# Patient Record
Sex: FEMALE | Race: WHITE | Employment: OTHER | URBAN - NONMETROPOLITAN AREA
[De-identification: names, ages, dates, MRNs, and addresses within clinical notes are randomized per-mention and may not be internally consistent; named-entity substitution may affect disease eponyms.]

---

## 2017-07-17 ENCOUNTER — APPOINTMENT (OUTPATIENT)
Dept: GENERAL RADIOLOGY | Age: 72
End: 2017-07-17
Payer: MEDICARE

## 2017-07-17 ENCOUNTER — APPOINTMENT (OUTPATIENT)
Dept: CT IMAGING | Age: 72
End: 2017-07-17
Payer: MEDICARE

## 2017-07-17 ENCOUNTER — HOSPITAL ENCOUNTER (EMERGENCY)
Age: 72
Discharge: HOME | End: 2017-07-17
Attending: FAMILY MEDICINE
Payer: MEDICARE

## 2017-07-17 VITALS
HEIGHT: 60 IN | RESPIRATION RATE: 18 BRPM | SYSTOLIC BLOOD PRESSURE: 147 MMHG | HEART RATE: 64 BPM | OXYGEN SATURATION: 98 % | DIASTOLIC BLOOD PRESSURE: 72 MMHG | BODY MASS INDEX: 24.54 KG/M2 | TEMPERATURE: 98.1 F | WEIGHT: 125 LBS

## 2017-07-17 DIAGNOSIS — S30.0XXA SACRAL CONTUSION, INITIAL ENCOUNTER: ICD-10-CM

## 2017-07-17 DIAGNOSIS — S00.03XA CONTUSION OF SCALP, INITIAL ENCOUNTER: ICD-10-CM
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
## 2017-07-17 DIAGNOSIS — S01.01XA SCALP LACERATION, INITIAL ENCOUNTER: Primary | ICD-10-CM

## 2017-07-17 PROCEDURE — 6370000000 HC RX 637 (ALT 250 FOR IP): Performed by: PHYSICIAN ASSISTANT

## 2017-07-17 PROCEDURE — 72125 CT NECK SPINE W/O DYE: CPT

## 2017-07-17 PROCEDURE — 72220 X-RAY EXAM SACRUM TAILBONE: CPT

## 2017-07-17 PROCEDURE — 12001 RPR S/N/AX/GEN/TRNK 2.5CM/<: CPT

## 2017-07-17 PROCEDURE — 73523 X-RAY EXAM HIPS BI 5/> VIEWS: CPT

## 2017-07-17 PROCEDURE — 70450 CT HEAD/BRAIN W/O DYE: CPT

## 2017-07-17 PROCEDURE — 99284 EMERGENCY DEPT VISIT MOD MDM: CPT

## 2017-07-17 RX ORDER — CALCIUM CARBONATE 500(1250)
500 TABLET ORAL DAILY
COMMUNITY
End: 2022-04-18

## 2017-07-17 RX ADMIN — Medication 3 ML: at 14:09

## 2017-07-17 ASSESSMENT — ENCOUNTER SYMPTOMS
CHEST TIGHTNESS: 0
NAUSEA: 0
SHORTNESS OF BREATH: 0
BACK PAIN: 0
DIARRHEA: 0
EYE DISCHARGE: 0
COUGH: 0
VOMITING: 0
ABDOMINAL PAIN: 0
RHINORRHEA: 0
WHEEZING: 0
FACIAL SWELLING: 0
PHOTOPHOBIA: 0
ABDOMINAL DISTENTION: 0
SORE THROAT: 0
STRIDOR: 0
VOICE CHANGE: 0
COLOR CHANGE: 0
EYE REDNESS: 0
EYE PAIN: 0
CONSTIPATION: 0

## 2017-07-17 ASSESSMENT — PAIN DESCRIPTION - DESCRIPTORS: DESCRIPTORS: THROBBING

## 2017-07-17 ASSESSMENT — PAIN DESCRIPTION - PAIN TYPE: TYPE: ACUTE PAIN

## 2017-07-17 ASSESSMENT — PAIN SCALES - GENERAL: PAINLEVEL_OUTOF10: 7

## 2019-09-26 ENCOUNTER — HOSPITAL ENCOUNTER (OUTPATIENT)
Dept: GENERAL RADIOLOGY | Age: 74
Discharge: HOME | End: 2019-09-26
Payer: MEDICARE

## 2019-09-26 DIAGNOSIS — R13.14 DYSPHAGIA, PHARYNGOESOPHAGEAL: ICD-10-CM

## 2019-09-26 PROCEDURE — A4641 RADIOPHARM DX AGENT NOC: HCPCS | Performed by: OTOLARYNGOLOGY

## 2019-09-26 PROCEDURE — 6370000000 HC RX 637 (ALT 250 FOR IP): Performed by: OTOLARYNGOLOGY

## 2019-09-26 PROCEDURE — 74220 X-RAY XM ESOPHAGUS 1CNTRST: CPT

## 2019-09-26 PROCEDURE — 2500000003 HC RX 250 WO HCPCS: Performed by: OTOLARYNGOLOGY

## 2019-09-26 PROCEDURE — 6360000004 HC RX CONTRAST MEDICATION: Performed by: OTOLARYNGOLOGY

## 2019-09-26 RX ADMIN — BARIUM SULFATE 40 ML: 980 POWDER, FOR SUSPENSION ORAL at 08:36

## 2019-09-26 RX ADMIN — BARIUM SULFATE 40 ML: 0.6 SUSPENSION ORAL at 08:36

## 2019-09-26 RX ADMIN — ANTACID/ANTIFLATULENT 1 EACH: 380; 550; 10; 10 GRANULE, EFFERVESCENT ORAL at 08:36

## 2021-02-03 ENCOUNTER — HOSPITAL ENCOUNTER (EMERGENCY)
Age: 76
Discharge: HOME | End: 2021-02-03
Payer: MEDICARE

## 2021-02-03 ENCOUNTER — APPOINTMENT (OUTPATIENT)
Dept: CT IMAGING | Age: 76
End: 2021-02-03
Payer: MEDICARE

## 2021-02-03 VITALS
HEIGHT: 60 IN | SYSTOLIC BLOOD PRESSURE: 127 MMHG | DIASTOLIC BLOOD PRESSURE: 62 MMHG | OXYGEN SATURATION: 99 % | BODY MASS INDEX: 23.16 KG/M2 | HEART RATE: 78 BPM | RESPIRATION RATE: 15 BRPM | TEMPERATURE: 97.8 F | WEIGHT: 118 LBS

## 2021-02-03 DIAGNOSIS — N20.0 KIDNEY STONE: Primary | ICD-10-CM

## 2021-02-03 LAB
ALBUMIN SERPL-MCNC: 4 G/DL (ref 3.5–5.1)
ALP BLD-CCNC: 63 U/L (ref 38–126)
ALT SERPL-CCNC: 15 U/L (ref 11–66)
ANION GAP SERPL CALCULATED.3IONS-SCNC: 7 MEQ/L (ref 8–16)
AST SERPL-CCNC: 29 U/L (ref 5–40)
BASOPHILS # BLD: 0.4 %
BASOPHILS ABSOLUTE: 0 THOU/MM3 (ref 0–0.1)
BILIRUB SERPL-MCNC: 0.5 MG/DL (ref 0.3–1.2)
BILIRUBIN DIRECT: < 0.2 MG/DL (ref 0–0.3)
BUN BLDV-MCNC: 23 MG/DL (ref 7–22)
CALCIUM SERPL-MCNC: 9.4 MG/DL (ref 8.5–10.5)
CHLORIDE BLD-SCNC: 99 MEQ/L (ref 98–111)
CO2: 32 MEQ/L (ref 23–33)
CREAT SERPL-MCNC: 0.6 MG/DL (ref 0.4–1.2)
EKG ATRIAL RATE: 62 BPM
EKG P AXIS: 34 DEGREES
EKG P-R INTERVAL: 144 MS
EKG Q-T INTERVAL: 406 MS
EKG QRS DURATION: 74 MS
EKG QTC CALCULATION (BAZETT): 412 MS
EKG R AXIS: 89 DEGREES
EKG T AXIS: 77 DEGREES
EKG VENTRICULAR RATE: 62 BPM
EOSINOPHIL # BLD: 0.4 %
EOSINOPHILS ABSOLUTE: 0 THOU/MM3 (ref 0–0.4)
ERYTHROCYTE [DISTWIDTH] IN BLOOD BY AUTOMATED COUNT: 12.9 % (ref 11.5–14.5)
ERYTHROCYTE [DISTWIDTH] IN BLOOD BY AUTOMATED COUNT: 49.1 FL (ref 35–45)
GFR SERPL CREATININE-BSD FRML MDRD: > 90 ML/MIN/1.73M2
GLUCOSE BLD-MCNC: 121 MG/DL (ref 70–108)
HCT VFR BLD CALC: 40.5 % (ref 37–47)
HEMOGLOBIN: 12.7 GM/DL (ref 12–16)
IMMATURE GRANS (ABS): 0.02 THOU/MM3 (ref 0–0.07)
IMMATURE GRANULOCYTES: 0.2 %
LIPASE: 40.9 U/L (ref 5.6–51.3)
LYMPHOCYTES # BLD: 16.9 %
LYMPHOCYTES ABSOLUTE: 1.4 THOU/MM3 (ref 1–4.8)
MCH RBC QN AUTO: 32.4 PG (ref 26–33)
MCHC RBC AUTO-ENTMCNC: 31.4 GM/DL (ref 32.2–35.5)
MCV RBC AUTO: 103.3 FL (ref 81–99)
MONOCYTES # BLD: 5.2 %
MONOCYTES ABSOLUTE: 0.4 THOU/MM3 (ref 0.4–1.3)
NUCLEATED RED BLOOD CELLS: 0 /100 WBC
OSMOLALITY CALCULATION: 280.6 MOSMOL/KG (ref 275–300)
PLATELET # BLD: 190 THOU/MM3 (ref 130–400)
PMV BLD AUTO: 10.2 FL (ref 9.4–12.4)
POTASSIUM REFLEX MAGNESIUM: 3.7 MEQ/L (ref 3.5–5.2)
RBC # BLD: 3.92 MILL/MM3 (ref 4.2–5.4)
SEG NEUTROPHILS: 76.9 %
SEGMENTED NEUTROPHILS ABSOLUTE COUNT: 6.2 THOU/MM3 (ref 1.8–7.7)
SODIUM BLD-SCNC: 138 MEQ/L (ref 135–145)
TOTAL PROTEIN: 6.6 G/DL (ref 6.1–8)
WBC # BLD: 8.1 THOU/MM3 (ref 4.8–10.8)

## 2021-02-03 PROCEDURE — 80048 BASIC METABOLIC PNL TOTAL CA: CPT

## 2021-02-03 PROCEDURE — 96372 THER/PROPH/DIAG INJ SC/IM: CPT

## 2021-02-03 PROCEDURE — 6370000000 HC RX 637 (ALT 250 FOR IP): Performed by: NURSE PRACTITIONER

## 2021-02-03 PROCEDURE — 99285 EMERGENCY DEPT VISIT HI MDM: CPT

## 2021-02-03 PROCEDURE — 6360000002 HC RX W HCPCS: Performed by: NURSE PRACTITIONER

## 2021-02-03 PROCEDURE — 93010 ELECTROCARDIOGRAM REPORT: CPT | Performed by: NUCLEAR MEDICINE

## 2021-02-03 PROCEDURE — 80076 HEPATIC FUNCTION PANEL: CPT

## 2021-02-03 PROCEDURE — 83690 ASSAY OF LIPASE: CPT

## 2021-02-03 PROCEDURE — 36415 COLL VENOUS BLD VENIPUNCTURE: CPT

## 2021-02-03 PROCEDURE — 74177 CT ABD & PELVIS W/CONTRAST: CPT

## 2021-02-03 PROCEDURE — 96374 THER/PROPH/DIAG INJ IV PUSH: CPT

## 2021-02-03 PROCEDURE — 93005 ELECTROCARDIOGRAM TRACING: CPT | Performed by: NURSE PRACTITIONER

## 2021-02-03 PROCEDURE — 96375 TX/PRO/DX INJ NEW DRUG ADDON: CPT

## 2021-02-03 PROCEDURE — 85025 COMPLETE CBC W/AUTO DIFF WBC: CPT

## 2021-02-03 PROCEDURE — 6360000004 HC RX CONTRAST MEDICATION: Performed by: NURSE PRACTITIONER

## 2021-02-03 RX ORDER — MELOXICAM 15 MG/1
15 TABLET ORAL DAILY
Status: ON HOLD | COMMUNITY
End: 2022-05-29 | Stop reason: HOSPADM

## 2021-02-03 RX ORDER — KETOROLAC TROMETHAMINE 10 MG/1
10 TABLET, FILM COATED ORAL EVERY 6 HOURS PRN
Qty: 20 TABLET | Refills: 0 | Status: SHIPPED | OUTPATIENT
Start: 2021-02-03 | End: 2021-02-12

## 2021-02-03 RX ORDER — MORPHINE SULFATE 4 MG/ML
4 INJECTION, SOLUTION INTRAMUSCULAR; INTRAVENOUS ONCE
Status: COMPLETED | OUTPATIENT
Start: 2021-02-03 | End: 2021-02-03

## 2021-02-03 RX ORDER — PROMETHAZINE HYDROCHLORIDE 25 MG/1
25 TABLET ORAL 3 TIMES DAILY PRN
Qty: 12 TABLET | Refills: 0 | Status: SHIPPED | OUTPATIENT
Start: 2021-02-03 | End: 2021-02-10

## 2021-02-03 RX ORDER — HYDROCODONE BITARTRATE AND ACETAMINOPHEN 5; 325 MG/1; MG/1
1 TABLET ORAL ONCE
Status: COMPLETED | OUTPATIENT
Start: 2021-02-03 | End: 2021-02-03

## 2021-02-03 RX ORDER — KETOROLAC TROMETHAMINE 30 MG/ML
15 INJECTION, SOLUTION INTRAMUSCULAR; INTRAVENOUS ONCE
Status: COMPLETED | OUTPATIENT
Start: 2021-02-03 | End: 2021-02-03

## 2021-02-03 RX ORDER — TAMSULOSIN HYDROCHLORIDE 0.4 MG/1
0.4 CAPSULE ORAL DAILY
Qty: 5 CAPSULE | Refills: 0 | Status: SHIPPED | OUTPATIENT
Start: 2021-02-03 | End: 2021-02-05 | Stop reason: SDUPTHER

## 2021-02-03 RX ORDER — HYDROCODONE BITARTRATE AND ACETAMINOPHEN 5; 325 MG/1; MG/1
1 TABLET ORAL EVERY 4 HOURS PRN
Qty: 18 TABLET | Refills: 0 | Status: SHIPPED | OUTPATIENT
Start: 2021-02-03 | End: 2021-02-06

## 2021-02-03 RX ORDER — METOCLOPRAMIDE HYDROCHLORIDE 5 MG/ML
10 INJECTION INTRAMUSCULAR; INTRAVENOUS ONCE
Status: COMPLETED | OUTPATIENT
Start: 2021-02-03 | End: 2021-02-03

## 2021-02-03 RX ORDER — PROMETHAZINE HYDROCHLORIDE 25 MG/ML
25 INJECTION, SOLUTION INTRAMUSCULAR; INTRAVENOUS ONCE
Status: DISCONTINUED | OUTPATIENT
Start: 2021-02-03 | End: 2021-02-03 | Stop reason: HOSPADM

## 2021-02-03 RX ORDER — ONDANSETRON 2 MG/ML
4 INJECTION INTRAMUSCULAR; INTRAVENOUS ONCE
Status: COMPLETED | OUTPATIENT
Start: 2021-02-03 | End: 2021-02-03

## 2021-02-03 RX ORDER — TAMSULOSIN HYDROCHLORIDE 0.4 MG/1
0.4 CAPSULE ORAL DAILY
Status: DISCONTINUED | OUTPATIENT
Start: 2021-02-03 | End: 2021-02-03 | Stop reason: HOSPADM

## 2021-02-03 RX ADMIN — IOPAMIDOL 80 ML: 755 INJECTION, SOLUTION INTRAVENOUS at 09:58

## 2021-02-03 RX ADMIN — METOCLOPRAMIDE 10 MG: 5 INJECTION, SOLUTION INTRAMUSCULAR; INTRAVENOUS at 11:27

## 2021-02-03 RX ADMIN — KETOROLAC TROMETHAMINE 15 MG: 30 INJECTION, SOLUTION INTRAMUSCULAR at 10:15

## 2021-02-03 RX ADMIN — MORPHINE SULFATE 4 MG: 4 INJECTION, SOLUTION INTRAMUSCULAR; INTRAVENOUS at 09:08

## 2021-02-03 RX ADMIN — ONDANSETRON 4 MG: 2 INJECTION INTRAMUSCULAR; INTRAVENOUS at 09:07

## 2021-02-03 RX ADMIN — HYDROCODONE BITARTRATE AND ACETAMINOPHEN 1 TABLET: 5; 325 TABLET ORAL at 11:21

## 2021-02-03 RX ADMIN — TAMSULOSIN HYDROCHLORIDE 0.4 MG: 0.4 CAPSULE ORAL at 11:22

## 2021-02-03 ASSESSMENT — PAIN SCALES - GENERAL
PAINLEVEL_OUTOF10: 10
PAINLEVEL_OUTOF10: 10
PAINLEVEL_OUTOF10: 8
PAINLEVEL_OUTOF10: 7

## 2021-02-03 ASSESSMENT — PAIN DESCRIPTION - ORIENTATION: ORIENTATION: RIGHT

## 2021-02-03 NOTE — ED NOTES
Pain 8/10 at this time. Pulse ox 84%. Placed patient on 2L NC. Pulse ox up to 95%. Patient resting in bed. Respirations easy and unlabored. No distress noted. Call light within reach.        Erica Madison RN  02/03/21 8549

## 2021-02-03 NOTE — ED TRIAGE NOTES
Presents to ED with c/o right flank pain that radiates into lower abdomen. Pain started at 0600 this morning. Reports nausea.

## 2021-02-03 NOTE — ED PROVIDER NOTES
[facility] COMPLAINT       Chief Complaint   Patient presents with    Abdominal Pain       Nurses Notes reviewed and I agree except as noted in the HPI. HISTORY OF PRESENT ILLNESS    Gail Daniels is a 66 y.o. female who presents to the Emergency Department for the evaluation of right flank pain and right lower quadrant abdominal pain that started this morning at approximately 8 PM.  Patient states the pain was not present when she woke, had no pain yesterday evening when she went to bed. Developed sudden onset right flank pain radiating to her right groin. Reports decreased urine output since, associated nausea and vomiting. Denies history of this type of pain, denies exacerbating or alleviating factors. The HPI was provided by the patient. REVIEW OF SYSTEMS     Review of Systems   Constitutional: Negative for chills, diaphoresis, fatigue and fever. HENT: Negative for congestion, ear pain, nosebleeds, rhinorrhea, sinus pressure, sinus pain, sore throat and trouble swallowing. Eyes: Negative for photophobia. Respiratory: Negative for cough, chest tightness, shortness of breath and wheezing. Cardiovascular: Negative for chest pain and palpitations. Gastrointestinal: Positive for abdominal pain (RLQ/groin), nausea and vomiting. Negative for constipation and diarrhea. Endocrine: Negative for cold intolerance and heat intolerance. Genitourinary: Positive for decreased urine volume and flank pain. Negative for difficulty urinating, dysuria, hematuria, pelvic pain, vaginal bleeding, vaginal discharge and vaginal pain. Musculoskeletal: Negative for arthralgias, back pain, joint swelling, myalgias and neck stiffness. Skin: Negative for color change and wound. Neurological: Negative for dizziness, weakness, light-headedness, numbness and headaches.    Psychiatric/Behavioral: Negative for agitation, behavioral problems, confusion, hallucinations, self-injury and suicidal ideas. The patient is not nervous/anxious. PAST MEDICAL HISTORY    has a past medical history of GERD (gastroesophageal reflux disease), Osteoarthritis, Paralysis of vocal cords, and Polio. SURGICAL HISTORY      has a past surgical history that includes Cholecystectomy and knee surgery. CURRENT MEDICATIONS       Discharge Medication List as of 2/3/2021 11:12 AM      CONTINUE these medications which have NOT CHANGED    Details   meloxicam (MOBIC) 7.5 MG tablet Take 7.5 mg by mouth dailyHistorical Med      calcium carbonate (OSCAL) 500 MG TABS tablet Take 500 mg by mouth dailyHistorical Med      Multiple Vitamins-Minerals (PRESERVISION AREDS 2) CAPS Take  by mouth 2 times daily. 2 tabs BID      omeprazole (PRILOSEC) 20 MG capsule Take 20 mg by mouth daily. raloxifene (EVISTA) 60 MG tablet Take 60 mg by mouth daily. fluticasone (FLONASE) 50 MCG/ACT nasal spray 1 spray by Nasal route daily. multivitamin (THERAGRAN) per tablet Take 1 tablet by mouth daily. ALLERGIES     is allergic to latex. FAMILY HISTORY     has no family status information on file. family history is not on file. SOCIAL HISTORY      reports that she has never smoked. She has never used smokeless tobacco. She reports current alcohol use. She reports that she does not use drugs. PHYSICAL EXAM     INITIAL VITALS:  height is 5' (1.524 m) and weight is 118 lb (53.5 kg). Her oral temperature is 97.8 °F (36.6 °C). Her blood pressure is 127/62 and her pulse is 78. Her respiration is 15 and oxygen saturation is 99%. Physical Exam  Vitals signs and nursing note reviewed. Constitutional:       General: She is awake. She is not in acute distress. Appearance: Normal appearance. She is well-developed and normal weight. She is ill-appearing. She is not toxic-appearing or diaphoretic. HENT:      Head: Normocephalic and atraumatic.       Right Ear: Tympanic membrane normal.      Left Ear: Tympanic membrane normal.      Nose: Nose normal.      Mouth/Throat:      Mouth: Mucous membranes are moist.      Pharynx: Oropharynx is clear. Eyes:      Extraocular Movements: Extraocular movements intact. Pupils: Pupils are equal, round, and reactive to light. Neck:      Musculoskeletal: Normal range of motion and neck supple. No neck rigidity or muscular tenderness. Vascular: No carotid bruit. Cardiovascular:      Rate and Rhythm: Normal rate and regular rhythm. Pulses: Normal pulses. Heart sounds: Normal heart sounds, S1 normal and S2 normal. Heart sounds not distant. No murmur. No friction rub. No gallop. Pulmonary:      Effort: Pulmonary effort is normal. No tachypnea, bradypnea, accessory muscle usage, prolonged expiration, respiratory distress or retractions. Breath sounds: Normal breath sounds. Abdominal:      General: Abdomen is flat. Bowel sounds are normal. There is no distension or abdominal bruit. There are no signs of injury. Palpations: Abdomen is soft. There is no shifting dullness, fluid wave, hepatomegaly, splenomegaly, mass or pulsatile mass. Tenderness: There is abdominal tenderness in the right lower quadrant and suprapubic area. There is right CVA tenderness. There is no guarding or rebound. Hernia: No hernia is present. Musculoskeletal: Normal range of motion. General: No swelling, tenderness, deformity or signs of injury. Right lower leg: No edema. Left lower leg: No edema. Lymphadenopathy:      Cervical: No cervical adenopathy. Skin:     General: Skin is warm and dry. Capillary Refill: Capillary refill takes less than 2 seconds. Neurological:      General: No focal deficit present. Mental Status: She is alert and oriented to person, place, and time. Mental status is at baseline. GCS: GCS eye subscore is 4. GCS verbal subscore is 5. GCS motor subscore is 6. within normal limits   HEPATIC FUNCTION PANEL   LIPASE   GLOMERULAR FILTRATION RATE, ESTIMATED   OSMOLALITY   URINE RT REFLEX TO CULTURE       EMERGENCY DEPARTMENT COURSE:   Vitals:    Vitals:    02/03/21 0854 02/03/21 1016   BP: 136/63 127/62   Pulse: 66 78   Resp: 14 15   Temp: 97.8 °F (36.6 °C)    TempSrc: Oral    SpO2: 98% 99%   Weight: 118 lb (53.5 kg)    Height: 5' (1.524 m)        8:49 AM EST: The patient was seen and evaluated. Patient seen and evaluated    MDM:  Patient was having significant discomfort, pain was treated appropriately, she had right CVA tenderness on exam.  Denied hematuria. Reported some decreased urine output. Labs and imaging were ordered. EKG completed by nursing staff upon arrival based on chief complaint. Pain not improved with morphine, she was given 15 mg Toradol with some improvement. CT scan shows small right UVJ stone with mild hydronephrosis. She was experiencing some significant nausea initially treated with Zofran, subsequently treated with Phenergan. I discussed admission due to patient having significant pain and nausea versus outpatient follow-up with urology. After much discussion with her , patient elected to go home with appropriate analgesia and go to urology appointment tomorrow. Encouraged to return if pain becomes more severe. CRITICAL CARE:   None    CONSULTS:  None    PROCEDURES:  None    FINAL IMPRESSION      1. Kidney stone          DISPOSITION/PLAN   Discharge    PATIENT REFERRED TO:  [address] [facility]  [address]  Go in 1 day      [facility] EMERGENCY DEPT  [address] [address]  [phone]  Go to   If symptoms worsen      DISCHARGE MEDICATIONS:  Discharge Medication List as of 2/3/2021 11:12 AM      START taking these medications    Details   tamsulosin (FLOMAX) 0.4 MG capsule Take 1 capsule by mouth daily for 5 doses, Disp-5 capsule, R-0Print      HYDROcodone-acetaminophen (NORCO) 5-325 MG per tablet Take 1 tablet by mouth every 4 hours as needed for Pain for up to 3 days. Intended supply: 3 days. Take lowest dose possible to manage pain, Disp-18 tablet, R-0Print      ketorolac (TORADOL) 10 MG tablet Take 1 tablet by mouth every 6 hours as needed for Pain, Disp-20 tablet, R-0Print             (Please note that portions of this note were completed with a voice recognition program.  Efforts were made to edit the dictations but occasionally words are mis-transcribed.)    The patient was given an opportunity to see the Emergency Attending. The patient voiced understanding that I was a Mid-LevelProvider and was in agreement with being seen independently by myself. Provider:  I personally performed the services described in the documentation, reviewed and edited the documentation which was dictated to the scribe in my presence, and it accurately records my words and actions.     IMELDA Madrigal CNP, 2/3/21, 3:39 AM        IMELDA Madrigal CNP  02/05/21 0270

## 2021-02-03 NOTE — ED NOTES
Patient resting in bed. Respirations easy and unlabored. No distress noted. Call light within reach.        Carlos Manuel Cuello RN  02/03/21 1016

## 2021-02-05 ENCOUNTER — OFFICE VISIT (OUTPATIENT)
Dept: UROLOGY | Age: 76
End: 2021-02-05
Payer: MEDICARE

## 2021-02-05 ENCOUNTER — TELEPHONE (OUTPATIENT)
Dept: UROLOGY | Age: 76
End: 2021-02-05

## 2021-02-05 VITALS — BODY MASS INDEX: 22.78 KG/M2 | WEIGHT: 116 LBS | HEIGHT: 60 IN | TEMPERATURE: 97.1 F

## 2021-02-05 DIAGNOSIS — N20.0 KIDNEY STONE: Primary | ICD-10-CM

## 2021-02-05 LAB
BILIRUBIN URINE: NEGATIVE
BLOOD URINE, POC: NEGATIVE
CHARACTER, URINE: CLEAR
COLOR, URINE: YELLOW
GLUCOSE URINE: NEGATIVE MG/DL
KETONES, URINE: 40
LEUKOCYTE CLUMPS, URINE: ABNORMAL
NITRITE, URINE: NEGATIVE
PH, URINE: 7.5 (ref 5–9)
PROTEIN, URINE: NEGATIVE MG/DL
SPECIFIC GRAVITY, URINE: 1.02 (ref 1–1.03)
UROBILINOGEN, URINE: 1 EU/DL (ref 0–1)

## 2021-02-05 PROCEDURE — 99204 OFFICE O/P NEW MOD 45 MIN: CPT | Performed by: NURSE PRACTITIONER

## 2021-02-05 PROCEDURE — 81003 URINALYSIS AUTO W/O SCOPE: CPT | Performed by: NURSE PRACTITIONER

## 2021-02-05 RX ORDER — ESTRADIOL 0.1 MG/G
2 CREAM VAGINAL DAILY
Status: ON HOLD | COMMUNITY
End: 2022-02-15

## 2021-02-05 RX ORDER — METHYLPREDNISOLONE ACETATE 80 MG/ML
80 INJECTION, SUSPENSION INTRA-ARTICULAR; INTRALESIONAL; INTRAMUSCULAR; SOFT TISSUE
COMMUNITY
End: 2021-10-05

## 2021-02-05 RX ORDER — TAMSULOSIN HYDROCHLORIDE 0.4 MG/1
0.4 CAPSULE ORAL DAILY
Qty: 14 CAPSULE | Refills: 0 | Status: SHIPPED | OUTPATIENT
Start: 2021-02-05 | End: 2021-02-12

## 2021-02-05 RX ORDER — IPRATROPIUM BROMIDE 42 UG/1
2 SPRAY, METERED NASAL 3 TIMES DAILY PRN
Status: ON HOLD | COMMUNITY
Start: 2020-09-14 | End: 2022-04-15

## 2021-02-05 RX ORDER — TRIAMCINOLONE ACETONIDE 0.25 MG/G
CREAM TOPICAL EVERY 4 HOURS PRN
COMMUNITY
End: 2021-10-05

## 2021-02-05 ASSESSMENT — ENCOUNTER SYMPTOMS
SORE THROAT: 0
COUGH: 0
PHOTOPHOBIA: 0
WHEEZING: 0
SHORTNESS OF BREATH: 0
BACK PAIN: 0
BACK PAIN: 0
CONSTIPATION: 0
SINUS PAIN: 0
SINUS PRESSURE: 0
COLOR CHANGE: 0
VOMITING: 1
DIARRHEA: 0
TROUBLE SWALLOWING: 0
CHEST TIGHTNESS: 0
RHINORRHEA: 0
ABDOMINAL PAIN: 1
NAUSEA: 1
ABDOMINAL PAIN: 1

## 2021-02-05 NOTE — TELEPHONE ENCOUNTER
Patient scheduled for US at [facility] MR on 2/9/21 arrival of 2:30 pm for a 3:00 pm scan time . No prep. Patient will do the KUB at this time also. Patient left with the instructions.

## 2021-02-05 NOTE — PROGRESS NOTES
[address] [facility].  [address] [facility]  Dept: [phone]  Loc: [phone]    Visit Date: 2/5/2021        HPI:     Piyush Wellington is a 66 y.o. female who presents today for:  Chief Complaint   Patient presents with    Advice Only     New patient seen at [address] [place] [address] for kidney stones.  Nephrolithiasis       HPI   Pt seen in referral by Sharon Chan for kidney stone. Pt presented to ER on 2/3/2021 for R flank and RLQ abdominal pain that started at 1800 on 2/3. CT imaging revealed a thickened endometrial stripe for pt's age and a 2 mm R UVJ calculus with mild right hydronephrosis. WBC 8.1, creatinine 0.6. Urinalysis was not performed. Pt reports she had pain of 10/10 located in right back and right lower quadrant of abdomen at its most severe. Pain came in [place]" and was sharp. Associated with nausea and vomiting. No measurable fever. Pt reports she hasn't taken any toradol since leaving the ER. Reports she just took some tylenol yesterday. Notes pain today is 0/10. Pt is accompanied by her .     Current Outpatient Medications   Medication Sig Dispense Refill    NONFORMULARY Occuvite      estradiol (ESTRACE VAGINAL) 0.1 MG/GM vaginal cream Place 2 g vaginally daily      methylPREDNISolone acetate (DEPO-MEDROL) 80 MG/ML injection Inject 80 mg into the muscle      ipratropium (ATROVENT) 0.06 % nasal spray 2 sprays by Nasal route 3 times daily as needed      triamcinolone (KENALOG) 0.025 % cream Apply topically every 4 hours as needed Apply Topically twice a week      meloxicam (MOBIC) 7.5 MG tablet Take 7.5 mg by mouth daily      tamsulosin (FLOMAX) 0.4 MG capsule Take 1 capsule by mouth daily for 5 doses 5 capsule 0    promethazine (PHENERGAN) 25 MG tablet Take 1 tablet by mouth 3 times daily as needed for Nausea 12 tablet 0    calcium carbonate (OSCAL) 500 MG TABS tablet Take 500 mg by mouth daily      Multiple Vitamins-Minerals (PRESERVISION AREDS 2) CAPS Take  by mouth 2 times daily. 2 tabs BID      omeprazole (PRILOSEC) 20 MG capsule Take 20 mg by mouth daily.  raloxifene (EVISTA) 60 MG tablet Take 60 mg by mouth daily.  multivitamin (THERAGRAN) per tablet Take 1 tablet by mouth daily Alive 50+      HYDROcodone-acetaminophen (NORCO) 5-325 MG per tablet Take 1 tablet by mouth every 4 hours as needed for Pain for up to 3 days. Intended supply: 3 days. Take lowest dose possible to manage pain (Patient not taking: Reported on 2/5/2021) 18 tablet 0    ketorolac (TORADOL) 10 MG tablet Take 1 tablet by mouth every 6 hours as needed for Pain (Patient not taking: Reported on 2/5/2021) 20 tablet 0     No current facility-administered medications for this visit. Past Medical History  Nish Rodriguez  has a past medical history of GERD (gastroesophageal reflux disease), Osteoarthritis, Paralysis of vocal cords, and Polio. Past Surgical History  The patient  has a past surgical history that includes Cholecystectomy and knee surgery. Family History  This patient's family history is not on file. Social History  Nish Rodriguez  reports that she has never smoked. She has never used smokeless tobacco. She reports current alcohol use. She reports that she does not use drugs. Subjective:      Review of Systems   Constitutional: Negative for activity change, appetite change, chills, diaphoresis, fatigue, fever and unexpected weight change. Gastrointestinal: Positive for abdominal pain (currently resolved). Genitourinary: Positive for flank pain (currently resolved). Negative for decreased urine volume, difficulty urinating, dysuria, frequency, hematuria and urgency. Musculoskeletal: Negative for back pain. Objective:   Temp 97.1 °F (36.2 °C)   Ht 5' (1.524 m)   Wt 116 lb (52.6 kg)   BMI 22.65 kg/m²     Physical Exam  Constitutional:       General: She is not in acute distress. Appearance: Normal appearance. She is not ill-appearing or diaphoretic. HENT:      Head: Normocephalic and atraumatic. Right Ear: External ear normal.      Left Ear: External ear normal.      Nose: Nose normal.      Mouth/Throat:      Mouth: Mucous membranes are moist.   Eyes:      General: No scleral icterus. Right eye: No discharge. Left eye: No discharge. Cardiovascular:      Rate and Rhythm: Normal rate and regular rhythm. Pulses: Normal pulses. Pulmonary:      Effort: Pulmonary effort is normal. No respiratory distress. Breath sounds: Normal breath sounds. Abdominal:      General: There is no distension. Tenderness: There is no abdominal tenderness. There is no right CVA tenderness or left CVA tenderness. Musculoskeletal: Normal range of motion. Skin:     General: Skin is warm and dry. Neurological:      General: No focal deficit present. Mental Status: She is alert and oriented to person, place, and time. Mental status is at baseline. Psychiatric:         Mood and Affect: Mood normal.         Behavior: Behavior normal.         Thought Content: Thought content normal.         POC  No results found for this visit on 02/05/21. Patients recent PSA values are as follows  No results found for: PSA, PSADIA     Recent BUN/Creatinine:  Lab Results   Component Value Date    BUN 23 02/03/2021    CREATININE 0.6 02/03/2021       Radiology  The patient has had a CT scan of the abd/pelvis which I have reviewed along with its accompanying report. The study demonstrates a possible R UVJ 2 mm calculus with mild right hydronephrosis. Also noted to have thickened endometrial stripe for age per radiology report. Assessment:   R UVJ calculus with mild R hydronephrosis  R flank and abdominal pain  N/V  Thickened endometrial stripe    Plan:     Pt's symptoms markedly improved and no pain at this time.   Stone 2 mm in size and pt would like to trial spontaneous passage. Continue flomax, prn toradol, prn zofran, and increased fluids. F/u in 1-2 weeks with renal US and KUB prior to appt. Call for any worsening in symptoms prior. Pt and  Phong Ashrafs aware to present to ER for any intractable pain, intractable n/v, or any fever. F/u in 1-2 weeks with KUB and PVR prior.

## 2021-02-07 LAB
ORGANISM: ABNORMAL
URINE CULTURE, ROUTINE: ABNORMAL

## 2021-02-09 ENCOUNTER — HOSPITAL ENCOUNTER (OUTPATIENT)
Dept: ULTRASOUND IMAGING | Age: 76
Discharge: HOME | End: 2021-02-09
Payer: MEDICARE

## 2021-02-09 ENCOUNTER — HOSPITAL ENCOUNTER (OUTPATIENT)
Dept: GENERAL RADIOLOGY | Age: 76
Discharge: HOME | End: 2021-02-09
Payer: MEDICARE

## 2021-02-09 DIAGNOSIS — N20.0 KIDNEY STONE: ICD-10-CM

## 2021-02-09 PROCEDURE — 74018 RADEX ABDOMEN 1 VIEW: CPT

## 2021-02-09 PROCEDURE — 76775 US EXAM ABDO BACK WALL LIM: CPT

## 2021-02-12 ENCOUNTER — OFFICE VISIT (OUTPATIENT)
Dept: UROLOGY | Age: 76
End: 2021-02-12
Payer: MEDICARE

## 2021-02-12 VITALS — BODY MASS INDEX: 21.99 KG/M2 | WEIGHT: 116.5 LBS | TEMPERATURE: 97 F | HEIGHT: 61 IN

## 2021-02-12 DIAGNOSIS — N20.0 KIDNEY STONE: Primary | ICD-10-CM

## 2021-02-12 LAB
BILIRUBIN URINE: NEGATIVE
BLOOD URINE, POC: NEGATIVE
CHARACTER, URINE: CLEAR
COLOR, URINE: YELLOW
GLUCOSE URINE: NEGATIVE MG/DL
KETONES, URINE: NEGATIVE
LEUKOCYTE CLUMPS, URINE: ABNORMAL
NITRITE, URINE: NEGATIVE
PH, URINE: 7 (ref 5–9)
PROTEIN, URINE: NEGATIVE MG/DL
SPECIFIC GRAVITY, URINE: 1.02 (ref 1–1.03)
UROBILINOGEN, URINE: 0.2 EU/DL (ref 0–1)

## 2021-02-12 PROCEDURE — 99214 OFFICE O/P EST MOD 30 MIN: CPT | Performed by: NURSE PRACTITIONER

## 2021-02-12 PROCEDURE — 81003 URINALYSIS AUTO W/O SCOPE: CPT | Performed by: NURSE PRACTITIONER

## 2021-02-12 ASSESSMENT — ENCOUNTER SYMPTOMS
BACK PAIN: 0
ABDOMINAL PAIN: 0

## 2021-02-12 NOTE — PROGRESS NOTES
[address] [facility].  [address]  [facility] [address]  Dept: [phone]  Loc: [phone]    Visit Date: 2/12/2021        HPI:     Fran Mccrary is a 66 y.o. female who presents today for:  Chief Complaint   Patient presents with    Follow-up     kidney stones review scan        HPI   Pt seen in follow up for kidney stone. Gordon Calhoun was seen in office 2/5/21 for a 2 mm R UVJ calculus with mild right hydronephrosis. Her CT also incidentally noted a thickened endometrial stripe for her age. When seen in office her pain was well controlled and she opted for a trial of spontaneous stone passage. She is here today to review renal US and KUB results. She reports no further pain and is doing well. Current Outpatient Medications   Medication Sig Dispense Refill    NONFORMULARY Occuvite      estradiol (ESTRACE VAGINAL) 0.1 MG/GM vaginal cream Place 2 g vaginally daily      methylPREDNISolone acetate (DEPO-MEDROL) 80 MG/ML injection Inject 80 mg into the muscle      ipratropium (ATROVENT) 0.06 % nasal spray 2 sprays by Nasal route 3 times daily as needed      triamcinolone (KENALOG) 0.025 % cream Apply topically every 4 hours as needed Apply Topically twice a week      meloxicam (MOBIC) 7.5 MG tablet Take 7.5 mg by mouth daily      calcium carbonate (OSCAL) 500 MG TABS tablet Take 500 mg by mouth daily      Multiple Vitamins-Minerals (PRESERVISION AREDS 2) CAPS Take  by mouth 2 times daily. 2 tabs BID      omeprazole (PRILOSEC) 20 MG capsule Take 20 mg by mouth daily.  raloxifene (EVISTA) 60 MG tablet Take 60 mg by mouth daily.  multivitamin (THERAGRAN) per tablet Take 1 tablet by mouth daily Alive 50+       No current facility-administered medications for this visit.         Past Medical History  Gordon Calhoun  has a past medical history of GERD (gastroesophageal reflux disease), Osteoarthritis, Paralysis of vocal cords, and Chaya. Past Surgical History  The patient  has a past surgical history that includes Cholecystectomy and knee surgery. Family History  This patient's family history is not on file. Social History  Nirmala Garnett  reports that she has never smoked. She has never used smokeless tobacco. She reports current alcohol use. She reports that she does not use drugs. Subjective:      Review of Systems   Constitutional: Negative for activity change, appetite change, chills, diaphoresis, fatigue, fever and unexpected weight change. Gastrointestinal: Negative for abdominal pain. Genitourinary: Negative for difficulty urinating, dysuria, frequency, hematuria and urgency. Musculoskeletal: Negative for back pain. Objective:   Temp 97 °F (36.1 °C)   Ht 5' 1\" (1.549 m)   Wt 116 lb 8 oz (52.8 kg)   BMI 22.01 kg/m²     Physical Exam  Constitutional:       General: She is not in acute distress. Appearance: Normal appearance. She is not ill-appearing or diaphoretic. HENT:      Head: Normocephalic and atraumatic. Right Ear: External ear normal.      Left Ear: External ear normal.      Nose: Nose normal.      Mouth/Throat:      Mouth: Mucous membranes are moist.   Eyes:      General: No scleral icterus. Right eye: No discharge. Left eye: No discharge. Cardiovascular:      Rate and Rhythm: Normal rate and regular rhythm. Pulmonary:      Effort: Pulmonary effort is normal.      Breath sounds: Normal breath sounds. Abdominal:      Tenderness: There is no abdominal tenderness. There is no right CVA tenderness or left CVA tenderness. Skin:     General: Skin is warm and dry. Neurological:      Mental Status: She is alert and oriented to person, place, and time. Mental status is at baseline. Psychiatric:         Mood and Affect: Mood normal.         Behavior: Behavior normal.         Thought Content:  Thought content normal.         POC  Results for POC orders placed in visit on 02/12/21 POCT Urinalysis No Micro (Auto)   Result Value Ref Range    Glucose, Ur Negative NEGATIVE mg/dl    Bilirubin Urine Negative     Ketones, Urine Negative NEGATIVE    Specific Gravity, Urine 1.025 1.002 - 1.030    Blood, UA POC Negative NEGATIVE    pH, Urine 7.00 5.0 - 9.0    Protein, Urine Negative NEGATIVE mg/dl    Urobilinogen, Urine 0.20 0.0 - 1.0 eu/dl    Nitrite, Urine Negative NEGATIVE    Leukocyte Clumps, Urine Small (A) NEGATIVE    Color, Urine Yellow [id number]-STRAW    Character, Urine Clear [id number]         Patients recent PSA values are as follows  No results found for: PSA, PSADIA     Recent BUN/Creatinine:  Lab Results   Component Value Date    BUN 23 02/03/2021    CREATININE 0.6 02/03/2021       Radiology  The patient has had a Renal Ultrasound which I have reviewed along with its accompanying report. The study demonstrates no hydronephrosis. PVR 10 mls. BIlateral ureteral jets visualized. this patient   Narrative   PROCEDURE: US RENAL LIMITED       CLINICAL INFORMATION: Kidney stone.       COMPARISON: No prior study.       TECHNIQUE: Grayscale and color images were obtained of both kidneys.         FINDINGS:           RIGHT KIDNEY - 9.4 x 5 x 5 cm   Resistive Index - 0.73   Cortical Thickness - 1.2 cm       LEFT KIDNEY - 10.6 x 4.9 x 4.1 cm   Resistive Index - 0.74   Cortical Thickness - 0.9 cm       URINARY BLADDER   Pre-Void - 32 mL   Post-Void - 10 mL               There is normal echogenicity of the renal parenchyma bilaterally. There is no thinning of the renal cortex. There is no hydronephrosis.  No stones are noted.       No mass lesions are noted.       There is a small amount of postvoid residual in the bladder. The previously noted tiny stone at the right ureterovesical junction seen on CT scan is not clearly seen on ultrasound. There is no bladder wall thickening.  Bilateral ureteral jets are seen.                   Impression       1.  No evidence of stones or hydronephrosis on movement

## 2021-04-06 ENCOUNTER — OFFICE VISIT (OUTPATIENT)
Dept: RHEUMATOLOGY | Age: 76
End: 2021-04-06
Payer: MEDICARE

## 2021-04-06 ENCOUNTER — NURSE ONLY (OUTPATIENT)
Dept: LAB | Age: 76
End: 2021-04-06

## 2021-04-06 VITALS
BODY MASS INDEX: 22.51 KG/M2 | HEIGHT: 61 IN | DIASTOLIC BLOOD PRESSURE: 68 MMHG | OXYGEN SATURATION: 92 % | HEART RATE: 76 BPM | SYSTOLIC BLOOD PRESSURE: 130 MMHG | WEIGHT: 119.2 LBS

## 2021-04-06 DIAGNOSIS — M81.0 AGE-RELATED OSTEOPOROSIS WITHOUT CURRENT PATHOLOGICAL FRACTURE: ICD-10-CM

## 2021-04-06 DIAGNOSIS — R53.83 OTHER FATIGUE: ICD-10-CM

## 2021-04-06 DIAGNOSIS — M81.0 AGE-RELATED OSTEOPOROSIS WITHOUT CURRENT PATHOLOGICAL FRACTURE: Primary | ICD-10-CM

## 2021-04-06 LAB
ALBUMIN SERPL-MCNC: 4.1 G/DL (ref 3.5–5.1)
ALP BLD-CCNC: 62 U/L (ref 38–126)
ALT SERPL-CCNC: 18 U/L (ref 11–66)
ANION GAP SERPL CALCULATED.3IONS-SCNC: 7 MEQ/L (ref 8–16)
AST SERPL-CCNC: 25 U/L (ref 5–40)
BASOPHILS # BLD: 0.3 %
BASOPHILS ABSOLUTE: 0 THOU/MM3 (ref 0–0.1)
BILIRUB SERPL-MCNC: 0.2 MG/DL (ref 0.3–1.2)
BUN BLDV-MCNC: 24 MG/DL (ref 7–22)
C-REACTIVE PROTEIN: < 0.3 MG/DL (ref 0–1)
CALCIUM SERPL-MCNC: 9.4 MG/DL (ref 8.5–10.5)
CHLORIDE BLD-SCNC: 103 MEQ/L (ref 98–111)
CO2: 33 MEQ/L (ref 23–33)
CREAT SERPL-MCNC: 0.5 MG/DL (ref 0.4–1.2)
EOSINOPHIL # BLD: 0.7 %
EOSINOPHILS ABSOLUTE: 0.1 THOU/MM3 (ref 0–0.4)
ERYTHROCYTE [DISTWIDTH] IN BLOOD BY AUTOMATED COUNT: 13.2 % (ref 11.5–14.5)
ERYTHROCYTE [DISTWIDTH] IN BLOOD BY AUTOMATED COUNT: 50.4 FL (ref 35–45)
GFR SERPL CREATININE-BSD FRML MDRD: > 90 ML/MIN/1.73M2
GLUCOSE BLD-MCNC: 98 MG/DL (ref 70–108)
HCT VFR BLD CALC: 42.8 % (ref 37–47)
HEMOGLOBIN: 12.8 GM/DL (ref 12–16)
IMMATURE GRANS (ABS): 0.02 THOU/MM3 (ref 0–0.07)
IMMATURE GRANULOCYTES: 0.3 %
LYMPHOCYTES # BLD: 24.6 %
LYMPHOCYTES ABSOLUTE: 1.8 THOU/MM3 (ref 1–4.8)
MAGNESIUM: 2 MG/DL (ref 1.6–2.4)
MCH RBC QN AUTO: 30.8 PG (ref 26–33)
MCHC RBC AUTO-ENTMCNC: 29.9 GM/DL (ref 32.2–35.5)
MCV RBC AUTO: 103.1 FL (ref 81–99)
MONOCYTES # BLD: 7.7 %
MONOCYTES ABSOLUTE: 0.6 THOU/MM3 (ref 0.4–1.3)
NUCLEATED RED BLOOD CELLS: 0 /100 WBC
PHOSPHORUS: 3.3 MG/DL (ref 2.4–4.7)
PLATELET # BLD: 170 THOU/MM3 (ref 130–400)
PMV BLD AUTO: 10.7 FL (ref 9.4–12.4)
POTASSIUM SERPL-SCNC: 4.1 MEQ/L (ref 3.5–5.2)
RBC # BLD: 4.15 MILL/MM3 (ref 4.2–5.4)
SEDIMENTATION RATE, ERYTHROCYTE: 2 MM/HR (ref 0–20)
SEG NEUTROPHILS: 66.4 %
SEGMENTED NEUTROPHILS ABSOLUTE COUNT: 5 THOU/MM3 (ref 1.8–7.7)
SODIUM BLD-SCNC: 143 MEQ/L (ref 135–145)
TOTAL PROTEIN: 6.4 G/DL (ref 6.1–8)
TSH SERPL DL<=0.05 MIU/L-ACNC: 1.39 UIU/ML (ref 0.4–4.2)
WBC # BLD: 7.5 THOU/MM3 (ref 4.8–10.8)

## 2021-04-06 PROCEDURE — 99203 OFFICE O/P NEW LOW 30 MIN: CPT | Performed by: NURSE PRACTITIONER

## 2021-04-06 RX ORDER — 0.9 % SODIUM CHLORIDE 0.9 %
500 INTRAVENOUS SOLUTION INTRAVENOUS ONCE
Status: CANCELLED | OUTPATIENT
Start: 2021-04-06 | End: 2021-04-06

## 2021-04-06 RX ORDER — SODIUM CHLORIDE 0.9 % (FLUSH) 0.9 %
10 SYRINGE (ML) INJECTION PRN
Status: CANCELLED | OUTPATIENT
Start: 2021-04-06

## 2021-04-06 RX ORDER — METHYLPREDNISOLONE SODIUM SUCCINATE 125 MG/2ML
125 INJECTION, POWDER, LYOPHILIZED, FOR SOLUTION INTRAMUSCULAR; INTRAVENOUS ONCE
Status: CANCELLED | OUTPATIENT
Start: 2021-04-06 | End: 2021-04-06

## 2021-04-06 RX ORDER — CHOLECALCIFEROL (VITAMIN D3) 1250 MCG
CAPSULE ORAL
Status: ON HOLD | COMMUNITY
End: 2022-02-15

## 2021-04-06 RX ORDER — EPINEPHRINE 1 MG/ML
0.3 INJECTION, SOLUTION, CONCENTRATE INTRAVENOUS PRN
Status: CANCELLED | OUTPATIENT
Start: 2021-04-06

## 2021-04-06 RX ORDER — HEPARIN SODIUM (PORCINE) LOCK FLUSH IV SOLN 100 UNIT/ML 100 UNIT/ML
500 SOLUTION INTRAVENOUS PRN
Status: CANCELLED | OUTPATIENT
Start: 2021-04-06

## 2021-04-06 RX ORDER — SODIUM CHLORIDE 9 MG/ML
INJECTION, SOLUTION INTRAVENOUS ONCE
Status: CANCELLED | OUTPATIENT
Start: 2021-04-06 | End: 2021-04-06

## 2021-04-06 RX ORDER — ZOLEDRONIC ACID 5 MG/100ML
5 INJECTION, SOLUTION INTRAVENOUS ONCE
Status: CANCELLED | OUTPATIENT
Start: 2021-04-06 | End: 2021-04-06

## 2021-04-06 RX ORDER — SODIUM CHLORIDE 9 MG/ML
INJECTION, SOLUTION INTRAVENOUS CONTINUOUS
Status: CANCELLED | OUTPATIENT
Start: 2021-04-06

## 2021-04-06 RX ORDER — DIPHENHYDRAMINE HYDROCHLORIDE 50 MG/ML
50 INJECTION INTRAMUSCULAR; INTRAVENOUS ONCE
Status: CANCELLED | OUTPATIENT
Start: 2021-04-06 | End: 2021-04-06

## 2021-04-06 RX ORDER — SODIUM CHLORIDE 0.9 % (FLUSH) 0.9 %
5 SYRINGE (ML) INJECTION PRN
Status: CANCELLED | OUTPATIENT
Start: 2021-04-06

## 2021-04-06 ASSESSMENT — ENCOUNTER SYMPTOMS
EYE ITCHING: 0
ABDOMINAL PAIN: 0
EYE PAIN: 0
CONSTIPATION: 0
COUGH: 0
NAUSEA: 0
SHORTNESS OF BREATH: 0
DIARRHEA: 0
BACK PAIN: 0
TROUBLE SWALLOWING: 1

## 2021-04-06 NOTE — PROGRESS NOTES
[facility]  Bone Fragility Adult Consult/Referral     Visit Date: 4/6/2021  MRN: [id number]  Cc:   Chief Complaint   Patient presents with    New Patient     referral for bone fragility from Christa Sanchez         HPI:   Jonathan Kee  is a(n)75 y.o. female with a hx of osteoarthritis, GERD, polio, paralysis of vocal cords, diverticulosis, hypertension, osteoporosis here today as a consultation from Dr. Neymar Dumont for the evaluation of Osteoporosis. She was diagnosed with osteoporosis about 4 years ago. Most recent bone density scan on 11/3/2020 revealed T score of -3.0 at left hip. Patient denies history of fracture. No known family history of osteoporosis or fracture. She is . Has been postmenopausal since her late 40's. No history of smoking. Rare alcohol use. Has about 2 cups of coffee per day. Eats cheese and spinach frequently. Currently taking calcium and vitamin D supplements. Has had a couple falls, but nothing frequent. Walks a couple times per week for exercise. - Pet, + throw rugs, + nightlights, +  railing in bathroom, - falls or near fall, - muscle weakness    Patient has been on evista for at least 10 years. ROS:  Review of Systems   Constitutional: Negative for fatigue, fever and unexpected weight change. HENT: Positive for trouble swallowing. Negative for congestion. Eyes: Negative for pain and itching. Respiratory: Negative for cough and shortness of breath. Cardiovascular: Negative for chest pain and leg swelling. Gastrointestinal: Negative for abdominal pain, constipation, diarrhea and nausea. Endocrine: Negative for cold intolerance and heat intolerance. Genitourinary: Negative for difficulty urinating, frequency and urgency. Musculoskeletal: Positive for arthralgias. Negative for back pain and joint swelling. Skin: Negative for rash. Neurological: Negative for dizziness, weakness, numbness and headaches. 4.0 02/03/2021    PROT 6.6 02/03/2021     02/03/2021    K 3.7 02/03/2021    CO2 32 02/03/2021    CL 99 02/03/2021    BUN 23 02/03/2021    CREATININE 0.6 02/03/2021    ALKPHOS 63 02/03/2021    ALT 15 02/03/2021    AST 29 02/03/2021       HgBA1c: No components found for: HGBA1C    No results found for: TSHFT4, TSH  No results found for: VITD25    No results found for: SEDRATE  No results found for: CRP    Lab Results   Component Value Date    CALCIUM 9.4 02/03/2021     Lab Results   Component Value Date     02/03/2021    K 3.7 02/03/2021    CL 99 02/03/2021    CO2 32 02/03/2021    BUN 23 (H) 02/03/2021    CREATININE 0.6 02/03/2021    GLUCOSE 121 (H) 02/03/2021    CALCIUM 9.4 02/03/2021    PROT 6.6 02/03/2021    LABALBU 4.0 02/03/2021    BILITOT 0.5 02/03/2021    ALKPHOS 63 02/03/2021    AST 29 02/03/2021    ALT 15 02/03/2021         RADIOLOGY:     DEXA 11/3/2020  Left hip: -3.0, 0.625  Right hip: -2.7, 0.670  Lumbar spine: -2.8, 0.849      Assessment and plan:  Osteoporosis, postmenopausal  - A 66year old  female with osteoporosis diagnosed about 4 years ago, and osteopenia for several years prior to that. Has been treated with evista for about 10 years. Denies any fractures. No known fmhx of OP. Reports DEXA showed decline from previous. 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 - Recent DEXA reviewed with patient. - request prior DEXA records from [facility]  - Check CMP, CBC, phos, mag, Vit D, sed rate, CRP, TSH for baseline and to screen for secondary osteoporosis. - oral bisphosphonate contraindicated due to dysphagia   - start reclast 5 mg IV yearly. Side effects discussed. - Xray thoracic and lumbar spine to screen for compression fractures  - Encouraged calcium (1200 mg daily) and vitamin D3 (2000 IU daily) supplementation  - Encouraged regular weight bearing exercises.    - Referral to PT bone fragility program  - Education provided about fall risk prevention.    - Repeat DEXA 11/2022. No follow-ups on file. Electronically signed by IMELDA Hoffman CNP on 4/6/2021 at 1:00 PM      Thank you for allowing me to participate in the care of this patient. Please call if there are any questions.

## 2021-04-07 LAB — VITAMIN D 25-HYDROXY: 97 NG/ML (ref 30–100)

## 2021-04-12 ENCOUNTER — HOSPITAL ENCOUNTER (OUTPATIENT)
Dept: PHYSICAL THERAPY | Age: 76
Setting detail: THERAPIES SERIES
Discharge: HOME | End: 2021-04-12
Payer: MEDICARE

## 2021-04-12 PROCEDURE — 97161 PT EVAL LOW COMPLEX 20 MIN: CPT

## 2021-04-12 PROCEDURE — 97110 THERAPEUTIC EXERCISES: CPT

## 2021-04-12 NOTE — PROGRESS NOTES
been unsuccessful. CNP considering yearly injection and wanted to try PT. Pt denies suffering any previous fractures. Social/Functional History and Current Status:  Medications and Allergies have been reviewed and are listed on Medical History Questionnaire. Hank Pérez lives with spouse in a multiple floor home with ability to complete ADL's on main floor with 1-2 stairs and no handrail to enter.     Task Previous Current   ADLs  Independent Independent   IADL's Independent Independent   Ambulation Independent Independent   Transfers Independent Independent   Recreation Independent-gardening, sewing, reading, piano, travel, volunteer Independent   Community Integration Independent Independent   Driving Active  Active    Work Retired  Retired     OBJECTIVE:    Pain: Chronic left shoulder osteoarthritis pain   Palpation    Observation Right shoulder lower than left in standing, left scapular winging   Posture Occiput to wall 6.5 cm, thoracic kyphosis       Range of Motion BLE AROM WFL  BUE AROM WFL   Strength Seated B hip 4/5, hamstring 5/5, quad 4+/5, ankle df 5/5  B shoulder 4/5, elbow 4+/5   Coordination Intact    Sensation Intact    Bed Mobility Mod I- demos log roll technique with head of bed elevated   Transfers Independent, straight back, hands on legs but doesn't rely on UEs   Ambulation Good pace; normal step length; straight path   Stairs Reciprocating pattern, light handrail use   Balance See Tinetti, narrow step stance B x20 sec without sway   Special Tests          TREATMENT   Precautions:    Pain:     X in shaded column indicates activity completed today   Modalities Parameters/  Location  Notes                     Manual Therapy Time/Technique  Notes                     Exercise/Intervention   Notes   Osteoporosis  education with daily tasks 15 min  x    Corner pec stetch 3x20 sec  x    Horiztonal abduction 5  x Aggravates right shoulder   tband rows, extension 10x3 sec peach x    Wall push-up 10  x    Semi-recumbent scapular retraction in ER with hands behind head 10 3 sec                                          Specific Interventions Next Treatment: progress postural exercises, address body mechanics as needed, osteoporosis education    Activity/Treatment Tolerance:  [x]  Patient tolerated treatment well  []  Patient limited by fatigue  []  Patient limited by pain   []  Patient limited by medical complications  []  Other:     Assessment: 66year old presents with age-related osteoporosis for bone fragility clinic. Pt demos mild to moderate postural impairments with increased distance of occiput to wall test. Pt demos good body mechanics with bed mobility and transfers sit to/from stand, but requires education on bending to retrieve objects and garden. Postural exercises limited by shoulder arthritis but able to modify. Pt demos good balance but reviewed fall precautions. Pt will benefit from skilled PT to address listed impairments. Body Structures/Functions/Activity Limitations: impaired ROM, impaired strength and decreased posture  Prognosis: good    GOALS:  Patient Goal: Be more aware of body mechanics to decrease risk of fractures    Short Term Goals:  Time Frame: see LTGs      Long Term Goals:  Time Frame: 8 weeks  Patient will improve occiput to wall distance from 6.5 cm to 5.5 cm to decrease thoracic kyphosis and risk of fractures. Patient will improve postural awareness with retrieving objects and gardening with minimal spinal flexion to decrease stress on spine. Patient will be independent and compliant with HEP daily to achieve above goals. Patient Education:   [x]  HEP/Education Completed: Plan of Care, Goals, attendance policy, posture, body mechanics, exercises described above   [employer] Access Code:  []  No new Education completed  []  Reviewed Prior HEP      [x]  Patient verbalized and/or demonstrated understanding of education provided.   [] Patient unable to verbalize and/or demonstrate understanding of education provided. Will continue education. []  Barriers to learning:     PLAN:  Treatment Recommendations: Strengthening, Range of Motion, Manual Therapy - Soft Tissue Mobilization, Home Exercise Program, Patient Education and Safety Education and Training    [x]  Plan of care initiated. Plan to see patient 1 times per week for 8 weeks to address the treatment planned outlined above.   []  Continue with current plan of care  []  Modify plan of care as follows:    []  Hold pending physician visit  []  Discharge    Time In 0900   Time Out 0955   Timed Code Minutes: 25 min   Total Treatment Time: 55 min       Electronically Signed by: Chase Corrales

## 2021-04-21 ENCOUNTER — HOSPITAL ENCOUNTER (OUTPATIENT)
Dept: PHYSICAL THERAPY | Age: 76
Setting detail: THERAPIES SERIES
Discharge: HOME | End: 2021-04-21
Payer: MEDICARE

## 2021-04-21 PROCEDURE — 97110 THERAPEUTIC EXERCISES: CPT

## 2021-04-21 NOTE — PROGRESS NOTES
[address] [facility]  PHYSICAL THERAPY  [] EVALUATION  [x] DAILY NOTE (LAND) [] DAILY NOTE (AQUATIC ) [] PROGRESS NOTE [] DISCHARGE NOTE    [x] [facility] [place]   [] [facility]    [] [facility]   [] [facility]    Date: 2021  Patient Name:  Julio Cesar Ott  : 1945  MRN: [id number]  CSN: [id number]    Referring Practitioner Dread , *   Diagnosis Age-related osteoporosis without current pathological fracture [M81.0]    Treatment Diagnosis Decreased posture, shoulder and quad weakness   Date of Evaluation 21    Additional Pertinent History Osteoporosis, arthritis, polio with vocal cord paralysis. Functional Outcome Measure Used Occiput to wall   Functional Outcome Score 6.5 cm (21)       Insurance: Primary: Payor: Stephon Barger /  /  / ,   Secondary:    Authorization Information: Aquatics, modalities, and massage covered, telehealth covered   Visit # 2, 2/10 for progress note   Visits Allowed: Unlimited based on medical necessity   Recertification Date:    Physician Follow-Up: 10/5/21   Physician Orders:    History of Present Illness: Osteoporosis for ~4-5 years. Dexa scan within this year. Left shoulder injections every 3 months d/t osteoarthritis. SUBJECTIVE: Pt notes exercises are going well at home but had some questions on technique. Pt received steroid injection in left shoulder yesterday so modified or held exercises. Pt reports wall push up and corner stretch bother her back.      TREATMENT   Precautions:    Pain:     X in shaded column indicates activity completed today   Modalities Parameters/  Location  Notes                     Manual Therapy Time/Technique  Notes                     Exercise/Intervention   Notes   Osteoporosis  education with daily tasks 15 min  x    Corner pec stetch 3x20 sec  x Staggered stance   Horiztonal abduction, ER with tband 5 peach x Aggravates right shoulder   tband rows, extension 10x3 sec peach x    Wall push-up 10  x    Semi-recumbent scapular retraction in ER with hands behind head 10 5 sec     Field goal ER, \"W\" 10  x    Cervical retraction 10x5 sec  x    Seated thoracic extension over ball 10x5 sec   x    Standing 3 way hip with abdominal bracing 10  x    DLS: bridges, SLR, sidelying hip abduction and clams 10  x      Specific Interventions Next Treatment: reassess goals with plan for discharge    Activity/Treatment Tolerance:  [x]  Patient tolerated treatment well  []  Patient limited by fatigue  []  Patient limited by pain   []  Patient limited by medical complications  []  Other:     Assessment: Reviewed HEP and introduced mat DLSP, standing 3 way hip, cervical retraction and thoracic extension with good tolerance. Pt notes neck soreness at end of session. Pt had no complaints of shoulder pain. Instructed pt on staggered stance for corner stretch and wall push-ups to decrease strain on low back. Pt requires occasional cues for posture and core engagement throughout session. Plan to reassess next session. GOALS:  Patient Goal: Be more aware of body mechanics to decrease risk of fractures    Short Term Goals:  Time Frame: see LTGs      Long Term Goals:  Time Frame: 8 weeks  Patient will improve occiput to wall distance from 6.5 cm to 5.5 cm to decrease thoracic kyphosis and risk of fractures. Patient will improve postural awareness with retrieving objects and gardening with minimal spinal flexion to decrease stress on spine. Patient will be independent and compliant with HEP daily to achieve above goals. Patient Education:   [x]  HEP/Education Completed: 3 way hip, cerv retraction, thoracic extension, DLSP with handout   [employer] Access Code:  []  No new Education completed  [x]  Reviewed Prior HEP      [x]  Patient verbalized and/or demonstrated understanding of education provided.   []  Patient unable to verbalize and/or demonstrate understanding of education provided. Will continue education. []  Barriers to learning:     PLAN:  Treatment Recommendations: Strengthening, Range of Motion, Manual Therapy - Soft Tissue Mobilization, Home Exercise Program, Patient Education and Safety Education and Training    []  Plan of care initiated. Plan to see patient 1 times per week for 8 weeks to address the treatment planned outlined above.   [x]  Continue with current plan of care  []  Modify plan of care as follows:    []  Hold pending physician visit  []  Discharge    Time In 0845   Time Out 0915   Timed Code Minutes: 30 min   Total Treatment Time: 30 min       Electronically Signed by: Jaxon Mckinnon

## 2021-04-28 ENCOUNTER — HOSPITAL ENCOUNTER (OUTPATIENT)
Dept: NURSING | Age: 76
Discharge: HOME | End: 2021-04-28
Payer: MEDICARE

## 2021-04-28 VITALS
HEART RATE: 76 BPM | RESPIRATION RATE: 18 BRPM | DIASTOLIC BLOOD PRESSURE: 70 MMHG | SYSTOLIC BLOOD PRESSURE: 157 MMHG | OXYGEN SATURATION: 97 % | TEMPERATURE: 97.9 F

## 2021-04-28 DIAGNOSIS — M81.0 AGE-RELATED OSTEOPOROSIS WITHOUT CURRENT PATHOLOGICAL FRACTURE: Primary | ICD-10-CM

## 2021-04-28 PROCEDURE — 6360000002 HC RX W HCPCS: Performed by: NURSE PRACTITIONER

## 2021-04-28 PROCEDURE — 2580000003 HC RX 258: Performed by: NURSE PRACTITIONER

## 2021-04-28 PROCEDURE — 96365 THER/PROPH/DIAG IV INF INIT: CPT

## 2021-04-28 RX ORDER — SODIUM CHLORIDE 0.9 % (FLUSH) 0.9 %
10 SYRINGE (ML) INJECTION PRN
Status: CANCELLED | OUTPATIENT
Start: 2021-04-28

## 2021-04-28 RX ORDER — SODIUM CHLORIDE 9 MG/ML
INJECTION, SOLUTION INTRAVENOUS ONCE
Status: COMPLETED | OUTPATIENT
Start: 2021-04-28 | End: 2021-04-28

## 2021-04-28 RX ORDER — ZOLEDRONIC ACID 5 MG/100ML
5 INJECTION, SOLUTION INTRAVENOUS ONCE
Status: CANCELLED | OUTPATIENT
Start: 2021-04-28 | End: 2021-04-28

## 2021-04-28 RX ORDER — METHYLPREDNISOLONE SODIUM SUCCINATE 125 MG/2ML
125 INJECTION, POWDER, LYOPHILIZED, FOR SOLUTION INTRAMUSCULAR; INTRAVENOUS ONCE
Status: CANCELLED | OUTPATIENT
Start: 2021-04-28 | End: 2021-04-28

## 2021-04-28 RX ORDER — SODIUM CHLORIDE 9 MG/ML
INJECTION, SOLUTION INTRAVENOUS CONTINUOUS
Status: CANCELLED | OUTPATIENT
Start: 2021-04-28

## 2021-04-28 RX ORDER — SODIUM CHLORIDE 9 MG/ML
INJECTION, SOLUTION INTRAVENOUS ONCE
Status: CANCELLED | OUTPATIENT
Start: 2021-04-28 | End: 2021-04-28

## 2021-04-28 RX ORDER — DIPHENHYDRAMINE HYDROCHLORIDE 50 MG/ML
50 INJECTION INTRAMUSCULAR; INTRAVENOUS ONCE
Status: CANCELLED | OUTPATIENT
Start: 2021-04-28 | End: 2021-04-28

## 2021-04-28 RX ORDER — 0.9 % SODIUM CHLORIDE 0.9 %
500 INTRAVENOUS SOLUTION INTRAVENOUS ONCE
Status: COMPLETED | OUTPATIENT
Start: 2021-04-28 | End: 2021-04-28

## 2021-04-28 RX ORDER — 0.9 % SODIUM CHLORIDE 0.9 %
500 INTRAVENOUS SOLUTION INTRAVENOUS ONCE
Status: CANCELLED | OUTPATIENT
Start: 2021-04-28 | End: 2021-04-28

## 2021-04-28 RX ORDER — ZOLEDRONIC ACID 5 MG/100ML
5 INJECTION, SOLUTION INTRAVENOUS ONCE
Status: COMPLETED | OUTPATIENT
Start: 2021-04-28 | End: 2021-04-28

## 2021-04-28 RX ORDER — SODIUM CHLORIDE 0.9 % (FLUSH) 0.9 %
5 SYRINGE (ML) INJECTION PRN
Status: CANCELLED | OUTPATIENT
Start: 2021-04-28

## 2021-04-28 RX ORDER — HEPARIN SODIUM (PORCINE) LOCK FLUSH IV SOLN 100 UNIT/ML 100 UNIT/ML
500 SOLUTION INTRAVENOUS PRN
Status: CANCELLED | OUTPATIENT
Start: 2021-04-28

## 2021-04-28 RX ADMIN — SODIUM CHLORIDE 500 ML: 9 INJECTION, SOLUTION INTRAVENOUS at 11:40

## 2021-04-28 RX ADMIN — SODIUM CHLORIDE: 9 INJECTION, SOLUTION INTRAVENOUS at 11:24

## 2021-04-28 RX ADMIN — ZOLEDRONIC ACID 5 MG: 5 INJECTION, SOLUTION INTRAVENOUS at 11:21

## 2021-04-28 NOTE — PROGRESS NOTES
1100  Pt ambulated into room for reclast infusion. Pt rights and responsibilities offered to pt. Pt reclast infusion sliding scale used and pt okay to get reclast. Pt reclast explained and questions answered. 1121  reclast infusion started. Pt has no other needs at this time. 1140  reclast infusion complete and pt tolerated well. Hydration started. Pt has no other needs at this time. 1215  500 ml bolus complete and pt tolerated well. D/c instructions explained and pt verbalized understanding.  Pt ambulated out for d/c.       _m___ Safety:       (Environmental)   Parrott to environment   Ensure ID band is correct and in place/ allergy band as needed   Assess for fall risk   Initiate fall precautions as applicable (fall band, side rails, etc.)   Call light within reach   Bed in low position/ wheels locked    __m__ Pain:        Assess pain level and characteristics   Administer analgesics as ordered   Assess effectiveness of pain management and report to MD as needed    _m___ Knowledge Deficit:   Assess baseline knowledge   Provide teaching at level of understanding   Provide teaching via preferred learning method   Evaluate teaching effectiveness    __m__ Hemodynamic/Respiratory Status:       (Pre and Post Procedure Monitoring)   Assess/Monitor vital signs and LOC   Assess Baseline SpO2 prior to any sedation   Obtain weight/height   Assess vital signs/ LOC until patient meets discharge criteria   Monitor procedure site and notify MD of any issues
S/P  with first degree lac EBL 300cc on magnesium sulfate at 2 gm/hr with + orthostatic b/ps  fundal height 2F above umb with shift to right

## 2021-04-29 ENCOUNTER — TELEPHONE (OUTPATIENT)
Dept: RHEUMATOLOGY | Age: 76
End: 2021-04-29

## 2021-05-11 ENCOUNTER — HOSPITAL ENCOUNTER (OUTPATIENT)
Dept: PHYSICAL THERAPY | Age: 76
Setting detail: THERAPIES SERIES
Discharge: HOME | End: 2021-05-11
Payer: MEDICARE

## 2021-05-11 PROCEDURE — 97110 THERAPEUTIC EXERCISES: CPT

## 2021-05-11 NOTE — DISCHARGE SUMMARY
[facility]  PHYSICAL THERAPY  [] EVALUATION  [] DAILY NOTE (LAND) [] DAILY NOTE (AQUATIC ) [] PROGRESS NOTE [x] DISCHARGE NOTE    [x] [facility] [place]   [] [place] [address]    [] [facility]   [] [facility]    Date: 2021  Patient Name:  Victoriano Braswell  : 1945  MRN: [id number]  CSN: [id number]    Referring Practitioner Cheryl Stevens, *   Diagnosis Age-related osteoporosis without current pathological fracture [M81.0]    Treatment Diagnosis Decreased posture, shoulder and quad weakness   Date of Evaluation 21    Additional Pertinent History Osteoporosis, arthritis, polio with vocal cord paralysis. Functional Outcome Measure Used Occiput to wall   Functional Outcome Score 6.5 cm (21) 5 cm (21)      Insurance: Primary: Payor: Renee Tompkins /  /  / ,   Secondary:    Authorization Information: Aquatics, modalities, and massage covered, telehealth covered   Visit # 3, 3/10 for progress note   Visits Allowed: Unlimited based on medical necessity   Recertification Date: 34   Physician Follow-Up: 10/5/21   Physician Orders:    History of Present Illness: Osteoporosis for ~4-5 years. Dexa scan within this year. Left shoulder injections every 3 months d/t osteoarthritis. SUBJECTIVE: Pt notes exercises are going well at home. Pt has questions about body mechanics with unloading . Pt had Reclast injection a couple weeks ago and it went well, no adverse reactions.      TREATMENT   Precautions:    Pain:     X in shaded column indicates activity completed today   Modalities Parameters/  Location  Notes                     Manual Therapy Time/Technique  Notes                     Exercise/Intervention   Notes   Osteoporosis  education with daily tasks 15 min      Corner pec stetch 3x20 sec   Staggered stance   Horiztonal abduction, ER with tband 5 peach  Aggravates right shoulder tband rows, extension 10x3 sec peach     Wall push-up 10  x    Semi-recumbent scapular retraction in ER with hands behind head 10 5 sec     Field goal ER, \"W\" 10  x    Cervical retraction with flexion 10x5 sec  x    Seated thoracic extension over ball 10x5 sec   x    Standing 3 way hip with abdominal bracing 10  x    DLS: bridges, SLR, sidelying hip abduction and clams 10  x    Squats 15  x Cues for technique       Activity/Treatment Tolerance:  [x]  Patient tolerated treatment well  []  Patient limited by fatigue  []  Patient limited by pain   []  Patient limited by medical complications  []  Other:     Assessment: Patient demos good progress toward goals. Occiput to wall decreased 1.5 cm, decreasing thoracic kyphosis. Pt demos good postural awareness and  awareness with daily tasks, decreasing stress on spine. Introduced squats and discussed controlled descent to improve LE strength. No further PT warranted at this time. GOALS:  Patient Goal: Be more aware of body mechanics to decrease risk of fractures    Short Term Goals:  Time Frame: see LTGs      Long Term Goals:  Time Frame: 8 weeks  Patient will improve occiput to wall distance from 6.5 cm to 5.5 cm to decrease thoracic kyphosis and risk of fractures. GOAL MET:  occiput to wall 5 cm. Discontinue Goal  Patient will improve postural awareness with retrieving objects and gardening with minimal spinal flexion to decrease stress on spine. GOAL MET:  Pt demos good body mechanics with minimal spinal flexion when doing daily tasks. Discontinue Goal  Patient will be independent and compliant with HEP daily to achieve above goals. GOAL MET:  Pt verbalizes compliance with HEP daily and demos good understanding.   Discontinue Goal      Patient Education:   [x]  HEP/Education Completed: squats, controlled descent to chair, unloading diswasher   [employer] Access Code:  []  No new Education completed  [x]  Reviewed Prior HEP      [x]  Patient

## 2021-10-05 ENCOUNTER — OFFICE VISIT (OUTPATIENT)
Dept: RHEUMATOLOGY | Age: 76
End: 2021-10-05
Payer: MEDICARE

## 2021-10-05 VITALS
HEIGHT: 61 IN | SYSTOLIC BLOOD PRESSURE: 146 MMHG | HEART RATE: 78 BPM | BODY MASS INDEX: 21.18 KG/M2 | WEIGHT: 112.2 LBS | OXYGEN SATURATION: 95 % | DIASTOLIC BLOOD PRESSURE: 66 MMHG

## 2021-10-05 DIAGNOSIS — M81.0 AGE-RELATED OSTEOPOROSIS WITHOUT CURRENT PATHOLOGICAL FRACTURE: Primary | ICD-10-CM

## 2021-10-05 PROCEDURE — 99213 OFFICE O/P EST LOW 20 MIN: CPT | Performed by: NURSE PRACTITIONER

## 2021-10-05 ASSESSMENT — ENCOUNTER SYMPTOMS
EYE ITCHING: 0
SHORTNESS OF BREATH: 0
TROUBLE SWALLOWING: 1
BACK PAIN: 0
NAUSEA: 0
EYE PAIN: 0
COUGH: 0
DIARRHEA: 0
ABDOMINAL PAIN: 0
CONSTIPATION: 0

## 2021-10-05 NOTE — PROGRESS NOTES
[facility]  Bone Fragility Follow up     Visit Date: 10/5/2021  MRN: [id number]  Cc:   Chief Complaint   Patient presents with    6 Month Follow-Up         HPI:   Abbe Diop  is a(n)75 y.o. female here today for follow up of Osteoporosis. Started reclast x1 dose, tolerated well. Taking calcium and vitamin D supplements. Reports 1 fall on some bleachers when she tripped. No major injuries. ROS:  Review of Systems   Constitutional: Negative for fatigue, fever and unexpected weight change. HENT: Positive for trouble swallowing. Negative for congestion. Eyes: Negative for pain and itching. Respiratory: Negative for cough and shortness of breath. Cardiovascular: Negative for chest pain and leg swelling. Gastrointestinal: Negative for abdominal pain, constipation, diarrhea and nausea. Endocrine: Negative for cold intolerance and heat intolerance. Genitourinary: Negative for difficulty urinating, frequency and urgency. Musculoskeletal: Positive for arthralgias. Negative for back pain and joint swelling. Skin: Negative for rash. Neurological: Negative for dizziness, weakness, numbness and headaches. Psychiatric/Behavioral: The patient is not nervous/anxious.           PAST MEDICAL HISTORY  Past Medical History:   Diagnosis Date    GERD (gastroesophageal reflux disease)     Osteoarthritis     Paralysis of vocal cords     Polio        SOCIAL HISTORY  Social History     Socioeconomic History    Marital status:      Spouse name: None    Number of children: None    Years of education: None    Highest education level: None   Occupational History    None   Tobacco Use    Smoking status: Never Smoker    Smokeless tobacco: Never Used   Substance and Sexual Activity    Alcohol use: Yes     Comment: Seldom    Drug use: No    Sexual activity: None   Other Topics Concern    None   Social History Narrative    None     Social Determinants of Health Financial Resource Strain:     Difficulty of Paying Living Expenses:    Food Insecurity:     Worried About Running Out of Food in the Last Year:     [address] in the Last Year:    Transportation Needs:     Lack of Transportation (Medical):  Lack of Transportation (Non-Medical):    Physical Activity:     Days of Exercise per Week:     Minutes of Exercise per Session:    Stress:     Feeling of Stress :    Social Connections:     Frequency of Communication with Friends and Family:     Frequency of Social Gatherings with Friends and Family:     Attends Christian Services:     Active Member of Clubs or Organizations:     Attends Club or Organization Meetings:     Marital Status:    Intimate Partner Violence:     Fear of Current or Ex-Partner:     Emotionally Abused:     Physically Abused:     Sexually Abused:        FAMILY HISTORY  History reviewed. No pertinent family history. SURGICAL HISTORY  Past Surgical History:   Procedure Laterality Date    CHOLECYSTECTOMY      KNEE SURGERY      Right       ALLERGIES  Allergies   Allergen Reactions    Latex        CURRENTMEDICATIONS  Current Outpatient Medications   Medication Sig Dispense Refill    Cholecalciferol (VITAMIN D3) 1.25 MG (13809 UT) CAPS Take by mouth      estradiol (ESTRACE VAGINAL) 0.1 MG/GM vaginal cream Place 2 g vaginally daily      methylPREDNISolone acetate (DEPO-MEDROL) 80 MG/ML injection Inject 80 mg into the muscle      ipratropium (ATROVENT) 0.06 % nasal spray 2 sprays by Nasal route 3 times daily as needed      meloxicam (MOBIC) 7.5 MG tablet Take 7.5 mg by mouth daily      calcium carbonate (OSCAL) 500 MG TABS tablet Take 500 mg by mouth daily      Multiple Vitamins-Minerals (PRESERVISION AREDS 2) CAPS Take  by mouth 2 times daily.  2 tabs BID      multivitamin (THERAGRAN) per tablet Take 1 tablet by mouth daily Alive 50+      NONFORMULARY Occuvite (Patient not taking: Reported on 10/5/2021)      triamcinolone (KENALOG) 0.025 % cream Apply topically every 4 hours as needed Apply Topically twice a week (Patient not taking: Reported on 10/5/2021)      raloxifene (EVISTA) 60 MG tablet Take 60 mg by mouth daily. (Patient not taking: Reported on 10/5/2021)       No current facility-administered medications for this visit. Objective:  BP (!) 146/66 (Site: Left Upper Arm, Position: Sitting, Cuff Size: Medium Adult)   Pulse 78   Ht 5' 0.98\" (1.549 m)   Wt 112 lb 3.2 oz (50.9 kg)   SpO2 95%   BMI 21.21 kg/m²     General: No distress. Alert. Eyes: PERRL. No conjunctival injection. ENT: No discharge. Pharynx clear. Neck: Trachea midline. Normal thyroid. Resp: No accessory muscle use. Lung sounds clear. CV: Regular rate. Regularrhythm. No mumur or rub. No edema. GI: Non-tender. Non-distended. M/S:   Upper extremities:  Muscle strength 5/5, FROM, No Synovitis     Lower Extremities:   Muscle strength 5/5, FROM, No Synovitis     Neuro: Oriented to person, place, time. DTR's 2/4 bilat and equal  Psych:  No anxiety or agitation. Skin: Warm and dry. No rash on exposed extremities. Lymph: No cervical LAD. No supraclavicular LAD.        Labs:  CBC  Lab Results   Component Value Date    WBC 7.5 04/06/2021    RBC 4.15 04/06/2021    HGB 12.8 04/06/2021    HCT 42.8 04/06/2021    .1 04/06/2021    MCH 30.8 04/06/2021    MCHC 29.9 04/06/2021     04/06/2021       CMP  Lab Results   Component Value Date    CALCIUM 9.4 04/06/2021    LABALBU 4.1 04/06/2021    PROT 6.4 04/06/2021     04/06/2021    K 4.1 04/06/2021    K 3.7 02/03/2021    CO2 33 04/06/2021     04/06/2021    BUN 24 04/06/2021    CREATININE 0.5 04/06/2021    ALKPHOS 62 04/06/2021    ALT 18 04/06/2021    AST 25 04/06/2021       HgBA1c: No components found for: HGBA1C    Lab Results   Component Value Date    TSH 1.390 04/06/2021     Lab Results   Component Value Date    VITD25 97 04/06/2021       Lab Results   Component

## 2021-11-23 ENCOUNTER — NURSE ONLY (OUTPATIENT)
Dept: LAB | Age: 76
End: 2021-11-23

## 2022-02-15 ENCOUNTER — HOSPITAL ENCOUNTER (INPATIENT)
Age: 77
LOS: 14 days | Discharge: HOME | DRG: 189 | End: 2022-03-01
Attending: PHYSICAL MEDICINE & REHABILITATION | Admitting: PHYSICAL MEDICINE & REHABILITATION
Payer: MEDICARE

## 2022-02-15 DIAGNOSIS — Z90.710 HX OF TOTAL HYSTERECTOMY: ICD-10-CM

## 2022-02-15 DIAGNOSIS — J96.11 CHRONIC RESPIRATORY FAILURE WITH HYPOXIA (HCC): ICD-10-CM

## 2022-02-15 DIAGNOSIS — J96.02 ACUTE RESPIRATORY FAILURE WITH HYPERCAPNIA (HCC): ICD-10-CM

## 2022-02-15 DIAGNOSIS — G47.34 NOCTURNAL HYPOXIA: ICD-10-CM

## 2022-02-15 DIAGNOSIS — R13.10 DYSPHAGIA, UNSPECIFIED TYPE: ICD-10-CM

## 2022-02-15 DIAGNOSIS — Z86.12 PERSONAL HISTORY OF POLIOMYELITIS: ICD-10-CM

## 2022-02-15 DIAGNOSIS — Z93.0 TRACHEOSTOMY STATUS (HCC): Primary | ICD-10-CM

## 2022-02-15 PROBLEM — J96.00 ACUTE RESPIRATORY FAILURE (HCC): Status: ACTIVE | Noted: 2022-02-15

## 2022-02-15 LAB
MRSA SCREEN RT-PCR: NEGATIVE
VANCOMYCIN RESISTANT ENTEROCOCCUS: NEGATIVE

## 2022-02-15 PROCEDURE — 1180000000 HC REHAB R&B

## 2022-02-15 PROCEDURE — 94640 AIRWAY INHALATION TREATMENT: CPT

## 2022-02-15 PROCEDURE — 87500 VANOMYCIN DNA AMP PROBE: CPT

## 2022-02-15 PROCEDURE — 99223 1ST HOSP IP/OBS HIGH 75: CPT | Performed by: PHYSICAL MEDICINE & REHABILITATION

## 2022-02-15 PROCEDURE — 94760 N-INVAS EAR/PLS OXIMETRY 1: CPT

## 2022-02-15 PROCEDURE — 6370000000 HC RX 637 (ALT 250 FOR IP): Performed by: PHYSICAL MEDICINE & REHABILITATION

## 2022-02-15 PROCEDURE — 2700000000 HC OXYGEN THERAPY PER DAY

## 2022-02-15 PROCEDURE — 87641 MR-STAPH DNA AMP PROBE: CPT

## 2022-02-15 RX ORDER — MELOXICAM 7.5 MG/1
7.5 TABLET ORAL DAILY
Status: DISCONTINUED | OUTPATIENT
Start: 2022-02-15 | End: 2022-02-15

## 2022-02-15 RX ORDER — SENNA PLUS 8.6 MG/1
1 TABLET ORAL DAILY PRN
Status: DISCONTINUED | OUTPATIENT
Start: 2022-02-15 | End: 2022-03-01 | Stop reason: HOSPADM

## 2022-02-15 RX ORDER — ACETAMINOPHEN 325 MG/1
650 TABLET ORAL EVERY 6 HOURS PRN
Status: DISCONTINUED | OUTPATIENT
Start: 2022-02-15 | End: 2022-03-01 | Stop reason: HOSPADM

## 2022-02-15 RX ORDER — POLYETHYLENE GLYCOL 3350 17 G/17G
17 POWDER, FOR SOLUTION ORAL DAILY PRN
Status: DISCONTINUED | OUTPATIENT
Start: 2022-02-15 | End: 2022-03-01 | Stop reason: HOSPADM

## 2022-02-15 RX ORDER — VITAMIN B COMPLEX
1250 TABLET ORAL DAILY
Status: DISCONTINUED | OUTPATIENT
Start: 2022-02-15 | End: 2022-02-15

## 2022-02-15 RX ORDER — PANTOPRAZOLE SODIUM 40 MG/1
40 TABLET, DELAYED RELEASE ORAL
Status: DISCONTINUED | OUTPATIENT
Start: 2022-02-16 | End: 2022-02-16

## 2022-02-15 RX ORDER — MELOXICAM 7.5 MG/1
15 TABLET ORAL DAILY
Status: DISCONTINUED | OUTPATIENT
Start: 2022-02-15 | End: 2022-03-01 | Stop reason: HOSPADM

## 2022-02-15 RX ORDER — TRAZODONE HYDROCHLORIDE 50 MG/1
50 TABLET ORAL NIGHTLY
Status: DISCONTINUED | OUTPATIENT
Start: 2022-02-15 | End: 2022-03-01 | Stop reason: HOSPADM

## 2022-02-15 RX ORDER — POLYVINYL ALCOHOL 14 MG/ML
1 SOLUTION/ DROPS OPHTHALMIC 2 TIMES DAILY
Status: DISCONTINUED | OUTPATIENT
Start: 2022-02-15 | End: 2022-03-01 | Stop reason: HOSPADM

## 2022-02-15 RX ORDER — OXYCODONE HYDROCHLORIDE 5 MG/1
5 TABLET ORAL EVERY 4 HOURS PRN
Status: DISCONTINUED | OUTPATIENT
Start: 2022-02-15 | End: 2022-03-01 | Stop reason: HOSPADM

## 2022-02-15 RX ORDER — MULTIVITAMIN WITH IRON
1 TABLET ORAL DAILY
Status: DISCONTINUED | OUTPATIENT
Start: 2022-02-15 | End: 2022-03-01 | Stop reason: HOSPADM

## 2022-02-15 RX ORDER — METHYLPREDNISOLONE ACETATE 40 MG/ML
80 INJECTION, SUSPENSION INTRA-ARTICULAR; INTRALESIONAL; INTRAMUSCULAR; SOFT TISSUE
Status: DISCONTINUED | OUTPATIENT
Start: 2022-02-15 | End: 2022-02-15

## 2022-02-15 RX ORDER — IPRATROPIUM BROMIDE AND ALBUTEROL SULFATE 2.5; .5 MG/3ML; MG/3ML
1 SOLUTION RESPIRATORY (INHALATION) 3 TIMES DAILY
Status: DISCONTINUED | OUTPATIENT
Start: 2022-02-16 | End: 2022-02-17

## 2022-02-15 RX ORDER — CALCIUM CARBONATE 200(500)MG
500 TABLET,CHEWABLE ORAL 3 TIMES DAILY PRN
Status: DISCONTINUED | OUTPATIENT
Start: 2022-02-15 | End: 2022-03-01 | Stop reason: HOSPADM

## 2022-02-15 RX ORDER — IPRATROPIUM BROMIDE AND ALBUTEROL SULFATE 2.5; .5 MG/3ML; MG/3ML
1 SOLUTION RESPIRATORY (INHALATION) EVERY 6 HOURS
Status: DISCONTINUED | OUTPATIENT
Start: 2022-02-15 | End: 2022-02-15

## 2022-02-15 RX ORDER — IPRATROPIUM BROMIDE 42 UG/1
2 SPRAY, METERED NASAL 3 TIMES DAILY PRN
Status: DISCONTINUED | OUTPATIENT
Start: 2022-02-15 | End: 2022-03-01 | Stop reason: HOSPADM

## 2022-02-15 RX ORDER — CYCLOSPORINE 0.5 MG/ML
1 EMULSION OPHTHALMIC 2 TIMES DAILY
Status: DISCONTINUED | OUTPATIENT
Start: 2022-02-15 | End: 2022-02-15 | Stop reason: CLARIF

## 2022-02-15 RX ORDER — VITAMIN B COMPLEX
2000 TABLET ORAL DAILY
Status: DISCONTINUED | OUTPATIENT
Start: 2022-02-22 | End: 2022-02-15

## 2022-02-15 RX ORDER — IPRATROPIUM BROMIDE AND ALBUTEROL SULFATE 2.5; .5 MG/3ML; MG/3ML
1 SOLUTION RESPIRATORY (INHALATION) EVERY 4 HOURS PRN
Status: DISCONTINUED | OUTPATIENT
Start: 2022-02-15 | End: 2022-03-01 | Stop reason: HOSPADM

## 2022-02-15 RX ORDER — LANOLIN ALCOHOL/MO/W.PET/CERES
3 CREAM (GRAM) TOPICAL NIGHTLY
Status: DISCONTINUED | OUTPATIENT
Start: 2022-02-15 | End: 2022-02-16

## 2022-02-15 RX ORDER — NYSTATIN AND TRIAMCINOLONE ACETONIDE 100000; 1 [USP'U]/G; MG/G
OINTMENT TOPICAL 2 TIMES DAILY
Status: DISCONTINUED | OUTPATIENT
Start: 2022-02-15 | End: 2022-02-15

## 2022-02-15 RX ADMIN — TRAZODONE HYDROCHLORIDE 50 MG: 50 TABLET ORAL at 21:29

## 2022-02-15 RX ADMIN — ACETAMINOPHEN 650 MG: 325 TABLET ORAL at 21:29

## 2022-02-15 RX ADMIN — Medication 3 MG: at 21:29

## 2022-02-15 RX ADMIN — IPRATROPIUM BROMIDE AND ALBUTEROL SULFATE 1 AMPULE: .5; 3 SOLUTION RESPIRATORY (INHALATION) at 17:29

## 2022-02-15 RX ADMIN — POLYVINYL ALCOHOL 1 DROP: 14 SOLUTION/ DROPS OPHTHALMIC at 21:29

## 2022-02-15 ASSESSMENT — PAIN SCALES - GENERAL
PAINLEVEL_OUTOF10: 2
PAINLEVEL_OUTOF10: 2
PAINLEVEL_OUTOF10: 0
PAINLEVEL_OUTOF10: 0

## 2022-02-15 NOTE — PROGRESS NOTES
Admitted to the Inpatient Rehabilitation Unit via stretcher. Patient was then oriented to room and unit. Education provided on the rehabilitation routine: three hours of therapy five days per week and dining room for lunch. Explained patients right to have family, representative or physician notified of their admission. Patient has Declined for physician to be notified. Patient has Declined for family/representative to be notified. Admitting medication orders compared with acute stay medications; home medication list reviewed with patient/family. Medication issues identified No  Medication issue: none    Bladder and Bowel Function Assessment:  1. Prior history of bladder problems: frequency  2. Number of pads used per day: 2  3. Frequency of night time voiding: twice  4. Fluid intake volume and pattern: adequate  5. Last BM: 2/13/22  6. Bowel problems (prior or current) No      Incontinence      Frequent diarrhea      No BM in 3 days this stay or history of constipation      Hemorrhoids      Diverticulitis      Bowel Surgery     Two nurse skin assessment performed by Cheko Gonzales LPN  and [facility] . Weight: 129 lbs        Care plan was created with patient's input and goals were agreed upon. Admission folder provided with education regarding patients diagnoses, fall prevention, skin care, and M in the box. \"Data Collection Information Summary for Patients in Inpatient Rehabilitation Facilities\" and \"Privacy Act Statement - Health Care Records\" provided. Please refer to the admission navigator for further information.

## 2022-02-15 NOTE — H&P
Physical Medicine & Rehabilitation   History and Physical    Chief Complaint and Reason for Rehabilitation Admission:   Respiratory Failure with subsequent Debility; s/p JONI with BSO    History of Present Illness:  Kira Hernandez  is a 64 y.o. female admitted to the inpatient rehabilitation unit on 2/15/2022. The patient was originally admitted to the [facility] January 24, 2022 to the gynecology oncology service secondary to postmenopausal bleeding. She started with the bleeding in early November 2021 and was followed by Dr. Anthony Mejia here at [facility]. Ms. Shy Harris presented to [facility] (/[facility])  for initial consultation for Post-menopausal Bleeding and failed Endometrial Biopsy. PMB started in November 2021. Had pelvic US that showed EMS 9mm. EMB was attempted twice in the office, no endometrial cells were identified. She was therefore referred to the [facility] 1/24/22 for additional management. She had no pelvic pain. Only intermittent spotting, no heavy bleeding. No issues with urination/BM. Had history of Lichen sclerosis, was prescribed estrogen cream and steroid cream. Was recommended to stop the estrogen cream since starting with PMB. D&C was considered with Ms. Shy Harris, but not completed given history of bulbar polio and concern for difficulty with intubation. She instead underwent JONI-BSO 1/24/22. She became hypercapnic at the end of the surgery requiring intubation in the PACU and transferred to the SICU. She had minimal respiratory drive and subsequent worsening of respiratory acidosis requiring reintubation. She was intubated x3, the last time 12/28/2021. Her tracheostomy was placed to 122 per SICU. She passed her speaking valve trial on 2/5/2022 and speech pathology continues to follow.   On 2/11/2022 she was noted to have increased secretions with an increase in the white blood cell count to 11.9, chest x-ray without acute findings consistent with infection (shallow lung volumes, small to moderate sized layering bilateral pleural effusion). By 2/14/22 her white blood cell count was 8.3. Nutritional needs were initially met per NG tube feeds. On 2/7/2022 she had a fees with concern for aspiration with p.o. intake; she was able to take comfort sips of thin liquids with safe swallow strategies in place. She is status post PEG placement 2/8/2022. She transitioned to bolus tube feeds, and is has been tolerating without difficulty. On 2/13/2022 she noted increased gas pain with feeding, improved after tube feeds. Nutrition modified her feedings secondary to lactose intolerance. She also had a modified barium swallow study on 2/13/2022, and was okayed for thin liquid diet as tolerated. Possible Zenker's diverticulum also noted with inpatient GI consult deferred at that time. Patient also had acute blood loss anemia with preop hemoglobin of 12.2, and a hemoglobin of 6.8 on 2/10/2022. She was transfused 1 unit of packed red blood cells. Concern for pneumonia is improved with T-max of 1-1.7 on 1/26/2022. X-ray at that time was with mild pulmonary edema, small layering pleural effusions. Repeat chest x-ray 1/30/2022 showed no acute processes. She did have 1 lower respiratory culture 1 out of 28 which was positive for Serratia and is status post vancomycin 1/30/2022 through 1/31/2022 and cefepime 1/30/2022 through 2/6/2022. Lower extremity Dopplers were negative 2/2/22. In regards to her postmenopausal bleeding and thickened endometrial stripe, 2/3/2021 CT scan showed endometrial thickening 10 mm.  3/16/2021 pelvic ultrasound showed a 1-1/2 cm fibroid, endometrial fluid-filled, 6.8 mm, with 2 small cystic areas on the anterior/posterior wall;. Ovaries were normal Janeth biopsy showed endocervical glandular epithelium negative for dysplasia without endometrium present.   11/16 and 11/23/2021 again endometrial biopsy attempted with no endometrial cells. 11/23/2021 pelvic ultrasound showed the endometrial stripe at 9 mm and the ovaries normal 1/24/2022 status post total abdominal hysterectomy, BSO via P. Pfannenstiel incision. Final pathology benign    Her hospitalization was complicated by respiratory failure (now on 3L supplemental O2 per trach mask,) and has PEG for pharyngeal dysphagia. Also has a PMH positive for GERD, OA,  Paralysis of vocal cords, Polio (bulbar), Dysphagia, Inhalation and ingestion of other object causing obstruction of respiratory tract or suffocation, Osteoporosis, Hoarse voice, Hypernasal speech, Diaphragm paralysis, and Left adhesive capsulitis          Current Rehabilitation Assessments:  PT:      Balance:  Static sitting balance independent  Dynamic sitting balance  Standing balance standby assist  Dynamic standing contact-guard  Required verbal cues and cues for increased safety    Transfers:  Sit to stand standby assist of 2 from armed chair, with assistance of 1 from a bedside commode  Stand to sit transfer with supervision  Bed to chair transfer standby assist  Gait contact-guard assist with a 4 wheeled walker 50 feet      OT:      ADL's:  Grooming: contact-guard assist standing at the sink  Toileting: contact guard assist from the bedside commode  Patient continues to be limited by fatigue, generalized weakness, and balance deficits. ST:      Patient is a speaking valve when awake/alert as tolerated. She is to remove the speaking valve when sleeping and the cough must be deflated prior to placement of the speaking valve. Nutrition is per combination of p.o. and n.p.o. and diet rate is full liquids with strategies with thin liquids and medications are per feeding tube. Swallow strategies include speaking valve in place,, single drink, right head turn, and multiple swallows.     Speech therapy recommends a GI consult due to modified barium swallow findings of outpouching consistent with Zenker's diverticulum given the overall impact out pouching has on patient swallowing efficiency    She presents with presumed ongoing pharyngeal dysphagia consistent with most recent modified barium swallow and fees. Patient demonstrates excellent recall of safe swallow strategies and understanding/awareness into her swallow function.   She has been tolerating the speaking valve for the duration of the session    Past Medical History:  · Gastroesophageal reflux disease  · Osteoarthritis  · Paralysis of vocal cords  · Polio, bulbar  · Dysphagia  · Inhalation and ingestion of other object causing obstruction of respiratory tract or suffocation  · Osteoporosis  · Hoarse voice  · Hypernasal speech  · Diaphragm paralysis  · Left adhesive capsulitis    Past Surgical History  · Knee surgery, right  · Tonsillectomy  · Varicose vein surgery  · Cholecystectomy, laparoscopic, 2012  · Total hysterectomy (abdominal open JONI with removal of tubes and ovaries      Primary care provider: Lewis Somers       Allergies:    Latex    Current Medications:    Current Facility-Administered Medications: pantoprazole (PROTONIX) tablet 40 mg, 40 mg, Oral, BID AC  sertraline (ZOLOFT) tablet 50 mg, 50 mg, Oral, Daily  calcium carbonate (TUMS) chewable tablet 500 mg, 500 mg, Oral, TID PRN  magnesium oxide (MAG-OX) tablet 400 mg, 400 mg, Oral, Daily  melatonin tablet 3 mg, 3 mg, Oral, Nightly  oxyCODONE (ROXICODONE) immediate release tablet 5 mg, 5 mg, Oral, Q4H PRN  polyethylene glycol (GLYCOLAX) packet 17 g, 17 g, Oral, Daily PRN  traZODone (DESYREL) tablet 50 mg, 50 mg, Oral, Nightly  zinc oxide (PINXAV) 30 % ointment, , Topical, 4x Daily PRN  acetaminophen (TYLENOL) tablet 650 mg, 650 mg, PEG Tube, Q6H PRN  ipratropium (ATROVENT) 0.06 % nasal spray 2 spray, 2 spray, Each Nostril, TID PRN  multivitamin 1 tablet, 1 tablet, Oral, Daily  senna (SENOKOT) tablet 8.6 mg, 1 tablet, Oral, Daily PRN  meloxicam (MOBIC) tablet 15 mg, 15 mg, Oral, Daily  polyvinyl alcohol (LIQUIFILM TEARS) 1.4 % ophthalmic solution 1 drop, 1 drop, Both Eyes, BID  ipratropium-albuterol (DUONEB) nebulizer solution 1 ampule, 1 ampule, Inhalation, TID  ipratropium-albuterol (DUONEB) nebulizer solution 1 ampule, 1 ampule, Inhalation, Q4H PRN     Social History:  Social History     Socioeconomic History    Marital status:      Spouse name: Not on file    Number of children: Not on file    Years of education: Not on file    Highest education level: Not on file   Occupational History    Not on file   Tobacco Use    Smoking status: Never Smoker    Smokeless tobacco: Never Used   Substance and Sexual Activity    Alcohol use: Yes     Comment: Seldom    Drug use: No    Sexual activity: Not on file   Other Topics Concern    Not on file   Social History Narrative    Not on file     Social Determinants of Health     Financial Resource Strain:     Difficulty of Paying Living Expenses: Not on file   Food Insecurity:     Worried About Running Out of Food in the Last Year: Not on file    Charli of Food in the Last Year: Not on file   Transportation Needs:     Lack of Transportation (Medical): Not on file    Lack of Transportation (Non-Medical):  Not on file   Physical Activity:     Days of Exercise per Week: Not on file    Minutes of Exercise per Session: Not on file   Stress:     Feeling of Stress : Not on file   Social Connections:     Frequency of Communication with Friends and Family: Not on file    Frequency of Social Gatherings with Friends and Family: Not on file    Attends Restoration Services: Not on file    Active Member of Clubs or Organizations: Not on file    Attends Club or Organization Meetings: Not on file    Marital Status: Not on file   Intimate Partner Violence:     Fear of Current or Ex-Partner: Not on file    Emotionally Abused: Not on file    Physically Abused: Not on file    Sexually Abused: Not on file   Housing Stability:     Unable to Pay for Housing in the Last Year: Not on file    Number of Places Lived in the Last Year: Not on file    Unstable Housing in the Last Year: Not on file       Occupation: Retired teacher at [employer]; lives in [address] with:  [address] setup: 2 story with 2 steps to enter from the front or the garage (both without railings); basement but she doesn't need to go down there; bedroom and bathroom (with shower seat) on first floor;   Prior functional status: Independent    Family History:   History reviewed. No pertinent family history.     Review of Systems:  CONSTITUTIONAL:  positive for  fatigue  EYES:  Wears glasses  HEENT:  + Pharyngeal Dysphagia  RESPIRATORY:  positive for  dry cough, dyspnea, wheezing and on trach currently with 8L of supplemental O2 per trach mask  CARDIOVASCULAR:  negative  GASTROINTESTINAL:  Dysphagia with PEG; nutrition per tube feedings and comfort sips of thin liquids  GENITOURINARY:  negative  SKIN:  Healing incision  HEMATOLOGIC/LYMPHATIC:  Recent anemia; s/p 1 unit PRBC's  MUSCULOSKELETAL:  positive for  myalgias, arthralgias, pain, stiff joints, decreased range of motion and muscle weakness  NEUROLOGICAL:  positive for speech problems, gait problems, dysphagia and weakness  BEHAVIOR/PSYCH:  positive for decreased energy level, fatigue and anxiety  10 point system review otherwise negative    Physical Exam:  /62   Pulse 96   Temp 97.8 °F (36.6 °C) (Oral)   Resp 18   Ht 5' (1.524 m)   Wt 129 lb (58.5 kg)   SpO2 91%   BMI 25.19 kg/m²   awake  Orientation:   person, place, time  Mood: within normal limits  Affect: anxious and calm  General appearance: well groomed and in no acute distress    Memory:   normal,   Attention/Concentration: normal  Language:  abnormal - hoarse voice; hypernasal    Cranial Nerves:  cranial nerves II-XII are grossly intact  ROM:  abnormal - decreased left shoulder 2/2 adhesive capsulitis  Motor Exam:  Motor exam is Potassium reflex Magnesium 4.5 3.5 - 5.2 meq/L    Chloride 100 98 - 111 meq/L    CO2 29 23 - 33 meq/L    Glucose 91 70 - 108 mg/dL    BUN 18 7 - 22 mg/dL    CREATININE 0.4 0.4 - 1.2 mg/dL    Calcium 9.1 8.5 - 10.5 mg/dL   Prealbumin    Collection Time: 02/16/22  5:41 AM   Result Value Ref Range    Prealbumin 21.7 20.0 - 40.0 mg/dl   Anion Gap    Collection Time: 02/16/22  5:41 AM   Result Value Ref Range    Anion Gap 10.0 8.0 - 16.0 meq/L   Glomerular Filtration Rate, Estimated    Collection Time: 02/16/22  5:41 AM   Result Value Ref Range    Est, Glom Filt Rate >90 ml/min/1.73m2       Impression:  · Postop day #22 (1/24/22) status post total abdominal hysterectomy, bilateral salpingo-oophorectomy secondary to postmenopausal bleeding via pfannenstiel incision. Final pathology benign. · Hypercarbic respiratory failure, now on 3L supplemental O2 per trach mask    · Tracheostomy status  · History of diaphragmatic and vocal cord paralysis (baseline right vocal cord and diaphragm paralysis with inability to cough, rare nasal regurgitation of liquids believed to be secondary to bulbar polio  · Status post PEG placement 2/8/2022 secondary to pharyngeal dysphagia; on tube feedings as well as comfort sips of thin liquids  · Zenker's diverticulum noted on a modified barium swallow study of 2/13/2022; GI consult deferred while at [facility]. · Lactose intolerant  · Acute blood loss anemia; transfused 1 unit of packed red blood cells on 2/10/2022. · 1 lower respiratory culture positive for Serratia and the patient is status post vancomycin 1/30 through 1/31, and cefepime 1/30 through 2/ 6. · Negative lower extremity Dopplers on 2/2/22.   · Gastroesophageal reflux disease  · Osteoarthritis  · Paralysis of vocal cords  · Bulbar polio  · Dysphagia  · Inhalation it and ingestion of other object causing obstruction of respiratory tract or suffocation  · Osteoporosis  · Hoarse voice  · Hypernasal speech  · Diaphragmatic paralysis  · Left adhesive capsulitis      Plan:   · Admit to the inpatient rehabilitation unit. The patient demonstrates good potential to participate in an inpatient rehabilitation program involving at least 3 hours per day, 5 days per week of intensive rehabilitation. Rehabilitation services will include PT, OT and SLP/RT in order to improve functional status prior to discharge. Family education and training will be completed. Equipment evaluations and recommendations will be completed as appropriate. · Rehabilitation nursing will be involved for bowel, bladder, skin, and pain management. Nursing will also provide education and training to patient and family. · Prophylaxis:  DVT: EPC cuffs; no oral anticoagulation secondary to bleeding rish. · GI:  Protonix 40 mg po bid   · Pain: OxyIR 5 mg po q 4 hours prn; Tylenol 650 mg per PEG q 6 hours prn;   · Nutrition:  Consultation to dietician for nutritional counseling and recommendations. Prealbumin will be checked on admission. · Bladder: Per Rehab nursing  · Bowel: Per Rehab nursing; Continue Glycolax 17 g po/PEG daily prn  ·  and case management consultations for coordination of care and discharge planning    The main medical problem(s) and comorbidities being actively managed by the physicians and requiring 24 hour rehabilitation nursing care during this stay include:  · Postop day #22 (1/24/22) status post total abdominal hysterectomy, bilateral salpingo-oophorectomy secondary to postmenopausal bleeding via pfannenstiel incision. Final pathology benign.   · Hypercarbic respiratory failure, now on 3L supplemental O2 per trach mask    · Tracheostomy status  · History of diaphragmatic and vocal cord paralysis (baseline right vocal cord and diaphragm paralysis with inability to cough, rare nasal regurgitation of liquids believed to be secondary to bulbar polio  · Status post PEG placement 2/8/2022 secondary to pharyngeal dysphagia; on tube feedings as well as comfort sips of thin liquids  · Zenker's diverticulum noted on a modified barium swallow study of 2/13/2022; GI consult deferred while at [facility]. · Lactose intolerant  · Acute blood loss anemia; transfused 1 unit of packed red blood cells on 2/10/2022. · 1 lower respiratory culture positive for Serratia and the patient is status post vancomycin 1/30 through 1/31, and cefepime 1/30 through 2/ 6. · Negative lower extremity Dopplers on 2/2/22. · Gastroesophageal reflux disease  · Osteoarthritis  · Paralysis of vocal cords  · Bulbar polio  · Dysphagia  · Inhalation it and ingestion of other object causing obstruction of respiratory tract or suffocation  · Osteoporosis  · Hoarse voice  · Hypernasal speech  · Diaphragmatic paralysis  · Left adhesive capsulitis        The domains of functional impairment present in this patient which will require an intensive and interdisciplinary rehabilitation environment include self care, mobility, motor dysfunction, bowel/bladder management, pain management, safety, cognitive function and communication. Estimated length of stay for this admission 14 days    Anticipated disposition: Home. The potential to achieve that is very good. The post admission physician evaluation (DANIKA) is consistent with the pre-admission assessment. See above findings to reflect the elements required in the DANIKA. Patient's admitting condition is consistent with the findings of the preadmission assessment by the rehabilitation admissions coordinator.     Titus Agustin MD

## 2022-02-15 NOTE — PROGRESS NOTES
Admitted to the Inpatient Rehabilitation Unit via bed. Patient was then oriented to room and unit. Education provided on the rehabilitation routine: three hours of therapy five days per week and dining room for lunch. Explained patients right to have family, representative or physician notified of their admission. Patient has Declined for physician to be notified. Patient has Declined for family/representative to be notified. Admitting medication orders compared with acute stay medications; home medication list reviewed with patient/family. Medication issues identified No  Medication issue: none  If yes, physician notified Dr Toro Kay. Bladder and Bowel Function Assessment:  1. Prior history of bladder problems: stress incontinence  2. Number of pads used per day: 2  3. Frequency of night time voiding: {frequency number of times:[id number]}  4. Fluid intake volume and pattern: 220 ml Vital 1.2 and 30 before and after 30   5. Last BM: 2/15/2022  6. Bowel problems (prior or current) No      Incontinence      Frequent diarrhea      No BM in 3 days this stay or history of constipation      Hemorrhoids      Diverticulitis      Bowel Surgery     Two nurse skin assessment performed by Birgit Feliz RN  and Isis Cardozo LPN . Weight: 129 lb      Care plan was created with patient's input and goals were agreed upon. Admission folder provided with education regarding patients diagnoses, fall prevention, skin care, and M in the box. \"Data Collection Information Summary for Patients in Inpatient Rehabilitation Facilities\" and \"Privacy Act Statement - Health Care Records\" provided. Please refer to the admission navigator for further information.

## 2022-02-15 NOTE — PROGRESS NOTES
[facility]  Individualized Disclosure Statement      Patient: Annetta Guy      Scope of Service  [facility] provides 24 hour individualized service to patients with functional limitations due to, but not limited to: stroke, brain injury, spinal cord injury, major multiple trauma, fractures, amputation, and neurological disorders. The [address] [place] [address] provides rehabilitative nursing and medical services as well as physical, occupational, speech, and recreation therapies. [facility] is fully accredited by the Commission on Accreditation of Rehabilitation Facilities (CARF) as a comprehensive provider of rehabilitation services. Patients admitted to the [address] [place] [address] receive a minimum of three hours of therapy per day, at least six days per week, with a revised therapy schedule on weekends and holidays. Physical therapy, occupational therapy, and speech therapy are provided seven days per week including holidays. Other therapeutic services are available on weekends and evenings as needed or scheduled. Intensity of Treatment  Your treatment program will consist of Nursing Care and:  1.5 hours of Physical Therapy, per day  1.5 hours of Occupational Therapy, per day   30-60 minutes of Speech Therapy, per day  1 hour of Recreational Therapy, per week    [address] [address] maintains contracts with most insurance plans. Depending on the type of coverage, the insurance may impose limits on the coverage for rehabilitation care. Coverage is based on the premise that you are able to fully participate in the rehabilitation program and show continued progress. Please verify your own insurance information A copy of this was given to the patient/ family on this date.   Insurance Coverage  Your insurance company has made the following determination relative to the length of your stay:   Your estimated length of stay is 14 days   Your insurance Coverage has been verified as follows:    Primary Insurance: A[employer]na Medicare   Deductible:   $[phone] Coverage: Active  Secondary Insurance: [employer] often cover co-pay amounts, but to ensure payment please contact your insurance company.     Alternative Resources: Please ask the  for more information [phone]

## 2022-02-15 NOTE — RT PROTOCOL NOTE
RT Inhaler-Nebulizer Bronchodilator Protocol Note    There is a bronchodilator order in the chart from a provider indicating to follow the RT Bronchodilator Protocol and there is an Initiate RT Inhaler-Nebulizer Bronchodilator Protocol order as well (see protocol at bottom of note). CXR Findings:  No results found. The findings from the last RT Protocol Assessment were as follows:   History Pulmonary Disease: None or smoker <15 pack years  Respiratory Pattern: Dyspnea on exertion or RR 21-25 bpm  Breath Sounds: Slightly diminished and/or crackles  Cough: Unable to generate effective cough  Indication for Bronchodilator Therapy: Decreased or absent breath sounds  Bronchodilator Assessment Score: 8    Aerosolized bronchodilator medication orders have been revised according to the RT Inhaler-Nebulizer Bronchodilator Protocol below. Respiratory Therapist to perform RT Therapy Protocol Assessment initially then follow the protocol. Repeat RT Therapy Protocol Assessment PRN for score 0-3 or on second treatment, BID, and PRN for scores above 3. No Indications - adjust the frequency to every 6 hours PRN wheezing or bronchospasm, if no treatments needed after 48 hours then discontinue using Per Protocol order mode. If indication present, adjust the RT bronchodilator orders based on the Bronchodilator Assessment Score as indicated below. Use Inhaler orders unless patient has one or more of the following: on home nebulizer, not able to hold breath for 10 seconds, is not alert and oriented, cannot activate and use MDI correctly, or respiratory rate 25 breaths per minute or more, then use the equivalent nebulizer order(s) with same Frequency and PRN reasons based on the score. If a patient is on this medication at home then do not decrease Frequency below that used at home.     0-3 - enter or revise RT bronchodilator order(s) to equivalent RT Bronchodilator order with Frequency of every 4 hours PRN for wheezing or increased work of breathing using Per Protocol order mode. 4-6 - enter or revise RT Bronchodilator order(s) to two equivalent RT bronchodilator orders with one order with BID Frequency and one order with Frequency of every 4 hours PRN wheezing or increased work of breathing using Per Protocol order mode. 7-10 - enter or revise RT Bronchodilator order(s) to two equivalent RT bronchodilator orders with one order with TID Frequency and one order with Frequency of every 4 hours PRN wheezing or increased work of breathing using Per Protocol order mode. 11-13 - enter or revise RT Bronchodilator order(s) to one equivalent RT bronchodilator order with QID Frequency and an Albuterol order with Frequency of every 4 hours PRN wheezing or increased work of breathing using Per Protocol order mode. Greater than 13 - enter or revise RT Bronchodilator order(s) to one equivalent RT bronchodilator order with every 4 hours Frequency and an Albuterol order with Frequency of every 2 hours PRN wheezing or increased work of breathing using Per Protocol order mode. RT to enter RT Home Evaluation for COPD & MDI Assessment order using Per Protocol order mode.     Electronically signed by Hussein Cervantes RCP on 2/15/2022 at 5:38 PM

## 2022-02-15 NOTE — PROGRESS NOTES
assistance    Current functional status for expression: Moderate assistance    Current functional status for social interaction: Moderate assistance    Current functional status for problem solving: Moderate assistance    Current functional status for memory: Moderate assistance    Expected level of Improvement in Self-Care:  Modified independence    Expected level of Improvement in Sphincter Control:  Modified independence    Expected level of Improvement in Transfers: Modified independence    Expected level of Improvement in Locomotion:  Modified independence    Expected level of Improvement in Communication and Social Cognition: Modified independence    Expected length of time to achieve that level of improvement: 14 days    Current rehab issues: ADL dysfunction,including nutrition management after PEG tube placement, assessment of safety, Pain management, patient and family education, prevention of secondary complications, skin integrity, cognitive impairment, communication impairment    Required therapy: Physical Therapy, Occupational Therapy and Speech Therapy 3 hours per day, 5-6 days per week. Recreational Therapy 1 hour per week.     Expected Discharge Destination: Home    Expected Post Discharge Treatments: Home Care    Other information relevant to the care needs:     Maintain O2 sats at and acceptable level during stay, effective pain management while on the rehabilitation unit, establish adequate pain control plan for discharge, absence of skin breakdown while on the rehabilitation unit, improved skin integrity via assessments including wound measurements, avoidance of any hospital acquired infections, no sign-symptoms of infection at the wound site, freedom from injury during hospitalization and complete education with patient/family with understanding demonstrated regarding disease process and resultant impariment    In order to achieve these goals, nursing interventions may include bowel/bladder training, education for medical assistive devices, medication education, O2 saturation management, energy conservation, stress management techniques, fall prevention including alarms protocol, seating and positioning, skin/wound care, pressure relief instruction, dressing changes, infection protection, DVT prophylaxis, assistance with safe transfers, and/or assistance with bathroom activities and hygiene. Lives with: Spouse  Type of Home: House  Home layout: one level  Home access: stairs to enter without rails  Entrance stairs- number of steps: 1  Home Equipment: None used PTA  Ambulation Assistance: Independent  Transfer Assistance: Independent  Occupation: Retired  Type of occupation:        Acute Inpatient Rehabilitation Disclosure Statement provided to patient. Patient verbalized understanding. I have reviewed and concur with the findings and results of the pre-admission screening assessment completed by the Inpatient Rehabilitation Admissions Coordinator.     Seamus Swain MD

## 2022-02-16 PROBLEM — Z86.12 PERSONAL HISTORY OF POLIOMYELITIS: Status: ACTIVE | Noted: 2022-02-16

## 2022-02-16 LAB
ANION GAP SERPL CALCULATED.3IONS-SCNC: 10 MEQ/L (ref 8–16)
BASOPHILS # BLD: 0.3 %
BASOPHILS ABSOLUTE: 0 THOU/MM3 (ref 0–0.1)
BUN BLDV-MCNC: 18 MG/DL (ref 7–22)
CALCIUM SERPL-MCNC: 9.1 MG/DL (ref 8.5–10.5)
CHLORIDE BLD-SCNC: 100 MEQ/L (ref 98–111)
CO2: 29 MEQ/L (ref 23–33)
CREAT SERPL-MCNC: 0.4 MG/DL (ref 0.4–1.2)
EOSINOPHIL # BLD: 2.2 %
EOSINOPHILS ABSOLUTE: 0.1 THOU/MM3 (ref 0–0.4)
ERYTHROCYTE [DISTWIDTH] IN BLOOD BY AUTOMATED COUNT: 13.9 % (ref 11.5–14.5)
ERYTHROCYTE [DISTWIDTH] IN BLOOD BY AUTOMATED COUNT: 51.3 FL (ref 35–45)
GFR SERPL CREATININE-BSD FRML MDRD: > 90 ML/MIN/1.73M2
GLUCOSE BLD-MCNC: 91 MG/DL (ref 70–108)
HCT VFR BLD CALC: 29.2 % (ref 37–47)
HEMOGLOBIN: 8.8 GM/DL (ref 12–16)
IMMATURE GRANS (ABS): 0.04 THOU/MM3 (ref 0–0.07)
IMMATURE GRANULOCYTES: 0.6 %
LYMPHOCYTES # BLD: 15.2 %
LYMPHOCYTES ABSOLUTE: 1 THOU/MM3 (ref 1–4.8)
MCH RBC QN AUTO: 30.6 PG (ref 26–33)
MCHC RBC AUTO-ENTMCNC: 30.1 GM/DL (ref 32.2–35.5)
MCV RBC AUTO: 101.4 FL (ref 81–99)
MONOCYTES # BLD: 10.7 %
MONOCYTES ABSOLUTE: 0.7 THOU/MM3 (ref 0.4–1.3)
NUCLEATED RED BLOOD CELLS: 0 /100 WBC
PLATELET # BLD: 346 THOU/MM3 (ref 130–400)
PMV BLD AUTO: 9.2 FL (ref 9.4–12.4)
POTASSIUM REFLEX MAGNESIUM: 4.5 MEQ/L (ref 3.5–5.2)
PREALBUMIN: 21.7 MG/DL (ref 20–40)
RBC # BLD: 2.88 MILL/MM3 (ref 4.2–5.4)
SEG NEUTROPHILS: 71 %
SEGMENTED NEUTROPHILS ABSOLUTE COUNT: 4.6 THOU/MM3 (ref 1.8–7.7)
SODIUM BLD-SCNC: 139 MEQ/L (ref 135–145)
WBC # BLD: 6.5 THOU/MM3 (ref 4.8–10.8)

## 2022-02-16 PROCEDURE — 6370000000 HC RX 637 (ALT 250 FOR IP): Performed by: PHYSICAL MEDICINE & REHABILITATION

## 2022-02-16 PROCEDURE — 92523 SPEECH SOUND LANG COMPREHEN: CPT

## 2022-02-16 PROCEDURE — 97530 THERAPEUTIC ACTIVITIES: CPT

## 2022-02-16 PROCEDURE — 94640 AIRWAY INHALATION TREATMENT: CPT

## 2022-02-16 PROCEDURE — 97163 PT EVAL HIGH COMPLEX 45 MIN: CPT

## 2022-02-16 PROCEDURE — 92610 EVALUATE SWALLOWING FUNCTION: CPT

## 2022-02-16 PROCEDURE — 97166 OT EVAL MOD COMPLEX 45 MIN: CPT

## 2022-02-16 PROCEDURE — 97535 SELF CARE MNGMENT TRAINING: CPT

## 2022-02-16 PROCEDURE — 97110 THERAPEUTIC EXERCISES: CPT

## 2022-02-16 PROCEDURE — 84134 ASSAY OF PREALBUMIN: CPT

## 2022-02-16 PROCEDURE — 1180000000 HC REHAB R&B

## 2022-02-16 PROCEDURE — 2700000000 HC OXYGEN THERAPY PER DAY

## 2022-02-16 PROCEDURE — 36415 COLL VENOUS BLD VENIPUNCTURE: CPT

## 2022-02-16 PROCEDURE — 99232 SBSQ HOSP IP/OBS MODERATE 35: CPT | Performed by: PHYSICAL MEDICINE & REHABILITATION

## 2022-02-16 PROCEDURE — 85025 COMPLETE CBC W/AUTO DIFF WBC: CPT

## 2022-02-16 PROCEDURE — 97116 GAIT TRAINING THERAPY: CPT

## 2022-02-16 PROCEDURE — 80048 BASIC METABOLIC PNL TOTAL CA: CPT

## 2022-02-16 RX ORDER — PANTOPRAZOLE SODIUM 40 MG/1
40 TABLET, DELAYED RELEASE ORAL
Status: DISCONTINUED | OUTPATIENT
Start: 2022-02-16 | End: 2022-03-01 | Stop reason: HOSPADM

## 2022-02-16 RX ORDER — MAGNESIUM HYDROXIDE/ALUMINUM HYDROXICE/SIMETHICONE 120; 1200; 1200 MG/30ML; MG/30ML; MG/30ML
30 SUSPENSION ORAL EVERY 6 HOURS PRN
Status: DISCONTINUED | OUTPATIENT
Start: 2022-02-16 | End: 2022-03-01 | Stop reason: HOSPADM

## 2022-02-16 RX ORDER — LANOLIN ALCOHOL/MO/W.PET/CERES
6 CREAM (GRAM) TOPICAL NIGHTLY
Status: DISCONTINUED | OUTPATIENT
Start: 2022-02-16 | End: 2022-03-01 | Stop reason: HOSPADM

## 2022-02-16 RX ADMIN — PANTOPRAZOLE SODIUM 40 MG: 40 TABLET, DELAYED RELEASE ORAL at 18:01

## 2022-02-16 RX ADMIN — Medication 1 TABLET: at 10:20

## 2022-02-16 RX ADMIN — Medication 400 MG: at 10:20

## 2022-02-16 RX ADMIN — IPRATROPIUM BROMIDE AND ALBUTEROL SULFATE 1 AMPULE: .5; 3 SOLUTION RESPIRATORY (INHALATION) at 14:23

## 2022-02-16 RX ADMIN — IPRATROPIUM BROMIDE AND ALBUTEROL SULFATE 1 AMPULE: .5; 3 SOLUTION RESPIRATORY (INHALATION) at 07:40

## 2022-02-16 RX ADMIN — TRAZODONE HYDROCHLORIDE 50 MG: 50 TABLET ORAL at 21:25

## 2022-02-16 RX ADMIN — MAGNESIUM HYDROXIDE 30 ML: 400 SUSPENSION ORAL at 21:25

## 2022-02-16 RX ADMIN — MELOXICAM 15 MG: 7.5 TABLET ORAL at 10:20

## 2022-02-16 RX ADMIN — POLYVINYL ALCOHOL 1 DROP: 14 SOLUTION/ DROPS OPHTHALMIC at 21:27

## 2022-02-16 RX ADMIN — SERTRALINE 50 MG: 50 TABLET, FILM COATED ORAL at 10:46

## 2022-02-16 RX ADMIN — PANTOPRAZOLE SODIUM 40 MG: 40 TABLET, DELAYED RELEASE ORAL at 06:05

## 2022-02-16 RX ADMIN — POLYVINYL ALCOHOL 1 DROP: 14 SOLUTION/ DROPS OPHTHALMIC at 10:46

## 2022-02-16 RX ADMIN — ACETAMINOPHEN 650 MG: 325 TABLET ORAL at 10:20

## 2022-02-16 RX ADMIN — Medication 6 MG: at 21:25

## 2022-02-16 RX ADMIN — ALUMINUM HYDROXIDE, MAGNESIUM HYDROXIDE, AND SIMETHICONE 30 ML: 200; 200; 20 SUSPENSION ORAL at 21:25

## 2022-02-16 ASSESSMENT — PAIN SCALES - GENERAL
PAINLEVEL_OUTOF10: 1
PAINLEVEL_OUTOF10: 1
PAINLEVEL_OUTOF10: 3

## 2022-02-16 NOTE — CONSULTS
Department of Family Practice  Consult Note        Reason for Consult:  Medical management while on the Inpatient Rehab unit. Requesting Physician:  Dr Nile Alford:   The need to continue the intensive time with therapies following the acute hospital stay. History Obtained From:  patient, EMR    HISTORY OF PRESENT ILLNESS:              The patient is a 64 y.o. female with significant past medical history of       Diagnosis Date    GERD (gastroesophageal reflux disease)     Osteoarthritis     Paralysis of vocal cords     Polio       who presents with a recent history of vaginal bleeding with a referral to [place] for more evaluation. She had a pelvic surgery and then several complications including respiratory failure. She now is more medically stable and has come to the Inpatient Rehab Unit to continue the time with therapies prior to a discharge disposition being made.       Past Medical History:        Diagnosis Date    GERD (gastroesophageal reflux disease)     Osteoarthritis     Paralysis of vocal cords     Polio      Past Surgical History:        Procedure Laterality Date    CHOLECYSTECTOMY      KNEE SURGERY      Right     Current Medications:   Current Facility-Administered Medications: pantoprazole (PROTONIX) tablet 40 mg, 40 mg, Oral, BID AC  sertraline (ZOLOFT) tablet 50 mg, 50 mg, Oral, Daily  calcium carbonate (TUMS) chewable tablet 500 mg, 500 mg, Oral, TID PRN  magnesium oxide (MAG-OX) tablet 400 mg, 400 mg, Oral, Daily  melatonin tablet 3 mg, 3 mg, Oral, Nightly  oxyCODONE (ROXICODONE) immediate release tablet 5 mg, 5 mg, Oral, Q4H PRN  polyethylene glycol (GLYCOLAX) packet 17 g, 17 g, Oral, Daily PRN  traZODone (DESYREL) tablet 50 mg, 50 mg, Oral, Nightly  zinc oxide (PINXAV) 30 % ointment, , Topical, 4x Daily PRN  acetaminophen (TYLENOL) tablet 650 mg, 650 mg, PEG Tube, Q6H PRN  ipratropium (ATROVENT) 0.06 % nasal spray 2 spray, 2 spray, Each Nostril, TID PRN  multivitamin 1 tablet, 1 tablet, Oral, Daily  senna (SENOKOT) tablet 8.6 mg, 1 tablet, Oral, Daily PRN  meloxicam (MOBIC) tablet 15 mg, 15 mg, Oral, Daily  polyvinyl alcohol (LIQUIFILM TEARS) 1.4 % ophthalmic solution 1 drop, 1 drop, Both Eyes, BID  ipratropium-albuterol (DUONEB) nebulizer solution 1 ampule, 1 ampule, Inhalation, TID  ipratropium-albuterol (DUONEB) nebulizer solution 1 ampule, 1 ampule, Inhalation, Q4H PRN  Allergies:  Latex    Social History:   MARITAL STATUS:      Family History:   History reviewed. No pertinent family history.   REVIEW OF SYSTEMS:    CONSTITUTIONAL:  positive for  fatigue  EYES:  positive for  glasses  HEENT:  negative for  nasal congestion  RESPIRATORY:  positive for  dyspnea  CARDIOVASCULAR:  negative for  chest pain  GASTROINTESTINAL:  negative for diarrhea  GENITOURINARY:  negative for dysuria  INTEGUMENT/BREAST:  negative for rash  HEMATOLOGIC/LYMPHATIC:  negative for petechiae  ALLERGIC/IMMUNOLOGIC:  negative for anaphylaxis  ENDOCRINE:  negative for tremor  MUSCULOSKELETAL:  positive for  myalgias, arthralgias and muscle weakness  NEUROLOGICAL:  positive for coordination problems and weakness  BEHAVIOR/PSYCH:  negative for increased agitation  PHYSICAL EXAM:      Vitals:    /62   Pulse 96   Temp 97.8 °F (36.6 °C) (Oral)   Resp 18   Ht 5' (1.524 m)   Wt 129 lb (58.5 kg)   SpO2 91%   BMI 25.19 kg/m²     Well developed well nourished white female who is awake alert and cooperative  Skin atrophic  Membranes moist  Head normocephalic  Neck without mass but has trach  Chest symmetrical expansion  Heart S1S2 without murmur  Lungs CTA  Abd soft, non tender, normoactive BS and no mass, PEG present  Ext with 1-2+ edema throughout  Neuro weak  Psy pleasant    IMPRESSION/RECOMMENDATIONS:      Active Hospital Problems    Diagnosis Date Noted    Personal history of poliomyelitis [Z86.12] 02/16/2022    Acute respiratory failure ([facility] [address] [J96.00] 02/15/2022

## 2022-02-16 NOTE — PROGRESS NOTES
[facility]      Date:  2/16/2022            Patient Name: Georgiana Persaud           MRN: [id number]  Acct: [de-identified]          YOB: 1945 (72 y.o.)       Gender: female   Diagnosis: Acute Respiratory Failure  Physician: Referring Practitioner: Dr Alejandra Gil:  pt asleep this afternoon-will evaluate for RT tomorrow     Axel Wang, CTRS    2/16/2022

## 2022-02-16 NOTE — PLAN OF CARE
Problem: Falls - Risk of:  Goal: Will remain free from falls  Description: Will remain free from falls  2/16/2022 0000 by Solomon Crenshaw LPN  Outcome: Ongoing  Call light within reach and bed alarm on. Gait belt and walker used to transfer to commode. Problem: Pain:  Goal: Control of acute pain  Description: Control of acute pain  Outcome: Ongoing   Pain in throat was a 2 out of 10. Pain was controlled with tylenol 650 mg. Problem: Skin Integrity:  Goal: Risk for impaired skin integrity will decrease  Description: Risk for impaired skin integrity will decrease  2/16/2022 0000 by Solomon Crenshaw LPN  Outcome: Ongoing  Patient turned self and pillow support under legs and behind back. No new skin breakdown noted during this shift.

## 2022-02-16 NOTE — PROGRESS NOTES
[facility]  INPATIENT PHYSICAL THERAPY  DAILY NOTE  [facility]    Time In: 1330  Time Out: 1400  Timed Code Treatment Minutes: 30 Minutes  Minutes: 30          Date: 2022  Patient Name: Lorena Mittal,  Gender:  female        MRN: [id number]  : 1945  (64 y.o.)     Referring Practitioner: Johann Galeazzi, MD  Diagnosis: Acute Respiratory Failure  Additional Pertinent Hx: Lorena Mittal  is a 64 y.o. female admitted to the inpatient rehabilitation unit on 2/15/2022. The patient was originally admitted to the [facility] 2022 to the gynecology oncology service secondary to postmenopausal bleeding. She started with the bleeding in early 2021 and was followed by Dr. Maryellen Springer here at [facility]. Ms. Rozina Oliver presented to [facility] (/[facility])  for initial consultation for Post-menopausal Bleeding and failed Endometrial Biopsy. PMB started in 2021. Had pelvic US that showed EMS 9mm. EMB was attempted twice in the office, no endometrial cells were identified. She was therefore referred to the [facility] for additional management. She had no pelvic pain. Only intermittent spotting, no heavy bleeding. Had history of Lichen sclerosis, was prescribed estrogen cream and steroid cream. Patient underwent abdominal hysterectomy on 2022, BSO via P. Pfannenstiel incision. Final pathology benign. Her hospitalization was complicated by respiratory failure (now on 3L supplemental O2 per trach mask,) and has PEG for pharyngeal dysphagia.   Also has a PMH positive for GERD, OA,  Paralysis of vocal cords, Polio (bulbar), Dysphagia, Inhalation and ingestion of other object causing obstruction of respiratory tract or suffocation, Osteoporosis, Hoarse voice, Hypernasal speech, Diaphragm paralysis, and Left adhesive capsulitis     Prior Level of Function:  Lives With: Spouse  Type of Home: House  Home Layout: Two level  Home Access: Stairs to enter without rails  Entrance Stairs - Number of Steps: ~4 steps at the front of the house without railings and 2 steps in the garage. Patient reports she has a few steps in the back of the house with railings. Patient reports typically she enters house from garage  Home Equipment:  (Patient reports she does not have an equipment at home)   Bathroom Shower/Tub: Walk-in shower (Patient reports she has a built in shower chair)  Bathroom Toilet: Standard  Bathroom Equipment: Grab bars in shower,Built-in shower seat    ADL Assistance: Independent  Homemaking Assistance: Independent  Homemaking Responsibilities: Yes  Ambulation Assistance: Independent  Transfer Assistance: Independent  Active : Yes  Additional Comments: Patient reports she was independent PTA and did not utilize an AD for mobility. Patient reports her daugther and son in law live next door and also would be able to help out as needed. Restrictions/Precautions:  Restrictions/Precautions: General Precautions,Fall Risk  Position Activity Restriction  Other position/activity restrictions: PEG, trach collar/mask, Place oxygen tank on 6L, No lifting >10lbs     SUBJECTIVE: Patient seated in recliner upon arrival. Patient spouse present during session. Patient is pleasant and agreeable to therapy. PAIN: 0/10    Vitals: Vitals not assessed per clinical judgement, see nursing flowsheet    OBJECTIVE:  Bed Mobility:  Not Tested    Transfers:  Sit to Stand: [employer], cues for hand placement  Stand to [place] [id number], cues for hand placement   -Patient instructed in sit to stand transfers from various surfaces including recliner and wheelchair. Patient required verbal cueing for hand placement and eccentric lowering control.      Ambulation:  Contact Guard Assistance  Distance: 75 feet  Surface: Level Tile  Device:Rolling Walker  Gait Deviations: Forward Flexed Posture, Decreased Step Length Bilaterally, Decreased Gait Speed, Decreased Heel Strike Bilaterally, Mild Path Deviations and Unsteady Gait    Stairs:  Minimal Assistance  Number of Steps: 1 step x 4 reps  Height: 4\" step with Rolling Walker  -Patient had one episode of LOB with descending step due to lack of complete foot clearance from step requiring moderate assistance in order to correct. Patient required verbal cueing for maintaining close proximity to AD with step negotiation. Exercise:  Patient was guided in 1 set(s) 15 reps of exercise to both lower extremities. Seated marches, Seated hamstring curls, Seated heel/toe raises, Long arc quads and Seated abduction/adduction. Exercises were completed for increased independence with functional mobility. Functional Outcome Measures: Not completed      ASSESSMENT:  Assessment: Patient progressing toward established goals. Activity Tolerance:  Patient tolerance of  treatment: good. Equipment Recommendations:Equipment Needed: Yes (Continue to assess pending progress (may require RW))  Discharge Recommendations: Continue to assess pending progress and Patient would benefit from continued PT at discharge  Plan: Times per week: 5x/wk 90min; 1x/wk 30 min  Times per day: Daily  Current Treatment Recommendations: Strengthening,Neuromuscular Re-education,Home Exercise Program,ROM,Safety Education & Clarisse Snuffer Training,Patient/Caregiver Education & Training,Functional Mobility Training,Wheelchair Mobility Training,Transfer Training,Gait Training,Stair training    Patient Education  Patient Education: Transfers, Gait, Verbal Exercise Instruction,  - Patient Verbalized Understanding, - Patient Requires Continued Education    Goals:  Patient goals :  To return to home  Short term goals  Time Frame for Short term goals: 1 week  Short term goal 1: Patient to perform supine<>sit with SBA without railings in order to assist with getting into and out of bed. Short term goal 2: Patient to perform sit to stand transfers wtih SBA with <=1 cue for hand placement in order to assist with safety with transfers from various surfaces. Short term goal 3: Patient to ambulate at least 75 feet with RW with SBA in order to assist with home mobility. Short term goal 4: Patient to ascend/descend 1 step with B handrails with CGA in order to assist with getting into and out of home. Short term goal 5: Patient to score at least 19/28 on the Tinetti in order to reduce risk for falls. Long term goals  Time Frame for Long term goals : 3 weeks  Long term goal 1: Patient to perform supine<>sit with Mod I without railings in order to assist with getting into and out of bed. Long term goal 2: Patient to perform sit to stand transfers with Mod I in order to assist with safety with transfers from various surfaces. Long term goal 3: Patient to ambulate at least 150 feet with RW with Mod I in order to assist with home mobility. Long term goal 4: Patient to ascend/descend 4 steps with 1 handrail with Supervision in order to assist with getting into and out of home. Long term goal 5: Patient to perform car transfer with Supervision in order to assist with getting to and from appointments. Following session, patient left in safe position with all fall risk precautions in place.

## 2022-02-16 NOTE — PLAN OF CARE
Individualized Plan of [address] [facility] Rehabilitation Unit    Rehabilitation physician: Dr. Marie Smith Date: 2/15/2022     Rehabilitation Diagnosis: Acute respiratory failure ([address] [J96.00]      Rehabilitation impairments: self care, mobility, motor dysfunction, bowel/bladder management, pain management, safety, cognitive function and communication    Factors facilitating achievement of predicted outcomes: Family support, Motivated, Cooperative and Pleasant  Barriers to the achievement of predicted outcomes: Pain, Limited safety awareness, Depression, Anxiety, Limited insight into deficits, Communication deficit, Decreased endurance, Upper extremity weakness, Lower extremity weakness, Long standing deficits, Medical complications and Stairs at home    Patient Goals: Improve independence with mobility, Improvement of mobility at a wheelchair level, Increase overall strength and endurance, Increase balance, Increase endurance, Increase independence with activities of daily living, Improve cognition, Increase self-awareness, Increase safety awareness, Increase community integration, Increase socialization, Functional communication with caregivers, Integrate appropriate pain management plan, Assure adequate nutritional option for discharge, Continence of bowel and bladder and Provide appropriate patient and family education      NURSING:  Nursing goals for Katy Do while on the rehabilitation unit will include:  Continence of bowel and bladder, Adequate number of bowel movements, Urinate with no urinary retention >300ml in bladder, Complete bladder protocol with torres removal, Maintain O2 SATs at an acceptable level during stay, Effective pain management while on the rehabilitation unit, Establish adequate pain control plan for discharge, Absence of skin breakdown while on the rehabilitation unit, Improved skin integrity via assessments including wound measurements, Avoidance of any hospital acquired infections, No signs/symptoms of infection at the wound site, Freedom from injury during hospitalization and Complete education with patient/family with understanding demonstrated regarding disease process and resultant impairment     In order to achieve these goals, nursing interventions may include bowel/bladder training, education for medical assistive devices, medication education, O2 saturation management, energy conservation, stress management techniques, fall prevention, alarms protocol, seating and positioning, skin/wound care, pressure relief instruction, dressing changes, infection protection, DVT prophylaxis, assistance with safe transfers , and/or assistance with bathroom activities and hygiene. PHYSICAL THERAPY:  Goals:        Short term goals  Time Frame for Short term goals: 1 week  Short term goal 1: Patient to perform supine<>sit with SBA without railings in order to assist with getting into and out of bed. Short term goal 2: Patient to perform sit to stand transfers wtih SBA with <=1 cue for hand placement in order to assist with safety with transfers from various surfaces. Short term goal 3: Patient to ambulate at least 75 feet with RW with SBA in order to assist with home mobility. Short term goal 4: Patient to ascend/descend 1 step with B handrails with CGA in order to assist with getting into and out of home. Short term goal 5: Patient to score at least 19/28 on the [place] in order to reduce risk for falls. Long term goals  Time Frame for Long term goals : 3 weeks  Long term goal 1: Patient to perform supine<>sit with Mod I without railings in order to assist with getting into and out of bed. Long term goal 2: Patient to perform sit to stand transfers with Mod I in order to assist with safety with transfers from various surfaces. Long term goal 3: Patient to ambulate at least 150 feet with RW with Mod I in order to assist with home mobility.   Long term goal 4: Patient to ascend/descend 4 steps with 1 handrail with Supervision in order to assist with getting into and out of home. Long term goal 5: Patient to perform car transfer with Supervision in order to assist with getting to and from appointments. Plan of Care: Patient to be seen by physical therapy services 90 minutes per day Monday through Friday and 30 minutes on Saturday or Sunday    Anticipated interventions may include therapeutic exercises, gait training, neuromuscular re-ed, transfer training, community reintegration, bed mobility, w/c mobility and training. OCCUPATIONAL THERAPY:  Goals:             Short term goals  Time Frame for Short term goals: 1 week  Short term goal 1: Pt will complete UB dressing tasks with min A to improve indep with donning her bra at home. Short term goal 2: Pt will complete LB dressing tasks with mod A to improve indep with donning socks and shoes at home. Short term goal 3: Pt will tolerate 4 minute standing tasks with SBA during shayy release tasks to improve indep with sinkside grooming. Short term goal 4: Pt will ambulate to and from bathroom with SBA and RW prn to improve activity tolerance needed for bathing. Short term goal 5: Pt will tolerate dynamic standing IADL tasks with SBA to meet patient goal of baking cookies with grandchildren. Long term goals  Time Frame for Long term goals : 2-3 weeks  Long term goal 1: Pt will complete bathing and shower transfer with mod I to improve indep with showering at home. Long term goal 2: Pt will complete dressing and grooming tasks with mod I to improve indep with self care at home. Long term goal 3: Pt will complete a simple meal prep tasks with mod I and 0 vcs for ECT to improve indep with preparing lunch at home.     Plan of Care: Patient to be seen by occupational therapy services 90 minutes per day Monday through Friday and 30 minutes on Saturday or Sunday    Anticipated interventions may include ADL and IADL retraining, strengthening, safety education and training, patient/caregiver education and training, equipment evaluation/ training/procurement, neuromuscular reeducation, wheelchair mobility training. SPEECH THERAPY:   Short-term Goals  Timeframe for Short-term Goals: 1 week  Goal 1: Patient will  Goal 2: Patient will complete pharyngeal strengthening exercises x10 each with good success in order to improve pharyngeal weakness and overall airway protection. Plan of Care: Pt to be seen by speech therapy services 30-60 minutes per day Monday through Friday . Anticipated interventions may include speech/language/communication therapy, cognitive training, group therapy, education, and/or dysphagia therapy based on the above goals. CASE MANAGEMENT:  Goals:   Assist patient/family with discharge planning, patient/family counseling,  and coordination with insurance during the inpatient rehabilitation stay. Other members of the multidisciplinary rehabilitation team that will be involved in the patient's plan of care include recreational therapy, dietary, respiratory therapy, and neuropsychology. Medical issues being managed closely and that require 24 hour availability of a physician:  Swallowing precautions, Bowel/Bladder function, Weight bearing precautions, Wound care, Pain management, Infection protection, DVT prophylaxis, Fall precautions, Fluid/Electrolyte balance, Nutritional status, Respiratory needs, Anemia and History of heart disease                                           Physician anticipated functional outcomes: Improved independence with functional measures   Estimated length of stay for this admission 14 days  Medical Prognosis: Good  Anticipated disposition: Home. The potential to achieve the above medical and rehabilitative goals is very good.     This plan of care has been developed with the assistance and input of the multidisciplinary rehabilitation team.  The plan was reviewed with the patient. The patient has had the opportunity to provide input to the therapy team.    I have reviewed this Individualized Plan of Care and agree with its contents. Above documentation has been expanded, modified, adjusted to reflect the findings of my evaluations and goals for the patient. Physician:   Kaci Gates M.D.

## 2022-02-16 NOTE — PROGRESS NOTES
[facility]  Speech - Language - Cognitive Evaluation + Clinical Swallow Evaluation    SLP Individual Minutes  Time In: 4748  Time Out: 4506  Minutes: 40  Timed Code Treatment Minutes: 0 Minutes       Date: 2022  Patient Name: Kenisha Flower      CSN: [id number]   : 1945  (64 y.o.)  Gender: female   Referring Physician: Shabbir Villarreal MD  Diagnosis: Debility  Secondary Diagnosis: Dysphagia. Dysphonia   Precautions: fall risk, trach and PEG  History of Present Illness/Injury: Patient admitted with above diagnosis. Per chart review, \"Lenka Chandra  is a 64 y.o. female admitted to the inpatient rehabilitation unit on 2/15/2022. The patient was originally admitted to the [facility] 2022 to the gynecology oncology service secondary to postmenopausal bleeding. She started with the bleeding in early 2021 and was followed by Dr. Severino Izaguirre here at [place] [address].     Ms. Jose Daniel Chandra [facility]  for initial consultation for Post-menopausal Bleeding and failed Endometrial Biopsy. PMB started in 2021. Had pelvic US that showed EMS 9mm. EMB was attempted twice in the office, no endometrial cells were identified. She was therefore referred to the [facility] [place] at [facility] for additional management. She had no pelvic pain. Only intermittent spotting, no heavy bleeding. No issues with urination/BM. Had history of Lichen sclerosis, was prescribed estrogen cream and steroid cream. Was recommended to stop the estrogen cream since starting with PMB.        D&C was considered with Ms. Jose Daniel Chandra, but not completed given history of bulbar polio and concern for difficulty with intubation. She instead underwent JONI-BSO 22. She became hypercapnic at the end of the surgery requiring intubation in the PACU and transferred to the SICU.   She had minimal respiratory drive and subsequent worsening of respiratory acidosis requiring reintubation. She was intubated x3, the last time 12/28/2021. Her tracheostomy was placed to 122 per SICU. She passed her speaking valve trial on 2/5/2022 and speech pathology continues to follow. On 2/11/2022 she was noted to have increased secretions with an increase in the white blood cell count to 11.9, chest x-ray without acute findings consistent with infection (shallow lung volumes, small to moderate sized layering bilateral pleural effusion). By 2/14/22 her white blood cell count was 8. 3.      Nutritional needs were initially met per NG tube feeds. On 2/7/2022 she had a fees with concern for aspiration with p.o. intake; she was able to take comfort sips of thin liquids with safe swallow strategies in place. She is status post PEG placement 2/8/2022. She transitioned to bolus tube feeds, and is has been tolerating without difficulty. On 2/13/2022 she noted increased gas pain with feeding, improved after tube feeds. Nutrition modified her feedings secondary to lactose intolerance. She also had a modified barium swallow study on 2/13/2022, and was okayed for thin liquid diet as tolerated. Possible Zenker's diverticulum also noted with inpatient GI consult deferred at that time. \" ST consulted to assess cognitive and swallowing function to assist with establishing POC for rehab stay. Past Medical History:   Diagnosis Date    GERD (gastroesophageal reflux disease)     Osteoarthritis     Paralysis of vocal cords     Polio        Pain: No pain reported. Subjective:  Patient seen sitting upright in recliner; alert and cooperative throughout. , Gianna Krause, present for duration of session.      SOCIAL HISTORY:   Living Arrangements: lives with   Work History: Retired, /K-8 guidance counselor  Education Level: Masters  Driving Status: Active   Finance Management: Independent  Medication Management: Independent  ADL's: Independent. Hobbies: Family time, reading, gardening, baking, traveling  Vision Status: WFL with glasses  Hearing: WFL    Type of Home: House  Home Layout: Two level  Home Access: Stairs to enter without rails  Entrance Stairs - Number of Steps: ~4 steps at the front of the house without railings and 2 steps in the garage. Patient reports she has a few steps in the back of the house with railings. Patient reports typically she enters house from garage  Home Equipment:  (Patient reports she does not have an equipment at home)    SPEECH / VOICE:  Speech appears to be grossly intact for basic and complex daily communication    Voice:   -Phonation - hoarse vocal quality evident with sustained phonation, consistent with documented right vocal fold paralysis. Attempted pitch glides with limited pitch variation and pitch breaks. Fair vocal intensity was achieved. LANGUAGE:  Receptive:  Receptive language skills appear to be grossly intact for basic and complex daily communication. Expressive:  Expressive language skills appear to be grossly intact for basic and complex daily communication. COGNITION:  [place] Cognitive Assessment [facility]) version 7.3 completed. Pt scored 26/30. Normal is greater than or equal to 26/30. Immediate Recall: 4/5  Short-Term Recall: 2/5  Divergent Namin units, 1 min  Reasonin/2  Attention: 3/3  Math Computation: 3/3  Executive Functionin/5    SWALLOWING:    Respiratory Status: Tracheostomy. Patient on 8 L trach mask (Covidine 6.5 cuffed). Passy-Wright valve placed throughout evaluation.      Behavioral Observation: Alert and Oriented    ORAL MECHANISM EVALUATION:      Facial / Labial WFL    Lingual Impaired Reduced range of motion   Dentition WFL    Velum WFL    Vocal Quality Impaired Hoarseness, decreased loudness   Sensation WFL    Cough Impaired Inability to achieve strong cough response given right vocal fold paralysis     PATIENT WAS EVALUATED USING:  Thin liquids via spoon and ice chips (PMV placed for all PO trials)     ORAL PHASE:  WFL    PHARYNGEAL PHASE:  Impaired:  Decreased Hyolaryngeal Elevation, Suspected Pharyngeal Residue - please refer to impression statement from recent instrumental evaluation as listed below. SIGNS AND SYMPTOMS OF LARYNGEAL PENETRATION / ASPIRATION:  Immediate Cough x1    INSTRUMENTAL EVALUATION: Instrumental evaluation not indicated at this time. FEES completed on 02/07/22 (please see notes from [facility] for further information). Repeat MBS completed on 02/11/22 at [facility] with impressions as indicated below. \"Impressions:  Pt presents with moderate-severe oropharyngeal   dysphagia, 2/2 impaired bolus propulsion and poor pharyngeal   clearance. Oral phase characterized by impaired oral bolus drive   resulting in piecemeal deglutition for oral clearance. Pharyngeal   phase characterized by absent pharyngeal stripping, impaired   hyolaryngeal elevation/excursion, incomplete laryngeal vestibule   closure, unilateral bulging/outpouching during pharyngeal   contraction, and minimal UES opening. Unilateral   bulging/outpouching concerning for Zenker's Diverticulum (per   Radiology report) causing significant residue (right sided)   spilling out and resulting in deep penetration during/after the   swallow with residual material remaining in laryngeal vestibule. Pyriform sinus residue increased as viscosity of bolus increased   (Mildly Thick (IDDSI 2)/Nectar thick liquid and Puree). No   aspiration observed. Dysphagia is likely acute related to chronic dysphagia risk   factors (GERD, Vocal cord paralysis and Diaphragmatic paralysis)   and likely further exacerbated by acute dysphagia risk factors   (multiple intubations and now s/p tracheostomy). Swallow safety is   impaired; swallow efficiency is impaired, however, impairments   are mitigated with use of safe swallow strategies (Speaking valve   in place, Single drink, Right head turn, Multiple swallows). Pt   appears to be at low-moderate risk for aspiration PNA (given pt   with increased O2 requirements s/p trach and consistent   penetration on FEES and MBS), and high risk for   malnutrition/dehydration (given recommendation for full liquid   diet and pt anxiety related to swallowing). Diet modification is   indicated. Swallow prognosis is fair, given multitude chronic   dysphagia risk factors and history of dysphagia. Pt appears to be   a fair-good candidate for behavioral swallow rehabilitation   (given impaired muscle strength at baseline d/t history of Bulbar   Polio with unclear/unknown prognosis for muscle function   improvement per medical team). \"   -SUNITA Fiore at 2022        DIET RECOMMENDATIONS:  Full liquid diet (thin liquids)     STRATEGIES: Full Upright Position, Small Bite/Sip, Multiple Swallow and Head Turn to the right    Comprehension: 6 - Complex ideas 90% or device (hearing aid or glasses- if patient is primarily a visual learner)  Expression: 6 - Device used to express complex ideas/needs  Social Interaction: 5 - Patient is appropriate with supervision/cues  Problem Solvin - Patient able to solve simple/routine tasks  Memory: 3 - Patient remembers 50%-74% of the time    RECOMMENDATIONS/ASSESSMENT:  DIAGNOSTIC IMPRESSIONS:  Patient presents with cognitive-linguistic skills WFL, characterized by a score of 26/30 on the [address] [place] [address]. Patient also presents with hoarse vocal quality, consistent with previously documented right vocal fold paralysis, though patient reported this as baseline functioning. Swallow function assessed using thin liquids via spoon and ice chips x2. Oral phase WFL with appropriate bolus control, though limited assessment of oral phase as only thin liquids provided. Cannot formally assess pharyngeal swallow function at the bedside.  However, results of the MBS completed on 22 indicated moderate-severe oropharyngeal dysphagia as outlined above, with evidence of reduced oral bolus drive, absent pharyngeal stripping, impaired hyolaryngeal elevation/excursion resulting in incomplete vestibule closure and minimal UES opening. [facility] report also indicates concern for possible Zenker's diverticulum. Patient demonstrating appropriate success with use of head turn to right and multiple swallow to clear single, small bolus attempts. Immediate cough evident x1. Patient remains at moderate risk for aspiration pneumonia given acute trach placement, right vocal fold paralysis, weak cough effort and degree of pharyngeal dysphagia. Recommend full thin liquid diet, with STRICT use of swallow strategies including of small bite/sip, 3-4 swallows, and head turn to the right, and CLOSE pulmonary monitoring. Recommend skilled ST services to improve patient swallow function to progress safety with consumption of least restrictive diet. Rehabilitation Potential: fair    EDUCATION:  Learner: Patient and Significant Other  Education:  Reviewed results and recommendations of this evaluation, Reviewed diet and strategies and Reviewed recommendations for follow-up  Evaluation of Education: Verbalizes understanding    PLAN:  Skilled SLP intervention on IP Rehab 60 minutes per day ,BID, 5 days per week. Specific interventions for next session may include: pharyngeal exercises    PATIENT GOAL:    Did not state. Will further assess during treatment. SHORT TERM GOALS:  Short-term Goals  Timeframe for Short-term Goals: 1 week  Goal 1: Patient will safely consume thin liquid diet with good success in order to increase PO intake and improve pharyngeal function. Goal 2: Patient will complete pharyngeal strengthening exercises x10 each with good success in order to improve pharyngeal weakness and overall airway protection. Goal 3: Patient will complete further assessment of voice evaluation.     LONG TERM GOALS:  Long-term Goals  Timeframe for Long-term Goals: 3 weeks  Goal 1: Patient will complete repeat instrumental to reevaluate readiness for initiation of solids as clinically indicated. Kavon Holguin, MARCELLA.  Speech Therapy Student Intern

## 2022-02-16 NOTE — PROGRESS NOTES
[address] [address]  INPATIENT PHYSICAL THERAPY  EVALUATION  [facility] - [id number]    Time In: 0730  Time Out: 7063  Timed Code Treatment Minutes: 40 Minutes  Minutes: 65          Date: 2022  Patient Name: Afia Nelson,  Gender:  female        MRN: [id number]  : 1945  (64 y.o.)      Referring Practitioner: Adriana Hebert MD  Diagnosis: Acute Respiratory Failure  Additional Pertinent Hx: Afia Nelson  is a 64 y.o. female admitted to the inpatient rehabilitation unit on 2/15/2022. The patient was originally admitted to the [facility] 2022 to the gynecology oncology service secondary to postmenopausal bleeding. She started with the bleeding in early 2021 and was followed by Dr. Hina Lyman here at [facility]. Ms. Juan Antonio Aguirre [facility]  for initial consultation for Post-menopausal Bleeding and failed Endometrial Biopsy. PMB started in 2021. Had pelvic US that showed EMS 9mm. EMB was attempted twice in the office, no endometrial cells were identified. She was therefore referred to the [facility] for additional management. She had no pelvic pain. Only intermittent spotting, no heavy bleeding. Had history of Lichen sclerosis, was prescribed estrogen cream and steroid cream. Patient underwent abdominal hysterectomy on 2022, BSO via P. Pfannenstiel incision. Final pathology benign. Her hospitalization was complicated by respiratory failure (now on 3L supplemental O2 per trach mask,) and has PEG for pharyngeal dysphagia.   Also has a PMH positive for GERD, OA,  Paralysis of vocal cords, Polio (bulbar), Dysphagia, Inhalation and ingestion of other object causing obstruction of respiratory tract or suffocation, Osteoporosis, Hoarse voice, Hypernasal speech, Diaphragm paralysis, and Left adhesive capsulitis Restrictions/Precautions:  Restrictions/Precautions: General Precautions,Fall Risk  Position Activity Restriction  Other position/activity restrictions: PEG, trach collar/mask, Place oxygen tank on 6L, No lifting >10lbs    Subjective:  Chart Reviewed: Yes  Patient assessed for rehabilitation services?: Yes  Family / Caregiver Present: No  Subjective: Patient resting in bed upon arrival. Patient is pleasant and agreeable to therapy. RN assisted with placement of speaking valve at beginning of session. Respiratory Therapy arrived to patients room and assisted PT with trach tubing for oxygen tank. Patient's  arrived during evaluation and very supportive and attended rest of therapy evaluation. General:  Follows Commands: Within Functional Limits    Vision: Impaired  Vision Exceptions: Wears glasses at all times    Hearing: Within functional limits         Pain: Patient reported discomfort at trach however did not rate pain    Vitals: Blood Pressure: 122/58mmHg  Oxygen: 93%  Heart Rate: 98bpm    Social/Functional History:    Lives With: Spouse  Type of Home: House  Home Layout: Two level  Home Access: Stairs to enter without rails  Entrance Stairs - Number of Steps: ~4 steps at the front of the house without railings and 2 steps in the garage. Patient reports she has a few steps in the back of the house with railings.  Patient reports typically she enters house from garage  Home Equipment:  (Patient reports she does not have an equipment at home)     Bathroom Shower/Tub: Walk-in shower (Patient reports she has a built in shower chair)  Bathroom Toilet: Standard  Bathroom Equipment: Grab bars in shower       ADL Assistance: [address] [place] [address] Avenue: Independent  Homemaking Responsibilities: Yes  Ambulation Assistance: Independent  Transfer Assistance: Independent    Active : Yes  Occupation: Retired  Type of occupation:   Additional Comments: Patient reports she was independent PTA and did not utilize an AD for mobility. Patient reports her daugther and son in law live next door and would be able to help out as needed. OBJECTIVE:  Range of Motion:  Right Lower Extremity: WFL  Left Lower Extremity: WFL    Strength:  Bilateral Lower Extremity: Impaired - 3+/5    Balance:  Static Sitting Balance:  Supervision  Dynamic Sitting Balance: Stand By Assistance  Static Standing Balance: Contact Guard Assistance  Dynamic Standing Balance: Minimal Assistance    Bed Mobility:  Rolling to Left: Stand By Assistance, with head of bed raised, with rail, with increased time for completion   Rolling to Right: Stand By Assistance, with head of bed raised, with rail, with increased time for completion   Supine to Sit: Minimal Assistance, with head of bed raised, with rail, with increased time for completion  Sit to Supine: Not tested     Transfers:  Sit to Stand: [employer], with increased time for completion, cues for hand placement  Stand to Sit:Contact Guard Assistance  -Patient initially performed from edge of bed and required increased time to complete with verbal cueing for hand placement. Patient then instructed in sit to stand transfers from wheelchair and recliner. To/From Bed and Chair: Contact Guard Assistance  -Patient performed from bed to recliner and wheelchair to recliner    Ambulation:  [employer]  Distance: 5 feet, 10 feet and 30 feet   Surface: Level Tile  Device:Rolling Walker  Gait Deviations: Forward Flexed Posture, Decreased Step Length Bilaterally, Decreased Gait Speed, Decreased Heel Strike Bilaterally and Unsteady Gait        Functional Outcome Measures: Completed  Balance Score: 9  Gait Score: 6  Tinetti Total Score: 15   Risk Indicators:  Less than/equal to 18 = high risk  19-23 Moderate risk  Greater than/equal to 24 = low risk    ASSESSMENT:  Activity Tolerance:  Patient tolerance of  treatment: fair.  Limited due to fatigue       Treatment Initiated: Treatment and education initiated within context of evaluation. Evaluation time included review of current medical information, gathering information related to past medical, social and functional history, completion of standardized testing, formal and informal observation of tasks, assessment of data and development of plan of care and goals. Treatment time included skilled education and facilitation of tasks to increase safety and independence with functional mobility for improved independence and quality of life. Assessment: Body structures, Functions, Activity limitations: Decreased functional mobility ,Decreased endurance,Decreased balance,Increased pain,Decreased strength,Decreased posture,Decreased safe awareness  Assessment: Patient is a 64 y.o female who presents s/p total hysterectomy performed at [facility] with respiratory complication resulting in decreased functional mobility. Patient required min assist for bed mobility supine to sit, CGA for sit to stand transfers with verbal cueing for hand placement, CGA for ambulation with RW ~30 feet limited due to decreased endurance and demonstrates unsteady gait pattern. Patient demonstrates decreased strength in BLE's and decreased endurance which limits her safety with functional mobility. Patient scored a 15/28 on the  Tinetti indicating she is a high  risk for falls. Patient can benefit from skilled PT treatment in order to assist with dynamic balance, gait training, transfer training, bed mobility, stair training, BLE strengthening and endurance training for increased functional mobility.   Prognosis: Good         Discharge Recommendations:  Discharge Recommendations: Continue to assess pending progress,Patient would benefit from continued therapy after discharge    Patient Education:  PT Education: [place] Drape of Care,Precautions,Transfer Training,Gait Training,General Safety,Functional Mobility Training    Equipment Recommendations:  Equipment Needed: Yes (Continue to assess pending progress (may require RW))    Plan:  Times per week: 5x/wk 90min; 1x/wk 30 min  Times per day: Daily  Current Treatment Recommendations: Strengthening,Neuromuscular Re-education,Home Exercise Program,ROM,Safety Education & Audrene Spell Training,Patient/Caregiver Education & Training,Functional Mobility Training,Wheelchair Mobility Training,Transfer Training,Gait Training,Stair training    Goals:  Patient goals : To return to home  Short term goals  Time Frame for Short term goals: 1 week  Short term goal 1: Patient to perform supine<>sit with SBA without railings in order to assist with getting into and out of bed. Short term goal 2: Patient to perform sit to stand transfers wtih SBA with <=1 cue for hand placement in order to assist with safety with transfers from various surfaces. Short term goal 3: Patient to ambulate at least 75 feet with RW with SBA in order to assist with home mobility. Short term goal 4: Patient to ascend/descend 1 step with B handrails with CGA in order to assist with getting into and out of home. Short term goal 5: Patient to score at least 19/28 on the Tinetti in order to reduce risk for falls. Long term goals  Time Frame for Long term goals : 3 weeks  Long term goal 1: Patient to perform supine<>sit with Mod I without railings in order to assist with getting into and out of bed. Long term goal 2: Patient to perform sit to stand transfers with Mod I in order to assist with safety with transfers from various surfaces. Long term goal 3: Patient to ambulate at least 150 feet with RW with Mod I in order to assist with home mobility. Long term goal 4: Patient to ascend/descend 4 steps with 1 handrail with Supervision in order to assist with getting into and out of home.   Long term goal 5: Patient to perform car transfer with Supervision in order to assist with getting to and from appointments. Following session, patient left in safe position with all fall risk precautions in place.

## 2022-02-16 NOTE — PLAN OF CARE
Problem: DISCHARGE BARRIERS  Goal: Patient's continuum of care needs are met  Note:   [facility]  Physical Medicine Case Management Assessment    [x] Inpatient Rehabilitation Unit      Patient Name: Kenisha Flower        MRN: [id number]    : 1945  (64 y.o.)  Gender: female     Date of Admission: 02/15/2022      Family/Social/Home Environment: Prior to surgery and hospitalization, patient was independent with ADL's and personal care. Patient lives with spouse, Marcelina Fernando, of 61 years (61 in September). Patient and spouse, Marcelina Fernando, have five children; son in [place], daughter in [place], daughter in [facility], son in [facility], and daughter next door. Patient was not using any medical equipment for ambulation. Patient involved with family and Holiness. Patient was driving, managing medications, managing finances, errands, housekeeping, laundry, and meal preparation. Patient's spouse, Marcelina Fernando, will be patient's primary support at discharge. Social/Functional History  Lives With: Spouse  Type of Home: House  Home Layout: Two level  Home Access: Stairs to enter without rails  Entrance Stairs - Number of Steps: ~4 steps at the front of the house without railings and 2 steps in the garage. Patient reports she has a few steps in the back of the house with railings.  Patient reports typically she enters house from garage  Bathroom Shower/Tub: Walk-in shower (Patient reports she has a built in shower chair)  Bathroom Toilet: Standard  Bathroom Equipment: Grab bars in shower,Built-in shower seat  Home Equipment:  (Patient reports she does not have an equipment at home)  ADL Assistance: Independent  Homemaking Assistance: Independent  Homemaking Responsibilities: Yes  Ambulation Assistance: Independent  Transfer Assistance: Independent  Active : Yes  Occupation: Retired  Type of occupation:   Additional Comments: Patient reports she was independent PTA and did not utilize an AD for mobility. Patient reports her daugther and son in law live next door and also would be able to help out as needed. Contact/Guardian Information: Ronak Meza [phone]    [employer] Utilized: No community resources utilized prior to admission. Sexuality/Intimacy: No issues or concerns identified at time of SW assessment. Complementary Health Approaches: Patient with no current interest in complementary health approaches at time of SW assessment. Anticipated Needs/Discharge Plans: Anticipate patient would benefit from continued therapy upon discharge. SW met with patient and spouse, Eloisa Gooden, to introduce self and explain role, complete SW assessment, and initiate discharge planning. Prior to surgery and hospitalization, patient was independent with ADL's and personal care. Patient lives with spouse, Eloisa Gooden, of 61 years (61 in September). Patient and spouse, Eloisa Gooden, have five children; son in [place], daughter in [place], daughter in [facility], son in [facility], and daughter next door. Patient was not using any medical equipment for ambulation. Patient involved with family and Gnosticism. Patient was driving, managing medications, managing finances, errands, housekeeping, laundry, and meal preparation. Patient's spouse, Eloisa Gooden, will be patient's primary support at discharge. Anticipate patient would benefit from continued therapy upon discharge. SW reviewed with patient and spouse, Eloisa Gooden, team conference on Thursday, 02/17/2022, to further discuss progress and discharge recommendations. SW to follow and maintain involvement in discharge planning. Discharge Planning  Living Arrangements: Spouse/Significant Other (Simultaneous filing. User may not have seen previous data.)  Support Systems: None,Spouse/Significant Other,Children  Potential Assistance Needed: Durable Medical Equipment,Home Care  Potential Assistance Purchasing Medications: No (Simultaneous filing.  User may not have seen previous data.)  DME: Bedside Commode,Enteral Feedings,Home Aerosol,Infusion Pump,Oxygen Therapy (Comment),Trach Suction Supplies,Walker,Shower Chair  Type of Home Care Services: PT,OT,Nursing Services  Patient expects to be discharged to[de-identified] [place] (Simultaneous filing. User may not have seen previous data.)  Expected Discharge Date:  (Undetermined)  Follow Up Appointment: Best Day/Time : Thursday AM (Simultaneous filing.  User may not have seen previous data.)      Electronically signed by NORMA Donato on 2/16/2022 at 3:20 PM

## 2022-02-16 NOTE — PROGRESS NOTES
[address] [address] THERAPY  [facility]  EVALUATION    Time:    Time In: 1100  Time Out: 1230  Timed Code Treatment Minutes: 74 Minutes  Minutes: 90          Date: 2022  Patient Name: Ollie Klinefelter,   Gender: female      MRN: [id number]  : 1945  (64 y.o.)  Referring Practitioner: Dr Sudheer Centeno  Diagnosis: Acute Respiratory Failure  Additional Pertinent Hx: Ollie Klinefelter  is a 64 y.o. female admitted to the inpatient rehabilitation unit on 2/15/2022. The patient was originally admitted to the [facility] 2022 to the gynecology oncology service secondary to postmenopausal bleeding. She started with the bleeding in early 2021 and was followed by Dr. Delmer Bardales here at [facility]. Ms. Janet Bhakta presented to [facility] (/[facility])  for initial consultation for Post-menopausal Bleeding and failed Endometrial Biopsy. PMB started in 2021. Had pelvic US that showed EMS 9mm. EMB was attempted twice in the office, no endometrial cells were identified. She was therefore referred to the [facility] for additional management. She had no pelvic pain. Only intermittent spotting, no heavy bleeding. Had history of Lichen sclerosis, was prescribed estrogen cream and steroid cream. Patient underwent abdominal hysterectomy on 2022, BSO via P. Pfannenstiel incision. Final pathology benign. Her hospitalization was complicated by respiratory failure (now on 3L supplemental O2 per trach mask,) and has PEG for pharyngeal dysphagia. Also has a PMH positive for GERD, OA,  Paralysis of vocal cords, Polio (bulbar), Dysphagia, Inhalation and ingestion of other object causing obstruction of respiratory tract or suffocation, Osteoporosis, Hoarse voice, Hypernasal speech, Diaphragm paralysis, and Left adhesive capsulitis.     Restrictions/Precautions:  Restrictions/Precautions: General Precautions,Fall Risk  Position Activity Restriction  Other position/activity restrictions: PEG, trach collar/mask, Place oxygen tank on 6L, No lifting >10lbs    Subjective  Chart Reviewed: Yes,Orders,Progress Notes,History and Physical  Patient assessed for rehabilitation services?: Yes    Subjective: Pt pleasant and cooperative. After much discussion and explaination of process, patient agreeable to a shower. Pain:  Pain Assessment  Patient Currently in Pain: Denies    Vitals: Nurse checked vitals prior to session  Oxygen: 93% at start of session,  during seated ADL tasks and while standing at sink   Heart Rate: 74     Social/Functional History:  Lives With: Spouse  Type of Home: House  Home Layout: Two level  Home Access: Stairs to enter without rails  Entrance Stairs - Number of Steps: ~4 steps at the front of the house without railings and 2 steps in the garage. Patient reports she has a few steps in the back of the house with railings. Patient reports typically she enters house from garage  Home Equipment:  (Patient reports she does not have an equipment at home)   Bathroom Shower/Tub: Walk-in shower (Patient reports she has a built in shower chair)  Bathroom Toilet: Standard  Bathroom Equipment: Grab bars in shower,Built-in shower seat       ADL Assistance: Independent  Homemaking Assistance: Independent  Homemaking Responsibilities: Yes  Ambulation Assistance: Independent  Transfer Assistance: Independent    Active : Yes  Occupation: Retired  Type of occupation:   Additional Comments: Patient reports she was independent PTA and did not utilize an AD for mobility. Patient reports her daugther and son in law live next door and also would be able to help out as needed. VISION:Corrected    HEARING:  WFL    COGNITION: Slow Processing and Decreased Recall    RANGE OF MOTION:  Bilateral Upper Extremity:  [facility] for ADL.   Hx of adhesive capsulitis on L.      STRENGTH:  Deconditioned throughout     SENSATION:   WFL    ADL:   EATING:Supervision or touching assistance. Rakesh Wilson CARE Score: 4. ORAL HYGIENE:Supervision or touching assistance. CGA standing at the sink to brush teeth and then brush her hair. CARE Score: 4. TOILETING HYGIENE:Partial/moderate assistance. A to manage clothing. Pt able to perform own hygiene after BM while seated on toilet. CARE Score: 3. SHOWERING/BATHING:Partial/moderate assistance. A to wash feet, CGA while standing to wash buttocks in shower. Completed most of shower while seated. CARE Score: 3.     UPPER BODY DRESSING:Partial/moderate assistance. Mod A to don shirt,  A with mgt of trach mask. CARE Score: 3. LOWER BODY DRESSING:Substantial/maximal assistance. A to thread clothing, pt pulled up to hips, A to manage over hips. CARE Score: 2. FOOTWEAR:Dependent   . CARE Score: 1.     TOILET TRANSFER: Supervision or touching assistance. CGA. CARE Score: 4. SHOWER TRANSFER: Contact guard assistance,  Walk in shower     BALANCE:  Sitting Balance:  Supervision. Standing Balance: Contact Guard Assistance. Patient completed dynamic standing task that facilitated BUE release. Patient required CGA, and demo'ed an endurance of 2 then 4 minutes. Demo good tolerance with 1 rest breaks. Standing task completed to challenge endurance and balance required for ADL and IADL skills. BED MOBILITY:  Not Tested    TRANSFERS:  Sit to Stand:  [employer], with increased time for completion, cues for hand placement. Stand to Sit: [employer], cues for hand placement. FUNCTIONAL MOBILITY:  Assistive Device: Rolling Walker  Assist Level:  Contact Guard Assistance; then stand by assistance   Distance: To and from bathroom   Assistive Device: No device   Assist Level:  Contact Guard Assistance. Distance: To and from bathroom    Activity Tolerance:  Patient tolerance of  treatment: fair.  Pt motivated for shower and was able to tolerate activity well provided bathing at slower pace while seated with a 5 min recovery rest break in between showering and dressing tasks. Assessment:  Assessment: Pt presents to occupational therapy following acute respiratory failure with patient complaints of weakness, decreased activity tolerance, decreased balance impacting patient's ability to complete self care at prior level of functioning. Due to patient's performance deficits, patient would greatly benefit from continued occupational therapy for ADL remediation, endurance building, strengthening and education on energy conservation techniques to return to prior level of functioning and safe return to home environment. Performance deficits / Impairments: Decreased functional mobility ,Decreased ADL status,Decreased safe awareness,Decreased balance,Decreased endurance  Prognosis: Good  Decision Making: Medium Complexity  Safety Devices in place: Yes  Type of devices: All fall risk precautions in place,Call light within reach,Chair alarm in place,Gait belt,Patient at risk for falls    Treatment Initiated: Treatment and education initiated within context of evaluation. Evaluation time included review of current medical information, gathering information related to past medical, social and functional history, completion of standardized testing, formal and informal observation of tasks, assessment of data and development of plan of care and goals. Treatment time included skilled education and facilitation of tasks to increase safety and independence with ADL's for improved functional independence and quality of life. Discharge Recommendations:  Home with Home health OT    Patient Education:  OT Education: OT Role,Home Exercise Program,ADL Adaptive Strategies,Equipment,Transfer Training    Equipment Recommendations:   Other: Pt has a built in shower seat    Plan:  Times per week: 5x/wk for 90 min and 1x/wk for 30 min  Times per day: Daily  Current Treatment Recommendations: Strengthening,Balance Training,Endurance Training,Functional Mobility Training,Equipment Evaluation, Education, & procurement,Patient/Caregiver Education & Training,Self-Care / ADL,Safety Education & Training. See long-term goal time frame for expected duration of plan of care. If no long-term goals established, a short length of stay is anticipated. Goals:  Patient goals : To be able to bake cookies with grandchildren, sit on the porch with her   Short term goals  Time Frame for Short term goals: 1 week  Short term goal 1: Pt will complete UB dressing tasks with min A to improve indep with donning her bra at home. Short term goal 2: Pt will complete LB dressing tasks with mod A to improve indep with donning socks and shoes at home. Short term goal 3: Pt will tolerate 4 minute standing tasks with SBA during shayy release tasks to improve indep with sinkside grooming. Short term goal 4: Pt will ambulate to and from bathroom with SBA and RW prn to improve activity tolerance needed for bathing. Short term goal 5: Pt will tolerate dynamic standing IADL tasks with SBA to meet patient goal of baking cookies with grandchildren. Long term goals  Time Frame for Long term goals : 2-3 weeks  Long term goal 1: Pt will complete bathing and shower transfer with mod I to improve indep with showering at home. Long term goal 2: Pt will complete dressing and grooming tasks with mod I to improve indep with self care at home. Long term goal 3: Pt will complete a simple meal prep tasks with mod I and 0 vcs for ECT to improve indep with preparing lunch at home. Following session, patient left in safe position with all fall risk precautions in place.

## 2022-02-16 NOTE — PROGRESS NOTES
Comprehensive Nutrition Assessment    Type and Reason for Visit:  Initial,Consult (Tube feeding Orders and Management)    Nutrition Recommendations/Plan:   · TF bolus regimen- Vital AF 1.2 at 220 ml six times daily (suggested times 7a, 10a, 1p, 4p, 7p, 10p). · Fluid flush of 30 ml before and after each bolus feed. · Continue current oral diet as ordered or per SLP recommendations- full liquid diet. · Encourage PO oral intakes with meals and snacks daily. Nutrition Assessment:   Pt. nutritionally compromised AEB hx of dysphagia and need for EN, trach mask, and need for full liquid diet. At risk for further nutrition compromise r/t admitted for debility r/t respiratory failure, recent hysterectomy and underlying medical condition (GERD, osteoarthritis, paralysis of vocal cord, dysphagia, diaphragm paralysis). Malnutrition Assessment:  Malnutrition Status:  No malnutrition    Context:  Acute Illness     Findings of the 6 clinical characteristics of malnutrition:  Energy Intake:  No significant decrease in energy intake (On EN)  Weight Loss:  No significant weight loss (Weight gain noted- likelt r/t edema)     Body Fat Loss:  No significant body fat loss     Muscle Mass Loss:  No significant muscle mass loss    Fluid Accumulation:   (RLE/LLE (1+ pitting edema) 2/15)     Strength:  Not Performed    Estimated Daily Nutrient Needs:  Energy (kcal):  6261-5829 kcal/day; Weight Used for Energy Requirements:  Current     Protein (g):  64-76 gm/day (1.1-1.3 gm/kg); Weight Used for Protein Requirements:  Current           Nutrition Related Findings:       Writer met with patient this date. Patient reports her appetite has been \"okay\". States cream of wheat seems to \"get stuck\". Patient reports tolerating current TF regimen other than some \"regurgitation\". Patient reports hx of reflux. Per discussion with RN, plans to get protonix ordered BID. Treatments/Miscellaneous: PT/OT/ST consulted.    GI Status: Last BM 2/16. Patient denies and N/V/D. Pertinent Labs: Hgb 8.8 (2/16). Pertinent Meds: mag-ox, mobic, MVI, protonix, desyrel; PRN tums, oxycodone, glycolax, senokot    Wounds:  Surgical Incision (incision to pelvis anterior)       Current Nutrition Therapies:    ADULT TUBE FEEDING; PEG; Other Tube Feeding (specify); VITAL AF 1.2 Dominic Liqd; Bolus; 6 TIMES DAILY; 220; 30; Before and after each bolus  ADULT DIET; Full Liquid  Current Tube Feeding (TF) Orders:  · Feeding Route: PEG  · Formula: Peptide Based  · Schedule: Bolus   · Water Flushes: 30 ml fluid flush before and after each bolus. · Current TF & Flush Orders Provides: Receiving Vital AF 1.2 at 220 ml bolus six times daily. Regimen providing 1584 kcal, 99 gm protein, 146 CHO, 6.7 gm fiber, 1070 ml free fluid. Total fluid provided: 1430 ml (1070 TF, 360 flushes). Total goal volume: 1680 ml (1320 TF, 360 flushes). · Goal TF & Flush Orders Provides: Recommend Vital AF 1.2 bolus feeds of 220 ml six times daily (suggested times 7a, 10a, 1p, 4p, 7p, 10p). Fluid flush 30 ml before and after each bolus feed or per physician recommendations. Regimen at goal to provide 1584 kcal, 99 gm protein, 146 gm CHO, 6.7 gm fiber, 1070 ml free fluid. Total fluid provided: 1430 ml (1070 TF, 360 flushes). Total goal volume: 1680 ml (1320 TF, 360 flushes). Anthropometric Measures:  · Height: 5' (152.4 cm)  · Current Body Weight: 128 lb 15.5 oz (58.5 kg) (2/15- RLE/LLE (1+ pitting) edema present 2/15)   · Admission Body Weight: 128 lb 15.5 oz (58.5 kg) (2/15- RLE/LLE (1+ pitting) edema present 2/15)    · Usual Body Weight:  (Patient reports UBW around 110-112 lbs)     · Ideal Body Weight: 100 lbs; % Ideal Body Weight 129 %   · BMI: 25.2  · BMI Categories: Overweight (BMI 25.0-29. 9)       Nutrition Diagnosis:   · Inadequate oral intake related to swallowing difficulty as evidenced by nutrition support - enteral nutrition      Nutrition Interventions:   Food and/or Nutrient Delivery:  Continue Current Diet,Continue Current Tube Feeding, Vitamin/Mineral  Nutrition Education/Counseling:  No recommendation at this time   Coordination of Nutrition Care:  Continue to monitor while inpatient    Goals:  Patient to meet % of estimated nutritional needs via PO diet and EN daily throughout LOS       Nutrition Monitoring and Evaluation:   Behavioral-Environmental Outcomes:  None Identified   Food/Nutrient Intake Outcomes:  Food and Nutrient Intake,Enteral Nutrition Intake/Tolerance, Vitamin/Mineral Intake  Physical Signs/Symptoms Outcomes:  Chewing or Swallowing,Biochemical Data,GI Status,Fluid Status or Edema,Skin,Weight,Nutrition Focused Physical Findings     Discharge Planning:     Too soon to determine     Electronically signed by Norris Maciel RD on 2/16/22 at 10:35 AM EST    Contact: [phone]

## 2022-02-16 NOTE — PROGRESS NOTES
Physical Medicine & Rehabilitation  Progress Note                Talia Gomez MD      Chief Complaint:    Respiratory Failure with subsequent Debility; s/p JONI with BSO    Subjective: Patient was seen on rounds today in her room. Her  Catrachita Sosa was also present and remains extremely supportive. Patient states she slept well last night. She continues on  supplemental oxygen per her trach mask. She did complain of burning around her G-tube site following her tube feedings; her Protonix was increased to 40 mg twice daily. She also complained of being anxious last night and was unable to sleep and she was started on Zoloft 50 mg at bedtime. Rehabilitation:  PT: Reviewed:    OBJECTIVE:  Bed Mobility:  Not Tested     Transfers:  Sit to Stand: [employer], cues for hand placement  Stand to [place] [id number], cues for hand placement   -Patient instructed in sit to stand transfers from various surfaces including recliner and wheelchair. Patient required verbal cueing for hand placement and eccentric lowering control.      Ambulation:  Contact Guard Assistance  Distance: 75 feet  Surface: Level Tile  Device:Rolling Walker  Gait Deviations: Forward Flexed Posture, Decreased Step Length Bilaterally, Decreased Gait Speed, Decreased Heel Strike Bilaterally, Mild Path Deviations and Unsteady Gait     Stairs:  Minimal Assistance  Number of Steps: 1 step x 4 reps  Height: 4\" step with Rolling Walker  -Patient had one episode of LOB with descending step due to lack of complete foot clearance from step requiring moderate assistance in order to correct. Patient required verbal cueing for maintaining close proximity to AD with step negotiation.      Exercise:  Patient was guided in 1 set(s) 15 reps of exercise to both lower extremities. Seated marches, Seated hamstring curls, Seated heel/toe raises, Long arc quads and Seated abduction/adduction.   Exercises were completed for increased independence with functional mobility.     Functional Outcome Measures: Not completed     OT: Reviewed:    COGNITION: Slow Processing and Decreased Recall     RANGE OF MOTION:  Bilateral Upper Extremity:  [facility] for ADL. Hx of adhesive capsulitis on L.       STRENGTH:  Deconditioned throughout      SENSATION:   WFL     ADL:   EATING:Supervision or touching assistance. Jess Horne CARE Score: 4. ORAL HYGIENE:Supervision or touching assistance. CGA standing at the sink to brush teeth and then brush her hair. CARE Score: 4. TOILETING HYGIENE:Partial/moderate assistance. A to manage clothing. Pt able to perform own hygiene after BM while seated on toilet. CARE Score: 3. SHOWERING/BATHING:Partial/moderate assistance. A to wash feet, CGA while standing to wash buttocks in shower. Completed most of shower while seated. CARE Score: 3.     UPPER BODY DRESSING:Partial/moderate assistance. Mod A to don shirt,  A with mgt of trach mask. CARE Score: 3. LOWER BODY DRESSING:Substantial/maximal assistance. A to thread clothing, pt pulled up to hips, A to manage over hips. CARE Score: 2. FOOTWEAR:Dependent   . CARE Score: 1.     TOILET TRANSFER: Supervision or touching assistance. CGA. CARE Score: 4. SHOWER TRANSFER: Contact guard assistance,  Walk in shower      BALANCE:  Sitting Balance:  Supervision. Standing Balance: Contact Guard Assistance. Patient completed dynamic standing task that facilitated BUE release. Patient required CGA, and demo'ed an endurance of 2 then 4 minutes. Demo good tolerance with 1 rest breaks. Standing task completed to challenge endurance and balance required for ADL and IADL skills.      BED MOBILITY:  Not Tested     TRANSFERS:  Sit to Stand:  [address], with increased time for completion, cues for hand placement.     Stand to Sit: [address], cues for hand placement.       FUNCTIONAL MOBILITY:  Assistive Device: Rolling Walker  Assist Level:  Contact Guard Assistance; then stand by assistance   Distance: To and from bathroom   Assistive Device: No device   Assist Level:  Contact Guard Assistance. Distance: To and from bathroom     Activity Tolerance:  Patient tolerance of  treatment: fair. Pt motivated for shower and was able to tolerate activity well provided bathing at slower pace while seated with a 5 min recovery rest break in between showering and dressing tasks.        ST: Reviewed:    Diagnosis: Debility  Secondary Diagnosis: dysphagia  Precautions: fall risk, trach and PEG        Pain: No pain reported.     Subjective:  Patient seen sitting upright in recliner; alert and cooperative throughout. , Gianna Krause, present for duration of session.         SPEECH / VOICE:  Speech appears to be grossly intact for basic and complex daily communication     Voice: Phonation - hoarse vocal quality evident, consistent with documented right vocal fold paralysis. Attempted pitch glides with limited pitch variation and pitch breaks. Fair vocal intensity was achieved.      LANGUAGE:  Receptive:  Receptive language skills appear to be grossly intact for basic and complex daily communication.     Expressive:  Expressive language skills appear to be grossly intact for basic and complex daily communication.     COGNITION:  [place] Cognitive Assessment (MOCA) version 7.3 completed. Pt scored 26/30. Normal is greater than or equal to 26/30. Immediate Recall: 4/5  Short-Term Recall: 2/5  Divergent Namin units, 1 min  Reasonin/2  Attention: 3/3  Math Computation: 3/3  Executive Functionin/5     SWALLOWING:     Respiratory Status:   Tracheostomy. Patient on 8 L trach mask (Covidine 6.5 cuffed).  Passy-Ashlie valve placed throughout evaluation.      Behavioral Observation: Alert and Oriented     ORAL MECHANISM EVALUATION:       Facial / Labial WFL     Lingual Impaired Reduced range of motion   Dentition WFL     Velum WFL     Vocal Quality Impaired Hoarseness, decreased loudness   Sensation WFL     Cough Impaired Inability to achieve strong cough response given right vocal fold paralysis      PATIENT WAS EVALUATED USING:  Thin liquids via spoon and ice chips     ORAL PHASE:  WFL     PHARYNGEAL PHASE:  Impaired:  Decreased Hyolaryngeal Elevation, Suspected Pharyngeal Residue - please refer to impression statement from recent instrumental evaluation as listed below.      SIGNS AND SYMPTOMS OF LARYNGEAL PENETRATION / ASPIRATION:  Immediate Cough x1     INSTRUMENTAL EVALUATION: Instrumental evaluation not indicated at this time.     FEES completed on 02/07/22 (please see notes from [place] for further information).      Repeat MBS completed on 02/11/22 at [place] with impressions as indicated below.     \"Impressions:  Pt presents with moderate-severe oropharyngeal   dysphagia, 2/2 impaired bolus propulsion and poor pharyngeal   clearance. Oral phase characterized by impaired oral bolus drive   resulting in piecemeal deglutition for oral clearance. Pharyngeal   phase characterized by absent pharyngeal stripping, impaired   hyolaryngeal elevation/excursion, incomplete laryngeal vestibule   closure, unilateral bulging/outpouching during pharyngeal   contraction, and minimal UES opening. Unilateral   bulging/outpouching concerning for Zenker's Diverticulum (per   Radiology report) causing significant residue (right sided)   spilling out and resulting in deep penetration during/after the   swallow with residual material remaining in laryngeal vestibule. Pyriform sinus residue increased as viscosity of bolus increased   (Mildly Thick (IDDSI 2)/Nectar thick liquid and Puree). No   aspiration observed. Dysphagia is likely acute related to chronic dysphagia risk   factors (GERD, Vocal cord paralysis and Diaphragmatic paralysis)   and likely further exacerbated by acute dysphagia risk factors   (multiple intubations and now s/p tracheostomy). Swallow safety is   impaired; swallow efficiency is impaired, however, impairments   are mitigated with use of safe swallow strategies (Speaking valve   in place, Single drink, Right head turn, Multiple swallows). Pt   appears to be at low-moderate risk for aspiration PNA (given pt   with increased O2 requirements s/p trach and consistent   penetration on FEES and MBS), and high risk for   malnutrition/dehydration (given recommendation for full liquid   diet and pt anxiety related to swallowing). Diet modification is   indicated. Swallow prognosis is fair, given multitude chronic   dysphagia risk factors and history of dysphagia. Pt appears to be   a fair-good candidate for behavioral swallow rehabilitation   (given impaired muscle strength at baseline d/t history of Bulbar   Polio with unclear/unknown prognosis for muscle function   improvement per medical team). \"   -Leonor Galicia, SLP at 2022          DIET RECOMMENDATIONS:  Full liquid diet (thin liquids)      STRATEGIES: Full Upright Position, Small Bite/Sip, Multiple Swallow and Head Turn to the right     Comprehension: 6 - Complex ideas 90% or device (hearing aid or glasses- if patient is primarily a visual learner)  Expression: 6 - Device used to express complex ideas/needs  Social Interaction: 5 - Patient is appropriate with supervision/cues  Problem Solvin - Patient able to solve simple/routine tasks  Memory: 3 - Patient remembers 50%-74% of the time     RECOMMENDATIONS/ASSESSMENT:  DIAGNOSTIC IMPRESSIONS:  Patient presents with cognitive-linguistic skills WFL, characterized by a score of 26/30 on the [address] [place] [address]. Patient also presents with hoarse vocal quality, consistent with previously documented right vocal fold paralysis, though patient reported this as baseline functioning. Swallow function assessed using thin liquids via spoon and ice chips x2. Oral phase WFL; patient demonstrated good success with head turn to the right. Patient with x1 immediate cough.  MBS on 22 revealed moderate-severe oropharyngeal dysphagia. See quotation above for details. Recommend full thin liquid diet, and use of swallow strategies including of small bite/sip, 3-4 swallows, and head turn to the right. Recommend skilled ST services to improve patient swallow function and tolerate least restrictive diet.    Rehabilitation Potential: fair         Review of Systems -   CONSTITUTIONAL:  positive for  fatigue  EYES:  Wears glasses  HEENT:  + Pharyngeal Dysphagia  RESPIRATORY:  positive for  dry cough, dyspnea, wheezing and on trach currently with 8L of supplemental O2 per trach mask  CARDIOVASCULAR:  negative  GASTROINTESTINAL:  Dysphagia with PEG; nutrition per tube feedings and comfort sips of thin liquids  GENITOURINARY:  negative  SKIN:  Healing incision  HEMATOLOGIC/LYMPHATIC:  Recent anemia; s/p 1 unit PRBC's  MUSCULOSKELETAL:  positive for  myalgias, arthralgias, pain, stiff joints, decreased range of motion and muscle weakness  NEUROLOGICAL:  positive for speech problems, gait problems, dysphagia and weakness  BEHAVIOR/PSYCH:  positive for decreased energy level, fatigue and anxiety  10 point system review otherwise negative      Objective:  Vitals:    02/16/22 0843   BP: 122/62   Pulse: 96   Resp: 18   Temp:    SpO2: 91%   awake  Orientation:   person, place, time  Mood: within normal limits  Affect: anxious and calm  General appearance: well groomed and in no acute distress     Memory:   normal,   Attention/Concentration: normal  Language:  abnormal - hoarse voice; hypernasal; using her talking valve     Cranial Nerves:  cranial nerves II-XII are grossly intact  ROM:  abnormal - decreased left shoulder 2/2 adhesive capsulitis  Motor Exam:  Motor exam is symmetrical 3+ out of 5 all extremities bilaterally except 4/5 bilateral anterior tibialis, EHL, gastrocnemius/soleus  Tone:  normal  Muscle bulk: normal  Sensory:  Sensory intact  Coordination:   normal  Deep Tendon Reflexes:  Reflexes are intact and Collection Time: 02/16/22  5:41 AM   Result Value Ref Range    Prealbumin 21.7 20.0 - 40.0 mg/dl   Anion Gap    Collection Time: 02/16/22  5:41 AM   Result Value Ref Range    Anion Gap 10.0 8.0 - 16.0 meq/L   Glomerular Filtration Rate, Estimated    Collection Time: 02/16/22  5:41 AM   Result Value Ref Range    Est, Glom Filt Rate >90 ml/min/1.73m2       Impression:    · Status-post total abdominal hysterectomy, bilateral salpingo-oophorectomy on 1/24/22 at [facility] secondary to postmenopausal bleeding via pfannenstiel incision. Final pathology benign. · Hypercarbic respiratory failure, now on supplemental O2 per trach mask    · Tracheostomy status  · History of diaphragmatic and vocal cord paralysis (baseline right vocal cord and diaphragm paralysis with inability to cough, rare nasal regurgitation of liquids believed to be secondary to bulbar polio  · Status post PEG placement 2/8/2022 secondary to pharyngeal dysphagia; on tube feedings as well as comfort sips of thin liquids  · Zenker's diverticulum noted on a modified barium swallow study of 2/13/2022; GI consult deferred while at [facility]. · Lactose intolerant  · Acute blood loss anemia; transfused 1 unit of packed red blood cells on 2/10/2022. · 1 lower respiratory culture positive for Serratia and the patient is status post vancomycin 1/30 through 1/31, and cefepime 1/30 through 2/ 6. · Negative lower extremity Dopplers on 2/2/22. · Gastroesophageal reflux disease  · Osteoarthritis  · Paralysis of vocal cords  · Bulbar polio  · Dysphagia, moderate to severe oropharyngeal  · Inhalation it and ingestion of other object causing obstruction of respiratory tract or suffocation  · Osteoporosis  · Hoarse voice  · Hypernasal speech  · Diaphragmatic paralysis  · Left adhesive capsulitis      Plan:  · Continue therapies  · Rehabilitation nursing for bowel, bladder, skin, and pain management. Nursing will also provide education and training to patient and family. · Prophylaxis:  DVT: EPC cuffs; no oral anticoagulation secondary to bleeding risk. · GI:  Protonix 40 mg po bid   · Pain: OxyIR 5 mg po q 4 hours prn; Tylenol 650 mg per PEG q 6 hours prn;   · Nutrition:  Consultation to dietician for nutritional counseling and recommendations. Prealbumin was checked on admission and was WNL at 21.7. · Bladder: Per Rehab nursing  · Bowel: Per Rehab nursing; Continue Glycolax 17 g po/PEG daily prn  ·  and case management consultations for coordination of care and discharge planning  · Have started Zoloft 50 mg po/PEG  q day for anxiety  · IPOC completed today  · Rehab Team Conferences Thursdays       I have reviewed the individualized overall plan of care developed by each specialty and agree with the treatment approach at this time. Please reference the history and physical along with interdisciplinary team conference notes from this admission for full details concerning the estimated LOS, intensity of services, and discussion of medical comorbid conditions.        Bill Stacy MD

## 2022-02-17 PROCEDURE — 97110 THERAPEUTIC EXERCISES: CPT

## 2022-02-17 PROCEDURE — 97530 THERAPEUTIC ACTIVITIES: CPT

## 2022-02-17 PROCEDURE — 2700000000 HC OXYGEN THERAPY PER DAY

## 2022-02-17 PROCEDURE — 6370000000 HC RX 637 (ALT 250 FOR IP): Performed by: PHYSICAL MEDICINE & REHABILITATION

## 2022-02-17 PROCEDURE — 97116 GAIT TRAINING THERAPY: CPT

## 2022-02-17 PROCEDURE — 1180000000 HC REHAB R&B

## 2022-02-17 PROCEDURE — 92526 ORAL FUNCTION THERAPY: CPT

## 2022-02-17 PROCEDURE — 99233 SBSQ HOSP IP/OBS HIGH 50: CPT | Performed by: PHYSICAL MEDICINE & REHABILITATION

## 2022-02-17 PROCEDURE — 94640 AIRWAY INHALATION TREATMENT: CPT

## 2022-02-17 PROCEDURE — 97535 SELF CARE MNGMENT TRAINING: CPT

## 2022-02-17 PROCEDURE — 94760 N-INVAS EAR/PLS OXIMETRY 1: CPT

## 2022-02-17 PROCEDURE — 97112 NEUROMUSCULAR REEDUCATION: CPT

## 2022-02-17 RX ORDER — IPRATROPIUM BROMIDE AND ALBUTEROL SULFATE 2.5; .5 MG/3ML; MG/3ML
1 SOLUTION RESPIRATORY (INHALATION) 2 TIMES DAILY
Status: DISCONTINUED | OUTPATIENT
Start: 2022-02-17 | End: 2022-02-20

## 2022-02-17 RX ADMIN — Medication 400 MG: at 09:56

## 2022-02-17 RX ADMIN — MELOXICAM 15 MG: 7.5 TABLET ORAL at 09:56

## 2022-02-17 RX ADMIN — Medication 6 MG: at 21:31

## 2022-02-17 RX ADMIN — TRAZODONE HYDROCHLORIDE 50 MG: 50 TABLET ORAL at 21:31

## 2022-02-17 RX ADMIN — POLYVINYL ALCOHOL 1 DROP: 14 SOLUTION/ DROPS OPHTHALMIC at 21:31

## 2022-02-17 RX ADMIN — SERTRALINE 50 MG: 50 TABLET, FILM COATED ORAL at 09:56

## 2022-02-17 RX ADMIN — Medication 1 TABLET: at 10:13

## 2022-02-17 RX ADMIN — IPRATROPIUM BROMIDE AND ALBUTEROL SULFATE 1 AMPULE: .5; 3 SOLUTION RESPIRATORY (INHALATION) at 16:57

## 2022-02-17 RX ADMIN — IPRATROPIUM BROMIDE AND ALBUTEROL SULFATE 1 AMPULE: .5; 3 SOLUTION RESPIRATORY (INHALATION) at 07:25

## 2022-02-17 RX ADMIN — PANTOPRAZOLE SODIUM 40 MG: 40 TABLET, DELAYED RELEASE ORAL at 06:17

## 2022-02-17 ASSESSMENT — PAIN SCALES - GENERAL: PAINLEVEL_OUTOF10: 2

## 2022-02-17 NOTE — PROGRESS NOTES
Patient refused bolus feeding due to reflux and feeling full. Dr. Jack Alvarado was notified and she allowed the bolus to be skipped until morning.

## 2022-02-17 NOTE — PLAN OF CARE
Problem: Impaired respiratory status  Goal: Clear lung sounds  Description: Clear lung sounds  Outcome: Ongoing  Note: Will continue with bronchodilator protocol to determine frequency of treatments.

## 2022-02-17 NOTE — PROGRESS NOTES
Patient tolerating her lunch at this time. PEG Feed held at this time due to eating lunch, and therapy schedule. Will reassess after therapy. Patient in agreement.

## 2022-02-17 NOTE — PROGRESS NOTES
Patient: Georgiana Persaud  Unit/Bed: 7E-66/066-A  YOB: 1945  MRN: [id number] Acct: [de-identified]   Admitting Diagnosis: Acute respiratory failure ([facility] [address] [J96.00]  Admit Date:  2/15/2022  Hospital Day: 2    Assessment:     Active Problems:    Acute respiratory failure ([facility] [address]    Personal history of poliomyelitis  Resolved Problems:    * No resolved hospital problems. *      Plan:     Check an H/H in the AM tomorrow        Subjective:     Patient has no complaint of CP or SOB. .   Medication side effects: none    Scheduled Meds:   ipratropium-albuterol  1 ampule Inhalation BID    pantoprazole  40 mg Oral BID AC    sertraline  50 mg Oral Daily    melatonin  6 mg Oral Nightly    magnesium oxide  400 mg Oral Daily    traZODone  50 mg Oral Nightly    multivitamin  1 tablet Oral Daily    meloxicam  15 mg Oral Daily    polyvinyl alcohol  1 drop Both Eyes BID     Continuous Infusions:  PRN Meds:magnesium hydroxide, aluminum & magnesium hydroxide-simethicone, calcium carbonate, oxyCODONE, polyethylene glycol, zinc oxide, acetaminophen, ipratropium, senna, ipratropium-albuterol    Review of Systems  Pertinent items are noted in HPI. Objective:     Patient Vitals for the past 8 hrs:   BP Temp Temp src Pulse Resp SpO2   02/17/22 0954 (!) 119/57 98.7 °F (37.1 °C) Oral 77 16 94 %   02/17/22 0728 -- -- -- -- 16 95 %     I/O last 3 completed shifts: In: 1680 [NG/GT:1680]  Out: -   I/O this shift:   In: 500 [NG/GT:500]  Out: -     BP (!) 119/57   Pulse 77   Temp 98.7 °F (37.1 °C) (Oral)   Resp 16   Ht 5' (1.524 m)   Wt 129 lb (58.5 kg)   SpO2 94%   BMI 25.19 kg/m²     General appearance: alert, appears stated age and cooperative  Head: Normocephalic, without obvious abnormality, atraumatic  Lungs: clear to auscultation bilaterally  Heart: regular rate and rhythm, S1, S2 normal, no murmur, click, rub or gallop  Abdomen: soft, non-tender; bowel sounds normal; no masses,  no organomegaly  Extremities: edema 2+ bilaterally  Skin: Skin color, texture, turgor normal. No rashes or lesions  Neurologic: weak    Electronically signed by Yadira Shcultz MD on 2/17/2022 at 12:11 PM

## 2022-02-17 NOTE — PROGRESS NOTES
Physical Medicine & Rehabilitation  Progress Note                Kaci Gates MD      Chief Complaint:    Respiratory Failure with subsequent Debility; s/p JONI with BSO    Subjective: Patient was seen on rounds with Jeff Fitzgerald, MSW, AIDENW, following patient's inpatient Rehab Team Conference. Updated patient ahnd her  that the expected date for discharge from the IPR Unit to home is 2/28/22 with Home Health or Outpatient services including PT, OT, and ST (and HHA if Home Health). Explained to patient and  that we will begin weaning supplemental O2 per trach mask next week. She is currently on 8L of O2 (28%) per trach mask. She slept well last night and GERD is \"so much better. \"  She follows with ENT Dr. Katerina Kinney at [facility] and she is already scheduled for f/u with him in one month. Will try increasing the number of tube feeds per day while decreasing the size of the boluses as she has been feeling very full after the tube feeding. Rehabilitation:  PT: Reviewed during team conference    OBJECTIVE:  Bed Mobility:  Not Tested     Transfers:  Sit to Stand: [address], cues for hand placement  Stand to [facility] [address], cues for hand placement   -Patient instructed in sit to stand transfers from various surfaces including recliner and wheelchair. Patient required verbal cueing for hand placement and eccentric lowering control. Ambulation:  Contact Guard Assistance  Distance: 75 feet  Surface: Level Tile  Device:Rolling Walker  Gait Deviations: Forward Flexed Posture, Decreased Step Length Bilaterally, Decreased Gait Speed, Decreased Heel Strike Bilaterally, Mild Path Deviations and Unsteady Gait     Stairs:  Minimal Assistance  Number of Steps: 1 step x 4 reps  Height: 4\" step with Rolling Walker  -Patient had one episode of LOB with descending step due to lack of complete foot clearance from step requiring moderate assistance in order to correct.  Patient required verbal cueing for maintaining close proximity to AD with step negotiation. Exercise:  Patient was guided in 1 set(s) 15 reps of exercise to both lower extremities. Seated marches, Seated hamstring curls, Seated heel/toe raises, Long arc quads and Seated abduction/adduction. Exercises were completed for increased independence with functional mobility. Functional Outcome Measures: Not completed     OT: Reviewed:    COGNITION: Slow Processing and Decreased Recall     RANGE OF MOTION:  Bilateral Upper Extremity:  [facility] for ADL. Hx of adhesive capsulitis on L.       STRENGTH:  Deconditioned throughout      SENSATION:   WFL     ADL:   EATING:Supervision or touching assistance. Donzell Pyo CARE Score: 4. ORAL HYGIENE:Supervision or touching assistance. CGA standing at the sink to brush teeth and then brush her hair. CARE Score: 4. TOILETING HYGIENE:Partial/moderate assistance. A to manage clothing. Pt able to perform own hygiene after BM while seated on toilet. CARE Score: 3. SHOWERING/BATHING:Partial/moderate assistance. A to wash feet, CGA while standing to wash buttocks in shower. Completed most of shower while seated. CARE Score: 3.     UPPER BODY DRESSING:Partial/moderate assistance. Mod A to don shirt,  A with mgt of trach mask. CARE Score: 3. LOWER BODY DRESSING:Substantial/maximal assistance. A to thread clothing, pt pulled up to hips, A to manage over hips. CARE Score: 2. FOOTWEAR:Dependent   . CARE Score: 1.     TOILET TRANSFER: Supervision or touching assistance. CGA. CARE Score: 4. SHOWER TRANSFER: Contact guard assistance,  Walk in shower      BALANCE:  Sitting Balance:  Supervision. Standing Balance: Contact Guard Assistance. Patient completed dynamic standing task that facilitated BUE release. Patient required CGA, and demo'ed an endurance of 2 then 4 minutes. Demo good tolerance with 1 rest breaks.  Standing task completed to challenge endurance and Status:   Tracheostomy. Patient on 8 L trach mask (Covidine 6.5 cuffed). [place] valve placed throughout evaluation. Behavioral Observation: Alert and Oriented     ORAL MECHANISM EVALUATION:       Facial / Labial WFL     Lingual Impaired Reduced range of motion   Dentition WFL     Velum WFL     Vocal Quality Impaired Hoarseness, decreased loudness   Sensation WFL     Cough Impaired Inability to achieve strong cough response given right vocal fold paralysis      PATIENT WAS EVALUATED USING:  Thin liquids via spoon and ice chips     ORAL PHASE:  WFL     PHARYNGEAL PHASE:  Impaired:  Decreased Hyolaryngeal Elevation, Suspected Pharyngeal Residue - please refer to impression statement from recent instrumental evaluation as listed below. SIGNS AND SYMPTOMS OF LARYNGEAL PENETRATION / ASPIRATION:  Immediate Cough x1     INSTRUMENTAL EVALUATION: Instrumental evaluation not indicated at this time. FEES completed on 02/07/22 (please see notes from [address] [place] [address] for further information). Repeat MBS completed on 02/11/22 at [address] [place] [address] with impressions as indicated below. \"Impressions:  Pt presents with moderate-severe oropharyngeal   dysphagia, 2/2 impaired bolus propulsion and poor pharyngeal   clearance. Oral phase characterized by impaired oral bolus drive   resulting in piecemeal deglutition for oral clearance. Pharyngeal   phase characterized by absent pharyngeal stripping, impaired   hyolaryngeal elevation/excursion, incomplete laryngeal vestibule   closure, unilateral bulging/outpouching during pharyngeal   contraction, and minimal UES opening. Unilateral   bulging/outpouching concerning for Zenker's Diverticulum (per   Radiology report) causing significant residue (right sided)   spilling out and resulting in deep penetration during/after the   swallow with residual material remaining in laryngeal vestibule.    Pyriform sinus residue increased as viscosity of bolus increased   (Mildly Thick (IDDSI 2)/Nectar thick liquid and Puree). No   aspiration observed. Dysphagia is likely acute related to chronic dysphagia risk   factors (GERD, Vocal cord paralysis and Diaphragmatic paralysis)   and likely further exacerbated by acute dysphagia risk factors   (multiple intubations and now s/p tracheostomy). Swallow safety is   impaired; swallow efficiency is impaired, however, impairments   are mitigated with use of safe swallow strategies (Speaking valve   in place, Single drink, Right head turn, Multiple swallows). Pt   appears to be at low-moderate risk for aspiration PNA (given pt   with increased O2 requirements s/p trach and consistent   penetration on FEES and MBS), and high risk for   malnutrition/dehydration (given recommendation for full liquid   diet and pt anxiety related to swallowing). Diet modification is   indicated. Swallow prognosis is fair, given multitude chronic   dysphagia risk factors and history of dysphagia. Pt appears to be   a fair-good candidate for behavioral swallow rehabilitation   (given impaired muscle strength at baseline d/t history of Bulbar   Polio with unclear/unknown prognosis for muscle function   improvement per medical team). \"   -SUNITA Maya at 2022          DIET RECOMMENDATIONS:  Full liquid diet (thin liquids)      STRATEGIES: Full Upright Position, Small Bite/Sip, Multiple Swallow and Head Turn to the right     Comprehension: 6 - Complex ideas 90% or device (hearing aid or glasses- if patient is primarily a visual learner)  Expression: 6 - Device used to express complex ideas/needs  Social Interaction: 5 - Patient is appropriate with supervision/cues  Problem Solvin - Patient able to solve simple/routine tasks  Memory: 3 - Patient remembers 50%-74% of the time     RECOMMENDATIONS/ASSESSMENT:  DIAGNOSTIC IMPRESSIONS:  Patient presents with cognitive-linguistic skills WFL, characterized by a score of 26/30 on the [address] [place] [address].  Patient also presents with hoarse vocal quality, left shoulder 2/2 adhesive capsulitis  Motor Exam:  Motor exam is symmetrical 3+ out of 5 all extremities bilaterally except 4/5 bilateral anterior tibialis, EHL, gastrocnemius/soleus  Tone:  normal  Muscle bulk: normal  Sensory:  Sensory intact  Coordination:   normal  Deep Tendon Reflexes:  Reflexes are intact and symmetrical bilaterally  Plantar Response:  Right:  upgoing  Left:  upgoing  Gait: not tested     Heart: normal rate and regular rhythm  Lungs: expiratory wheezes  Abdomen: soft, non-tender, non-distended, normal bowel sounds, no masses or organomegaly     Skin: warm and dry  Peripheral vascular: Pulses: Normal upper and lower extremity pulses; Edema: no       Diagnostics:   No results found for this or any previous visit (from the past 24 hour(s)). Impression:    Status-post total abdominal hysterectomy, bilateral salpingo-oophorectomy on 1/24/22 at [facility] secondary to postmenopausal bleeding via pfannenstiel incision. Final pathology benign. Hypercarbic respiratory failure, now on supplemental O2 per trach mask    Tracheostomy status  History of diaphragmatic and vocal cord paralysis (baseline right vocal cord and diaphragm paralysis with inability to cough, rare nasal regurgitation of liquids believed to be secondary to bulbar polio  Status post PEG placement 2/8/2022 secondary to pharyngeal dysphagia; on tube feedings as well as comfort sips of thin liquids  Zenker's diverticulum noted on a modified barium swallow study of 2/13/2022; GI consult deferred while at [place]. Lactose intolerant  Acute blood loss anemia; transfused 1 unit of packed red blood cells on 2/10/2022.  1 lower respiratory culture positive for Serratia and the patient is status post vancomycin 1/30 through 1/31, and cefepime 1/30 through 2/ 6. Negative lower extremity Dopplers on 2/2/22.   Gastroesophageal reflux disease  Osteoarthritis  Paralysis of vocal cords  Bulbar polio  Dysphagia, moderate to severe oropharyngeal  Inhalation it and ingestion of other object causing obstruction of respiratory tract or suffocation  Osteoporosis  Hoarse voice  Hypernasal speech  Diaphragmatic paralysis  Left adhesive capsulitis      Plan:  Continue therapies  Rehabilitation nursing for bowel, bladder, skin, and pain management. Nursing will also provide education and training to patient and family. Prophylaxis:  DVT: EPC cuffs; no oral anticoagulation secondary to bleeding risk. GI:  Protonix 40 mg po bid   Pain: OxyIR 5 mg po q 4 hours prn; Tylenol 650 mg per PEG q 6 hours prn;   Nutrition:  Consultation to dietician for nutritional counseling and recommendations. Prealbumin was checked on admission and was WNL at 21.7. Bladder: Per Rehab nursing  Bowel: Per Rehab nursing; Continue Glycolax 17 g po/PEG daily prn   and case management consultations for coordination of care and discharge planning  Have started Zoloft 50 mg po/PEG  q day for anxiety  IPOC completed   Rehab Team Conferences Thursdays  Planning discharge to home on Monday, 2/28/22   Dietary to evaluate tube feedings to possibly decrease bolus volumes and increase bolus frequency to maintain adequate caloric intake       I have reviewed the individualized overall plan of care developed by each specialty and agree with the treatment approach at this time. Please reference the history and physical along with interdisciplinary team conference notes from this admission for full details concerning the estimated LOS, intensity of services, and discussion of medical comorbid conditions.        Damion Pace MD

## 2022-02-17 NOTE — PROGRESS NOTES
[address] [address]  Inpatient Rehabilitation  Occupational Therapy  Daily Note  Time:  Time In: 1000  Time Out: 1130  Timed Code Treatment Minutes: 90 Minutes  Minutes: 90          Date: 2022  Patient Name: Donnell Hernandes,   Gender: female      Room: [facility]66/066-A  MRN: [id number]  : 1945  (64 y.o.)  Referring Practitioner: Dr Sathya Coronel  Diagnosis: Acute Respiratory Failure  Additional Pertinent Hx: Donnell Hernandes  is a 64 y.o. female admitted to the inpatient rehabilitation unit on 2/15/2022. The patient was originally admitted to the [facility] 2022 to the gynecology oncology service secondary to postmenopausal bleeding. She started with the bleeding in early 2021 and was followed by Dr. Ayla Ferrara here at [facility]. Ms. Yg Forrest presented to [facility])  for initial consultation for Post-menopausal Bleeding and failed Endometrial Biopsy. PMB started in 2021. Had pelvic US that showed EMS 9mm. EMB was attempted twice in the office, no endometrial cells were identified. She was therefore referred to the [email or web address] at [address] [place] [address] 22 for additional management. She had no pelvic pain. Only intermittent spotting, no heavy bleeding. Had history of Lichen sclerosis, was prescribed estrogen cream and steroid cream. Patient underwent abdominal hysterectomy on 2022, BSO via P. Pfannenstiel incision. Final pathology benign. Her hospitalization was complicated by respiratory failure (now on 3L supplemental O2 per trach mask,) and has PEG for pharyngeal dysphagia. Also has a PMH positive for GERD, OA,  Paralysis of vocal cords, Polio (bulbar), Dysphagia, Inhalation and ingestion of other object causing obstruction of respiratory tract or suffocation, Osteoporosis, Hoarse voice, Hypernasal speech, Diaphragm paralysis, and Left adhesive capsulitis.     Restrictions/Precautions:  Restrictions/Precautions: General Precautions,Fall Risk  Position Activity Restriction  Other position/activity restrictions: PEG, trach collar/mask, Place oxygen tank on 4L (continue to check notes for decreased FiO2 levels), No lifting >10lbs    SUBJECTIVE: Pt pleasant and cooperative. Agreeable to a shower. PAIN: Mild gastric pain around PEG tube site,  Pt reports reflux from tube feedings. Vitals: Oxygen: 4L via trach mask:  95% at rest,  93% while standing to brush teeth,   85% after bending over to doff socks. Quickly returned to 94% given 10 min upright seated rest break. COGNITION: WFL    ADL:   Grooming: Contact Guard Assistance. standing at sink, standing for 4 minutes   Bathing: Minimal Assistance. A to wash feet   Upper Extremity Dressing: Minimal Assistance. to doff and on shirt,  A to fasten bra   Lower Extremity Dressing: Moderate Assistance. To doff and don clothing,  SBA to doff socks. Total A to don socks after 02 sats dropped after bending over   Toileting: [employer]. Toilet Transfer: Contact Guard Assistance from standard height toilet   Shower Transfer: [employer]. Intermittent rest breaks needed during ADL due to shortness of breath, fatigue and 02 sats. BALANCE:  Sitting Balance:  Stand By Assistance. Standing Balance: Contact Guard Assistance. TRANSFERS:  Sit to Stand:  [employer], with increased time for completion, cues for hand placement. Stand to Sit: [employer], with increased time for completion, cues for hand placement. FUNCTIONAL MOBILITY:  Assistive Device: Rolling Walker  Assist Level:  Contact Guard Assistance. Distance: To and from bathroom and To and from shower room     ASSESSMENT:  Activity Tolerance:  Patient tolerance of  treatment: good. Improved from yesterday.        Assessment: Pt presents to occupational therapy following acute respiratory failure with patient complaints of weakness, decreased activity tolerance, decreased balance impacting patient's ability to complete self care at prior level of functioning. Due to patient's performance deficits, patient would greatly benefit from continued occupational therapy for ADL remediation, endurance building, strengthening and education on energy conservation techniques to return to prior level of functioning and safe return to home environment. Discharge Recommendations: Home with Home health OT  Equipment Recommendations: Other: Pt has a built in shower seat  Plan: Times per week: 5x/wk for 90 min and 1x/wk for 30 min  Times per day: Daily  Current Treatment Recommendations: Strengthening,Balance Training,Endurance Training,Functional Mobility Training,Equipment Evaluation, Education, & procurement,Patient/Caregiver Education & Training,Self-Care / ADL,Safety Education & Training    Patient Education  Patient Education: Role of OT, Plan of Care, ADL's, Energy Conservation and Equipment Education     Goals  Short term goals  Time Frame for Short term goals: 1 week  Short term goal 1: Pt will complete UB dressing tasks with min A to improve indep with donning her bra at home. Short term goal 2: Pt will complete LB dressing tasks with mod A to improve indep with donning socks and shoes at home. Short term goal 3: Pt will tolerate 4 minute standing tasks with SBA during shayy release tasks to improve indep with sinkside grooming. Short term goal 4: Pt will ambulate to and from bathroom with SBA and RW prn to improve activity tolerance needed for bathing. Short term goal 5: Pt will tolerate dynamic standing IADL tasks with SBA to meet patient goal of baking cookies with grandchildren. Long term goals  Time Frame for Long term goals : 2-3 weeks  Long term goal 1: Pt will complete bathing and shower transfer with mod I to improve indep with showering at home.   Long term goal 2: Pt will complete dressing and grooming tasks with mod I to improve indep with self care at home.  Long term goal 3: Pt will complete a simple meal prep tasks with mod I and 0 vcs for ECT to improve indep with preparing lunch at home. Following session, patient left in safe position with all fall risk precautions in place.

## 2022-02-17 NOTE — RT PROTOCOL NOTE
RT Inhaler-Nebulizer Bronchodilator Protocol Note    There is a bronchodilator order in the chart from a provider indicating to follow the RT Bronchodilator Protocol and there is an Initiate RT Inhaler-Nebulizer Bronchodilator Protocol order as well (see protocol at bottom of note). CXR Findings:  No results found. The findings from the last RT Protocol Assessment were as follows:   History Pulmonary Disease: None or smoker <15 pack years  Respiratory Pattern: Regular pattern and RR 12-20 bpm  Breath Sounds: Slightly diminished and/or crackles  Cough: Weak, productive  Indication for Bronchodilator Therapy: Decreased or absent breath sounds  Bronchodilator Assessment Score: 4    Aerosolized bronchodilator medication orders have been revised according to the RT Inhaler-Nebulizer Bronchodilator Protocol below. Respiratory Therapist to perform RT Therapy Protocol Assessment initially then follow the protocol. Repeat RT Therapy Protocol Assessment PRN for score 0-3 or on second treatment, BID, and PRN for scores above 3. No Indications - adjust the frequency to every 6 hours PRN wheezing or bronchospasm, if no treatments needed after 48 hours then discontinue using Per Protocol order mode. If indication present, adjust the RT bronchodilator orders based on the Bronchodilator Assessment Score as indicated below. Use Inhaler orders unless patient has one or more of the following: on home nebulizer, not able to hold breath for 10 seconds, is not alert and oriented, cannot activate and use MDI correctly, or respiratory rate 25 breaths per minute or more, then use the equivalent nebulizer order(s) with same Frequency and PRN reasons based on the score. If a patient is on this medication at home then do not decrease Frequency below that used at home.     0-3 - enter or revise RT bronchodilator order(s) to equivalent RT Bronchodilator order with Frequency of every 4 hours PRN for wheezing or increased work of breathing using Per Protocol order mode. 4-6 - enter or revise RT Bronchodilator order(s) to two equivalent RT bronchodilator orders with one order with BID Frequency and one order with Frequency of every 4 hours PRN wheezing or increased work of breathing using Per Protocol order mode. 7-10 - enter or revise RT Bronchodilator order(s) to two equivalent RT bronchodilator orders with one order with TID Frequency and one order with Frequency of every 4 hours PRN wheezing or increased work of breathing using Per Protocol order mode. 11-13 - enter or revise RT Bronchodilator order(s) to one equivalent RT bronchodilator order with QID Frequency and an Albuterol order with Frequency of every 4 hours PRN wheezing or increased work of breathing using Per Protocol order mode. Greater than 13 - enter or revise RT Bronchodilator order(s) to one equivalent RT bronchodilator order with every 4 hours Frequency and an Albuterol order with Frequency of every 2 hours PRN wheezing or increased work of breathing using Per Protocol order mode. RT to enter RT Home Evaluation for COPD & MDI Assessment order using Per Protocol order mode.     Electronically signed by Jayy Riley RCP on 2/17/2022 at 7:34 AM

## 2022-02-17 NOTE — PLAN OF CARE
Problem: Falls - Risk of:  Goal: Will remain free from falls  Description: Will remain free from falls  Outcome: Ongoing   Patient is alert and oriented and has demonstrated the use of using the call light for assistance before getting up. Education has been provided to defer the use of the bed alarm and/or restraint free alarm as the patient has shown competency in calling for assistance and verbalizing the risk. Problem: Pain:  Goal: Control of acute pain  Description: Control of acute pain  Outcome: Ongoing   Continuing to assess and monitor. Pain medication ordered PRN. Patient able to rate pain level effectively. Problem: Skin Integrity:  Goal: Risk for impaired skin integrity will decrease  Description: Risk for impaired skin integrity will decrease  Outcome: Ongoing   Patient able to reposition self. No areas of concern at this time.

## 2022-02-17 NOTE — PROGRESS NOTES
[address] [facility] ([place], [place]) THERAPY  [facility]  DAILY NOTE    TIME   SLP Individual Minutes  Time In: 0830  Time Out: 0900  Minutes: 30  Timed Code Treatment Minutes: 0 Minutes       Date: 2022  Patient Name: Bradley Lopez      CSN: [id number]   : 1945  (64 y.o.)  Gender: female   Referring Physician: Deisi Moran MD  Diagnosis: Debility  Secondary Diagnosis: Dysphagia. Dysphonia   Precautions: fall risk, trach and PEG  Current Diet: Full thin liquid diet  Swallowing Strategies: Right head turn, small bites/sips, 3-4 swallows, CLOSE pulmonary monitoring  Date of Last MBS/FEES: 22    Pain:  No pain reported. Subjective:  Patient seen sitting upright in recliner; cooperative and pleasant.  present for duration of session. Discussed diet level and food options with patient and ; placed handout in room outlining foods allowed and foods not allowed, both expressed understanding. Also provided education regarding NMES and discussed possibility of using this in subsequent sessions with patient; patient agreeable to this. Orders placed with MD Wong Grain approval, plan to initiate tomorrow. Short-Term Goals:  SHORT TERM GOAL #1:  Goal 1: Patient will safely consume thin liquid diet with good success in order to increase PO intake and improve pharyngeal function. INTERVENTIONS: Patient consumed ice chips in conjunction with pharyngeal exercises (see below). SHORT TERM GOAL #2:  Goal 2: Patient will complete pharyngeal strengthening exercises x10 each with good success in order to improve pharyngeal weakness and overall airway protection. INTERVENTIONS:  ST provided education and demonstration on proper technique regarding the following pharyngeal exercises: effortful swallow, Caren, TBR (\"kick\"), VF closure, supraglottic swallow, super supraglottic swallow.    Patient completed x10 of all aforementioned exercises excluding supraglottic swallows due to patient with difficulty completing this exercise. Recommended patient complete x10-15 of each exercise, 2-3x daily. A handout provided. SHORT TERM GOAL #3:  Goal 3: Patient will complete further assessment of voice evaluation. INTERVENTIONS: Not addressed due to focus on other goals. Long-Term Goals:  Timeframe for Long-term Goals: 3 weeks    LONG TERM GOAL #1:  Goal 1: Patient will complete repeat instrumental to reevaluate readiness for initiation of solids as clinically indicated. Comprehension: 6 - Complex ideas 90% or device (hearing aid or glasses- if patient is primarily a visual learner)  Expression: 6 - Device used to express complex ideas/needs  Social Interaction: 5 - Patient is appropriate with supervision/cues  Problem Solvin - Patient able to solve simple/routine tasks  Memory: 3 - Patient remembers 50%-74% of the time    EDUCATION:  Learner: Patient and Significant Other  Education:  Reviewed diet and strategies, Reviewed ST goals and Plan of Care and Reviewed recommendations for follow-up  Evaluation of Education: Verbalizes understanding    ASSESSMENT/PLAN:    Patient continues to present with hoarse vocal quality, consistent with previously documented right vocal fold paralysis, though patient reported this as baseline functioning. Patient also continues to present with moderate-severe oropharyngeal dysphagia, as indicated by results of MBS completed on 22 with evidence of reduced oral bolus drive, absent pharyngeal stripping, impaired hyolaryngeal elevation/excursion resulting in incomplete vestibule closure and minimal UES opening. Patient demonstrating good success with use of head turn to right and multiple swallow to clear single, small bolus attempts, while safely consuming thin liquid diet.  Patient remains at moderate risk for aspiration pneumonia given acute trach placement, right vocal fold paralysis, weak cough effort and degree of pharyngeal dysphagia. Have made improvements in overall understanding of compensatory strategy usage and completion of pharyngeal exercises. Will require ongoing skilled ST services during IPR stay. Without continued intensive (60 min per day) skilled ST services, patient likely not able to return to previous diet level. Activity Tolerance:  Patient tolerance of  Treatment: good  Assessment/Plan: Patient progressing toward established goals. Continues to require skilled care of licensed speech pathologist to progress toward achievement of established goals and plan of care. Plan for Next Session: NMES with pharyngeal exercises     Jenean Halsted, B.S.  Speech Therapy Student Intern

## 2022-02-17 NOTE — PROGRESS NOTES
[address] [place] [address]  INPATIENT PHYSICAL THERAPY  DAILY NOTE  [address] [facility],4Th Floor - [id number]    Time In: 0730  Time Out: 0830  Timed Code Treatment Minutes: 60 Minutes  Minutes: 60          Date: 2022  Patient Name: Donnell Hernandes,  Gender:  female        MRN: [id number]  : 1945  (64 y.o.)     Referring Practitioner: Elvi Evans MD  Diagnosis: Acute Respiratory Failure  Additional Pertinent Hx: Donnell Hernandes  is a 64 y.o. female admitted to the inpatient rehabilitation unit on 2/15/2022. The patient was originally admitted to the [facility] 2022 to the gynecology oncology service secondary to postmenopausal bleeding. She started with the bleeding in early 2021 and was followed by Dr. Anthony Stephenson here at [facility]. Ms. Yg Forrest presented to [facility] (/[facility])  for initial consultation for Post-menopausal Bleeding and failed Endometrial Biopsy. PMB started in 2021. Had pelvic US that showed EMS 9mm. EMB was attempted twice in the office, no endometrial cells were identified. She was therefore referred to the [facility] 22 for additional management. She had no pelvic pain. Only intermittent spotting, no heavy bleeding. Had history of Lichen sclerosis, was prescribed estrogen cream and steroid cream. Patient underwent abdominal hysterectomy on 2022, BSO via P. Pfannenstiel incision. Final pathology benign. Her hospitalization was complicated by respiratory failure (now on 3L supplemental O2 per trach mask,) and has PEG for pharyngeal dysphagia.   Also has a PMH positive for GERD, OA,  Paralysis of vocal cords, Polio (bulbar), Dysphagia, Inhalation and ingestion of other object causing obstruction of respiratory tract or suffocation, Osteoporosis, Hoarse voice, Hypernasal speech, Diaphragm paralysis, and Left adhesive capsulitis     Prior Level of Function:  Lives With: Spouse  Type of Home: House  Home Layout: Two level  Home Access: Stairs to enter without rails  Entrance Stairs - Number of Steps: ~4 steps at the front of the house without railings and 2 steps in the garage. Patient reports she has a few steps in the back of the house with railings. Patient reports typically she enters house from garage  Home Equipment:  (Patient reports she does not have an equipment at home)   Bathroom Shower/Tub: Walk-in shower (Patient reports she has a built in shower chair)  Bathroom Toilet: Standard  Bathroom Equipment: Grab bars in shower,Built-in shower seat    ADL Assistance: Independent  Homemaking Assistance: Independent  Homemaking Responsibilities: Yes  Ambulation Assistance: Independent  Transfer Assistance: Independent  Active : Yes  Additional Comments: Patient reports she was independent PTA and did not utilize an AD for mobility. Patient reports her daugther and son in law live next door and also would be able to help out as needed. Restrictions/Precautions:  Restrictions/Precautions: General Precautions,Fall Risk  Position Activity Restriction  Other position/activity restrictions: PEG, trach collar/mask, Place oxygen tank on 4L (continue to check notes for decreased FiO2 levels), No lifting >10lbs     SUBJECTIVE: Patient laying in bed finishing with respiratory therapy. Patient is pleasant and agreeable to therapy. Respiratory reported reducing FiO2 to 28% and therefore while on oxygen tank patient only requires 4L O2. Patient requesting to use bathroom and get dressed at beginning of session with increased time required to complete.      PAIN: 0/10    Vitals: Monitored oxygen throughout treatment with O2 able to maintain >90%    OBJECTIVE:  Bed Mobility:  Supine to Sit: Contact Guard Assistance, with head of bed raised, with rail, with increased time for completion    Transfers:  Sit to Stand: [address], cues for hand placement  Stand to Sit:Contact Guard Assistance, cues for hand placement  -Patient performed from various surfaces including: edge of bed, toilet, wheelchair and recliner. Patient required verbal cueing for hand placement with use of AD. Patient required Min assist from lower surface height including toilet with increased time required to complete. To/From Bed and Chair: Contact Guard Assistance  -Patient performed bed to chair transfer with RW initially and progressed to no RW however required verbal cueing for slowed speed and hand placement due to attempting to sit early at times. Car:Minimal Assistance, with increased time for completion, cues for hand placement, with verbal cues for proper sequencing due to attempting to step into car feet first with education provided on safety with getting into and out of car. Ambulation:  Contact Guard Assistance  Distance: 10 feet x 2   Surface: Level Tile  Device:Rolling Walker  Gait Deviations: Forward Flexed Posture, Decreased Step Length Bilaterally, Decreased Gait Speed and Decreased Heel Strike Bilaterally    Contact Guard Assistance, with cues for safety and increased time for completion  Distance: 15 feet x 2  Surface: Level Tile  Device: No Device  Gait Deviations: Forward Flexed Posture, decreased step length bilaterally, decreased gait speed, decreased heel strike bilaterally, unsteady gait   -Patient required  Verbal cueing for slowed gait speed with turning due to slight unsteadiness    Balance:  Dynamic Standing Balance: Contact Guard Assistance, with cues for safety   -Patient stood unsupported with CGA while donning clothing with increased anterior weight shifting noted with verbal cueing require for maintaining upright posture in order to reduce risk for falls. Stairs:  Minimal Assistance  Number of Steps: 1  Height: 6\" step with Rolling Walker   -Patient required verbal cueing for maintaining close proximity with AD with ascending and descending step.  Patient required verbal cueing for increased foot clearance from step. Patient required increased time to complete. Functional Outcome Measures: Not completed       ASSESSMENT:  Assessment: Patient progressing toward established goals. Patient requires CGA for supine to sit with use of railings,  CGA for sit to stand transfers with cueing for hand placement, and CGA for ambulation without RW ~15 feet with verbal cueing for slowed speed with turning due to unsteadiness with patient limited with distance due to decreased endurance levels. Patient requires min assist for stair negotiation with RW with cueing for body alignment with RW and for proper foot placement in order to assist with safety. Patient demonstrates generalized weakness and decreased endurance levels with functional mobility which limits her safety with functional mobility. Patient requires close CGA for standing dynamic balance and verbal cueing for safety due to increased anterior weight shifting resulting in unsteadiness with functional tasks. Patient overall can continue to benefit from skilled PT treatment in order to assist with dynamic balance, gait training, transfer training, stair negotiation, endurance training and BLE strengthening for increased functional mobility. Activity Tolerance:  Patient tolerance of  treatment: fair.       Equipment Recommendations:Equipment Needed: Yes (Continue to assess pending progress (may require RW))  Discharge Recommendations: Continue to assess pending progress and Patient would benefit from continued PT at discharge  Plan: Times per week: 5x/wk 90min; 1x/wk 30 min  Times per day: Daily  Current Treatment Recommendations: Strengthening,Neuromuscular Re-education,Home Exercise Program,ROM,Safety Education & Brigida Armin Training,Patient/Caregiver Education & Training,Functional Mobility Training,Wheelchair Mobility Training,Transfer Training,Gait Training,Stair training    Patient Education  Patient Education: [employer] Mobility, Transfers, Gait, Stairs, Verbal Exercise Instruction,  - Patient Verbalized Understanding, - Patient Requires Continued Education    Goals:  Patient goals : To return to home  Short term goals  Time Frame for Short term goals: 1 week  Short term goal 1: Patient to perform supine<>sit with SBA without railings in order to assist with getting into and out of bed. Short term goal 2: Patient to perform sit to stand transfers wtih SBA with <=1 cue for hand placement in order to assist with safety with transfers from various surfaces. Short term goal 3: Patient to ambulate at least 75 feet with RW with SBA in order to assist with home mobility. Short term goal 4: Patient to ascend/descend 1 step with B handrails with CGA in order to assist with getting into and out of home. Short term goal 5: Patient to score at least 19/28 on the Tinetti in order to reduce risk for falls. Long term goals  Time Frame for Long term goals : 3 weeks  Long term goal 1: Patient to perform supine<>sit with Mod I without railings in order to assist with getting into and out of bed. Long term goal 2: Patient to perform sit to stand transfers with Mod I in order to assist with safety with transfers from various surfaces. Long term goal 3: Patient to ambulate at least 150 feet with RW with Mod I in order to assist with home mobility. Long term goal 4: Patient to ascend/descend 4 steps with 1 handrail with Supervision in order to assist with getting into and out of home. Long term goal 5: Patient to perform car transfer with Supervision in order to assist with getting to and from appointments. Following session, patient left in safe position with all fall risk precautions in place.

## 2022-02-17 NOTE — PROGRESS NOTES
[address] [address]  Recreational Therapy  Inpatient Rehabilitation Evaluation        Time Spent with Patient: 25 minutes    Date:  2/17/2022       Patient Name: Parisa Mccloud      MRN: [id number]       YOB: 1945 (72 y.o.)       Gender: female  Diagnosis: Acute Respiratory Failure  Referring Practitioner: Dr Emma Rosales    RESTRICTIONS/PRECAUTIONS:  Restrictions/Precautions: General Precautions,Fall Risk  Vision: Impaired  Hearing: Within functional limits    PAIN: at this time just a sore throat but does not want any pain medication for it     SUBJECTIVE:  Pt lives with her  Stan-they have 5 children - 9 grandchildren and 8 great grandchildren with 1 on the way    VISION:  Glasses    HEARING: Within Normal Limit    LEISURE INTERESTS:   Pt enjoys spending time with her family, attending grandchildren's activities, she does the cooking and the cleaning at home, she enjoys gardening and attends [employer]-very pleasant- present and very supportive -she would like to get her hair washed and styled by our beautician next week    BARRIERS TO LEISURE INTERESTS:    Decreased endurance      Patient Education  New Education Provided: Importance of Leisure, RT Plan of Care    Plan:  Continue to follow patient through this admission  See patient individually    Electronically signed by: SHERIE Mac  Date: 2/17/2022

## 2022-02-18 LAB
HCT VFR BLD CALC: 27.7 % (ref 37–47)
HEMOGLOBIN: 8.3 GM/DL (ref 12–16)

## 2022-02-18 PROCEDURE — 94640 AIRWAY INHALATION TREATMENT: CPT

## 2022-02-18 PROCEDURE — 6370000000 HC RX 637 (ALT 250 FOR IP): Performed by: PHYSICAL MEDICINE & REHABILITATION

## 2022-02-18 PROCEDURE — 99232 SBSQ HOSP IP/OBS MODERATE 35: CPT | Performed by: NURSE PRACTITIONER

## 2022-02-18 PROCEDURE — 92526 ORAL FUNCTION THERAPY: CPT

## 2022-02-18 PROCEDURE — 36415 COLL VENOUS BLD VENIPUNCTURE: CPT

## 2022-02-18 PROCEDURE — 85014 HEMATOCRIT: CPT

## 2022-02-18 PROCEDURE — 1180000000 HC REHAB R&B

## 2022-02-18 PROCEDURE — 2700000000 HC OXYGEN THERAPY PER DAY

## 2022-02-18 PROCEDURE — 97530 THERAPEUTIC ACTIVITIES: CPT

## 2022-02-18 PROCEDURE — 97535 SELF CARE MNGMENT TRAINING: CPT

## 2022-02-18 PROCEDURE — 94760 N-INVAS EAR/PLS OXIMETRY 1: CPT

## 2022-02-18 PROCEDURE — 97110 THERAPEUTIC EXERCISES: CPT

## 2022-02-18 PROCEDURE — 97116 GAIT TRAINING THERAPY: CPT

## 2022-02-18 PROCEDURE — 85018 HEMOGLOBIN: CPT

## 2022-02-18 RX ADMIN — PANTOPRAZOLE SODIUM 40 MG: 40 TABLET, DELAYED RELEASE ORAL at 06:16

## 2022-02-18 RX ADMIN — PANTOPRAZOLE SODIUM 40 MG: 40 TABLET, DELAYED RELEASE ORAL at 15:32

## 2022-02-18 RX ADMIN — Medication 400 MG: at 09:30

## 2022-02-18 RX ADMIN — Medication 1 TABLET: at 09:29

## 2022-02-18 RX ADMIN — MELOXICAM 15 MG: 7.5 TABLET ORAL at 09:29

## 2022-02-18 RX ADMIN — Medication 6 MG: at 21:37

## 2022-02-18 RX ADMIN — TRAZODONE HYDROCHLORIDE 50 MG: 50 TABLET ORAL at 21:37

## 2022-02-18 RX ADMIN — IPRATROPIUM BROMIDE AND ALBUTEROL SULFATE 1 AMPULE: .5; 3 SOLUTION RESPIRATORY (INHALATION) at 07:10

## 2022-02-18 RX ADMIN — SERTRALINE 50 MG: 50 TABLET, FILM COATED ORAL at 09:29

## 2022-02-18 ASSESSMENT — PAIN SCALES - GENERAL
PAINLEVEL_OUTOF10: 2
PAINLEVEL_OUTOF10: 2
PAINLEVEL_OUTOF10: 0

## 2022-02-18 NOTE — PROGRESS NOTES
Spouse  Type of Home: House  Home Layout: Two level  Home Access: Stairs to enter without rails  Entrance Stairs - Number of Steps: ~4 steps at the front of the house without railings and 2 steps in the garage. Patient reports she has a few steps in the back of the house with railings. Patient reports typically she enters house from garage  Home Equipment:  (Patient reports she does not have an equipment at home)   Bathroom Shower/Tub: Walk-in shower (Patient reports she has a built in shower chair)  Bathroom Toilet: Standard  Bathroom Equipment: Grab bars in shower,Built-in shower seat    ADL Assistance: Independent  Homemaking Assistance: Independent  Homemaking Responsibilities: Yes  Ambulation Assistance: Independent  Transfer Assistance: Independent  Active : Yes  Additional Comments: Patient reports she was independent PTA and did not utilize an AD for mobility. Patient reports her daugther and son in law live next door and also would be able to help out as needed. Restrictions/Precautions:  Restrictions/Precautions: General Precautions,Fall Risk  Position Activity Restriction  Other position/activity restrictions: PEG, trach collar/mask, Place oxygen tank on 4L (continue to check notes for decreased FiO2 levels), No lifting >10lbs     SUBJECTIVE: Patient resting in recliner with a couple STNAs present, pleasant and agreeable to participate in therapy. Oxygen tank was again on 4L throughout session. PAIN: 0/10     Vitals: Oxygen: Monitored throughout session; was 91% immediately after walking and box taps but quickly increased to 94-95% with rest. Maintained >91% throughout session.     OBJECTIVE:  Bed Mobility:  Not Tested    Transfers:  Sit to Stand: [employer], with increased time for completion  Stand to Sit:Stand By Assistance, with increased time for completion  To/From Bed and Chair: [employer], with increased time for completion  Patient completed sit to stands from various surfaces including the wheelchair and recliner. Patient completed transfer from recliner <> wheelchair with contact guard assistance and no cueing needed for hand placement. Ambulation:  Contact Guard Assistance, with verbal cues , with increased time for completion  Distance: 20' x2, 10' x2  Surface: Level Tile  Device:No Device  Gait Deviations:  Decreased Step Length Bilaterally, Decreased Weight Shift Bilaterally, Decreased Gait Speed, Decreased Heel Strike Bilaterally and Unsteady Gait  -Patient instructed in stepping through agility ladder in the parallel bars with contact guard assistance. Patient completed task with bilateral UE support, and during second trial progressed to light UE support to challenge stability and balance. Patient instructed to pick feet up higher to improve bilateral step height. Patient completed activity 4 times intermittently with oxygen monitored throughout. Patient instructed in agility ladder stepping to improve bilateral step height and weight shifting for improved gait mechanics.  -Patient instructed in cone weaving with contact guard assistance and no AD. Patient had one or two episodes of mild LOB and unsteadiness, however was able to recover. Patient instructed to complete task slowly for improved stability and balance. Patient instructed in cone weaving to work on navigating around obstacles for safe ambulation at home. Balance:  Static Standing Balance: Stand By Assistance with RW for support, Contact Guard Assistance without AD for support  Dynamic Standing Balance: Contact Guard Assistance, with verbal cues , with increased time for completion  -Patient instructed in bilateral 6\" box taps with contact guard assistance provided. Patient completed 2 sets of 8 repetitions bilaterally, with a rest break in between sets. Patient unable to get to 10 repetitions due to increased fatigue.  Patient required close CGA with this task as patient demonstrated unsteadiness with SL stance and lateral weight shifting. Patient instructed in box taps to challenge dynamic standing balance and SL stance.  -Patient instructed in ring toss with contact guard assistance and reaching outside OTIS to grab rings. Patient completed 2 sets intermittently and progressed to a more NBOS to further challenge her balance. Patient instructed in ring toss to work on dynamic standing balance reaching outside OTIS and overall endurance. Functional Outcome Measures: Not completed       ASSESSMENT:  Assessment: Patient progressing toward established goals. Activity Tolerance:  Patient tolerance of  treatment: good. Equipment Recommendations:Equipment Needed: Yes (Continue to assess pending progress (may require RW))  Discharge Recommendations: Continue to assess pending progress  Plan: Times per week: 5x/wk 90min; 1x/wk 30 min  Times per day: Daily  Current Treatment Recommendations: Strengthening,Neuromuscular Re-education,Home Exercise Program,ROM,Safety Education & Tod Curb Training,Patient/Caregiver Education & Training,Functional Mobility Training,Wheelchair Mobility Training,Transfer Training,Gait Training,Stair training    Patient Education  Patient Education: Transfers, Gait,  - Patient Verbalized Understanding, - Patient Requires Continued Education    Goals:  Patient goals : To return to home  Short term goals  Time Frame for Short term goals: 1 week  Short term goal 1: Patient to perform supine<>sit with SBA without railings in order to assist with getting into and out of bed. Short term goal 2: Patient to perform sit to stand transfers wtih SBA with <=1 cue for hand placement in order to assist with safety with transfers from various surfaces. Short term goal 3: Patient to ambulate at least 75 feet with RW with SBA in order to assist with home mobility.   Short term goal 4: Patient to ascend/descend 1 step with B handrails with CGA in order to assist with getting into and out of home. Short term goal 5: Patient to score at least 19/28 on the Tinetti in order to reduce risk for falls. Long term goals  Time Frame for Long term goals : 3 weeks  Long term goal 1: Patient to perform supine<>sit with Mod I without railings in order to assist with getting into and out of bed. Long term goal 2: Patient to perform sit to stand transfers with Mod I in order to assist with safety with transfers from various surfaces. Long term goal 3: Patient to ambulate at least 150 feet with RW with Mod I in order to assist with home mobility. Long term goal 4: Patient to ascend/descend 4 steps with 1 handrail with Supervision in order to assist with getting into and out of home. Long term goal 5: Patient to perform car transfer with Supervision in order to assist with getting to and from appointments. Following session, patient left in safe position with all fall risk precautions in place.

## 2022-02-18 NOTE — PROGRESS NOTES
EN/PN NUTRITION SUPPORT    RECOMMENDATIONS/PLAN:   · Continue current diet as ordered Full liquid (per SLP, patient can only have thin liquids- no pureed or thickened liquids). · Recommend new TF regimen of TwoCal HN bolus of 130 ml six times daily. Recommend fluid flush 30 ml before and after each bolus feed. · Patient willing to trial Ensure clear daily. CURRENT NUTRITION THERAPIES  ADULT DIET; Full Liquid  ADULT TUBE FEEDING; PEG; 2.0 Calorie; Bolus; 6 TIMES DAILY; 130; Syringe Push; 30; Before and after each bolus  Current Tube Feeding (TF) Orders:  · Feeding Route: PEG  · Formula: Peptide Based (Vital AF 1.2)  · Schedule: Bolus   · Water Flushes: 30 ml fluid flush before and after each bolus  · Current TF & Flush Orders Provides: Vital AF 1.2 at 220 ml six times daily. Regimen providing 1584 kcal, 99 gm protein, 146 gm CHO, 6.7 gm fiber, 1070 ml free fluid. Total fluid provided: 1430 ml (1070 TF, 360 fluid flush). Total goal volume: 1680 ml (1320 TF, 360 fluid flush). · Goal TF & Flush Orders Provides: Pt reports feeling full and with reflux of current TF regimen. Recommend new TF regimen of TwoCal HN at 130 ml six times daily to help reduce volume of feeding. New regimen to provide 1560 kcal, 65 gm protein, 170 gm CHO, 3.9 gm fiber, 546 ml free fluid. Recommend continuing 30 ml fluid flush before and after each bolus. Total fluid provided: 906 ml (546 TF, 360 fluid flush). Total goal volume: 1140 ml (780 ml TF, 360 fluid flush). COMPARATIVE STANDARDS  Energy (kcal):  1691-1750 kcal/day; Weight Used for Energy Requirements:  Current     Protein (g):  64-76 gm/day (1.1-1.3 gm/kg); Weight Used for Protein Requirements:  Current          NUTRITIONAL SUMMARY/STATUS:    Pt. nutritionally compromised AEB hx of dysphagia and need for EN, trach mask, and need for full liquid diet.   At risk for further nutrition compromise r/t admitted for debility r/t respiratory failure, recent hysterectomy and underlying medical condition (GERD, osteoarthritis, paralysis of vocal cord, dysphagia, diaphragm paralysis).       Writer met with patient evening of 2/17. Patient reports feeling full and having reflux with current TF regimen. Per RN note (2/17) TF bolus was held d/t patient eating lunch and scheduled therapy. Per RN note (2/16) patient refused bolus feed d/t reflux and feeling full. Patient reported having \"bad\" reflux 2/16 after consuming tomato soup for dinner. Patient amenable to trying new TF regimen of TwoCal HN at 130 ml bolus six times daily. New regimen reducing volume by 90 ml from previous 220 ml TF bolus order. Patient reports hx of lactose intolerance. TF formulas are noted to be suitable for lactose intolerant patients. Patient reports tolerating new TF regimen of TwoCal HN so far. NUTRITION RELATED FINDINGS:   Treatments: PT/OT/ST following. TF Status: New TF regimen put into place 2/17. GI Status: Last BM 2/17. Patient reports feeling \"gassy\" at times. Hx of lactose intolerance. Current TF formula suitable for lactose intolerant patients. Protonix ordered and maalox ordered PRN. Pertinent Labs: Hgb 8.3 (2/18)  Pertinent Meds: mag-ox, mobic, protonix, MVI, zoloft, desyrel; PRN maalox, milk of mag, oxycodone, glycolax, senokot  Current Weight: 128 lb 15.5 oz (58.5 kg) (2/15- edema 2/17 RLE/LLE (1+ pitting))  Admission Weight:  128 lb 15.5 oz (58.5 kg) (2/15- RLE/LLE (1+ pitting) edema present 2/15)  Wounds: Surgical Incision (incision to pelvis anterior)  Malnutrition Status: No malnutrition    Please refer to initial nutrition assessment for additional details.    Tammy Rivera RD:    Contact Number: [phone]

## 2022-02-18 NOTE — RT PROTOCOL NOTE
RT Inhaler-Nebulizer Bronchodilator Protocol Note    There is a bronchodilator order in the chart from a provider indicating to follow the RT Bronchodilator Protocol and there is an Initiate RT Inhaler-Nebulizer Bronchodilator Protocol order as well (see protocol at bottom of note). CXR Findings:  No results found. The findings from the last RT Protocol Assessment were as follows:   History Pulmonary Disease: None or smoker <15 pack years  Respiratory Pattern: Regular pattern and RR 12-20 bpm  Breath Sounds: Slightly diminished and/or crackles  Cough: Weak, productive  Indication for Bronchodilator Therapy: Decreased or absent breath sounds  Bronchodilator Assessment Score: 4    Aerosolized bronchodilator medication orders have been revised according to the RT Inhaler-Nebulizer Bronchodilator Protocol below. Respiratory Therapist to perform RT Therapy Protocol Assessment initially then follow the protocol. Repeat RT Therapy Protocol Assessment PRN for score 0-3 or on second treatment, BID, and PRN for scores above 3. No Indications - adjust the frequency to every 6 hours PRN wheezing or bronchospasm, if no treatments needed after 48 hours then discontinue using Per Protocol order mode. If indication present, adjust the RT bronchodilator orders based on the Bronchodilator Assessment Score as indicated below. Use Inhaler orders unless patient has one or more of the following: on home nebulizer, not able to hold breath for 10 seconds, is not alert and oriented, cannot activate and use MDI correctly, or respiratory rate 25 breaths per minute or more, then use the equivalent nebulizer order(s) with same Frequency and PRN reasons based on the score. If a patient is on this medication at home then do not decrease Frequency below that used at home.     0-3 - enter or revise RT bronchodilator order(s) to equivalent RT Bronchodilator order with Frequency of every 4 hours PRN for wheezing or increased work of breathing using Per Protocol order mode. 4-6 - enter or revise RT Bronchodilator order(s) to two equivalent RT bronchodilator orders with one order with BID Frequency and one order with Frequency of every 4 hours PRN wheezing or increased work of breathing using Per Protocol order mode. 7-10 - enter or revise RT Bronchodilator order(s) to two equivalent RT bronchodilator orders with one order with TID Frequency and one order with Frequency of every 4 hours PRN wheezing or increased work of breathing using Per Protocol order mode. 11-13 - enter or revise RT Bronchodilator order(s) to one equivalent RT bronchodilator order with QID Frequency and an Albuterol order with Frequency of every 4 hours PRN wheezing or increased work of breathing using Per Protocol order mode. Greater than 13 - enter or revise RT Bronchodilator order(s) to one equivalent RT bronchodilator order with every 4 hours Frequency and an Albuterol order with Frequency of every 2 hours PRN wheezing or increased work of breathing using Per Protocol order mode. RT to enter RT Home Evaluation for COPD & MDI Assessment order using Per Protocol order mode.     Electronically signed by Anna Craig RCP on 2/18/2022 at 7:21 AM

## 2022-02-18 NOTE — PROGRESS NOTES
[address]  Occupational Therapy  Daily Note  Time:   Time In: 1000  Time Out: 1100  Timed Code Treatment Minutes: 60 Minutes  Minutes: 60    Date: 2022  Patient Name: Connor Franks,   Gender: female      Room: [facility]66/066-A  MRN: [id number]  : 1945  (64 y.o.)  Referring Practitioner: Dr Joanne Adkins  Diagnosis: Acute Respiratory Failure  Additional Pertinent Hx: Connor Franks  is a 64 y.o. female admitted to the inpatient rehabilitation unit on 2/15/2022. The patient was originally admitted to the [facility] 2022 to the gynecology oncology service secondary to postmenopausal bleeding. She started with the bleeding in early 2021 and was followed by Dr. Bruno Padron here at [facility]. [facility]  for initial consultation for Post-menopausal Bleeding and failed Endometrial Biopsy. PMB started in 2021. Had pelvic US that showed EMS 9mm. EMB was attempted twice in the office, no endometrial cells were identified. She was therefore referred to the [facility] at [facility] 22 for additional management. She had no pelvic pain. Only intermittent spotting, no heavy bleeding. Had history of Lichen sclerosis, was prescribed estrogen cream and steroid cream. Patient underwent abdominal hysterectomy on 2022, BSO via P. Pfannenstiel incision. Final pathology benign. Her hospitalization was complicated by respiratory failure (now on 3L supplemental O2 per trach mask,) and has PEG for pharyngeal dysphagia. Also has a PMH positive for GERD, OA,  Paralysis of vocal cords, Polio (bulbar), Dysphagia, Inhalation and ingestion of other object causing obstruction of respiratory tract or suffocation, Osteoporosis, Hoarse voice, Hypernasal speech, Diaphragm paralysis, and Left adhesive capsulitis.     Restrictions/Precautions:  Restrictions/Precautions: General Precautions,Fall Risk  Position Activity Restriction  Other position/activity restrictions: PEG, trach collar/mask, Place oxygen tank on 4L (continue to check notes for decreased FiO2 levels), No lifting >10lbs     SUBJECTIVE: Patient pleasant and cooperative. Agreeable to OT. PAIN: Denies pain     Vitals: Oxygen: > 90 throughout entire tx session ; on 4L via trach mask   Heart Rate: 80's throughout entire tx session  * Spot checked several times throughout session     COGNITION: WFL    ADL:   Grooming: Stand By Assistance.  hand hygiene x 2min. Toileting: Stand By Assistance. Toilet Transfer: Stand By Assistance. ETS. BALANCE:  Sitting Balance:  Modified Independent. Standing Balance: Stand By Assistance. BED MOBILITY:  Not Tested    TRANSFERS:  Sit to Stand:  Stand By Assistance. recliner, w/c, standard chair without arm rests with education on tech. Stand to Sit: Stand By Assistance. FUNCTIONAL MOBILITY:  Assistive Device: Rolling Walker  Assist Level:  Stand By Assistance. Distance: To and from bathroom and within apartment. Also trialed some functional mobility in kitchen without AD to challenge balance, pt demo no LOB. Required CGA without AD. ADDITIONAL ACTIVITIES:  Patient completed IADL meal prep task of cooking a simple stovetop meal to challenge kitchen safety awareness, endurance and balance. Challenged pt to complete without AD per her preference in which pt was able to complete with CGA without RW and SBA with RW. Pt demo endurance x 10 min throughout with no LOB. Skilled education on 02 line mgmt and safety within kitchen.  present, supportive and assisted in tasks. Pt completed additional IADL task of loading  to challenge standing endurance and balance, pt stood without AD x 4 min with no LOB to load top rack. Pt unable to load bottom rack due to difficulty with bending with trach mask, requiring A for reaching low.        ASSESSMENT:     Activity Tolerance:  Patient tolerance of  treatment: good. Discharge Recommendations: Home with Home Health OT  Equipment Recommendations: Other: Pt has a built in shower seat  Plan: Times per week: 5x/wk for 90 min and 1x/wk for 30 min  Times per day: Daily  Current Treatment Recommendations: Strengthening,Balance Training,Endurance Training,Functional Mobility Training,Equipment Evaluation, Education, & procurement,Patient/Caregiver Education & Training,Self-Care / ADL,Safety Education & Training    Patient Education  Patient Education: ADL's, IADL's, Energy Conservation, Family Education, Equipment Education, Reviewed Prior Education, Home Safety and Assistive Device Safety    Goals  Short term goals  Time Frame for Short term goals: 1 week  Short term goal 1: Pt will complete UB dressing tasks with min A to improve indep with donning her bra at home. Short term goal 2: Pt will complete LB dressing tasks with mod A to improve indep with donning socks and shoes at home. Short term goal 3: Pt will tolerate 4 minute standing tasks with SBA during shayy release tasks to improve indep with sinkside grooming. Short term goal 4: Pt will ambulate to and from bathroom with SBA and RW prn to improve activity tolerance needed for bathing. Short term goal 5: Pt will tolerate dynamic standing IADL tasks with SBA to meet patient goal of baking cookies with grandchildren. Long term goals  Time Frame for Long term goals : 2-3 weeks  Long term goal 1: Pt will complete bathing and shower transfer with mod I to improve indep with showering at home. Long term goal 2: Pt will complete dressing and grooming tasks with mod I to improve indep with self care at home. Long term goal 3: Pt will complete a simple meal prep tasks with mod I and 0 vcs for ECT to improve indep with preparing lunch at home. Following session, patient left in safe position with all fall risk precautions in place.

## 2022-02-18 NOTE — PROGRESS NOTES
[address] [place] [address]  INPATIENT PHYSICAL THERAPY  DAILY NOTE  [address] [facility],4Th Floor - 7E-66/066-A    Time In: 1400  Time Out: 1430  Timed Code Treatment Minutes: 30 Minutes  Minutes: 30          Date: 2022  Patient Name: Nemo Colon,  Gender:  female        MRN: [id number]  : 1945  (64 y.o.)     Referring Practitioner: Gume Aguirre MD  Diagnosis: Acute Respiratory Failure  Additional Pertinent Hx: Nemo Colon  is a 64 y.o. female admitted to the inpatient rehabilitation unit on 2/15/2022. The patient was originally admitted to the [facility] 2022 to the gynecology oncology service secondary to postmenopausal bleeding. She started with the bleeding in early 2021 and was followed by Dr. Gianna Lay here at [facility]. Ms. Hunter Lake presented to [facility] (/[facility])  for initial consultation for Post-menopausal Bleeding and failed Endometrial Biopsy. PMB started in 2021. Had pelvic US that showed EMS 9mm. EMB was attempted twice in the office, no endometrial cells were identified. She was therefore referred to the [facility] at [facility] 22 for additional management. She had no pelvic pain. Only intermittent spotting, no heavy bleeding. Had history of Lichen sclerosis, was prescribed estrogen cream and steroid cream. Patient underwent abdominal hysterectomy on 2022, BSO via P. Pfannenstiel incision. Final pathology benign. Her hospitalization was complicated by respiratory failure (now on 3L supplemental O2 per trach mask,) and has PEG for pharyngeal dysphagia.   Also has a PMH positive for GERD, OA,  Paralysis of vocal cords, Polio (bulbar), Dysphagia, Inhalation and ingestion of other object causing obstruction of respiratory tract or suffocation, Osteoporosis, Hoarse voice, Hypernasal speech, Diaphragm paralysis, and Left adhesive capsulitis     Prior Level of Function:  Lives With: Spouse  Type of Home: House  Home Layout: Two level  Home Access: Stairs to enter without rails  Entrance Stairs - Number of Steps: ~4 steps at the front of the house without railings and 2 steps in the garage. Patient reports she has a few steps in the back of the house with railings. Patient reports typically she enters house from garage  Home Equipment:  (Patient reports she does not have an equipment at home)   Bathroom Shower/Tub: Walk-in shower (Patient reports she has a built in shower chair)  Bathroom Toilet: Standard  Bathroom Equipment: Grab bars in shower,Built-in shower seat    ADL Assistance: Independent  Homemaking Assistance: Independent  Homemaking Responsibilities: Yes  Ambulation Assistance: Independent  Transfer Assistance: Independent  Active : Yes  Additional Comments: Patient reports she was independent PTA and did not utilize an AD for mobility. Patient reports her daugther and son in law live next door and also would be able to help out as needed. Restrictions/Precautions:  Restrictions/Precautions: General Precautions,Fall Risk  Position Activity Restriction  Other position/activity restrictions: PEG, trach collar/mask, Place oxygen tank on 4L (continue to check notes for decreased FiO2 levels), No lifting >10lbs     SUBJECTIVE: Patient sitting in chair upon arrival,  present. Patient pleasant and agreeable to participate in therapy. PAIN: 0/10    Vitals: Oxygen: Monitored, >93% throughout session    OBJECTIVE:  Bed Mobility:  Not Tested    Transfers:  Sit to Stand: [employer], with increased time for completion  Stand to Sit:Stand By Assistance, with increased time for completion  To/From Bed and Chair: Stand By Assistance, [employer], with increased time for completion  -Patient completed STS's from recliner and wheelchair and transfers from the recliner <> wheelchair with Contact guard assistance to stand by assistance.  Patient demonstrates good hand placement and safety awareness without cueing. Ambulation:  Contact Guard Assistance, with increased time for completion  Distance: 25'  Surface: Level Tile  Device:No Device  Gait Deviations:  Decreased Step Length Bilaterally, Decreased Weight Shift Bilaterally, Decreased Gait Speed, Decreased Heel Strike Bilaterally and Unsteady Gait  -Patient ambulated without an AD and contact guard assistance with decreased gait speed due to mild unsteadiness. Stairs:  Contact Guard Assistance, with verbal cues , with increased time for completion  Number of Steps: 4 steps x2  Height: 6\" step with Bilateral Handrails  -Patient instructed in stairs with contact guard assistance. Patient completed 2 sets of 4 steps intermittently. Patient instructed to use step to gait pattern for safety and stability. Patient demonstrated good carryover of instruction. Patient instructed in stairs to improve patient safety with stairs to get in her house following discharge. Exercise:  Patient was guided in 1 set(s) 12 reps of exercise to both lower extremities with bilateral UE support and seated rest breaks throughout. Standing heel/toe raises, Standing marches, Standing hip abduction/adduction, Standing hip extension, Standing hamstring curls and Standing hip flexion. Exercises were completed for increased independence with functional mobility. Functional Outcome Measures: Not completed       ASSESSMENT:  Assessment: Patient progressing toward established goals. Activity Tolerance:  Patient tolerance of  treatment: good.       Equipment Recommendations:Equipment Needed: Yes (Continue to assess pending progress (may require RW))  Discharge Recommendations: Continue to assess pending progress  Plan: Times per week: 5x/wk 90min; 1x/wk 30 min  Times per day: Daily  Current Treatment Recommendations: Strengthening,Neuromuscular Re-education,Home Exercise Program,ROM,Safety Education & Julissa Toussaint Training,Patient/Caregiver Education & Training,Functional Mobility Training,Wheelchair Mobility Training,Transfer Training,Gait Training,Stair training    Patient Education  Patient Education: Transfers, Gait, Stairs, Verbal Exercise Instruction,  - Patient Verbalized Understanding, - Patient Requires Continued Education    Goals:  Patient goals : To return to home  Short term goals  Time Frame for Short term goals: 1 week  Short term goal 1: Patient to perform supine<>sit with SBA without railings in order to assist with getting into and out of bed. Short term goal 2: Patient to perform sit to stand transfers wtih SBA with <=1 cue for hand placement in order to assist with safety with transfers from various surfaces. Short term goal 3: Patient to ambulate at least 75 feet with RW with SBA in order to assist with home mobility. Short term goal 4: Patient to ascend/descend 1 step with B handrails with CGA in order to assist with getting into and out of home. Short term goal 5: Patient to score at least 19/28 on the Tinetti in order to reduce risk for falls. Long term goals  Time Frame for Long term goals : 3 weeks  Long term goal 1: Patient to perform supine<>sit with Mod I without railings in order to assist with getting into and out of bed. Long term goal 2: Patient to perform sit to stand transfers with Mod I in order to assist with safety with transfers from various surfaces. Long term goal 3: Patient to ambulate at least 150 feet with RW with Mod I in order to assist with home mobility. Long term goal 4: Patient to ascend/descend 4 steps with 1 handrail with Supervision in order to assist with getting into and out of home. Long term goal 5: Patient to perform car transfer with Supervision in order to assist with getting to and from appointments. Following session, patient left in safe position with all fall risk precautions in place.

## 2022-02-18 NOTE — PLAN OF CARE
Problem: Nutrition  Goal: Optimal nutrition therapy  Outcome: Ongoing     Nutrition Problem #1: Inadequate oral intake  Intervention: Food and/or Nutrient Delivery: Continue Current Diet,Modify Tube Feeding (TF orders modified to TwoCal HN at 130 ml 6x daily)  Nutritional Goals: Patient to meet % of estimated nutritional needs via PO diet and EN daily throughout LOS'    See full note filed 2/18 at 1:13PM.

## 2022-02-18 NOTE — PROGRESS NOTES
Patient: Bradley Lopez  Unit/Bed: 7E-66/066-A  YOB: 1945  MRN: [id number] Acct: [de-identified]   Admitting Diagnosis: Acute respiratory failure ([facility].) [J96.00]  Admit Date:  2/15/2022  Hospital Day: 3    Assessment:     Active Problems:    Acute respiratory failure ([facility] [address] [facility]    Personal history of poliomyelitis  Resolved Problems:    * No resolved hospital problems. *      Plan:     The Hgb is somewhat lower today and so will check again soon. Subjective:     Patient has no complaint of CP or SOB. .   Medication side effects: none    Scheduled Meds:   ipratropium-albuterol  1 ampule Inhalation BID    pantoprazole  40 mg Oral BID AC    sertraline  50 mg Oral Daily    melatonin  6 mg Oral Nightly    magnesium oxide  400 mg Oral Daily    traZODone  50 mg Oral Nightly    multivitamin  1 tablet Oral Daily    meloxicam  15 mg Oral Daily    polyvinyl alcohol  1 drop Both Eyes BID     Continuous Infusions:  PRN Meds:magnesium hydroxide, aluminum & magnesium hydroxide-simethicone, calcium carbonate, oxyCODONE, polyethylene glycol, zinc oxide, acetaminophen, ipratropium, senna, ipratropium-albuterol    Review of Systems  Pertinent items are noted in HPI. Objective:     Patient Vitals for the past 8 hrs:   BP Pulse Resp SpO2   02/18/22 1200 118/62 80 18 94 %     I/O last 3 completed shifts:   In: 2400 [P.O.:100; NG/GT:2300]  Out: -   I/O this shift:  In: 220 [P.O.:220]  Out: -     /62   Pulse 80   Temp 98.5 °F (36.9 °C) (Oral)   Resp 18   Ht 5' (1.524 m)   Wt 129 lb (58.5 kg)   SpO2 94%   BMI 25.19 kg/m²     General appearance: alert, appears stated age and cooperative  Head: Normocephalic, without obvious abnormality, atraumatic  Lungs: clear to auscultation bilaterally  Heart: regular rate and rhythm, S1, S2 normal, no murmur, click, rub or gallop  Abdomen: soft, non-tender; bowel sounds normal; no masses,  no organomegaly  Extremities: 1+ edema bilaterally  Skin: Skin color, texture,

## 2022-02-18 NOTE — PROGRESS NOTES
Alert and oriented to person, place, time. Speech is soft. CHAS 3mm to 2mm. Mucous membranes pink/moist. Heart sounds strong and regular. Lung sounds clear anterior, posterior, anterior. No cough noted. Arm drift negative bilaterally. Hand grasp strong and equal bilaterally. Radial pulses strong/equal bilaterally. Capillary refill less than 3 seconds. Abdomen round/soft. Bowel sounds active in all 4 quadrants. Last BM 2/17/22. Pedal push and pull strong/equal bilaterally. Leg lift strong/equal bilaterally. Nicole's sign negative. Non-pitting edema in lower extremities bilaterally. Stated no pain, numbness or tingling. In recliner, wheels locked. Side table and call light within reach. Denies further needs.

## 2022-02-18 NOTE — PROGRESS NOTES
[address]  Occupational Therapy  Daily Note  Time:   Time In: 5545  Time Out: 1507  Timed Code Treatment Minutes: 34 Minutes  Minutes: 34    Date: 2022  Patient Name: Julio Cesar Ott,   Gender: female      Room: [facility]66/066-A  MRN: [id number]  : 1945  (64 y.o.)  Referring Practitioner: Dr Tulio Rodriguez  Diagnosis: Acute Respiratory Failure  Additional Pertinent Hx: Julio Cesar Ott  is a 64 y.o. female admitted to the inpatient rehabilitation unit on 2/15/2022. The patient was originally admitted to the [facility] 2022 to the gynecology oncology service secondary to postmenopausal bleeding. She started with the bleeding in early 2021 and was followed by Dr. Leonela Wilson here at [facility]. [facility]  for initial consultation for Post-menopausal Bleeding and failed Endometrial Biopsy. PMB started in 2021. Had pelvic US that showed EMS 9mm. EMB was attempted twice in the office, no endometrial cells were identified. She was therefore referred to the [facility] at [facility] 22 for additional management. She had no pelvic pain. Only intermittent spotting, no heavy bleeding. Had history of Lichen sclerosis, was prescribed estrogen cream and steroid cream. Patient underwent abdominal hysterectomy on 2022, BSO via P. Pfannenstiel incision. Final pathology benign. Her hospitalization was complicated by respiratory failure (now on 3L supplemental O2 per trach mask,) and has PEG for pharyngeal dysphagia. Also has a PMH positive for GERD, OA,  Paralysis of vocal cords, Polio (bulbar), Dysphagia, Inhalation and ingestion of other object causing obstruction of respiratory tract or suffocation, Osteoporosis, Hoarse voice, Hypernasal speech, Diaphragm paralysis, and Left adhesive capsulitis.     Restrictions/Precautions:  Restrictions/Precautions: General Precautions,Fall Risk  Position Activity Restriction  Other position/activity restrictions: PEG, trach collar/mask, Place oxygen tank on 4L (continue to check notes for decreased FiO2 levels), No lifting >10lbs     SUBJECTIVE: Patient pleasant and cooperative. Agreeable to OT. PAIN: Denies pain     Vitals: Vitals not assessed per clinical judgement, see nursing flowsheet    COGNITION: WFL    ADL:   Grooming: Stand By Assistance.  hand hygiene  Toileting: Stand By Assistance. Toilet Transfer: Stand By Assistance. ETS. BALANCE:  Sitting Balance:  Modified Independent. Standing Balance: Stand By Assistance. BED MOBILITY:  Not Tested    TRANSFERS:  Sit to Stand:  Stand By Assistance. recliner, standard chair   Stand to Sit: Stand By Assistance. FUNCTIONAL MOBILITY:  Assistive Device: None  Assist Level:  Stand By Assistance. Distance: To and from bathroom and within gardenhouse, used w/c to/from      ADDITIONAL ACTIVITIES:  Pt completed dynamic standing and endurance task of attending to plants in gardenhouse ; pt was able to ambulate without AD to challenge balance in tight spaces to challenge ability for IADL. Pt demo endurance x 6 min with SBA and no LOB     ASSESSMENT:     Activity Tolerance:  Patient tolerance of  treatment: good. Discharge Recommendations: Home with Home Health OT  Equipment Recommendations:  Other: Pt has a built in shower seat  Plan: Times per week: 5x/wk for 90 min and 1x/wk for 30 min  Times per day: Daily  Current Treatment Recommendations: Strengthening,Balance Training,Endurance Training,Functional Mobility Training,Equipment Evaluation, Education, & procurement,Patient/Caregiver Education & Training,Self-Care / ADL,Safety Education & Training    Patient Education  Patient Education: endurance building, transfer safety     Goals  Short term goals  Time Frame for Short term goals: 1 week  Short term goal 1: Pt will complete UB dressing tasks with min A to improve indep with donning her bra at home. Short term goal 2: Pt will complete LB dressing tasks with mod A to improve indep with donning socks and shoes at home. Short term goal 3: Pt will tolerate 4 minute standing tasks with SBA during shayy release tasks to improve indep with sinkside grooming. Short term goal 4: Pt will ambulate to and from bathroom with SBA and RW prn to improve activity tolerance needed for bathing. Short term goal 5: Pt will tolerate dynamic standing IADL tasks with SBA to meet patient goal of baking cookies with grandchildren. Long term goals  Time Frame for Long term goals : 2-3 weeks  Long term goal 1: Pt will complete bathing and shower transfer with mod I to improve indep with showering at home. Long term goal 2: Pt will complete dressing and grooming tasks with mod I to improve indep with self care at home. Long term goal 3: Pt will complete a simple meal prep tasks with mod I and 0 vcs for ECT to improve indep with preparing lunch at home. Following session, patient left in safe position with all fall risk precautions in place.

## 2022-02-18 NOTE — PROGRESS NOTES
Alert and oriented to person, place, time. Speech is soft. CHAS 3mm to 2mm. Mucous membranes pink/moist. Heart sounds strong and regular. Lung sounds clear anterior, posterior, anterior. No cough noted. Arm drift negative bilaterally. Hand grasp strong and equal bilaterally. Radial pulses strong/equal bilaterally. Capillary refill less than 3 seconds. Abdomen round/soft. Bowel sounds active in all 4 quadrants. Pedal push and pull strong/equal bilaterally. Leg lift strong/equal bilaterally. Nicole's sign negative. Non-pitting edema in lower extremities bilaterally. Stated no pain, numbness or tingling. In recliner, wheels locked. Side table and call light within reach. Denies further needs.

## 2022-02-18 NOTE — PROGRESS NOTES
Physical Medicine & Rehabilitation   Progress Note      Clarisa Monroy, APRN - CNP     Chief Complaint:    Respiratory Failure with subsequent Debility; s/p JONI with BSO    Subjective: Patient was sitting up in chair. Denies pain. Trach stable, on trach mask, 6L 28% O2. fluctuates between 91-94%. Actually set just below 28%, patient tolerating well. Discussed continue to wean for O2 and consult for transition to removal next week if on RA. Patient tolerating PMV well. All questions from pain and family answered. Patient denies any other needs at this time. Planning discharge from the IPR Unit to home is 2/28/22 with Home Health or Outpatient services including PT, OT, and ST (and HHA if Home Health). Rehabilitation:  PT:    Transfers:  Sit to Stand: [employer], with increased time for completion  Stand to Sit:Stand By Assistance, with increased time for completion  To/From Bed and Chair: [employer], with increased time for completion  Patient completed sit to stands from various surfaces including the wheelchair and recliner. Patient completed transfer from recliner <> wheelchair with contact guard assistance and no cueing needed for hand placement. Ambulation:  Contact Guard Assistance, with verbal cues , with increased time for completion  Distance: 20' x2, 10' x2  Surface: Level Tile  Device:No Device  Gait Deviations:  Decreased Step Length Bilaterally, Decreased Weight Shift Bilaterally, Decreased Gait Speed, Decreased Heel Strike Bilaterally and Unsteady Gait  -Patient instructed in stepping through agility ladder in the parallel bars with contact guard assistance. Patient completed task with bilateral UE support, and during second trial progressed to light UE support to challenge stability and balance. Patient instructed to pick feet up higher to improve bilateral step height. Patient completed activity 4 times intermittently with oxygen monitored throughout.  Patient instructed in agility ladder stepping to improve bilateral step height and weight shifting for improved gait mechanics.  -Patient instructed in cone weaving with contact guard assistance and no AD. Patient had one or two episodes of mild LOB and unsteadiness, however was able to recover. Patient instructed to complete task slowly for improved stability and balance. Patient instructed in cone weaving to work on navigating around obstacles for safe ambulation at home. Balance:  Static Standing Balance: Stand By Assistance with RW for support, Contact Guard Assistance without AD for support  Dynamic Standing Balance: Contact Guard Assistance, with verbal cues , with increased time for completion  -Patient instructed in bilateral 6\" box taps with contact guard assistance provided. Patient completed 2 sets of 8 repetitions bilaterally, with a rest break in between sets. Patient unable to get to 10 repetitions due to increased fatigue. Patient required close CGA with this task as patient demonstrated unsteadiness with SL stance and lateral weight shifting. Patient instructed in box taps to challenge dynamic standing balance and SL stance.  -Patient instructed in ring toss with contact guard assistance and reaching outside OTIS to grab rings. Patient completed 2 sets intermittently and progressed to a more NBOS to further challenge her balance. Patient instructed in ring toss to work on dynamic standing balance reaching outside TOIS and overall endurance. OT:   ADL:   Grooming: Contact Guard Assistance. standing at sink, standing for 4 minutes   Bathing: Minimal Assistance. A to wash feet   Upper Extremity Dressing: Minimal Assistance. to doff and on shirt,  A to fasten bra   Lower Extremity Dressing: Moderate Assistance. To doff and don clothing,  SBA to doff socks. Total A to don socks after 02 sats dropped after bending over   Toiletin [address]      Toilet Transfer: Contact Guard Assistance from 02/18/22 0926   BP: (!) 109/55   Pulse: 81   Resp: 18   Temp: 98.5 °F (36.9 °C)   SpO2: 94%   awake  Orientation:   person, place, time  Mood: within normal limits  Affect: anxious and calm  General appearance: well groomed and in no acute distress     Memory:   normal,   Attention/Concentration: normal  Language:  abnormal - hoarse voice; hypernasal; using her talking valve     Cranial Nerves:  cranial nerves II-XII are grossly intact  ROM:  abnormal - decreased left shoulder 2/2 adhesive capsulitis  Motor Exam:  Motor exam is symmetrical 3+ out of 5 all extremities bilaterally except 4/5 bilateral anterior tibialis, EHL, gastrocnemius/soleus  Tone:  normal  Muscle bulk: normal  Sensory:  Sensory intact  Coordination:   normal  Deep Tendon Reflexes:  Reflexes are intact and symmetrical bilaterally  Plantar Response:  Right:  upgoing  Left:  upgoing  Gait: not tested     Heart: normal rate and regular rhythm  Lungs: expiratory wheezes  Abdomen: soft, non-tender, non-distended, normal bowel sounds, no masses or organomegaly     Skin: warm and dry  Peripheral vascular: Pulses: Normal upper and lower extremity pulses; Edema: no       Diagnostics:   Recent Results (from the past 24 hour(s))   Hemoglobin and Hematocrit    Collection Time: 02/18/22  5:54 AM   Result Value Ref Range    Hemoglobin 8.3 (L) 12.0 - 16.0 gm/dl    Hematocrit 27.7 (L) 37.0 - 47.0 %       Impression:    · Status-post total abdominal hysterectomy, bilateral salpingo-oophorectomy on 1/24/22 at [facility] secondary to postmenopausal bleeding via pfannenstiel incision. Final pathology benign.   · Hypercarbic respiratory failure, now on supplemental O2 per trach mask    · Tracheostomy status  · History of diaphragmatic and vocal cord paralysis (baseline right vocal cord and diaphragm paralysis with inability to cough, rare nasal regurgitation of liquids believed to be secondary to bulbar polio  · Pharyngeal dysphagia  · Status post PEG placement 2/8/2022 · Zenker's diverticulum noted on a modified barium swallow study of 2/13/2022; GI consult deferred while at [facility]. · Lactose intolerant  · Acute blood loss anemia; transfused 1 unit of packed red blood cells on 2/10/2022. · 1 lower respiratory culture positive for Serratia and the patient is status post vancomycin 1/30 through 1/31, and cefepime 1/30 through 2/ 6. · Negative lower extremity Dopplers on 2/2/22. · Gastroesophageal reflux disease  · Osteoarthritis  · Paralysis of vocal cords  · Bulbar polio  · Dysphagia, moderate to severe oropharyngeal  · Inhalation it and ingestion of other object causing obstruction of respiratory tract or suffocation  · Osteoporosis  · Hoarse voice  · Hypernasal speech  · Diaphragmatic paralysis  · Left adhesive capsulitis      Plan:  · Continue therapies. PT, OT, SLP  · Prophylaxis:  DVT: EPC cuffs; no oral anticoagulation secondary to bleeding risk.    · GI:  Protonix 40 mg po bid   · Pain: OxyIR 5 mg po q 4 hours prn; Tylenol 650 mg per PEG q 6 hours prn;   · Nutrition:   Dietary to evaluate tube feedings to possibly decrease bolus volumes and increase bolus frequency to maintain adequate caloric intake  · Bowel: Glycolax 17 g po/PEG daily prn  · Anxiety: Zoloft 50 mg po/PEG  q day for anxiety  · Ok to start home restasis   · Planning red button trial next week with SLP  · Monitor O2 needs   · Rehab Team Conferences Thursdays  · Planning discharge to home on Monday, 2/28/22        IMELDA Martinez - CNP

## 2022-02-18 NOTE — PROGRESS NOTES
protection. INTERVENTIONS: Completed the following pharyngeal exercises in conjunction with skilled demonstration and rationale on proper technique. Effortful swallow -- x10 with good success  TBR ('kick') -- x10 with good success  Caren -- x10 with great success and timeliness of swallow trigger  Mendelsohn -- x10 with good-fair success provided cues  Vocal Fold Closure -- x5 with fair success; low pitch and hoarseness impacting ability  Super-supraglottic Swallow -- x5 with good-fair success; required step-by-step sequential cues to do unison with ST  *handout with HEP reviewed; again encouraged nightly completion outside of therapy day, patient verbalized understanding    No NMES completed this date due to limited time following extensive family education related to swallowing, respiratory status, and overall prognosis (see STG #1 and #3 for details). SHORT TERM GOAL #3:  Goal 3: Patient will complete further assessment of voice evaluation. - GOAL MET  REVISED GOAL: Patient will trial red button consistently without respiratory distress to permit potential decannulation. INTERVENTIONS: Obtained detailed medical history this session. Per patient and , patient has had R vocal fold paralysis since she was 6years old secondary to diagnosis of Bulbar Polio. At that time, patient reports having independently identified the compensatory strategy of head turn to the RIGHT, and has executed this strategy since. Patient reports her voice has \"always been somewhat hoarse\", but that it is \"just a little quieter\" now. Per , patient's voice has just gotten softer but is not far from baseline. Given these reports, no further assessment of voice to be completed in subsequent sessions. Will continue to educate on vocal hygiene measures. Also reviewed plans for potential red button trials on Monday 2/21/22. Patient reporting anxieties and being fearful about living without the trach.  ST educated patient

## 2022-02-18 NOTE — PLAN OF CARE
Problem: OXYGENATION/RESPIRATORY FUNCTION  Goal: Patient will maintain patent airway  Outcome: Met This Shift  Note: Patients air way has remained patent this shift     Problem: MECHANICAL VENTILATION  Goal: Tracheostomy will be managed safely  Outcome: Met This Shift  Note: Trach managed as ordered     Problem: Falls - Risk of:  Goal: Will remain free from falls  Description: Will remain free from falls  Outcome: Met This Shift  Note: Patient free of falls      Problem: Falls - Risk of:  Goal: Absence of physical injury  Description: Absence of physical injury  Outcome: Met This Shift  Note: Patient free of injury this shift     Problem: Pain:  Goal: Control of acute pain  Description: Control of acute pain  Outcome: Met This Shift  Note: Patient has adequate pain control with current pain regimen     Problem: Skin Integrity:  Goal: Risk for impaired skin integrity will decrease  Description: Risk for impaired skin integrity will decrease  Outcome: Met This Shift  Note: Patient mobility improving and walking more frequent     Problem: Skin Integrity:  Goal: Will show no infection signs and symptoms  Description: Will show no infection signs and symptoms  Outcome: Met This Shift  Note: Patient free of s/s of infection     Problem: Skin Integrity:  Goal: Absence of new skin breakdown  Description: Absence of new skin breakdown  Outcome: Met This Shift     Problem: Pain:  Goal: Pain level will decrease  Description: Pain level will decrease  Outcome: Ongoing     Problem: Pain:  Goal: Control of chronic pain  Description: Control of chronic pain  Outcome: Ongoing     Problem:  Activity:  Goal: Sleeping patterns will improve  Description: Sleeping patterns will improve  Outcome: Ongoing     Problem: Nutrition  Goal: Optimal nutrition therapy  2/18/2022 1439 by Winsome Hansen RN  Outcome: Ongoing  Note: Patient receiving bolus TF every 3 hours with full liquid diet   2/18/2022 1314 by Amaury Bryan RD  Outcome: Ongoing     Problem: IP MOBILITY  Goal: LTG - patient will demonstrate safe mobility requirements  Outcome: Ongoing     Problem: IP DRESSINGS LOWER EXTREMITIES  Goal: LTG - patient will dress lower body with or without assistive device  Outcome: Ongoing     Problem: IP SWALLOWING  Goal: LTG - patient will tolerate the least restrictive diet consistency to allow for safe consumption of daily meals  Outcome: Ongoing     Problem: DISCHARGE BARRIERS  Goal: Patient's continuum of care needs are met  Outcome: Ongoing

## 2022-02-19 PROCEDURE — 97530 THERAPEUTIC ACTIVITIES: CPT

## 2022-02-19 PROCEDURE — 94640 AIRWAY INHALATION TREATMENT: CPT

## 2022-02-19 PROCEDURE — 97535 SELF CARE MNGMENT TRAINING: CPT

## 2022-02-19 PROCEDURE — 6370000000 HC RX 637 (ALT 250 FOR IP): Performed by: PHYSICAL MEDICINE & REHABILITATION

## 2022-02-19 PROCEDURE — 2700000000 HC OXYGEN THERAPY PER DAY

## 2022-02-19 PROCEDURE — 97116 GAIT TRAINING THERAPY: CPT

## 2022-02-19 PROCEDURE — 1180000000 HC REHAB R&B

## 2022-02-19 PROCEDURE — 97110 THERAPEUTIC EXERCISES: CPT

## 2022-02-19 PROCEDURE — 97112 NEUROMUSCULAR REEDUCATION: CPT

## 2022-02-19 PROCEDURE — 92526 ORAL FUNCTION THERAPY: CPT

## 2022-02-19 PROCEDURE — 94760 N-INVAS EAR/PLS OXIMETRY 1: CPT

## 2022-02-19 RX ORDER — CYCLOSPORINE 0.5 MG/ML
2 EMULSION OPHTHALMIC DAILY
Status: DISCONTINUED | OUTPATIENT
Start: 2022-02-20 | End: 2022-03-01 | Stop reason: HOSPADM

## 2022-02-19 RX ADMIN — Medication 400 MG: at 11:05

## 2022-02-19 RX ADMIN — IPRATROPIUM BROMIDE AND ALBUTEROL SULFATE 1 AMPULE: .5; 3 SOLUTION RESPIRATORY (INHALATION) at 08:45

## 2022-02-19 RX ADMIN — SERTRALINE 50 MG: 50 TABLET, FILM COATED ORAL at 11:05

## 2022-02-19 RX ADMIN — MELOXICAM 15 MG: 7.5 TABLET ORAL at 11:04

## 2022-02-19 RX ADMIN — IPRATROPIUM BROMIDE AND ALBUTEROL SULFATE 1 AMPULE: .5; 3 SOLUTION RESPIRATORY (INHALATION) at 18:26

## 2022-02-19 RX ADMIN — TRAZODONE HYDROCHLORIDE 50 MG: 50 TABLET ORAL at 21:12

## 2022-02-19 RX ADMIN — PANTOPRAZOLE SODIUM 40 MG: 40 TABLET, DELAYED RELEASE ORAL at 18:19

## 2022-02-19 RX ADMIN — Medication 6 MG: at 21:12

## 2022-02-19 RX ADMIN — ALUMINUM HYDROXIDE, MAGNESIUM HYDROXIDE, AND SIMETHICONE 30 ML: 200; 200; 20 SUSPENSION ORAL at 18:19

## 2022-02-19 RX ADMIN — Medication 1 TABLET: at 11:04

## 2022-02-19 RX ADMIN — PANTOPRAZOLE SODIUM 40 MG: 40 TABLET, DELAYED RELEASE ORAL at 06:29

## 2022-02-19 ASSESSMENT — PAIN SCALES - GENERAL
PAINLEVEL_OUTOF10: 0
PAINLEVEL_OUTOF10: 0
PAINLEVEL_OUTOF10: 1
PAINLEVEL_OUTOF10: 0
PAINLEVEL_OUTOF10: 0

## 2022-02-19 NOTE — PROGRESS NOTES
Alert and oriented to person, place, time. Speech is soft. CHAS 3mm to 2mm. Mucous membranes pink/moist. Heart sounds strong and regular. Lungs have crackles anterior, posterior. Productive cough noted. Pt on room air oxygen. Arm drift negative bilaterally. Hand grasp strong and equal bilaterally. Radial pulses strong/equal bilaterally. Capillary refill less than 3 seconds. Abdomen round/soft. Bowel sounds active in all 4 quadrants. No difficulties with urination. Pedal push and pull strong/equal bilaterally. Leg lift strong/equal bilaterally. Nicole's sign negative. Non-pitting edema in lower extremities bilaterally. Stated no pain, numbness or tingling. In recliner, wheels locked. Side table and call light within reach. Denies further needs.

## 2022-02-19 NOTE — PLAN OF CARE
Problem: OXYGENATION/RESPIRATORY FUNCTION  Goal: Patient will maintain patent airway  Outcome: Ongoing  Note: Trach mask at 28%. Sats 96%. Rm air sats down to 91%.  Inner cannula changed, trach care and oral suctioning      Problem: MECHANICAL VENTILATION  Goal: Tracheostomy will be managed safely  Outcome: Ongoing  Note: Maintain positioning and ties at all times      Problem: Falls - Risk of:  Goal: Will remain free from falls  Description: Will remain free from falls  Outcome: Ongoing  Note: Bed and chair alarm in use ,call light in reach      Problem: Falls - Risk of:  Goal: Absence of physical injury  Description: Absence of physical injury  Outcome: Ongoing     Problem: Pain:  Goal: Pain level will decrease  Description: Pain level will decrease  Outcome: Ongoing  Note: No pain at this time      Problem: Pain:  Goal: Control of acute pain  Description: Control of acute pain  Outcome: Ongoing     Problem: Skin Integrity:  Goal: Risk for impaired skin integrity will decrease  Description: Risk for impaired skin integrity will decrease  Outcome: Ongoing  Note: Turn and reposition, wt shifts, waffle cushion      Problem: Skin Integrity:  Goal: Will show no infection signs and symptoms  Description: Will show no infection signs and symptoms  Outcome: Ongoing     Problem: Nutrition  Goal: Optimal nutrition therapy  Outcome: Ongoing  Note: Clear liquid, peg bolus      Problem: DISCHARGE BARRIERS  Goal: Patient's continuum of care needs are met  Outcome: Ongoing  Note: Continue to work with therapy

## 2022-02-19 NOTE — PROGRESS NOTES
Refusing 3pm tube feed bolus. She feels as though she has a lot of gas and burping and wants to wait unitl 6pm for the next bolus.

## 2022-02-19 NOTE — PROGRESS NOTES
[address] [place] [place] [facility]  [facility]  DAILY NOTE    TIME   SLP Individual Minutes  Time In: 1330  Time Out: 1400  Minutes: 30  Timed Code Treatment Minutes: 0 Minutes       Date: 2022  Patient Name: Darius Escalante      CSN: [id number]   : 1945  (64 y.o.)  Gender: female   Referring Physician: Claudette Kim MD  Diagnosis: Debility  Secondary Diagnosis: Dysphagia, Dysphonia   Precautions: Fall risk, trach, PEG, aspiration  Current Diet: Full THIN liquid diet  Swallowing Strategies: Right head turn, small bites/sips, 3-4 swallows, CLOSE pulmonary monitoring  Date of Last MBS/FEES: 22    Pain:  No pain reported. Subjective:  Patient sitting upright in recliner with  at bedside for entire session. Patient alert, cooperative, and pleasant throughout. Short-Term Goals:  SHORT TERM GOAL #1:  Goal 1: Patient will safely consume thin liquid diet with good success in order to increase PO intake and improve pharyngeal function. INTERVENTIONS: Patient independently recalled and carried over recommended swallow strategies with 100% accuracy. Patient with good insight regarding rationale for recommended swallow strategies this date. Patient completed po trials of thin liquids via straw x3 with evidence of adequate labial seal, suspected adequate bolus control/containment, suspected timely swallow initiation, concerns for pharyngeal residue resulting in delayed throat clear/cough x2. SHORT TERM GOAL #2:  Goal 2: Patient will complete pharyngeal strengthening exercises x10 each with good success in order to improve pharyngeal weakness and overall airway protection. INTERVENTIONS: Completed the following pharyngeal exercises in conjunction with skilled demonstration and rationale on proper technique.   Effortful swallow -- x20 with good success  TBR ('kick') -- x25 with good success  Caren -- x15 with great success and timeliness of swallow trigger  Mendelsohn -- x10 with good success  Vocal Fold Closure -- x10 with good success; low pitch and hoarseness impacting ability  Pitch Glide -- x10 with fair success, limited pitch change. *plans for completion of NMES in conjunction with exercises on       SHORT TERM GOAL #3:  REVISED GOAL: Patient will trial red button consistently without respiratory distress to permit potential decannulation. INTERVENTIONS: Reviewed plans for potential red button trials on 22. Patient continue to report anxieties and being fearful about living without the trach. ST educated patient and  on anatomy behind both respiratory and swallowing systems and discussed success with PMV. Patient and  verbalized understanding. Long-Term Goals:  Timeframe for Long-term Goals: 3 weeks    LONG TERM GOAL #1:  Goal 1: Patient will complete repeat instrumental to reevaluate readiness for initiation of solids as clinically indicated.       Comprehension: 6 - Complex ideas 90% or device (hearing aid or glasses- if patient is primarily a visual learner)  Expression: 6 - Device used to express complex ideas/needs  Social Interaction: 6 - Patient requires medication for mood and/or effect  Problem Solvin - Independent with device (e.g. notes, schedules)  Memory: 5 - Patient requires prompting with stress/unfamiliar situations    EDUCATION:  Learner: Patient and Significant Other  Education:  Reviewed diet and strategies, Reviewed signs, symptoms and risks of aspiration, Reviewed ST goals and Plan of Care, Reviewed recommendations for follow-up, Education Related to Potential Risks and Complications Due to Impairment/Illness/Injury, Education Related to Prevention of Recurrence of Impairment/Illness/Injury, Education Related to [employer] and Wellness and Swallowing HEP  Evaluation of Education: Vasyl Sandhu understanding       ASSESSMENT/PLAN:      Activity Tolerance: Patient tolerance of treatment: good. Assessment/Plan: Patient progressing toward established goals. Continues to require skilled care of licensed speech pathologist to progress toward achievement of established goals and plan of care.    Plan for Next Session:  pharyngeal exercises       [facility]) [address] Nishant Her M.A., [address]

## 2022-02-19 NOTE — PROGRESS NOTES
[address]  Occupational Therapy  Daily Note  Time:   Time In: 0930  Time Out: 1100  Timed Code Treatment Minutes: 90 Minutes  Minutes: 90          Date: 2022  Patient Name: Whitley Wren,   Gender: female      Room: [id number]/066-A  MRN: [id number]  : 1945  (64 y.o.)  Referring Practitioner: Dr Rosy Camara  Diagnosis: Acute Respiratory Failure  Additional Pertinent Hx: Whitley Wrne  is a 64 y.o. female admitted to the inpatient rehabilitation unit on 2/15/2022. The patient was originally admitted to the [facility] 2022 to the gynecology oncology service secondary to postmenopausal bleeding. She started with the bleeding in early 2021 and was followed by Dr. Jamal Rodriguez here at [facility]. Ms. En Payton presented to [facility] (/[facility])  for initial consultation for Post-menopausal Bleeding and failed Endometrial Biopsy. PMB started in 2021. Had pelvic US that showed EMS 9mm. EMB was attempted twice in the office, no endometrial cells were identified. She was therefore referred to the [facility] 22 for additional management. She had no pelvic pain. Only intermittent spotting, no heavy bleeding. Had history of Lichen sclerosis, was prescribed estrogen cream and steroid cream. Patient underwent abdominal hysterectomy on 2022, BSO via P. Pfannenstiel incision. Final pathology benign. Her hospitalization was complicated by respiratory failure (now on 3L supplemental O2 per trach mask,) and has PEG for pharyngeal dysphagia. Also has a PMH positive for GERD, OA,  Paralysis of vocal cords, Polio (bulbar), Dysphagia, Inhalation and ingestion of other object causing obstruction of respiratory tract or suffocation, Osteoporosis, Hoarse voice, Hypernasal speech, Diaphragm paralysis, and Left adhesive capsulitis.     Restrictions/Precautions:  Restrictions/Precautions: General Precautions,Fall Risk  Position Activity Restriction  Other position/activity restrictions: PEG, trach collar/mask, Place oxygen tank on 4L (continue to check notes for decreased FiO2 levels), No lifting >10lbs     SUBJECTIVE: Pt sitting in chair upon arrival, pt agreeable to OT session, RN states patient now on RA, pt's  present during session     PAIN: 0/10:     Vitals: Oxygen: 90-94% with pt spot checked throughout session     COGNITION: Decreased Insight and Decreased Problem Solving    ADL:   Grooming: Stand By Assistance. with pt standing at the sink to brush teeth and hair   Upper Extremity Dressing: Minimal Assistance. with hooking bra, with pt educated on hooking bra in the front and sliding around, however had increased anxiety with completing. Lower Extremity Dressing: Minimal Assistance. with donning LLE through pant leg due to pt's gripper sock getting stuck  Toileting: Stand By Assistance. for safety  Toilet Transfer: Stand By Assistance. for safety  Shower Transfer: Stand By Assistance. with walk in shower. BALANCE:  Standing Balance: Stand By Assistance. with no AE with pt standing to complete grooming routine/ADL routine in bathroom and in room    BED MOBILITY:  Not Tested    TRANSFERS:  Sit to Stand:  Stand By Assistance. from recliner  Stand to Sit: Stand By Assistance. back to recliner    FUNCTIONAL MOBILITY:  Assistive Device: None  Assist Level:  Stand By Assistance. Distance: To and from shower room       ADDITIONAL ACTIVITIES:  Pt attempted to complete shower this date, with pt able to sit on shower bench and doff clothing. However, once seated, hot water noted to not be working this date, with pt then placed into robe, and walked back to room. Pt completed IADL activity of retrieving all clothing items prior to shower, with SBA, and able to place dirty items into drawer with SBA, with good oxygen noted.      ASSESSMENT:     Activity Tolerance:  Patient tolerance of  treatment: good. Discharge Recommendations: Home with Home Health OT  Equipment Recommendations: Other: Pt has a built in shower seat  Plan: Times per week: 5x/wk for 90 min and 1x/wk for 30 min  Times per day: Daily  Current Treatment Recommendations: Strengthening,Balance Training,Endurance Training,Functional Mobility Training,Equipment Evaluation, Education, & procurement,Patient/Caregiver Education & Training,Self-Care / ADL,Safety Education & Training    Patient Education  Patient Education: Energy Conservation    Goals  Short term goals  Time Frame for Short term goals: 1 week  Short term goal 1: Pt will complete UB dressing tasks with min A to improve indep with donning her bra at home. Short term goal 2: Pt will complete LB dressing tasks with mod A to improve indep with donning socks and shoes at home. Short term goal 3: Pt will tolerate 4 minute standing tasks with SBA during shayy release tasks to improve indep with sinkside grooming. Short term goal 4: Pt will ambulate to and from bathroom with SBA and RW prn to improve activity tolerance needed for bathing. Short term goal 5: Pt will tolerate dynamic standing IADL tasks with SBA to meet patient goal of baking cookies with grandchildren. Long term goals  Time Frame for Long term goals : 2-3 weeks  Long term goal 1: Pt will complete bathing and shower transfer with mod I to improve indep with showering at home. Long term goal 2: Pt will complete dressing and grooming tasks with mod I to improve indep with self care at home. Long term goal 3: Pt will complete a simple meal prep tasks with mod I and 0 vcs for ECT to improve indep with preparing lunch at home. Following session, patient left in safe position with all fall risk precautions in place.

## 2022-02-19 NOTE — PROGRESS NOTES
[facility]  INPATIENT PHYSICAL THERAPY  Daily Note  [facility] - [id number]    Time In: 0730  Time Out: 0830  Timed Code Treatment Minutes: 60 Minutes  Minutes: 60          Date: 2022  Patient Name: Lorena Mittal,  Gender:  female        MRN: [id number]  : 1945  (64 y.o.)     Referring Practitioner: Johann Galeazzi, MD  Diagnosis: Acute Respiratory Failure  Additional Pertinent Hx: Lorena Mittal  is a 64 y.o. female admitted to the inpatient rehabilitation unit on 2/15/2022. The patient was originally admitted to the [facility] 2022 to the gynecology oncology service secondary to postmenopausal bleeding. She started with the bleeding in early 2021 and was followed by Dr. Maryellen Springer here at [facility]. Ms. Rozina Oliver presented to [facility] (/[facility])  for initial consultation for Post-menopausal Bleeding and failed Endometrial Biopsy. PMB started in 2021. Had pelvic US that showed EMS 9mm. EMB was attempted twice in the office, no endometrial cells were identified. She was therefore referred to the [facility] 22 for additional management. She had no pelvic pain. Only intermittent spotting, no heavy bleeding. Had history of Lichen sclerosis, was prescribed estrogen cream and steroid cream. Patient underwent abdominal hysterectomy on 2022, BSO via P. Pfannenstiel incision. Final pathology benign. Her hospitalization was complicated by respiratory failure (now on 3L supplemental O2 per trach mask,) and has PEG for pharyngeal dysphagia.   Also has a PMH positive for GERD, OA,  Paralysis of vocal cords, Polio (bulbar), Dysphagia, Inhalation and ingestion of other object causing obstruction of respiratory tract or suffocation, Osteoporosis, Hoarse voice, Hypernasal speech, Diaphragm paralysis, and Left adhesive capsulitis     Prior Level of Function:  Lives With: Spouse  Type of Home: House  Home Layout: Two level  Home Access: Stairs to enter without rails  Entrance Stairs - Number of Steps: ~4 steps at the front of the house without railings and 2 steps in the garage. Patient reports she has a few steps in the back of the house with railings. Patient reports typically she enters house from garage  Home Equipment:  (Patient reports she does not have an equipment at home)   Bathroom Shower/Tub: Walk-in shower (Patient reports she has a built in shower chair)  Bathroom Toilet: Standard  Bathroom Equipment: Grab bars in shower,Built-in shower seat    ADL Assistance: Independent  Homemaking Assistance: Independent  Homemaking Responsibilities: Yes  Ambulation Assistance: Independent  Transfer Assistance: Independent  Active : Yes  Additional Comments: Patient reports she was independent PTA and did not utilize an AD for mobility. Patient reports her daugther and son in law live next door and also would be able to help out as needed. Restrictions/Precautions:  Restrictions/Precautions: General Precautions,Fall Risk  Position Activity Restriction  Other position/activity restrictions: PEG, trach collar/mask, Place oxygen tank on 4L (continue to check notes for decreased FiO2 levels), No lifting >10lbs     SUBJECTIVE: Pt. Seated on BS chair and pleasantly agrees to therapy session. Pt. Motivated for therapy session. PAIN: None indicated    Vitals: Oxygen: 91% after 150' ambulation bout, recovered quickly, otherwise >93% on 4L O2 via trach mask  Heart Rate: WNLs    OBJECTIVE:  Bed Mobility:  Not Tested    Transfers:  Sit to Stand: Contact Guard Assistance  Stand to Sit:Stand By Assistance, [employer] Resources Assistance    Ambulation:  Stand By Assistance  Distance: 5' x 1, 60' x 1, 150' x 1  Surface: Level Tile  Device:Rolling Walker  Gait Deviations:  Decreased Step Length Bilaterally and Decreased Gait Speed    Balance:  Pt.  Completed standing dynamic balance activity: ring toss with Normal OTIS on dynadisk and on foam Surface and Single UE support and No UE support on Rolling Walker with [employer] Resources Assistance, Minimal Assistance. Activity completed to improve balance, enhance functional mobility, and reduce risk of falls. Neuromuscular Education:   Pt. Completed standing and dynamic gait activity tapping cones with feet while standing at walker progressing to tapping cones with ambulation using Rolling Walker to improve proprioception, coordination and lateral weight shifting for improved functional mobility. Exercise:  Patient was guided in 1 set(s) 10 reps of exercises: Glut sets, Seated marches, Seated hamstring curls, Seated heel/toe raises, Long arc quads, Seated isometric hip adduction, Seated abduction/adduction, and abdominal isometrics. Exercises were completed for increased independence with functional mobility. Functional Outcome Measures: Not completed       ASSESSMENT:  Assessment: Patient progressing toward established goals. Activity Tolerance:  Patient tolerance of  treatment: good. Equipment Recommendations:Equipment Needed: Yes (Continue to assess pending progress (may require RW))  Discharge Recommendations: Continue to assess pending progress,Patient would benefit from continued therapy after discharge     Plan: Times per week: 5x/wk 90min; 1x/wk 30 min  Times per day: Daily  Current Treatment Recommendations: Strengthening,Neuromuscular Re-education,Home Exercise Program,ROM,Safety Education & Hanna Eleazar Training,Patient/Caregiver Education & Training,Functional Mobility Training,Wheelchair Mobility Training,Transfer Training,Gait Training,Stair training    Patient Education  Patient Education: Plan of Care, Transfers, Gait, Verbal Exercise Instruction    Goals:  Patient goals :  To return to home  Short term goals  Time Frame for Short term goals: 1 week  Short term goal 1: Patient to perform supine<>sit with SBA

## 2022-02-19 NOTE — PROGRESS NOTES
Alert and oriented to person, place, time. Speech is soft. CHAS 3mm to 2mm. Mucous membranes pink/moist. Heart sounds strong and regular. Lungs have crackles anterior, posterior. No cough noted. Pt on trach mask with 6L/min. Arm drift negative bilaterally. Hand grasp strong and equal bilaterally. Radial pulses strong/equal bilaterally. Capillary refill less than 3 seconds. Abdomen round/soft. Bowel sounds active in all 4 quadrants. No difficulties with urination. Pedal push and pull strong/equal bilaterally. Leg lift strong/equal bilaterally. Nicole's sign negative. Non-pitting edema in lower extremities bilaterally. Stated no pain, numbness or tingling. In recliner, wheels locked. Side table and call light within reach. Denies further needs.

## 2022-02-20 PROCEDURE — 6370000000 HC RX 637 (ALT 250 FOR IP): Performed by: PHYSICAL MEDICINE & REHABILITATION

## 2022-02-20 PROCEDURE — 1180000000 HC REHAB R&B

## 2022-02-20 RX ORDER — IPRATROPIUM BROMIDE AND ALBUTEROL SULFATE 2.5; .5 MG/3ML; MG/3ML
1 SOLUTION RESPIRATORY (INHALATION) EVERY 4 HOURS PRN
Status: DISCONTINUED | OUTPATIENT
Start: 2022-02-20 | End: 2022-02-20 | Stop reason: SDUPTHER

## 2022-02-20 RX ADMIN — CYCLOSPORINE 2 DROP: 0.5 EMULSION OPHTHALMIC at 09:32

## 2022-02-20 RX ADMIN — PANTOPRAZOLE SODIUM 40 MG: 40 TABLET, DELAYED RELEASE ORAL at 05:51

## 2022-02-20 RX ADMIN — MELOXICAM 15 MG: 7.5 TABLET ORAL at 15:05

## 2022-02-20 RX ADMIN — Medication 6 MG: at 21:53

## 2022-02-20 RX ADMIN — Medication 400 MG: at 15:05

## 2022-02-20 RX ADMIN — PANTOPRAZOLE SODIUM 40 MG: 40 TABLET, DELAYED RELEASE ORAL at 15:10

## 2022-02-20 RX ADMIN — Medication 1 TABLET: at 15:05

## 2022-02-20 RX ADMIN — SERTRALINE 50 MG: 50 TABLET, FILM COATED ORAL at 15:05

## 2022-02-20 RX ADMIN — TRAZODONE HYDROCHLORIDE 50 MG: 50 TABLET ORAL at 21:53

## 2022-02-20 ASSESSMENT — PAIN SCALES - GENERAL
PAINLEVEL_OUTOF10: 0

## 2022-02-20 NOTE — PROGRESS NOTES
Patient up in chair,  at bedside. Call light in reach. Vitals stable. Refused PEG feed at this time. Will continue to assess and monitor.

## 2022-02-20 NOTE — PLAN OF CARE
Problem: OXYGENATION/RESPIRATORY FUNCTION  Goal: Patient will maintain patent airway  Outcome: Ongoing   Trach in place. Continuing to assess and monitor. Problem: Falls - Risk of:  Goal: Will remain free from falls  Description: Will remain free from falls  Outcome: Ongoing   Patient is alert and oriented and has demonstrated the use of using the call light for assistance before getting up. Education has been provided to defer the use of the bed alarm and/or restraint free alarm as the patient has shown competency in calling for assistance and verbalizing the risk. Problem: Pain:  Goal: Pain level will decrease  Description: Pain level will decrease  Outcome: Ongoing   Continuing to assess and monitor. Problem: Skin Integrity:  Goal: Risk for impaired skin integrity will decrease  Description: Risk for impaired skin integrity will decrease  Outcome: Ongoing   Continuing to assess and monitor. Patient able to reposition self. Cushion in place. Problem: Nutrition  Goal: Optimal nutrition therapy  Outcome: Ongoing   Tube feedings in place. Continuing to assess and monitor.

## 2022-02-20 NOTE — PROGRESS NOTES
Refused 2100 tube feed bolus. Patient states that she feels full. 35 ml residual remaining from 6 pm feed.

## 2022-02-20 NOTE — PROGRESS NOTES
Inner Cannula on trach changed at this time. Speaking Valve in place. Patient up in the chair for lunch. Holding off on tube feed at this time. Will reassess after she eats. Continuing to assess and monitor.

## 2022-02-21 ENCOUNTER — APPOINTMENT (OUTPATIENT)
Dept: GENERAL RADIOLOGY | Age: 77
DRG: 189 | End: 2022-02-21
Attending: PHYSICAL MEDICINE & REHABILITATION
Payer: MEDICARE

## 2022-02-21 LAB
ALLEN TEST: POSITIVE
BASE EXCESS (CALCULATED): 7.2 MMOL/L (ref -2.5–2.5)
COLLECTED BY:: ABNORMAL
DEVICE: ABNORMAL
GLUCOSE BLD-MCNC: 118 MG/DL (ref 70–108)
HCO3: 33 MMOL/L (ref 23–28)
HCT VFR BLD CALC: 30.7 % (ref 37–47)
HEMOGLOBIN: 9.2 GM/DL (ref 12–16)
IFIO2: 28
O2 SATURATION: 92 %
PCO2: 53 MMHG (ref 35–45)
PH BLOOD GAS: 7.41 (ref 7.35–7.45)
PO2: 65 MMHG (ref 71–104)
SOURCE, BLOOD GAS: ABNORMAL

## 2022-02-21 PROCEDURE — 6370000000 HC RX 637 (ALT 250 FOR IP): Performed by: PHYSICAL MEDICINE & REHABILITATION

## 2022-02-21 PROCEDURE — 36415 COLL VENOUS BLD VENIPUNCTURE: CPT

## 2022-02-21 PROCEDURE — 97116 GAIT TRAINING THERAPY: CPT

## 2022-02-21 PROCEDURE — 2700000000 HC OXYGEN THERAPY PER DAY

## 2022-02-21 PROCEDURE — 85014 HEMATOCRIT: CPT

## 2022-02-21 PROCEDURE — 94760 N-INVAS EAR/PLS OXIMETRY 1: CPT

## 2022-02-21 PROCEDURE — 97530 THERAPEUTIC ACTIVITIES: CPT

## 2022-02-21 PROCEDURE — 92526 ORAL FUNCTION THERAPY: CPT

## 2022-02-21 PROCEDURE — 71046 X-RAY EXAM CHEST 2 VIEWS: CPT

## 2022-02-21 PROCEDURE — 85018 HEMOGLOBIN: CPT

## 2022-02-21 PROCEDURE — 97535 SELF CARE MNGMENT TRAINING: CPT

## 2022-02-21 PROCEDURE — 99222 1ST HOSP IP/OBS MODERATE 55: CPT | Performed by: INTERNAL MEDICINE

## 2022-02-21 PROCEDURE — 94640 AIRWAY INHALATION TREATMENT: CPT

## 2022-02-21 PROCEDURE — 97110 THERAPEUTIC EXERCISES: CPT

## 2022-02-21 PROCEDURE — 99232 SBSQ HOSP IP/OBS MODERATE 35: CPT | Performed by: NURSE PRACTITIONER

## 2022-02-21 PROCEDURE — 36600 WITHDRAWAL OF ARTERIAL BLOOD: CPT

## 2022-02-21 PROCEDURE — 82803 BLOOD GASES ANY COMBINATION: CPT

## 2022-02-21 PROCEDURE — 1180000000 HC REHAB R&B

## 2022-02-21 PROCEDURE — 82948 REAGENT STRIP/BLOOD GLUCOSE: CPT

## 2022-02-21 RX ADMIN — SERTRALINE 50 MG: 50 TABLET, FILM COATED ORAL at 09:41

## 2022-02-21 RX ADMIN — Medication 400 MG: at 09:42

## 2022-02-21 RX ADMIN — PANTOPRAZOLE SODIUM 40 MG: 40 TABLET, DELAYED RELEASE ORAL at 06:39

## 2022-02-21 RX ADMIN — TRAZODONE HYDROCHLORIDE 50 MG: 50 TABLET ORAL at 23:06

## 2022-02-21 RX ADMIN — Medication 6 MG: at 23:06

## 2022-02-21 RX ADMIN — CYCLOSPORINE 2 DROP: 0.5 EMULSION OPHTHALMIC at 11:41

## 2022-02-21 RX ADMIN — IPRATROPIUM BROMIDE AND ALBUTEROL SULFATE 1 AMPULE: .5; 3 SOLUTION RESPIRATORY (INHALATION) at 01:30

## 2022-02-21 RX ADMIN — Medication 1 TABLET: at 09:40

## 2022-02-21 RX ADMIN — MELOXICAM 15 MG: 7.5 TABLET ORAL at 09:40

## 2022-02-21 RX ADMIN — PANTOPRAZOLE SODIUM 40 MG: 40 TABLET, DELAYED RELEASE ORAL at 19:06

## 2022-02-21 ASSESSMENT — PAIN SCALES - GENERAL
PAINLEVEL_OUTOF10: 0

## 2022-02-21 NOTE — PROGRESS NOTES
[address] [address]  INPATIENT PHYSICAL THERAPY  DAILY NOTE  [facility]- [address] [facility],4Th Floor - 7E[address]66/066-A    Time In: 0700  Time Out: 0800  Timed Code Treatment Minutes: 60 Minutes  Minutes: 60          Date: 2022  Patient Name: Katy Do,  Gender:  female        MRN: [id number]  : 1945  (64 y.o.)     Referring Practitioner: Tanika Ragland MD  Diagnosis: Acute Respiratory Failure  Additional Pertinent Hx: Katy oD  is a 64 y.o. female admitted to the inpatient rehabilitation unit on 2/15/2022. The patient was originally admitted to the [facility] 2022 to the gynecology oncology service secondary to postmenopausal bleeding. She started with the bleeding in early 2021 and was followed by Dr. Millicent Redman here at [facility]. Ms. Clemencia Kwon presented to [facility] (/[facility])  for initial consultation for Post-menopausal Bleeding and failed Endometrial Biopsy. PMB started in 2021. Had pelvic US that showed EMS 9mm. EMB was attempted twice in the office, no endometrial cells were identified. She was therefore referred to the [email or web address] at [facility] for additional management. She had no pelvic pain. Only intermittent spotting, no heavy bleeding. Had history of Lichen sclerosis, was prescribed estrogen cream and steroid cream. Patient underwent abdominal hysterectomy on 2022, BSO via P. Pfannenstiel incision. Final pathology benign. Her hospitalization was complicated by respiratory failure (now on 3L supplemental O2 per trach mask,) and has PEG for pharyngeal dysphagia.   Also has a PMH positive for GERD, OA,  Paralysis of vocal cords, Polio (bulbar), Dysphagia, Inhalation and ingestion of other object causing obstruction of respiratory tract or suffocation, Osteoporosis, Hoarse voice, Hypernasal speech, Diaphragm paralysis, and Left adhesive capsulitis     Prior Level of Function:  Lives With: Spouse  Type of Home: House  Home Layout: Two level  Home Access: Stairs to enter without rails  Entrance Stairs - Number of Steps: ~4 steps at the front of the house without railings and 2 steps in the garage. Patient reports she has a few steps in the back of the house with railings. Patient reports typically she enters house from garage  Home Equipment:  (Patient reports she does not have an equipment at home)   Bathroom Shower/Tub: Walk-in shower (Patient reports she has a built in shower chair)  Bathroom Toilet: Standard  Bathroom Equipment: Grab bars in shower,Built-in shower seat    ADL Assistance: Independent  Homemaking Assistance: Independent  Homemaking Responsibilities: Yes  Ambulation Assistance: Independent  Transfer Assistance: Independent  Active : Yes  Additional Comments: Patient reports she was independent PTA and did not utilize an AD for mobility. Patient reports her daugther and son in law live next door and also would be able to help out as needed. Restrictions/Precautions:  Restrictions/Precautions: General Precautions,Fall Risk  Position Activity Restriction  Other position/activity restrictions: PEG, trach collar/mask, No lifting >10lbs     SUBJECTIVE: Patient sitting EOB upon arrival, RN present. Patient pleasant and agreeable to participate in therapy. Patient got new shoes to accommodate swelling, requesting to wear them this session. Patient was on 6L of O2 via NC throughout session due to the trach piece for 4L (28% FiO2), which the patient is on per respiratory therapy, was not present. PAIN: 0/10    Vitals: Oxygen: Montiored throughout session; 88-89% following 150' ambulation however increased to 93-94% within 1 min of seated rest break. Otherwise, 02 remained >94% throughout activity.     OBJECTIVE:  Bed Mobility:  Not Tested    Transfers:  Sit to Stand: Stand By Assistance, with increased time for completion  Stand to Sit:Stand By Assistance, with increased time for completion  -Patient completed sit to stands from various surfaces including EOB, recliner, and wheelchair with stand by assistance throughout. Patient able to use proper hand placement without any verbal cueing. Ambulation:  Contact Guard Assistance without RW, Stand by assistance with RW, with verbal cues , with increased time for completion  Distance: 150' with RW, 30' without AD, 10' x3 with activity  Surface: Level Tile  Device:RW and without AD  Gait Deviations:  Decreased Step Length Bilaterally, Decreased Weight Shift Bilaterally, Decreased Gait Speed, Decreased Heel Strike Bilaterally and Unsteady Gait  -Patient completed prolonged ambulation of 150' with use of RW for overall stability and support with SBA. Patient demonstrated decreased endurance and increased shortness of breath towards end of ambulation, requiring a seated rest break to increase O2 levels from 88% to 94%. Patient had good stability and no LOB with use of RW for support.   -Patient instructed in bilateral cone taps in standing with contact guard assistance, without AD. Close CGA provided due to mild unsteadiness throughout. Patient demonstrates good safety awareness, taking time as needed for weight shifting and picking feet up. Patient had one mild LOB towards end of set, with minimal assistance provided to recover. Patient completed 2 sets of 12-15 repetitions intermittently. Patient instructed in cone taps to improve bilateral step height and weight shifting needed for steadiness during gait.   -Patient instructed in walking while tapping cones with contact guard assistance. Patient completed 8' with use of RW, and 2 sets of 10' without AD, intermittently, to further challenge the patient. Patient demonstrates decreased gait speed and increased time for completion due to mild unsteadiness throughout with bilateral weight shifting and balance needed to tap the cones.  Patient instructed in ambulation with cone taps to challenge the patient's dynamic balance and bilateral weight shifting and step height needed for safety with gait. Balance:  Static Standing Balance: Stand By Assistance, with increased time for completion  Dynamic Standing Balance: Contact Guard Assistance, with verbal cues , with increased time for completion  -Patient instructed in hitting the balloon back and forth while standing on black foam pad with contact guard assistance. Patient instructed to reach in various directions outside OTIS to challenge dynamic standing balance. Patient progressed to performing task with a NBOS with no LOB throughout. Patient instructed in task to improve dynamic standing balance and multi-tasking needed for safe return to home. Functional Outcome Measures: Not completed       ASSESSMENT:  Assessment: Patient progressing toward established goals. Activity Tolerance:  Patient tolerance of  treatment: good. Equipment Recommendations:Equipment Needed: Yes (Continue to assess pending progress (may require RW))  Discharge Recommendations: Continue to assess pending progress  Plan: Times per week: 5x/wk 90min; 1x/wk 30 min  Times per day: Daily  Current Treatment Recommendations: Strengthening,Neuromuscular Re-education,Home Exercise Program,ROM,Safety Education & Burnetta Brawn Training,Patient/Caregiver Education & Training,Functional Mobility Training,Wheelchair Mobility Training,Transfer Training,Gait Training,Stair training    Patient Education  Patient Education: Transfers, Gait, Energy Conservation,  - Patient Verbalized Understanding, - Patient Requires Continued Education    Goals:  Patient goals : To return to home  Short term goals  Time Frame for Short term goals: 1 week  Short term goal 1: Patient to perform supine<>sit with SBA without railings in order to assist with getting into and out of bed.   Short term goal 2: Patient to perform sit to stand transfers wtih SBA with <=1 cue for hand placement in

## 2022-02-21 NOTE — PROGRESS NOTES
[facility]  INPATIENT PHYSICAL THERAPY  DAILY NOTE  [address]- [address] [facility],4[address] Floor - 7[address]66[address]066[address]A    Time In: 1400  Time Out: 1430  Timed Code Treatment Minutes: 30 Minutes  Minutes: 30          Date: 2022  Patient Name: Ollie Klinefelter,  Gender:  female        MRN: [id number]  : 1945  (64 y.o.)     Referring Practitioner: Rolando Hay MD  Diagnosis: Acute Respiratory Failure  Additional Pertinent Hx: Ollie Klinefelter  is a 64 y.o. female admitted to the inpatient rehabilitation unit on 2/15/2022. The patient was originally admitted to the [address] [place] [address] 2022 to the gynecology oncology service secondary to postmenopausal bleeding. She started with the bleeding in early 2021 and was followed by Dr. Delmer Bardales here at [facility]. Ms. Janet Bhakta presented to [facility] (/[facility])  for initial consultation for Post-menopausal Bleeding and failed Endometrial Biopsy. PMB started in 2021. Had pelvic US that showed EMS 9mm. EMB was attempted twice in the office, no endometrial cells were identified. She was therefore referred to the [facility] at [address] [place] [address] 22 for additional management. She had no pelvic pain. Only intermittent spotting, no heavy bleeding. Had history of Lichen sclerosis, was prescribed estrogen cream and steroid cream. Patient underwent abdominal hysterectomy on 2022, BSO via P. Pfannenstiel incision. Final pathology benign. Her hospitalization was complicated by respiratory failure (now on 3L supplemental O2 per trach mask,) and has PEG for pharyngeal dysphagia.   Also has a PMH positive for GERD, OA,  Paralysis of vocal cords, Polio (bulbar), Dysphagia, Inhalation and ingestion of other object causing obstruction of respiratory tract or suffocation, Osteoporosis, Hoarse voice, Hypernasal speech, Diaphragm paralysis, and Left adhesive capsulitis     Prior Level of Function:  Lives With: Spouse  Type of Home: House  Home Layout: Two level  Home Access: Stairs to enter without rails  Entrance Stairs - Number of Steps: ~4 steps at the front of the house without railings and 2 steps in the garage. Patient reports she has a few steps in the back of the house with railings. Patient reports typically she enters house from garage  Home Equipment:  (Patient reports she does not have an equipment at home)   Bathroom Shower/Tub: Walk-in shower (Patient reports she has a built in shower chair)  Bathroom Toilet: Standard  Bathroom Equipment: Grab bars in shower,Built-in shower seat    ADL Assistance: Independent  Homemaking Assistance: Independent  Homemaking Responsibilities: Yes  Ambulation Assistance: Independent  Transfer Assistance: Independent  Active : Yes  Additional Comments: Patient reports she was independent PTA and did not utilize an AD for mobility. Patient reports her daugther and son in law live next door and also would be able to help out as needed. Restrictions/Precautions:  Restrictions/Precautions: General Precautions,Fall Risk  Position Activity Restriction  Other position/activity restrictions: PEG, trach collar/mask, No lifting >10lbs     SUBJECTIVE: Patient in rehab gym finishing up with OT,  present. Patient was pleasant and agreeable to participate in therapy. OT informed us patient was currently on 2L O2 via NC, so that level was maintained throughout the session with oxygen frequently monitored. Patient requested assistance to use the restroom at end of session. Patient educated on performing ambulation without the RW for therapy sessions to further challenge the patient and her endurance, as she didn't use an AD PTA. Patient verbalized understanding. PAIN: 0/10    Vitals: Oxygen: O2 was 90% following step ups and ambulation, however quickly increased to >94% with a seated rest break. Otherwise, O2 >94% throughout.     OBJECTIVE:  Bed Mobility:  Not Tested    Transfers:  Sit to Stand: Supervision, with increased time for completion  Stand to [address] [place] [address], with increased time for completion  -Patient completed sit to stands from various surfaces, including recliner, wheelchair, and toilet. Patient able to complete without cueing and demonstrates safety with proper hand placement. Ambulation:  Contact Guard Assistance, with verbal cues, with increased time for completion  Distance: 80'  Surface: Level Tile  Device:No Device  Gait Deviations:  Decreased Step Length Bilaterally, Decreased Weight Shift Bilaterally, Decreased Gait Speed, Decreased Heel Strike Bilaterally, Wide Base of Support and Unsteady Gait    Balance:  Dynamic Sitting Balance: Supervision, with increased time for completion  Static Standing Balance: Stand By Assistance  Dynamic Standing Balance: Stand By Assistance, with increased time for completion  -Patient completed dynamic sitting and standing balance with pericare at end of session. Patient able to complete with stand by assistance without use of UE support, no LOB or unsteadiness noted. Stairs:  Contact Guard Assistance, with increased time for completion  Number of Steps: curb step x2  Height: 6\" step with No Device  -Patient completed the curb step twice with contact guard assistance, first time completed with one UE support on the porch railing and the second time completed without UE support. Patient demonstrates mild unsteadiness, however no LOB noted. Exercise:  Patient was guided in 1 set(s) 10 reps of exercise to both lower extremities. Standing heel/toe raises, Standing marches, Standing hip abduction/adduction, Standing hamstring curls, Standing hip flexion and Mini squats. Exercises were completed for increased independence with functional mobility.  -Patient completed standing exercises with stand by assistance and bilateral UE support on parallel bar.  Verbal cueing provided to maintain upright posture and trunk control throughout.  -Patient completed forward step ups to 4\" step x5 on each side with 2 sets completed intermittently. Patient used bilateral hand rails for UE support. Verbal cueing provided for patient to decrease reliance on handrails for support with patient able to complete second set with light touch bilaterally. Functional Outcome Measures: Not completed       ASSESSMENT:  Assessment: Patient progressing toward established goals. Activity Tolerance:  Patient tolerance of  treatment: good. Equipment Recommendations:Equipment Needed: Yes (Continue to assess pending progress (may require RW))  Discharge Recommendations: Continue to assess pending progress  Plan: Times per week: 5x/wk 90min; 1x/wk 30 min  Times per day: Daily  Current Treatment Recommendations: Strengthening,Neuromuscular Re-education,Home Exercise Program,ROM,Safety Education & [place]if[place]on Bruno Training,Patient/Caregiver Education & Training,Functional Mobility Training,Wheelchair Mobility Training,Transfer Training,Gait Training,Stair training    Patient Education  Patient Education: Transfers, Gait, Stairs, Verbal Exercise Instruction,  - Patient Verbalized Understanding, - Patient Requires Continued Education    Goals:  Patient goals : To return to home  Short term goals  Time Frame for Short term goals: 1 week  Short term goal 1: Patient to perform supine<>sit with SBA without railings in order to assist with getting into and out of bed. Short term goal 2: Patient to perform sit to stand transfers wtih SBA with <=1 cue for hand placement in order to assist with safety with transfers from various surfaces. Short term goal 3: Patient to ambulate at least 75 feet with RW with SBA in order to assist with home mobility. Short term goal 4: Patient to ascend/descend 1 step with B handrails with CGA in order to assist with getting into and out of home.   Short term goal 5: Patient to score at least 19/28 on the Tinetti in order to reduce risk for falls. Long term goals  Time Frame for Long term goals : 3 weeks  Long term goal 1: Patient to perform supine<>sit with Mod I without railings in order to assist with getting into and out of bed. Long term goal 2: Patient to perform sit to stand transfers with Mod I in order to assist with safety with transfers from various surfaces. Long term goal 3: Patient to ambulate at least 150 feet with RW with Mod I in order to assist with home mobility. Long term goal 4: Patient to ascend/descend 4 steps with 1 handrail with Supervision in order to assist with getting into and out of home. Long term goal 5: Patient to perform car transfer with Supervision in order to assist with getting to and from appointments. Following session, patient left in safe position with all fall risk precautions in place.

## 2022-02-21 NOTE — PROGRESS NOTES
[address] [address]  Recreational Therapy  Daily Note  [address]    Time Spent with Patient: 0 minutes    Date:  2/21/2022       Patient Name: Balbina Burroughs      MRN: [id number]      YOB: 1945 (72 y.o.)       Gender: female  Diagnosis: Acute Respiratory Failure  Referring Practitioner: Dr Roxana Garcia    RESTRICTIONS/PRECAUTIONS:  Restrictions/Precautions: General Precautions,Fall Risk  Vision: Impaired  Hearing: Within functional limits    PAIN: 0-no c/o pain     SUBJECTIVE:  I want to give her a tip    OBJECTIVE:  Pt paid me the money this am for her to get her hair done by our beautician tomorrow-left a tip also-pleasant and appreciative          Patient Education  New Education Provided: Importance of Leisure,     Electronically signed by: SHERIE Ragland  Date: 2/21/2022

## 2022-02-21 NOTE — CONSULTS
[facility]    Patient - Paulino Santana   MRN -  [id number]   Acct # - [de-identified]   - 1945      Date of Admission -  2/15/2022  4:18 PM  Date of evaluation -  2022  Room - Tolu Helton MD Primary Care Physician - Aldo Faust     Problem List      Active Hospital Problems    Diagnosis Date Noted    Personal history of poliomyelitis [Z86.12] 2022    Acute respiratory failure ([address] [J96.00] 02/15/2022     Reason for Consult    For management of Tracheostomy and chronic respiratory failure. HPI   History Obtained From: Patient and electronic medical record. Paulino Santana is a 64 y.o. female  was initially admitted under Nery Reyes MD service. Pulmonary medicine was consulted for further management of Tracheostomy and chronic respiratory failure. Tracheostomy placed:  Surgeon: Performed at [facility]. I went through care everywhere. There is no procedure note regarding tracheostomy. Type: Covidian. Size:7.5  Fio2 supplementation at home prior to current hospitalization: 28%    At the time of my initial evaluation, she remain on tracheostomy with trach mask. Limited history obtained from the patient. Majority of the history obtained from the patient chart, care everywhere documents and discussion with the patient's  at bedside. According to patient's  and patient. Patient was referred to [facility] by her gynecologist for total abdominal hysterectomy and oophorectomy as a part of treatment for her postmenopausal bleeding due to her high risk for denise and postoperative pulmonary complications. She had hx of baseline hypercapnia and neuromuscular disorder. After her planned hysterectomy at [facility], patient had a complicated postoperative period with the development of hypercapnic respiratory failure.   Patient required 3 intubations with the last intubation performed on 28 December 2021 as per the note by Ms. Frank Dimas MD.  Patient underwent tracheostomy in the SICU at [facility] on ? 1/22/22 per the note by Ms. Frank Dimas MD     She used follow with Dr. Lindsay Roche MD at and her pulmonary disease in the past.  She was seen by Dr. Lindsay Roche MD for the last time on 10 March 2014 for post polio muscle weakness. According to his note patient had a below normal MEP and NIF testing. She was also diagnosed with vocal cord paralysis and follows with the ENT specialist at [facility]. PMHx   Past Medical History      Diagnosis Date    GERD (gastroesophageal reflux disease)     Osteoarthritis     Paralysis of vocal cords     Polio       Past Surgical History        Procedure Laterality Date    CHOLECYSTECTOMY      KNEE SURGERY      Right     Meds    Current Medications    cycloSPORINE  2 drop Both Eyes Daily    pantoprazole  40 mg Oral BID AC    sertraline  50 mg Oral Daily    melatonin  6 mg Oral Nightly    magnesium oxide  400 mg Oral Daily    traZODone  50 mg Oral Nightly    multivitamin  1 tablet Oral Daily    meloxicam  15 mg Oral Daily    polyvinyl alcohol  1 drop Both Eyes BID     magnesium hydroxide, aluminum & magnesium hydroxide-simethicone, calcium carbonate, oxyCODONE, polyethylene glycol, zinc oxide, acetaminophen, ipratropium, senna, ipratropium-albuterol  IV Drips/Infusions    Home Medications  Medications Prior to Admission: ipratropium (ATROVENT) 0.06 % nasal spray, 2 sprays by Nasal route 3 times daily as needed  meloxicam (MOBIC) 7.5 MG tablet, Take 15 mg by mouth daily   calcium carbonate (OSCAL) 500 MG TABS tablet, Take 500 mg by mouth daily  Multiple Vitamins-Minerals (PRESERVISION AREDS 2) CAPS, Take  by mouth 2 times daily.  2 tabs BID  multivitamin (THERAGRAN) per tablet, Take 1 tablet by mouth daily Alive 50+  [DISCONTINUED] Cholecalciferol (VITAMIN D3) 1.25 MG (51094 UT) CAPS, Take by mouth  [DISCONTINUED] estradiol (ESTRACE VAGINAL) 0.1 MG/GM vaginal cream, Place 2 g vaginally daily  Diet    ADULT DIET; Full Liquid  ADULT TUBE FEEDING; PEG; 2.0 Calorie; Bolus; 6 TIMES DAILY; 130; Syringe Push; 30; Before and after each bolus  ADULT ORAL NUTRITION SUPPLEMENT; Lunch; Clear Liquid Oral Supplement  Allergies    Latex  Family History    History reviewed. No pertinent family history. Sleep History    Never diagnosed with sleep apnea in the past.    Social History     Social History     Tobacco Use    Smoking status: Never Smoker    Smokeless tobacco: Never Used   Substance Use Topics    Alcohol use: Yes     Comment: Seldom    Drug use: No       Riview of systems   General/Constitutional: She lost a few pounds of weight with decreased appetite. No fever or chills. HENT: Negative. Eyes: Negative. Lower respiratory tract/ lungs: She remain on trach mask. Cardiovascular: No chest pain. Gastrointestinal: No nausea or vomiting. Neurological: She had a history of post polio weakness with a neuromuscular disorder  Extremities: No edema. Musculoskeletal: History of neuromuscular disorder  Genitourinary: No complaints. Hematological: Negative. Psychiatric/Behavioral: Negative. Skin: No itching. Vitals     height is 5' (1.524 m) and weight is 128 lb 11.2 oz (58.4 kg). Her oral temperature is 97.5 °F (36.4 °C). Her blood pressure is 138/64 and her pulse is 66. Her respiration is 18 and oxygen saturation is 98%. Body mass index is 25.13 kg/m². SUPPLEMENTAL O2: O2 Flow Rate (L/min): 6 L/min       I/O        Intake/Output Summary (Last 24 hours) at 2/21/2022 1254  Last data filed at 2/21/2022 1208  Gross per 24 hour   Intake 1655 ml   Output --   Net 1655 ml     I/O last 3 completed shifts:   In: 46 [P.O.:705; NG/GT:1820]  Out: -    Patient Vitals for the past 96 hrs (Last 3 readings):   Weight   02/21/22 0654 128 lb 11.2 oz (58.4 kg)       Exam   General Appearance: ill built, ill nourished in no acute distress on trach collar with a 28% FiO2. HEENT: Normal, Head is normocephalic, atraumatic. PERRL  Neck - Supple, No JVD present. S/p Trachestomy with # 7.5 Covidien trach in place with no balloon. She is tolerating Passy-Salina valve well. Lungs - Bilateral air entry present. Bilateral breath sounds heard. Diminished breath sounds at bases Right > Left. No expiratory wheezes. Cardiovascular - Heart sounds are normal.  Regular rhythm normal rate without murmur, gallop or rub. Abdomen - Soft, nontender, nondistended, no masses or organomegaly. S/p PEG tube in place. Neurologic - Awake, alert, oriented. She is moving all 4 limbs per verbal commands  Extremities - No cyanosis, clubbing or edema. Musculoskeletal: Moving all 4 limbs verbal commands  Lymphadenopathy:  No cervical adenopathy. Psychiatric: Patient  has a normal mood. Skin - No bruising or bleeding. Labs  - Old records and notes have been reviewed in [facility]   ABG  Lab Results   Component Value Date    PH 7.38 06/06/2012    PO2 108 06/06/2012    PCO2 51 06/06/2012    HCO3 30 06/06/2012    O2SAT 98 06/06/2012     Lab Results   Component Value Date    IFIO2 21 06/05/2012    MODE CPAP+PS 06/05/2012    SETPEEP 4 06/05/2012     CBC  Recent Labs     02/21/22  0821   HGB 9.2*   HCT 30.7*      BMP  No results for input(s): NA, K, CL, CO2, BUN, CREATININE, GLUCOSE, MG, PHOS, CALCIUM, IONCA, MG in the last 72 hours. LFT  No results for input(s): AST, ALT, ALB, BILITOT, ALKPHOS, LIPASE in the last 72 hours. Invalid input(s): AMYLASE  TROP  No results found for: TROPONINT  BNP  No results for input(s): BNP in the last 72 hours. Lactic Acid  No results for input(s): LACTA in the last 72 hours. INR  No results for input(s): INR, PROTIME in the last 72 hours. PTT  No results for input(s): APTT in the last 72 hours.   Glucose  Recent Labs     02/21/22  0823   POCGLU 118*     UA No results for input(s): [place], [address], CLARITYU, MUCUS, PROTEINU, BLOODU, RBCUA, [address], BACTERIA, NITRU, GLUCOSEU, BILIRUBINUR, UROBILINOGEN, KETUA, LABCAST, LABCASTTY, AMORPHOS in the last 72 hours. Invalid input(s): CRYSTALS. PFTs performed on 3 January 2013               Sleep studies   None in epic    Cultures    None in epic    EKG   None in epic  Echocardiogram   None in epic    Radiology    CXR  Chest x-ray mobile view performed on 5 June 2012:  Accession Number:  Procedure Name:             Exam Date/Time:     [id number]      Chest Mobile Single         6/5/2012 6:42:00 PM       CPT4 code     25422        IMPRESSION:     RIGHT HEMIDIAPHRAGM ELEVATION AND ADJACENT ATELECTASIS. PROGRESS IMAGING     AS CLINICALLY DETERMINED. Assessment   -Chronic hypercapnic respiratory failure in the postoperative. S/p Tracheostomy. She is currently tolerating Passy-Mercer valve trials well. She is on 28% trach collar. 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  -History of chronic right hemidiaphragmatic elevation with adjacent atelectasis. ? Paralysis per patient.  -Right side complete paralysis and left partial CP dysfunction. She is to follow-up with Dr. Anastacio Simons MD-ENT specialist at [facility].  -History of dysphagia. Patient underwent PEG tube placement  -Osteoarthritis  -Gastroesophageal reflux disease. Plan   -Will continue patient on Tracheostomy.  -Continue Passy-Ashlie valve trials as tolerated during daytime  -Continue routine trach care by respiratory therapy.  -Avoid all sedative medications if she is drowsy.   -Aspiration precautions.  -Continue PEG tube feeding  -We will obtain arterial blood gas on current FiO2.  -We will do a chest x-ray PA and lateral views to evaluate her lung fields.  -We will consult ENT service at [address] [id number] to evaluate patient's history of vocal cord paralysis and tracheostomy management. Patient follows with the ENT service at [facility].  -Titrate Oxygen/Fio2 to keep saturations >92%. -Deep Venous Thrombosis Prophylaxis: Per primary service    \"Thank you for asking us to see this patient\"     Case discussed with registered nurse taking care of patient. Oneil Christensen and her  were educated about my impression and plan. They verbalizes understanding. Questions and concerns addressed.     Electronically signed by   Gregoria Gonzales MD on 2/21/2022 at 12:54 PM

## 2022-02-21 NOTE — PROGRESS NOTES
[address]  Occupational Therapy  Daily Note  Time:   Time In: 0830  Time Out: 0930  Timed Code Treatment Minutes: 60 Minutes  Minutes: 60     Date: 2022  Patient Name: Connor Franks,   Gender: female      Room: [facility]66/066-A  MRN: [id number]  : 1945  (64 y.o.)  Referring Practitioner: Dr Joanne Adkins  Diagnosis: Acute Respiratory Failure  Additional Pertinent Hx: Connor Franks  is a 64 y.o. female admitted to the inpatient rehabilitation unit on 2/15/2022. The patient was originally admitted to the [facility] 2022 to the gynecology oncology service secondary to postmenopausal bleeding. She started with the bleeding in early 2021 and was followed by Dr. Cayla Harley here at [facility]. Ms. Karel Poole presented to [facility] (/[facility])  for initial consultation for Post-menopausal Bleeding and failed Endometrial Biopsy. PMB started in 2021. Had pelvic US that showed EMS 9mm. EMB was attempted twice in the office, no endometrial cells were identified. She was therefore referred to the [facility] at [facility] 22 for additional management. She had no pelvic pain. Only intermittent spotting, no heavy bleeding. Had history of Lichen sclerosis, was prescribed estrogen cream and steroid cream. Patient underwent abdominal hysterectomy on 2022, BSO via P. Pfannenstiel incision. Final pathology benign. Her hospitalization was complicated by respiratory failure (now on 3L supplemental O2 per trach mask,) and has PEG for pharyngeal dysphagia. Also has a PMH positive for GERD, OA,  Paralysis of vocal cords, Polio (bulbar), Dysphagia, Inhalation and ingestion of other object causing obstruction of respiratory tract or suffocation, Osteoporosis, Hoarse voice, Hypernasal speech, Diaphragm paralysis, and Left adhesive capsulitis.     Restrictions/Precautions:  Restrictions/Precautions: General Precautions,Fall Risk  Position Activity Restriction  Other position/activity restrictions: PEG, trach collar/mask, No lifting >10lbs     SUBJECTIVE: Patient pleasant and cooperative. Supportive  present. Pt pleasently requests to take a shower as she wasn't able to over the weekend. PAIN: Denies     Vitals: Oxygen: spot checked: remained > 95 throughout session on 2L via trach mask. COGNITION: WFL    ADL:   Grooming: Stand By Assistance.  hand hygiene x2 events, x1 min each. Bathing: Stand By Assistance. in walk-in shower, min A to hold shower head away from trach. Upper Extremity Dressing: Minimal Assistance. with bra hook. Skilled education on adaptive techniques, pt reports not wanting to use \"fasten and twist\" method d/t trach, reports her  can assist.   Lower Extremity Dressing: Stand By Assistance. during clothing mgmt without UE support. Pt able to cross legs to don socks and shoes without bending, and able to maintain 02 saturations. Toileting: Stand By Assistance. 2x this session  Toilet Transfer: Stand By Assistance. STS, 2x this session  Shower Transfer: Stand By Assistance. use of grab bars. BALANCE:  Sitting Balance:  Modified Independent. Standing Balance: Stand By Assistance. BED MOBILITY:  Not Tested    TRANSFERS:  Sit to Stand:  Stand By Assistance. recliner, STS,  chair. Good hand placement . Stand to Sit: Stand By Assistance. FUNCTIONAL MOBILITY:  Assistive Device: None  Assist Level:  Stand By Assistance. Distance: To and from bathroom and To and from shower room  No AD utilized to challenge balance. Pt demo no LOB and was able to functionally carry ADL based items throughout. ASSESSMENT:  Activity Tolerance:  Patient tolerance of  treatment: good. Discharge Recommendations: Home with Home Health OT  Equipment Recommendations:  Other: Pt has a built in shower seat  Plan: Times per week: 5x/wk for 90 min and 1x/wk for 30 min  Times per day: Daily  Current Treatment Recommendations: Strengthening,Balance Training,Endurance Training,Functional Mobility Training,Equipment Evaluation, Education, & procurement,Patient/Caregiver Education & Training,Self-Care / ADL,Safety Education & Training    Patient Education  Patient Education: ADL safety     Goals  Short term goals  Time Frame for Short term goals: 1 week  Short term goal 1: Pt will complete UB dressing tasks with min A to improve indep with donning her bra at home. Short term goal 2: Pt will complete LB dressing tasks with mod A to improve indep with donning socks and shoes at home. Short term goal 3: Pt will tolerate 4 minute standing tasks with SBA during shayy release tasks to improve indep with sinkside grooming. Short term goal 4: Pt will ambulate to and from bathroom with SBA and RW prn to improve activity tolerance needed for bathing. Short term goal 5: Pt will tolerate dynamic standing IADL tasks with SBA to meet patient goal of baking cookies with grandchildren. Long term goals  Time Frame for Long term goals : 2-3 weeks  Long term goal 1: Pt will complete bathing and shower transfer with mod I to improve indep with showering at home. Long term goal 2: Pt will complete dressing and grooming tasks with mod I to improve indep with self care at home. Long term goal 3: Pt will complete a simple meal prep tasks with mod I and 0 vcs for ECT to improve indep with preparing lunch at home. Following session, patient left in safe position with all fall risk precautions in place.

## 2022-02-21 NOTE — PROGRESS NOTES
[facility]  [address]  Occupational Therapy  Daily Note  Time:   Time In: 1330  Time Out: 1400  Timed Code Treatment Minutes: 30 Minutes  Minutes: 30     Date: 2022  Patient Name: Georgiana Persaud,   Gender: female      Room: [facility]66/066-A  MRN: [id number]  : 1945  (64 y.o.)  Referring Practitioner: Dr Elidia Quiñones  Diagnosis: Acute Respiratory Failure  Additional Pertinent Hx: Georgiana Persaud  is a 64 y.o. female admitted to the inpatient rehabilitation unit on 2/15/2022. The patient was originally admitted to the [facility] 2022 to the gynecology oncology service secondary to postmenopausal bleeding. She started with the bleeding in early 2021 and was followed by Dr. Veena helms at [facility]. Ms. Jada Gonzales presented to [facility] (/[facility])  for initial consultation for Post-menopausal Bleeding and failed Endometrial Biopsy. PMB started in 2021. Had pelvic US that showed EMS 9mm. EMB was attempted twice in the office, no endometrial cells were identified. She was therefore referred to the [facility] at [address] [place] [address] 22 for additional management. She had no pelvic pain. Only intermittent spotting, no heavy bleeding. Had history of Lichen sclerosis, was prescribed estrogen cream and steroid cream. Patient underwent abdominal hysterectomy on 2022, BSO via P. Pfannenstiel incision. Final pathology benign. Her hospitalization was complicated by respiratory failure (now on 3L supplemental O2 per trach mask,) and has PEG for pharyngeal dysphagia. Also has a PMH positive for GERD, OA,  Paralysis of vocal cords, Polio (bulbar), Dysphagia, Inhalation and ingestion of other object causing obstruction of respiratory tract or suffocation, Osteoporosis, Hoarse voice, Hypernasal speech, Diaphragm paralysis, and Left adhesive capsulitis.     Restrictions/Precautions:  Restrictions/Precautions: General Precautions,Fall Risk  Position Activity Restriction  Other position/activity restrictions: PEG, trach collar/mask, No lifting >10lbs     SUBJECTIVE: Patient pleasant and cooperative. Supportive  present. PAIN: Denies     Vitals: Oxygen: spot checked: remained > 93 throughout session on 2L via trach mask. COGNITION: WFL    ADL:   None this session. BALANCE:  Sitting Balance:  Modified Independent. Standing Balance: Stand By Assistance. BED MOBILITY:  Not Tested    TRANSFERS:  Sit to Stand:  Stand By Assistance. Recliner, standard chair. Good hand placement . Stand to Sit: Stand By Assistance. FUNCTIONAL MOBILITY:  Assistive Device: None  Assist Level:  Stand By Assistance. Distance: To easy [address] with rest break post task, within [address] market, market > car before requiring sitting rest break     ADDITIONAL ACTIVITIES:   Patient completed IADL shopping task of reaching outside OTIS on various heights of grocery shelves to place into cart, pt completed task with SBA with an endurance x8 min. Pt then ambulated to place items in car with SBA and no LOB. Skilled education completed on lifting restrictions with pt demo good insight. Patient completed BUE scap mobilizations 10 reps 1 set in scap retraction/protraction, depression/elevation and fwd/bwd cricles to decrease muscle tightness and increase flexibility for ease of donning her bra     ASSESSMENT:  Activity Tolerance:  Patient tolerance of  treatment: good. Discharge Recommendations: Home with Home Health OT  Equipment Recommendations:  Other: Pt has a built in shower seat  Plan: Times per week: 5x/wk for 90 min and 1x/wk for 30 min  Times per day: Daily  Current Treatment Recommendations: Strengthening,Balance Training,Endurance Training,Functional Mobility Training,Equipment Evaluation, Education, & procurement,Patient/Caregiver Education & Training,Self-Care / ADL,Safety Education & Training    Patient Education  Patient Education: IADLs, lifting restrictions, scap mobilizations     Goals  Short term goals  Time Frame for Short term goals: 1 week  Short term goal 1: Pt will complete UB dressing tasks with min A to improve indep with donning her bra at home. Short term goal 2: Pt will complete LB dressing tasks with mod A to improve indep with donning socks and shoes at home. Short term goal 3: Pt will tolerate 4 minute standing tasks with SBA during shayy release tasks to improve indep with sinkside grooming. Short term goal 4: Pt will ambulate to and from bathroom with SBA and RW prn to improve activity tolerance needed for bathing. Short term goal 5: Pt will tolerate dynamic standing IADL tasks with SBA to meet patient goal of baking cookies with grandchildren. Long term goals  Time Frame for Long term goals : 2-3 weeks  Long term goal 1: Pt will complete bathing and shower transfer with mod I to improve indep with showering at home. Long term goal 2: Pt will complete dressing and grooming tasks with mod I to improve indep with self care at home. Long term goal 3: Pt will complete a simple meal prep tasks with mod I and 0 vcs for ECT to improve indep with preparing lunch at home. Following session, patient left in safe position with all fall risk precautions in place.

## 2022-02-21 NOTE — PROGRESS NOTES
Physical Medicine & Rehabilitation   Progress Note      Clarisa Monroy, APRN - CNP     Chief Complaint:    Respiratory Failure with subsequent Debility; s/p JONI with BSO    Subjective: Patient was sitting up in chair. Denies pain. Trach stable, on trach mask, 6L 28% O2. Trial of Room air last evening, O2 sat 89%. Reviewed with . Discussed continue to wean for O2 and consult for transition to removal if on RA. Patient tolerating PMV well. All questions from pain and family answered. Patient denies any other needs at this time. Planning discharge from the IPR Unit to home is 2/28/22 with Home Health or Outpatient services including PT, OT, and ST (and HHA if Home Health). Rehabilitation:  PT:    Transfers:  Sit to Stand: Stand By Assistance, with increased time for completion  Stand to Sit:Stand By Assistance, with increased time for completion  -Patient completed sit to stands from various surfaces including EOB, recliner, and wheelchair with stand by assistance throughout. Patient able to use proper hand placement without any verbal cueing. Ambulation:  Contact Guard Assistance, with verbal cues , with increased time for completion  Distance: 150' with RW, 27' without AD, 10' x3 with activity  Surface: Level Tile  Device:RW and without AD  Gait Deviations:  Decreased Step Length Bilaterally, Decreased Weight Shift Bilaterally, Decreased Gait Speed, Decreased Heel Strike Bilaterally and Unsteady Gait  -Patient completed prolonged ambulation of 150' with use of RW for overall stability and support with contact guard assistance. Patient demonstrated decreased endurance and increased shortness of breath towards end of ambulation, requiring a seated rest break to increase O2 levels from 88% to 94%. Patient had good stability and no LOB with use of RW for support.   -Patient instructed in bilateral cone taps in standing with contact guard assistance, without AD.  Close CGA provided due to mild unsteadiness throughout. Patient demonstrates good safety awareness, taking time as needed for weight shifting and picking feet up. Patient had one mild LOB towards end of set, with minimal assistance provided to recover. Patient completed 2 sets of 12-15 repetitions intermittently. Patient instructed in cone taps to improve bilateral step height and weight shifting needed for steadiness during gait.   -Patient instructed in walking while tapping cones with contact guard assistance. Patient completed 8' with use of RW, and 2 sets of 10' without AD, intermittently, to further challenge the patient. Patient demonstrates decreased gait speed and increased time for completion due to mild unsteadiness throughout with bilateral weight shifting and balance needed to tap the cones. Patient instructed in ambulation with cone taps to challenge the patient's dynamic balance and bilateral weight shifting and step height needed for safety with gait. Balance:  Static Standing Balance: Contact Guard Assistance, with increased time for completion  Dynamic Standing Balance: Contact Guard Assistance, with verbal cues , with increased time for completion  -Patient instructed in hitting the balloon back and forth while standing on black foam pad with contact guard assistance. Patient instructed to reach in various directions outside OTIS to challenge dynamic standing balance. Patient progressed to performing task with a NBOS with no LOB throughout. Patient instructed in task to improve dynamic standing balance and multi-tasking needed for safe return to home. OT:   ADL:   Grooming: Stand By Assistance.  hand hygiene x2 events, x1 min each. Bathing: Stand By Assistance. in walk-in shower, min A to hold shower head away from trach. Upper Extremity Dressing: Minimal Assistance. with bra hook.  Skilled education on adaptive techniques, pt reports not wanting to use \"fasten and twist\" method d/t trach, reports her  can assist.   Lower Extremity Dressing: Stand By Assistance. during clothing mgmt without UE support. Pt able to cross legs to don socks and shoes without bending, and able to maintain 02 saturations. Toileting: Stand By Assistance. 2x this session  Toilet Transfer: Stand By Assistance. STS, 2x this session  Shower Transfer: Stand By Assistance. use of grab bars. BALANCE:  Sitting Balance:  Modified Independent. Standing Balance: Stand By Assistance. TRANSFERS:  Sit to Stand:  Stand By Assistance. recliner, STS,  chair. Good hand placement . Stand to Sit: Stand By Assistance. FUNCTIONAL MOBILITY:  Assistive Device: None  Assist Level:  Stand By Assistance. Distance: To and from bathroom and To and from shower room  No AD utilized to challenge balance. Pt demo no LOB and was able to functionally carry ADL based items throughout. ST:   INTERVENTIONS: Patient independently recalled and carried over recommended swallow strategies with 100% accuracy. Patient with good insight regarding rationale for recommended swallow strategies this date. Patient completed po trials of thin liquids via straw x3 with evidence of adequate labial seal, suspected adequate bolus control/containment, suspected timely swallow initiation, concerns for pharyngeal residue resulting in delayed throat clear/cough x2. INTERVENTIONS: Completed the following pharyngeal exercises in conjunction with skilled demonstration and rationale on proper technique. Effortful swallow -- x20 with good success  TBR ('kick') -- x25 with good success  Caren -- x15 with great success and timeliness of swallow trigger  Mendelsohn -- x10 with good success  Vocal Fold Closure -- x10 with good success; low pitch and hoarseness impacting ability  Pitch Glide -- x10 with fair success, limited pitch change.    *plans for completion of NMES in conjunction with exercises on Monday 2/21    INTERVENTIONS: Reviewed plans for potential red button trials on Monday 2/21/22. Patient continue to report anxieties and being fearful about living without the trach. ST educated patient and  on anatomy behind both respiratory and swallowing systems and discussed success with PMV. Patient and  verbalized understanding.        Review of Systems:   CONSTITUTIONAL:  positive for  fatigue  EYES:  Wears glasses  HEENT:  + Pharyngeal Dysphagia  RESPIRATORY:  positive for dyspnea, wheezing and on trach currently with 28% of supplemental O2 per trach mask  CARDIOVASCULAR:  negative  GASTROINTESTINAL:  Dysphagia with PEG; nutrition per tube feedings and comfort sips of thin liquids  GENITOURINARY:  negative  SKIN:  Healing incision  HEMATOLOGIC/LYMPHATIC:  Recent anemia; s/p 1 unit PRBC's  MUSCULOSKELETAL:  positive for  myalgias, arthralgias, pain, stiff joints, decreased range of motion and muscle weakness  NEUROLOGICAL:  positive for speech problems, gait problems, dysphagia and weakness  BEHAVIOR/PSYCH:  positive for decreased energy level, fatigue and anxiety  10 point system review otherwise negative      Objective:  Vitals:    02/21/22 0935   BP: 138/64   Pulse: 66   Resp: 18   Temp: 97.5 °F (36.4 °C)   SpO2: 98%   awake  Orientation:   person, place, time  Mood: within normal limits  Affect: anxious and calm  General appearance: well groomed and in no acute distress     Memory:   normal,   Attention/Concentration: normal  Language:  abnormal - hoarse voice; hypernasal; using her talking valve     Cranial Nerves:  cranial nerves II-XII are grossly intact  ROM:  abnormal - decreased left shoulder 2/2 adhesive capsulitis  Motor Exam:  Motor exam is symmetrical 3+ out of 5 all extremities bilaterally except 4/5 bilateral anterior tibialis, EHL, gastrocnemius/soleus  Tone:  normal  Muscle bulk: normal  Sensory:  Sensory intact     Skin: warm and dry  Peripheral vascular: Pulses: Normal upper and lower extremity pulses; Edema: no       Diagnostics:   Recent Results (from the past 24 hour(s))   Hemoglobin and Hematocrit    Collection Time: 02/21/22  8:21 AM   Result Value Ref Range    Hemoglobin 9.2 (L) 12.0 - 16.0 gm/dl    Hematocrit 30.7 (L) 37.0 - 47.0 %   POCT Glucose    Collection Time: 02/21/22  8:23 AM   Result Value Ref Range    POC Glucose 118 (H) 70 - 108 mg/dl       Impression:    · Status-post total abdominal hysterectomy, bilateral salpingo-oophorectomy on 1/24/22 at [facility] secondary to postmenopausal bleeding via pfannenstiel incision. Final pathology benign. · Hypercarbic respiratory failure, now on supplemental O2 per trach mask    · Tracheostomy status  · History of diaphragmatic and vocal cord paralysis (baseline right vocal cord and diaphragm paralysis with inability to cough, rare nasal regurgitation of liquids believed to be secondary to bulbar polio  · Pharyngeal dysphagia  · Status post PEG placement 2/8/2022   · Zenker's diverticulum noted on a modified barium swallow study of 2/13/2022; GI consult deferred while at [place]. · Lactose intolerant  · Acute blood loss anemia; transfused 1 unit of packed red blood cells on 2/10/2022. · 1 lower respiratory culture positive for Serratia and the patient is status post vancomycin 1/30 through 1/31, and cefepime 1/30 through 2/ 6. · Negative lower extremity Dopplers on 2/2/22. · Gastroesophageal reflux disease  · Osteoarthritis  · Paralysis of vocal cords  · Bulbar polio  · Dysphagia, moderate to severe oropharyngeal  · Inhalation it and ingestion of other object causing obstruction of respiratory tract or suffocation  · Osteoporosis  · Hoarse voice  · Hypernasal speech  · Diaphragmatic paralysis  · Left adhesive capsulitis      Plan:  · Continue therapies. PT, OT, SLP  · Prophylaxis:  DVT: EPC cuffs; no oral anticoagulation secondary to bleeding risk.    · GI:  Protonix 40 mg po bid   · Pain: OxyIR 5 mg po q 4 hours prn; Tylenol 650 mg per PEG q 6 hours prn;   · Nutrition:   Dietary to evaluate tube feedings to possibly decrease bolus volumes and increase bolus frequency to maintain adequate caloric intake  · Bowel: Glycolax 17 g po/PEG daily prn  · Anxiety: Zoloft 50 mg po/PEG  q day for anxiety  · Ok to start home restasis   · Planning red button trial this week with SLP  · Monitor O2 needs   · Rehab Team Conferences Thursdays  · Pulmonary consult today  · Planning discharge to home on Monday, 2/28/22        IMELDA Rivas - CNP

## 2022-02-21 NOTE — PROGRESS NOTES
Patient requested to not receive full 0700 tube feed. 100 ml's of 2CAL administered with a 90 ml flush.

## 2022-02-21 NOTE — PROGRESS NOTES
[address] [place] [facility]  [facility]  DAILY NOTE    TIME   SLP Individual Minutes  Time In: 1000  Time Out: 4779  Minutes: 52  Timed Code Treatment Minutes: 0 Minutes       Date: 2022  Patient Name: Bradley Lopez      CSN: [id number]   : 1945  (64 y.o.)  Gender: female   Referring Physician: Deisi Moran MD  Diagnosis: Debility  Secondary Diagnosis: Dysphagia, Dysphonia   Precautions: Fall risk, trach, PEG, aspiration  Current Diet: Full THIN liquid diet  Swallowing Strategies: Right head turn, small bites/sips, 3-4 swallows, CLOSE pulmonary monitoring  Date of Last MBS/FEES: 22    Pain:  No pain reported. Subjective:  Patient seated upright in recliner with  at bedside for entire session. Patient alert, cooperative, and pleasant throughout. Reports completion of ST HEP over weekend. NMES (Vital Stim) Treatment Mini    Does the patient have an area of neoplasm or infection in the area of electrode placement? No    Electrode Placement: 3-h  Range of e-stim strength tolerated: 6.0  Duration of e-stim: 32 minutes     Was the patient provided with constant supervision during the use of NMES? Yes  Was the patient's skin checked upon removal of electrodes? Yes  Were there concerns regarding the skin's integrity following electrode removal? No      Short-Term Goals:  SHORT TERM GOAL #1:  Goal 1: Patient will safely consume thin liquid diet with good success in order to increase PO intake and improve pharyngeal function. INTERVENTIONS: Patient reports no difficulty with consumption of full thin liquids diet over weekend.  Patient consumed ice chips and thin liquids throughout treatment session with evidence of adequate labial seal, suspected adequate bolus control/containment, suspected timely swallow initiation; x5 throat clear throughout (though certainly cannot ruleout pharyngeal phase deficits or invasive airway events at bedside alone). Reviewed plans for completion of flow testing tomorrow with soups per patient request. Will teach and educate patient and  on how to utilize flow test and then further determine if any soups can be thinned out enough to meet thin liquid diet level criteria (IDDSI level 0). SHORT TERM GOAL #2:  Goal 2: Patient will complete pharyngeal strengthening exercises x10 each with good success in order to improve pharyngeal weakness and overall airway protection. INTERVENTIONS: Completed the following pharyngeal exercises in conjunction with skilled demonstration and rationale on proper technique. Effortful swallow -- 2 sets of 10 with good success  TBR ('kick') -- 3 sets of 10 with good success and retraction strength  Caren -- 2 sets of 10 with great success and timeliness of swallow trigger  Mendelsohn -- 2 sets of 10 with good success  Vocal Fold Closure -- 2 sets of 3 with fair success; low pitch and hoarseness impacting ability  Super-supraglottic Swallow -- 2 sets of 5 with good success upon provision of verbal cues for completion in unison with ST   *utilized model of pharyngeal swallow anatomy to further educate patient and  on respiratory and swallowing systems   *reviewed ST HEP; patient verbalized understanding      SHORT TERM GOAL #3:  Goal 3: Patient will trial red button consistently without respiratory distress to permit potential decannulation. INTERVENTIONS: Spoke with RN Nell December regarding plans for pulmonary consult to trial red button placement this date; will monitor chart. Long-Term Goals:  Timeframe for Long-term Goals: 3 weeks    LONG TERM GOAL #1:  Goal 1: Patient will complete repeat instrumental to reevaluate readiness for initiation of solids as clinically indicated.       Comprehension: 6 - Complex ideas 90% or device (hearing aid or glasses- if patient is primarily a visual learner)  Expression: 6 - Device used to express complex ideas/needs  Social Interaction: 6 - Patient requires medication for mood and/or effect  Problem Solvin - Independent with device (e.g. notes, schedules)  Memory: 5 - Patient requires prompting with stress/unfamiliar situations    EDUCATION:  Learner: Patient and Significant Other  Education:  Reviewed diet and strategies, Reviewed signs, symptoms and risks of aspiration, Reviewed ST goals and Plan of Care, Reviewed recommendations for follow-up, Education Related to Potential Risks and Complications Due to Impairment/Illness/Injury, Education Related to Prevention of Recurrence of Impairment/Illness/Injury, Education Related to [employer] and Wellness and Swallowing HEP  Evaluation of Education: [employer] understanding       ASSESSMENT/PLAN:      Activity Tolerance:  Patient tolerance of treatment: good. Assessment/Plan: Patient progressing toward established goals. Continues to require skilled care of licensed speech pathologist to progress toward achievement of established goals and plan of care.    Plan for Next Session:  pharyngeal exercises       Khurram Enriquez M.S. [id number] [place] [phone]

## 2022-02-21 NOTE — PROGRESS NOTES
EN/PN NUTRITION SUPPORT    RECOMMENDATIONS/PLAN:   · Continue current diet as ordered Full liquid (per SLP, patient can only have thin liquids- no pureed or thickened liquids). · Continue TF regimen of TwoCal HN bolus of 130 ml six times daily. Recommend fluid flush of 60 ml before and after each bolus feed or per physician recommendations. · Recommend new suggested times of 7A, 11A, 2P, 5P, 8P, 11P  · May wish to have patient sit up for bolus feeds. · Continue Ensure Clear daily. CURRENT NUTRITION THERAPIES  ADULT DIET; Full Liquid  ADULT TUBE FEEDING; PEG; 2.0 Calorie; Bolus; 6 TIMES DAILY; 130; Syringe Push; 30; Before and after each bolus  ADULT ORAL NUTRITION SUPPLEMENT; Lunch; Clear Liquid Oral Supplement  Current Tube Feeding (TF) Orders:  · Feeding Route: PEG  · Formula: 2.0 Calorie (TwoCal)  · Schedule: Bolus  · Additives/Modulars:    · Water Flushes: Current fluid flush orders for 30 ml fluid flush before and after each bolus feed. Recommend NEW fluid flush of 60 ml fluid flush before and after each bolus feed or per physician recommendation. · Current TF & Flush Orders Provides: TwoCal HN bolus feeds of 130 ml six times daily. TF regimen providing 1560 kcal, 65 gm protein, 170 gm CHO, 3.9 gm fiber, 546 ml free fluid. Total fluid provided: 906 ml (546 TF, 360 fluid flush); Total goal volume: 1140 ml (780 TF, 360 fluid flush). · Goal TF & Flush Orders Provides: TwoCal  ml bolus feed six times daily. Recommend new fluid flush of 60 ml before and after each bolus feed or per physician recommendation. Regimen to provide 1560 kcal, 65 gm protein, 170 gm CHO, 3.9 gm fiber, 546 ml free fluid. Total fluid: 1266 ml (546 TF, 720 fluid flush); Total goal volume: 1500 ml (780 ml TF, 720 fluid flush). COMPARATIVE STANDARDS  Energy (kcal):  2888-4522 kcal/day (25-30 kcal/kg); Weight Used for Energy Requirements:  Current, 58.4 kg     Protein (g):  64-76 gm/day (1.1-1.3 gm/kg);  Weight Used for Protein Requirements:  Current, 58.4 kg         NUTRITIONAL SUMMARY/STATUS:    Pt. nutritionally compromised AEB hx of dysphagia and need for EN, trach mask, and need for full liquid diet.  At risk for further nutrition compromise r/t admitted for debility r/t respiratory failure, recent hysterectomy and underlying medical condition (GERD, osteoarthritis, paralysis of vocal cord, dysphagia, diaphragm paralysis).      Writer met with patient this date. Patient with hx of TF bolus refusal r/t '\"feeling full\". Patient reports today she drank the most she has so far since admit which was ~1 cup of chicken broth. Patient was still working on ensure clear at time of visit. Writer encouraged patient to take full TF bolus amount to ensure patient is meeting nutritional needs. Writer and patient discussed when patient would like to receive TF boluses. Per discussion with patient, patient would like TF bolus before meals so she can \"lucinda how much to drink at meals\" so she doesn't feel as full. Noted with planned discharge home with [facility] 2/28/22. NUTRITION RELATED FINDINGS:   Treatments: PT/OT/ST following. TF Status: Patient reports tolerating TF regimen well other than \"feeling full\". States she only had regurgitation of TF \"~1-2 times\". GI Status: Last BM recorded (2/17). Pertinent Labs: (2/21) POC glu 118, Hgb 9.2  Pertinent Meds: mag-ox, mobic, MVI, protonix, zoloft, desyrel; PRN tums, milk of mag, oxycodone, glycolax, senokot  Current Weight: 128 lb 12 oz (58.4 kg) (2/21- edema 2/21 RLE/LLE (1+ pitting)  Admission Weight:  128 lb 15.5 oz (58.5 kg) (2/15- RLE/LLE (1+ pitting) edema present 2/15)  Wounds: Surgical Incision (incision to pelvis anterior)  Malnutrition Status: No malnutrition    Please refer to initial nutrition assessment for additional details.    Tammy Rivera RD:    Contact Number: [phone]

## 2022-02-21 NOTE — PLAN OF CARE
See full note filed 2/21 at 4:20PM.     Problem: Nutrition  Goal: Optimal nutrition therapy  Outcome: Ongoing     Nutrition Problem #1: Inadequate oral intake  Intervention: Food and/or Nutrient Delivery: Continue Current Diet,Continue Oral Nutrition Supplement,Continue Current Tube Feeding  Nutritional Goals: Patient to meet % of estimated nutritional needs via PO diet and EN daily throughout LOS

## 2022-02-21 NOTE — PROGRESS NOTES
Patient: Afia   Unit/Bed: 7E-66/066-A  YOB: 1945  MRN: [id number] Acct: [de-identified]   Admitting Diagnosis: Acute respiratory failure ([facility] [address] [J96.00]  Admit Date:  2/15/2022  Hospital Day: 6    Assessment:     Active Problems:    Acute respiratory failure ([facility] [address]    Personal history of poliomyelitis  Resolved Problems:    * No resolved hospital problems. *      Plan:     Continue to follow  We discussed her edema and she would rather not use a diuretic. I encouraged her to keep the feet up as often as able. Subjective:     Patient has no complaint of CP or SOB. .   Medication side effects: none    Scheduled Meds:   cycloSPORINE  2 drop Both Eyes Daily    pantoprazole  40 mg Oral BID AC    sertraline  50 mg Oral Daily    melatonin  6 mg Oral Nightly    magnesium oxide  400 mg Oral Daily    traZODone  50 mg Oral Nightly    multivitamin  1 tablet Oral Daily    meloxicam  15 mg Oral Daily    polyvinyl alcohol  1 drop Both Eyes BID     Continuous Infusions:  PRN Meds:magnesium hydroxide, aluminum & magnesium hydroxide-simethicone, calcium carbonate, oxyCODONE, polyethylene glycol, zinc oxide, acetaminophen, ipratropium, senna, ipratropium-albuterol    Review of Systems  Pertinent items are noted in HPI. Objective:     Patient Vitals for the past 8 hrs:   BP Temp Temp src Pulse Resp SpO2   02/20/22 2211 -- -- -- -- 16 --   02/20/22 2151 (!) 156/69 -- -- 74 -- (!) 89 %   02/20/22 2103 (!) 156/69 98.5 °F (36.9 °C) Oral 76 18 92 %     I/O last 3 completed shifts: In: 6463 [P.O.:1050; NG/GT:1840]  Out: -   I/O this shift:   In: 425 [P.O.:75; NG/GT:350]  Out: -     BP (!) 156/69   Pulse 74   Temp 98.5 °F (36.9 °C) (Oral)   Resp 16   Ht 5' (1.524 m)   Wt 129 lb (58.5 kg)   SpO2 (!) 89%   BMI 25.19 kg/m²     General appearance: alert, appears stated age and cooperative  Head: Normocephalic, without obvious abnormality, atraumatic  Lungs: clear to auscultation bilaterally  Heart: regular rate and rhythm, S1, S2 normal, no murmur, click, rub or gallop  Abdomen: soft, non-tender; bowel sounds normal; no masses,  no organomegaly  Extremities: edema 1-2+ bilaterally  Skin: Skin color, texture, turgor normal. No rashes or lesions  Neurologic: weak    Electronically signed by Melina Mills MD on 2/21/2022 at 12:55 AM

## 2022-02-21 NOTE — PLAN OF CARE
Problem: DISCHARGE BARRIERS  Goal: Patient's continuum of care needs are met  Note: SW received returned phone call from [facility]. Infusion company is out of network with patient's insurance which would require patient to meet out of network deductible and copay. Therefore, SW initiated referral process with [facility]. Information faxed for referral. Await company to verify benefits and out of pocket expense for patient. SW to follow and maintain involvement in discharge planning.

## 2022-02-21 NOTE — PLAN OF CARE
some TF, nutrition up to see patient today and will adjust time of feedings with patient inclusion     Problem: IP MOBILITY  Goal: LTG - patient will demonstrate safe mobility requirements  Outcome: Ongoing  Note: Patient working with PT     Problem: IP DRESSINGS LOWER EXTREMITIES  Goal: LTG - patient will dress lower body with or without assistive device  Outcome: Ongoing  Note: Patient working with OT     Problem: IP SWALLOWING  Goal: LTG - patient will tolerate the least restrictive diet consistency to allow for safe consumption of daily meals  Outcome: Ongoing  Note: Patient working with speech     Problem: DISCHARGE BARRIERS  Goal: Patient's continuum of care needs are met  2/21/2022 1615 by Jeff London RN  Note: Patient and  needs education on TF administration and trach care which was initiated today  2/21/2022 1525 by NORMA Fuller  Note: SW received returned phone call from [employer]. Infusion company is out of network with patient's insurance which would require patient to meet out of network deductible and copay. Therefore, SW initiated referral process with [facility]. Information faxed for referral. Await company to verify benefits and out of pocket expense for patient. SW to follow and maintain involvement in discharge planning.

## 2022-02-22 ENCOUNTER — APPOINTMENT (OUTPATIENT)
Dept: GENERAL RADIOLOGY | Age: 77
DRG: 189 | End: 2022-02-22
Attending: PHYSICAL MEDICINE & REHABILITATION
Payer: MEDICARE

## 2022-02-22 PROCEDURE — 76000 FLUOROSCOPY <1 HR PHYS/QHP: CPT

## 2022-02-22 PROCEDURE — 97530 THERAPEUTIC ACTIVITIES: CPT

## 2022-02-22 PROCEDURE — 97110 THERAPEUTIC EXERCISES: CPT

## 2022-02-22 PROCEDURE — 94760 N-INVAS EAR/PLS OXIMETRY 1: CPT

## 2022-02-22 PROCEDURE — 97116 GAIT TRAINING THERAPY: CPT

## 2022-02-22 PROCEDURE — 99233 SBSQ HOSP IP/OBS HIGH 50: CPT | Performed by: INTERNAL MEDICINE

## 2022-02-22 PROCEDURE — 90791 PSYCH DIAGNOSTIC EVALUATION: CPT | Performed by: PSYCHOLOGIST

## 2022-02-22 PROCEDURE — 1180000000 HC REHAB R&B

## 2022-02-22 PROCEDURE — 2700000000 HC OXYGEN THERAPY PER DAY

## 2022-02-22 PROCEDURE — 6370000000 HC RX 637 (ALT 250 FOR IP): Performed by: PHYSICAL MEDICINE & REHABILITATION

## 2022-02-22 PROCEDURE — 99221 1ST HOSP IP/OBS SF/LOW 40: CPT | Performed by: PHYSICIAN ASSISTANT

## 2022-02-22 PROCEDURE — 92526 ORAL FUNCTION THERAPY: CPT

## 2022-02-22 PROCEDURE — 99232 SBSQ HOSP IP/OBS MODERATE 35: CPT | Performed by: NURSE PRACTITIONER

## 2022-02-22 RX ADMIN — PANTOPRAZOLE SODIUM 40 MG: 40 TABLET, DELAYED RELEASE ORAL at 16:15

## 2022-02-22 RX ADMIN — SERTRALINE 50 MG: 50 TABLET, FILM COATED ORAL at 08:45

## 2022-02-22 RX ADMIN — POLYVINYL ALCOHOL 1 DROP: 14 SOLUTION/ DROPS OPHTHALMIC at 22:51

## 2022-02-22 RX ADMIN — Medication 1 TABLET: at 08:46

## 2022-02-22 RX ADMIN — TRAZODONE HYDROCHLORIDE 50 MG: 50 TABLET ORAL at 22:50

## 2022-02-22 RX ADMIN — PANTOPRAZOLE SODIUM 40 MG: 40 TABLET, DELAYED RELEASE ORAL at 06:29

## 2022-02-22 RX ADMIN — Medication 6 MG: at 22:50

## 2022-02-22 RX ADMIN — Medication 400 MG: at 08:45

## 2022-02-22 RX ADMIN — CYCLOSPORINE 2 DROP: 0.5 EMULSION OPHTHALMIC at 13:53

## 2022-02-22 RX ADMIN — MELOXICAM 15 MG: 7.5 TABLET ORAL at 08:45

## 2022-02-22 ASSESSMENT — PAIN SCALES - GENERAL
PAINLEVEL_OUTOF10: 0

## 2022-02-22 NOTE — PROGRESS NOTES
[address] THERAPY  [address] [facility]  DAILY NOTE    TIME   SLP Individual Minutes  Time In: 2386  Time Out: 1100  Minutes: 55  Timed Code Treatment Minutes: 0 Minutes       Date: 2022  Patient Name: Cindy Pérez      CSN: [id number]   : 1945  (64 y.o.)  Gender: female   Referring Physician: Den Miranda MD  Diagnosis: Debility  Secondary Diagnosis: Dysphagia, Dysphonia   Precautions: Fall risk, trach, PEG, aspiration  Current Diet: Full THIN liquid diet  Swallowing Strategies: Right head turn, small bites/sips, 3-4 swallows, CLOSE pulmonary monitoring  Date of Last MBS/FEES: 22    Pain:  No pain reported. Subjective:  Patient seated upright in recliner for duration of session. Alert and pleasant.  also present at bedside. Short-Term Goals:  SHORT TERM GOAL #1:  Goal 1: Patient will safely consume thin liquid diet with good success in order to increase PO intake and improve pharyngeal function. INTERVENTIONS: As planned, ST ordered experimental tray to be delivered for demonstration on proper flow testing. Began by providing education on rationale behind flow test and how to complete. Provided IDDSI handout outlining Level 0, ST explained step-by-step process while involving patient  in demonstration. See below for results. Tested the following items:  1. Blended chicken noodle soup - PASSED as Level 0 (thin liquid)  2. Blended potato soup - FAILED as Level 0 (thin liquid)  3. Blended potato soup (3 oz) + chicken broth (2 oz) - PASSED as Level 0 (thin liquid)    Patient also questioning allowance for squash from [facility] approved any soups that PASS as Level 0, or any soups that can be thinned out to a Level 0 determined by flow test. Patient and  able to independently demonstrate flow testing following provision of edcation. ST provided measuring cups, spoons, and syringes for future use. Notified RN and dietary; upgraded diet level appropriately to reflect allowance for blended chicken noodle soup and blended potato soup IF served with a side of broth.  and patient also questioning overall prognosis and requesting timelines related to allowance for upgraded solids. ST explained therapeutic journey with expectations that patient completes IPR stay followed by skilled home health ST services and eventual repeat MBS or FEES in ~4-8 weeks, with eventual allowance for solids pending overall gains in swallow function. Both verbalized understanding    It should be noted that although patient is able to safely consume a full liquid diet, this is NOT optimal for maximizing nutrition levels. WithOUT ongoing feedings via PEG, patient unlikely to maintain adequate nutrition and would be at HIGH risk for malnourishment which could result in further deconditioning and recurrent hospitalizations. SHORT TERM GOAL #2:  Goal 2: Patient will complete pharyngeal strengthening exercises x10 each with good success in order to improve pharyngeal weakness and overall airway protection. INTERVENTIONS: Reviewed HEP; encouraged patient to complete 2-3x today since no exercises were completed during treatment session. SHORT TERM GOAL #3:  Goal 3: Patient will trial red button consistently without respiratory distress to permit potential decannulation. INTERVENTIONS: Per pulmonologist note (MD Mariano), plan to continue trach with PMV until CXR and ENT consult completed. Will continue to monitor chart. Long-Term Goals:  Timeframe for Long-term Goals: 3 weeks    LONG TERM GOAL #1:  Goal 1: Patient will complete repeat instrumental to reevaluate readiness for initiation of solids as clinically indicated.       Comprehension: 6 - Complex ideas 90% or device (hearing aid or glasses- if patient is primarily a visual learner)  Expression: 7 - Patient expresses complex ideas/needs  Social Interaction: 6 - Patient requires medication for mood and/or effect  Problem Solvin - Patient independent with complex tasks  Memory: 6 - Patient requires device to recall (e.g. memory book)    EDUCATION:  Learner: Patient and Significant Other  Education:  Reviewed diet and strategies, Reviewed signs, symptoms and risks of aspiration, Reviewed ST goals and Plan of Care, Reviewed recommendations for follow-up, Education Related to Potential Risks and Complications Due to Impairment/Illness/Injury, Education Related to Prevention of Recurrence of Impairment/Illness/Injury, Education Related to [employer] and Wellness and Swallowing HEP  Evaluation of Education: Anne Sutherland understanding       ASSESSMENT/PLAN:      Activity Tolerance:  Patient tolerance of treatment: good. Assessment/Plan: Patient progressing toward established goals. Continues to require skilled care of licensed speech pathologist to progress toward achievement of established goals and plan of care.    Plan for Next Session:  pharyngeal exercises with [employer] [address]

## 2022-02-22 NOTE — CONSULTS
Darren Cardenas 58   Dustin WOODS 86 CONSULTATION REPORT    TO:Tessa Reyez MD  PATIENT: Hank Pérez  : 1945   MR NUMBER: [id number]  FROM: Remy Wilson, Ph. D.   DATE: 2022      REPORT OF CONSULTATION: FINDINGS, OPINIONS and RECOMMENDATIONS     REASON FOR REFERRAL: The patient was referred for evaluation due to concerns about emotional status and coping in the wake of recent admission. This occurred after patient had difficulties in a surgery at [facility] due to post-menopausal bleeding and concern for possible cancer, had to be intubated multiple times throughout a complicated postsurgical course. History of post-polio    Patient presents with the following presenting problems:   Patient Active Problem List   Diagnosis    Age-related osteoporosis without current pathological fracture    Acute respiratory failure ([facility] [address]    Personal history of poliomyelitis       BASIS OF EVALUATION:   Clinical assessment of the patient, review of medical records, consultation with Nursing, Physical Therapy, Occupational Therapy, Speech Language Pathology and Medical Social Work and with attending physician, and conversation with family member Nemesio Reese, who was present for the interview. BEHAVIORAL OBSERVATIONS: The patient presents as a 64y.o.-year-old female of  descent. Grooming was WNL. Interpersonally, the patient was pleasant but guarded. Cooperation with assessment was WNL. Level of consciousness was alert. Affect was relatively flat. Mood was quite anxious. Cognition is well spared. PRESENTING PROBLEM: The patient acknowledged having difficulty with anxiety, concerns about her trach, and confusing directions. Primary coping strategies involve reliance on , seeking information. Support systems are  and their five children, all of whom live close by.       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 [address] Use    Smoking status: Never Smoker    Smokeless tobacco: Never Used   Substance and Sexual Activity    Alcohol use: Yes     Comment: Seldom    Drug use: No    Sexual activity: Not on file   Other Topics Concern    Not on file   Social History Narrative    Five children, good support     Social Determinants of Health     Financial Resource Strain:     Difficulty of Paying Living Expenses: Not on file   Food Insecurity:     Worried About [address] in the Last Year: Not on file    [address] in the Last Year: Not on file   Transportation Needs:     Lack of Transportation (Medical): Not on file    Lack of Transportation (Non-Medical):  Not on file   Physical Activity:     Days of Exercise per Week: Not on file    Minutes of Exercise per Session: Not on file   Stress:     Feeling of Stress : Not on file   Social Connections:     Frequency of Communication with Friends and Family: Not on file    Frequency of Social Gatherings with Friends and Family: Not on file    Attends [employer] Services: Not on file    Active Member of Clubs or Organizations: Not on file    Attends Club or Organization Meetings: Not on file    Marital Status: Not on file   Intimate Partner Violence:     Fear of Current or Ex-Partner: Not on file    Emotionally Abused: Not on file    Physically Abused: Not on file    Sexually Abused: Not on file   Housing Stability:     Unable to Pay for Housing in the Last Year: Not on file    Number of Places Lived in the Last Year: Not on file    Unstable Housing in the Last Year: Not on file        IMPRESSIONS:   1. Cognitively, the patient is doing quite well  2. The patient's emotional state is anxious, nondepressed. Much anxiety about medical trajectory, understandably. I would guess there is a premorbid tendency to some anxiety  3. Diagnosis: Adjustment disorder with anxiety  4. Factors that may impede progress are multiple medical complexities, tolerating uncertainty  5. Patient strengths and resources include good support, good cognition, good judgment    RECOMMENDATIONS:   1. I will follow patient via Rehab Team, and individually as needed during the hospital stay. 2. Medication recommendations: none at present. 3. We talked about a strategy for thinking through the decision about the trach, including her doctor Tracey Harada in [place]. I updated team members on this situation    Time spent in evaluating patient, including face to face time with patient, review of records, consultation with staff, and documentation:  45 minutes        Thank you for the opportunity to participate in the care of this patient.      Bakari White, Ph. D.

## 2022-02-22 NOTE — PLAN OF CARE
Problem: Falls - Risk of:  Goal: Will remain free from falls  Description: Will remain free from falls  2/22/2022 0259 by Prerna Henry, LPN  Note: No falls sustained at this time. Patient alert to call light and uses appropriately to alert staff to needs. Bed/chair alarms in use. Problem: Pain:  Goal: Pain level will decrease  Description: Pain level will decrease  2/22/2022 0259 by Prerna Henry, YOSEFN  Outcome: Ongoing  Note: Patient denies pain this shift. Problem: Skin Integrity:  Goal: Absence of new skin breakdown  Description: Absence of new skin breakdown  2/22/2022 0259 by Prerna Henry, YOSEFN  Outcome: Ongoing  Note: No new skin issues noted this shift. Problem: Activity:  Goal: Sleeping patterns will improve  Description: Sleeping patterns will improve  Note: Patient given melatonin and trazodone at hs. Patient resting quietly in bed eyes closed at this time. Care plan reviewed with patient. Patient verbalize understanding of the plan of care and contribute to goal setting.

## 2022-02-22 NOTE — PROGRESS NOTES
[address] [address]  INPATIENT PHYSICAL THERAPY  Daily Note  [facility]    Time In: 1400  Time Out: 1430  Timed Code Treatment Minutes: 30 Minutes  Minutes: 30          Date: 2022  Patient Name: Sherman Heard,  Gender:  female        MRN: [id number]  : 1945  (64 y.o.)     Referring Practitioner: Lynette Haile MD  Diagnosis: Acute Respiratory Failure  Additional Pertinent Hx: Sherman Heard  is a 64 y.o. female admitted to the inpatient rehabilitation unit on 2/15/2022. The patient was originally admitted to the [facility] 2022 to the gynecology oncology service secondary to postmenopausal bleeding. She started with the bleeding in early 2021 and was followed by Dr. Joycelyn Hamlin here at [facility]. Ms. Basil Albarado presented to [facility] (/[facility])  for initial consultation for Post-menopausal Bleeding and failed Endometrial Biopsy. PMB started in 2021. Had pelvic US that showed EMS 9mm. EMB was attempted twice in the office, no endometrial cells were identified. She was therefore referred to the [facility] 22 for additional management. She had no pelvic pain. Only intermittent spotting, no heavy bleeding. Had history of Lichen sclerosis, was prescribed estrogen cream and steroid cream. Patient underwent abdominal hysterectomy on 2022, BSO via P. Pfannenstiel incision. Final pathology benign. Her hospitalization was complicated by respiratory failure (now on 3L supplemental O2 per trach mask,) and has PEG for pharyngeal dysphagia.   Also has a PMH positive for GERD, OA,  Paralysis of vocal cords, Polio (bulbar), Dysphagia, Inhalation and ingestion of other object causing obstruction of respiratory tract or suffocation, Osteoporosis, Hoarse voice, Hypernasal speech, Diaphragm paralysis, and Left adhesive capsulitis     Prior Level of Function:  Lives With: Spouse  Type of Home: House  Home Layout: Two level  Home Access: Stairs to enter without rails  Entrance Stairs - Number of Steps: ~4 steps at the front of the house without railings and 2 steps in the garage. Patient reports she has a few steps in the back of the house with railings. Patient reports typically she enters house from garage  Home Equipment:  (Patient reports she does not have an equipment at home)   Bathroom Shower/Tub: Walk-in shower (Patient reports she has a built in shower chair)  Bathroom Toilet: Standard  Bathroom Equipment: Grab bars in shower,Built-in shower seat    ADL Assistance: Independent  Homemaking Assistance: Independent  Homemaking Responsibilities: Yes  Ambulation Assistance: Independent  Transfer Assistance: Independent  Active : Yes  Additional Comments: Patient reports she was independent PTA and did not utilize an AD for mobility. Patient reports her daugther and son in law live next door and also would be able to help out as needed. Restrictions/Precautions:  Restrictions/Precautions: General Precautions,Fall Risk  Position Activity Restriction  Other position/activity restrictions: PEG, trach collar/mask, No lifting >10lbs     SUBJECTIVE: Pt. Seated on BS chair and pleasantly agrees to therapy session. Spouse present during session, family education provided this session.     PAIN: None indicated    Vitals: Oxygen: WNLs on 4L via trach mask  Heart Rate: WNLs    OBJECTIVE:  Bed Mobility:  Not Tested    Transfers:  Sit to Stand: Stand By Assistance  Stand to Sit:Stand By Assistance    Ambulation:  Contact Guard Assistance, provided by spouse  Distance: 150' x 1  Surface: Level Tile  Device:Rolling Walker  Gait Deviations:  Decreased Step Length Bilaterally, Decreased Trunk Rotation, Decreased Gait Speed and Decreased Terminal Knee Extension    Balance:  None    Exercise:  Patient was guided in 1 set(s) 10 reps of exercises: Standing heel/toe raises, Standing marches, Standing hip abduction/adduction, Standing hip extension, Standing hamstring curls, Mini squats. Exercises were completed for increased independence with functional mobility. Functional Outcome Measures: Not completed       ASSESSMENT:  Assessment: Patient progressing toward established goals. Activity Tolerance:  Patient tolerance of  treatment: good. Equipment Recommendations:Equipment Needed: Yes (Continue to assess pending progress (may require RW))  Discharge Recommendations: Continue to assess pending progress,Patient would benefit from continued therapy after discharge     Plan: Times per week: 5x/wk 90min; 1x/wk 30 min  Times per day: Daily  Current Treatment Recommendations: Strengthening,Neuromuscular Re-education,Home Exercise Program,ROM,Safety Education & Leeta Clan Training,Patient/Caregiver Education & Training,Functional Mobility Training,Wheelchair Mobility Training,Transfer Training,Gait Training,Stair training    Patient Education  Patient Education: Plan of Care, Family Education, Transfers, Gait, Verbal Exercise Instruction    Goals:  Patient goals : To return to home  Short term goals  Time Frame for Short term goals: 1 week  Short term goal 1: Patient to perform supine<>sit with SBA without railings in order to assist with getting into and out of bed. Short term goal 2: Patient to perform sit to stand transfers wtih SBA with <=1 cue for hand placement in order to assist with safety with transfers from various surfaces. Short term goal 3: Patient to ambulate at least 75 feet with RW with SBA in order to assist with home mobility. Short term goal 4: Patient to ascend/descend 1 step with B handrails with CGA in order to assist with getting into and out of home. Short term goal 5: Patient to score at least 19/28 on the Tinetti in order to reduce risk for falls.   Long term goals  Time Frame for Long term goals : 3 weeks  Long term goal 1: Patient to perform supine<>sit with Mod I without railings in order to assist with getting into and out of bed. Long term goal 2: Patient to perform sit to stand transfers with Mod I in order to assist with safety with transfers from various surfaces. Long term goal 3: Patient to ambulate at least 150 feet with RW with Mod I in order to assist with home mobility. Long term goal 4: Patient to ascend/descend 4 steps with 1 handrail with Supervision in order to assist with getting into and out of home. Long term goal 5: Patient to perform car transfer with Supervision in order to assist with getting to and from appointments. Following session, patient left in safe position with all fall risk precautions in place.

## 2022-02-22 NOTE — PLAN OF CARE
Problem: DISCHARGE BARRIERS  Goal: Patient's continuum of care needs are met  Note: JORGE LUIS received call from Michael Jean at [facility]. Infusion company is unable to meet patient's needs at this time, however, recommending referral to [facility]. CSI initiated referral with [place]. JORGE LUIS then received call from Jessica at [facility] regarding patient's out of pocket expense (less than $10 a month) and plan to contact the spouse, Lincoln Villavicencio, to inform of cost before proceeding with ordering tube feedings. JORGE LUIS updated Jessica regarding anticipated discharge of Monday, 02/28/2022, and faxed additional supporting documentation regarding necessity of tube feedings. Await additional follow up from Jessica at [facility]. JORGE LUIS to follow and maintain involvement in discharge planning.

## 2022-02-22 NOTE — CARE COORDINATION
Balbina Burroughs was evaluated today and a DME order was entered for a wheeled walker because she requires this to successfully complete daily living tasks of toileting, personal cares, ambulating and meal preparation. A wheeled walker is necessary due to the patient's unsteady gait, upper body weakness, and inability to  an ambulation device; and she can ambulate only by pushing a walker instead of a lesser assistive device such as a cane, crutch, or standard walker. The need for this equipment was discussed with the patient and she understands and is in agreement.       Romayne Divine, SPT

## 2022-02-22 NOTE — PROGRESS NOTES
[address] [address]  Diagnosis List for Inpatient Rehab facility (IRF) - Patient Assessment Instrument ([id number])    Patient Name: Annalise Watson        MRN: [id number]    : 1945  (64 y.o.)  Gender: female     Primary impairment requiring rehabilitation: 12 Debility     Etiologic Diagnosis that led to the condition: Hypercarbic respiratory failure    Comorbid conditions affecting rehabilitation:  Status-post total abdominal hysterectomy, bilateral salpingo-oophorectomy on 22 at [facility] secondary to postmenopausal bleeding via pfannenstiel incision. Final pathology benign. Hypercarbic respiratory failure, now on supplemental O2 per trach mask    *Tracheostomy status  History of diaphragmatic and vocal cord paralysis (baseline right vocal cord and diaphragm paralysis with inability to cough, rare nasal regurgitation of liquids believed to be secondary to bulbar polio  *Pharyngeal dysphagia  Status post PEG placement 2022   Zenker's diverticulum noted on a modified barium swallow study of 2022; GI consult deferred while at [facility]. Lactose intolerant  Acute blood loss anemia; transfused 1 unit of packed red blood cells on 2/10/2022.  1 lower respiratory culture positive for Serratia and the patient is status post vancomycin  through , and cefepime  through . Negative lower extremity Dopplers on 22.   Gastroesophageal reflux disease  Osteoarthritis  *Paralysis of vocal cords  Bulbar polio  Dysphagia, moderate to severe oropharyngeal  Inhalation it and ingestion of other object causing obstruction of respiratory tract or suffocation  Osteoporosis  Hoarse voice  Hypernasal speech  Diaphragmatic paralysis  Left adhesive capsulitis  Adjustment disorder with anxiety            Keven Merino M.D.

## 2022-02-22 NOTE — PROGRESS NOTES
[address]  Occupational Therapy  Daily Note  Time:   Time In: 1430  Time Out: 1500  Timed Code Treatment Minutes: 30 Minutes  Minutes: 30          Date: 2022  Patient Name: Cindy Pérez,   Gender: female      Room: [facility]66/066-A  MRN: [id number]  : 1945  (64 y.o.)  Referring Practitioner: Dr Prieto Chun  Diagnosis: Acute Respiratory Failure  Additional Pertinent Hx: Cindy Pérez  is a 64 y.o. female admitted to the inpatient rehabilitation unit on 2/15/2022. The patient was originally admitted to the [facility] 2022 to the gynecology oncology service secondary to postmenopausal bleeding. She started with the bleeding in early 2021 and was followed by Dr. Yolanda Magallanes here at [facility]. Ms. Kirsty Stearns presented to [facility] (/[facility])  for initial consultation for Post-menopausal Bleeding and failed Endometrial Biopsy. PMB started in 2021. Had pelvic US that showed EMS 9mm. EMB was attempted twice in the office, no endometrial cells were identified. She was therefore referred to the [facility] at [facility] 22 for additional management. She had no pelvic pain. Only intermittent spotting, no heavy bleeding. Had history of Lichen sclerosis, was prescribed estrogen cream and steroid cream. Patient underwent abdominal hysterectomy on 2022, BSO via P. Pfannenstiel incision. Final pathology benign. Her hospitalization was complicated by respiratory failure (now on 3L supplemental O2 per trach mask,) and has PEG for pharyngeal dysphagia. Also has a PMH positive for GERD, OA,  Paralysis of vocal cords, Polio (bulbar), Dysphagia, Inhalation and ingestion of other object causing obstruction of respiratory tract or suffocation, Osteoporosis, Hoarse voice, Hypernasal speech, Diaphragm paralysis, and Left adhesive capsulitis.     Restrictions/Precautions:  Restrictions/Precautions: General Precautions,Fall Risk  Position Activity Restriction  Other position/activity restrictions: PEG, trach collar/mask, No lifting >10lbs     SUBJECTIVE: Pt pleasant and cooperative. Spouse present and managed 02 line and gait belt during session. PAIN: None stated    Vitals: Oxygen: Monitored throughout. Pt on 4L O2 via trach mask. Sats ranging between 94-97%  Educated spouse on how to switch patient oxygen from 02 tank to in room. Pt's spouse Jennie Raphael demonstrated understanding and switched patient over to room 02. COGNITION: WFL    BALANCE:  Sitting Balance:  Modified Independent. Standing Balance: Stand By Assistance. With and without use of RW    TRANSFERS:  Sit to Stand:  Stand By Assistance. From various surfaces  Stand to Sit: Stand By Assistance. From various surfaces    FUNCTIONAL MOBILITY:  Assistive Device: Walker  Assist Level:  Stand By Assistance and [employer]. Distance: from gym to apartment     ADDITIONAL ACTIVITIES:  Patient completed IADL homemaking task this date with spouse providing assistance - task was graded to challenge mgt of 02 line and dynamic balance. Patient was able to retrieve all items from fridge, cupboards and drawer with spouse provided CGA and education on how to manage 02 line with a concentrator. Pt and spouse demonstrating understanding with occasional min vcs needed. Patient required CGA by spouse when not using a RW throughout task with a standing endurance of 8 minutes. EXERCISES:  Pt instructed on and completed 10 reps of table top slides of sh flexion, horiz abduction and CW circles, completing with RUE then LUE. While seated. Tolerated well. ASSESSMENT:  Activity Tolerance:  Patient tolerance of  treatment: good. Discharge Recommendations: Home with Home Health OT and Home with Outpatient OT  Equipment Recommendations:  Other: Pt has a built in shower seat  Plan: Times per week: 5x/wk for 90 min and 1x/wk for 30 min  Times per day: Daily  Current Treatment Recommendations: Strengthening,Balance Training,Endurance Training,Functional Mobility Training,Equipment Evaluation, Education, & procurement,Patient/Caregiver Education & Training,Self-Care / ADL,Safety Education & Training    Patient Education  Patient Education: Role of OT, Plan of Care, ADL's, IADL's and Family Education    Goals  Short term goals  Time Frame for Short term goals: 1 week  Short term goal 1: Pt will complete UB dressing tasks with min A to improve indep with donning her bra at home. Short term goal 2: Pt will complete LB dressing tasks with mod A to improve indep with donning socks and shoes at home. Short term goal 3: Pt will tolerate 4 minute standing tasks with SBA during shayy release tasks to improve indep with sinkside grooming. Short term goal 4: Pt will ambulate to and from bathroom with SBA and RW prn to improve activity tolerance needed for bathing. Short term goal 5: Pt will tolerate dynamic standing IADL tasks with SBA to meet patient goal of baking cookies with grandchildren. Long term goals  Time Frame for Long term goals : 2-3 weeks  Long term goal 1: Pt will complete bathing and shower transfer with mod I to improve indep with showering at home. Long term goal 2: Pt will complete dressing and grooming tasks with mod I to improve indep with self care at home. Long term goal 3: Pt will complete a simple meal prep tasks with mod I and 0 vcs for ECT to improve indep with preparing lunch at home. Following session, patient left in safe position with all fall risk precautions in place.

## 2022-02-22 NOTE — CONSULTS
Department of Otolaryngology  Consult Note    Reason for Consult:  Trach management, consider decannulation  Requesting Physician:  Dr Katelynn Robertson:  Tracheostomy     History Obtained From:  patient, spouse, electronic medical record    HISTORY OF PRESENT ILLNESS:                The patient is a 64 y.o. female with past medical history of poliomyelitis, respiratory failure, vocal cord paralysis, who presented to [facility] on 2/15/22 as a transfer from [facility] for inpatient rehab. The patient was admitted to [facility] in January for JONI-BSO secondary to post-menopausal bleeding (pathology reportedly negative). The patient became hypercapnic at the end of the procedure and required reintubation in the PACU. She was intubated on 3 separate occasions during the admission due to attempts to extubate and quickly needing re-intubated. Per Care Everywhere, patient was last intubated on 12/28/21. The patient had a tracheostomy placed on 2/1/22. The patient has been following with ENT at [facility] for many years due to right vocal cord paralysis which is believed to be secondary to polio. 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 Patient had a PEG placed due to concern for aspiration. Patient last seen in office by Dr Katerina [facility]) on 9/13/21. His office note states \"true vocal cord on the right is immobile and at a lower level. The left has restricted motion (25% of normal motion). She can separate the cord to the commissure. There is a small notch in the posterior third. \" ENT consulted to evaluation of vocal cord paralysis and trach management    Past Medical History:        Diagnosis Date    GERD (gastroesophageal reflux disease)     Osteoarthritis     Paralysis of vocal cords     Polio        Past Surgical History:        Procedure Laterality Date    CHOLECYSTECTOMY      KNEE SURGERY      Right       Current Medications:   Current Facility-Administered Medications: cycloSPORINE (RESTASIS) 0.05 % ophthalmic emulsion 2 drop, 2 drop, Both Eyes, Daily  pantoprazole (PROTONIX) tablet 40 mg, 40 mg, Oral, BID AC  sertraline (ZOLOFT) tablet 50 mg, 50 mg, Oral, Daily  melatonin tablet 6 mg, 6 mg, Oral, Nightly  magnesium hydroxide (MILK OF MAGNESIA) 400 MG/5ML suspension 30 mL, 30 mL, Per G Tube, Daily PRN  aluminum & magnesium hydroxide-simethicone (MAALOX) 200-200-20 MG/5ML suspension 30 mL, 30 mL, Oral, Q6H PRN  calcium carbonate (TUMS) chewable tablet 500 mg, 500 mg, Oral, TID PRN  magnesium oxide (MAG-OX) tablet 400 mg, 400 mg, Oral, Daily  oxyCODONE (ROXICODONE) immediate release tablet 5 mg, 5 mg, Oral, Q4H PRN  polyethylene glycol (GLYCOLAX) packet 17 g, 17 g, Oral, Daily PRN  traZODone (DESYREL) tablet 50 mg, 50 mg, Oral, Nightly  zinc oxide (PINXAV) 30 % ointment, , Topical, 4x Daily PRN  acetaminophen (TYLENOL) tablet 650 mg, 650 mg, PEG Tube, Q6H PRN  ipratropium (ATROVENT) 0.06 % nasal spray 2 spray, 2 spray, Each Nostril, TID PRN  multivitamin 1 tablet, 1 tablet, Oral, Daily  senna (SENOKOT) tablet 8.6 mg, 1 tablet, Oral, Daily PRN  meloxicam (MOBIC) tablet 15 mg, 15 mg, Oral, Daily  polyvinyl alcohol (LIQUIFILM TEARS) 1.4 % ophthalmic solution 1 drop, 1 drop, Both Eyes, BID  ipratropium-albuterol (DUONEB) nebulizer solution 1 ampule, 1 ampule, Inhalation, Q4H PRN    Allergies: Allergies   Allergen Reactions    Latex         Social History:    TOBACCO:   reports that she has never smoked. She has never used smokeless tobacco.  ETOH:   reports current alcohol use. DRUGS:   reports no history of drug use. Family History:   History reviewed. No pertinent family history. REVIEW OF SYSTEMS:    ROS completed and negative unless stated in HPI.      PHYSICAL EXAM:    VITALS:  BP (!) 109/56   Pulse 75   Temp 99 °F (37.2 °C) (Oral)   Resp 16   Ht 5' (1.524 m)   Wt 128 lb 11.2 oz (58.4 kg)   SpO2 95%   BMI 25.13 kg/m²     This is a 64 y.o. female. Patient is alert and oriented to person, place and time. Patient appears well developed, well nourished. Flat mood and affect. Not obviously hearing impaired. Voice is weak and mildly hoarse. She has PMV on tracheostomy without evidence of distress. Trach mask in place with FiO2 of 28%    Head:   Normocephalic, atraumatic. No obvious masses or lesions noted. Nose:    External nose: Appears midline. No obvious deformity or masses. Septum:  normal. No septal hematoma. No perforation. Mucosa:  clear  Turbinates: normal and pink            Discharge:  clear    Mouth/Throat:  Lips, tongue and oral cavity: Normal. No masses or lesions noted   Dentition: good, no malocclusion  Oral mucosa: moist  Tonsils: absent  Oropharynx: normal-appearing mucosa  Hard and soft palates: symmetrical and intact. Salivary glands: not enlarged and no tenderness to palpation. Uvula: midline, no obvious lesions   Gag reflex is present    Neck: Trachea midline. Tracheostomy in place with PMV, which she appears to be tolerating well. No palpable neck edema or crepitus is noted  Lymphatic: No cervical lymphadenopathy noted. Eyes: CHAS, EOM intact. Conjunctiva moist without discharge. Lungs: Diminished breath sounds more so on left. No stridor or wheezing noted  Neuro: Cranial nerves II-XII grossly intact. Extremities: No clubbing, edema, or cyanosis noted.     DATA:    CBC with Differential:    Lab Results   Component Value Date    WBC 6.5 02/16/2022    RBC 2.88 02/16/2022    HGB 9.2 02/21/2022    HCT 30.7 02/21/2022     02/16/2022    .4 02/16/2022    MCH 30.6 02/16/2022    MCHC 30.1 02/16/2022    NRBC 0 02/16/2022    SEGSPCT 71.0 02/16/2022    MONOPCT 10.7 02/16/2022    MONOSABS 0.7 02/16/2022    LYMPHSABS 1.0 02/16/2022    EOSABS 0.1 02/16/2022    BASOSABS 0.0 02/16/2022     BMP:    Lab Results   Component Value Date     02/16/2022    K 4.5 02/16/2022     02/16/2022    CO2 29 02/16/2022 BUN 18 02/16/2022    LABALBU 4.1 04/06/2021    CREATININE 0.4 02/16/2022    CALCIUM 9.1 02/16/2022    LABGLOM >90 02/16/2022    GLUCOSE 91 02/16/2022     Radiology Review:      CXR 2/21/22-  Findings:   Elevation the right hemidiaphragm with mild right basilar atelectasis. Blunting of both costophrenic angles. No pneumothorax. Tracheostomy tube in place.       Normal size heart. No pulmonary vascular congestion. No acute fracture.           Impression   1. Small bilateral pleural effusions. 2. Elevation of the right hemidiaphragm with mild right basilar    atelectasis. FL less than 1 Hour to check diaphragmatic motion 2/22/22-  FINDINGS: The right hemidiaphragm is elevated. Diaphragmatic motion is reduced on the right side compared to the left side. There is no paradoxical motion to suggest paralysis.         Impression       No evidence of diaphragmatic paralysis.      IMPRESSION/RECOMMENDATIONS:      Right vocal cord paralysis, (?) left vocal cord paresis, history of polio, s/p tracheostomy placement    -Trach care per protocol  -Continue PMV trials and wean oxygen per pulm recommendations  -Will see patient tomorrow and perform beside fiberoptic laryngoscopy to assess the vocal cords more closely  -Further recommendations to follow pending findings of scope    Electronically signed by LISA Goddard on 2/22/2022 at 4:21 PM

## 2022-02-22 NOTE — PROGRESS NOTES
Patient: Katy Do  Unit/Bed: 7E-66/066-A  YOB: 1945  MRN: [id number] Acct: [de-identified]   Admitting Diagnosis: Acute respiratory failure ([address] [J96.00]  Admit Date:  2/15/2022  Hospital Day: 7    Assessment:     Active Problems:    Acute respiratory failure ([address]    Personal history of poliomyelitis  Resolved Problems:    * No resolved hospital problems. *      Plan:     Continue to follow        Subjective:     Patient has no complaint of CP or SOB. .   Medication side effects: none    Scheduled Meds:   cycloSPORINE  2 drop Both Eyes Daily    pantoprazole  40 mg Oral BID AC    sertraline  50 mg Oral Daily    melatonin  6 mg Oral Nightly    magnesium oxide  400 mg Oral Daily    traZODone  50 mg Oral Nightly    multivitamin  1 tablet Oral Daily    meloxicam  15 mg Oral Daily    polyvinyl alcohol  1 drop Both Eyes BID     Continuous Infusions:  PRN Meds:magnesium hydroxide, aluminum & magnesium hydroxide-simethicone, calcium carbonate, oxyCODONE, polyethylene glycol, zinc oxide, acetaminophen, ipratropium, senna, ipratropium-albuterol    Review of Systems  Pertinent items are noted in HPI. Objective:     Patient Vitals for the past 8 hrs:   BP Temp Temp src Pulse Resp SpO2   02/22/22 0836 (!) 109/56 99 °F (37.2 °C) Oral 76 22 95 %     I/O last 3 completed shifts:   In: 1885 [P.O.:175; NG/GT:1710]  Out: 1 [Urine:1]  I/O this shift:  In: 120 [NG/GT:120]  Out: -     BP (!) 109/56   Pulse 76   Temp 99 °F (37.2 °C) (Oral)   Resp 22   Ht 5' (1.524 m)   Wt 128 lb 11.2 oz (58.4 kg)   SpO2 95%   BMI 25.13 kg/m²     General appearance: alert, appears stated age and cooperative  Head: Normocephalic, without obvious abnormality, atraumatic  Lungs: clear to auscultation bilaterally  Heart: regular rate and rhythm, S1, S2 normal, no murmur, click, rub or gallop  Abdomen: soft, non-tender; bowel sounds normal; no masses,  no organomegaly  Extremities: edema 1+ bilaterally  Skin: Skin color, texture, turgor normal. No rashes or lesions  Neurologic: weak    Electronically signed by Melina Mills MD on 2/22/2022 at 10:00 AM

## 2022-02-22 NOTE — PROGRESS NOTES
[address]  Occupational Therapy  Daily Note  Time:   Time In: 1140  Time Out: 1240  Timed Code Treatment Minutes: 60 Minutes  Minutes: 60          Date: 2022  Patient Name: Donnell Hernandes,   Gender: female      Room: [facility]66/066-A  MRN: [id number]  : 1945  (64 y.o.)  Referring Practitioner: Dr Sathya Coronel  Diagnosis: Acute Respiratory Failure  Additional Pertinent Hx: Donnell Hernandes  is a 64 y.o. female admitted to the inpatient rehabilitation unit on 2/15/2022. The patient was originally admitted to the [facility] 2022 to the gynecology oncology service secondary to postmenopausal bleeding. She started with the bleeding in early 2021 and was followed by Dr. Anthony Stephenson here at [facility]. [facility]  for initial consultation for Post-menopausal Bleeding and failed Endometrial Biopsy. PMB started in 2021. Had pelvic US that showed EMS 9mm. EMB was attempted twice in the office, no endometrial cells were identified. She was therefore referred to the [facility] at [facility] 22 for additional management. She had no pelvic pain. Only intermittent spotting, no heavy bleeding. Had history of Lichen sclerosis, was prescribed estrogen cream and steroid cream. Patient underwent abdominal hysterectomy on 2022, BSO via P. Pfannenstiel incision. Final pathology benign. Her hospitalization was complicated by respiratory failure (now on 3L supplemental O2 per trach mask,) and has PEG for pharyngeal dysphagia. Also has a PMH positive for GERD, OA,  Paralysis of vocal cords, Polio (bulbar), Dysphagia, Inhalation and ingestion of other object causing obstruction of respiratory tract or suffocation, Osteoporosis, Hoarse voice, Hypernasal speech, Diaphragm paralysis, and Left adhesive capsulitis.     Restrictions/Precautions:  Restrictions/Precautions: General Precautions,Fall Risk  Position Activity Restriction  Other position/activity restrictions: PEG, trach collar/mask, No lifting >10lbs     SUBJECTIVE: Pt with beautician upon arrival. Pleasant and cooperative throughout.  present. PAIN: None stated    Vitals: Oxygen: Monitored throughout. Pt on 6 L O2. Sats ranging between 94-97%    COGNITION: WFL    ADL:   Upper Extremity Dressing: Stand By Assistance and Minimal Assistance. Seated in wheelchair, pt donning/doffing long sleeve shirt with SBA, requiring min A for pulling down donned shirt in the back. Pt also donning button down shirt, requiring min A to thread 2nd arm into hole  Toileting: Stand By Assistance. Toilet Transfer: Stand By Assistance. Use of grab bars. Demo'd 2x with use of grab bars. Pt challenged to complete transfer without use of grab bars, pt unable to achieve full stand from toilet withour use of grab bars. Education provided to pt and  for importance of grab bars within bathroom for increased independence. Pt and  provided with example to purchase for their home. BALANCE:  Sitting Balance:  Modified Independent. Standing Balance: Stand By Assistance. With and without use of RW    BED MOBILITY:  Not Tested    TRANSFERS:  Sit to Stand:  Stand By Assistance. From various surfaces  Stand to Sit: Stand By Assistance. From various surfaces    FUNCTIONAL MOBILITY:  Assistive Device: Walker  Assist Level:  Stand By Assistance and [address] Distance: With use of RW, pt ambulating throughout [employer] in [facility]. Without use of RW, pt ambulating throughout her room, to bathroom and back x2. CGA provided throughout for safety, progressing to SBA throughout activity. ADDITIONAL ACTIVITIES:  Pt challenged with gift shop navigation activity to target endurance, balance, and safety awareness. Pt tolerating functional mobility with RW for 11 minutes with CGA/SBA.  Throughout task, pt demonstrating good dynamic balance with CGA and SBA, reaching outside base of support at various heights. O2 monitored throughout, remaining in 94-95% range. Lengthy discussion about home set up, home safety, and concerns about discharge initiated with pt and . Home evaluation worksheet provided to pt and . ASSESSMENT:     Activity Tolerance:  Patient tolerance of  treatment: good. Discharge Recommendations: Home with Home Health OT and Home with Outpatient OT  Equipment Recommendations: Other: Pt has a built in shower seat  Plan: Times per week: 5x/wk for 90 min and 1x/wk for 30 min  Times per day: Daily  Current Treatment Recommendations: Strengthening,Balance Training,Endurance Training,Functional Mobility Training,Equipment Evaluation, Education, & procurement,Patient/Caregiver Education & Training,Self-Care / ADL,Safety Education & Training    Patient Education  Patient Education: Role of OT, Plan of Care, ADL's, IADL's and Family Education    Goals  Short term goals  Time Frame for Short term goals: 1 week  Short term goal 1: Pt will complete UB dressing tasks with min A to improve indep with donning her bra at home. Short term goal 2: Pt will complete LB dressing tasks with mod A to improve indep with donning socks and shoes at home. Short term goal 3: Pt will tolerate 4 minute standing tasks with SBA during shayy release tasks to improve indep with sinkside grooming. Short term goal 4: Pt will ambulate to and from bathroom with SBA and RW prn to improve activity tolerance needed for bathing. Short term goal 5: Pt will tolerate dynamic standing IADL tasks with SBA to meet patient goal of baking cookies with grandchildren. Long term goals  Time Frame for Long term goals : 2-3 weeks  Long term goal 1: Pt will complete bathing and shower transfer with mod I to improve indep with showering at home.   Long term goal 2: Pt will complete dressing and grooming tasks with mod I to improve indep with self care at home. Long term goal 3: Pt will complete a simple meal prep tasks with mod I and 0 vcs for ECT to improve indep with preparing lunch at home. Following session, patient left in safe position with all fall risk precautions in place.

## 2022-02-22 NOTE — PROGRESS NOTES
Physical Medicine & Rehabilitation   Progress Note      Bessy Amor, IMELDA - CNP     Chief Complaint:    Respiratory Failure with subsequent Debility; s/p JONI with BSO    Subjective: Patient and  seen up in the room. Pulmonary consulted yesterday, recommend continuing trach at this time and consult to ENT due to vocal cord paralysis and trach management. Patient and  with questions about ongoing trach and management. Discussed await ENT recommendations and also, encouraged patient that she follows with ENT OP and if trach is not removed prior to discharge, she has a trusted specialist already for ongoing monitoring. Patient verbalizes understanding. Planning discharge from the IPR Unit to home is 2/28/22 with Home Health or Outpatient services including PT, OT, and ST (and HHA if Home Health). Rehabilitation:  PT:    SUBJECTIVE: Patient in recliner with respiratory therapy present upon entry. Patient pleasant and agreeable to participate in therapy. RT suggested keeping the patient on 4L O2 (28% FiO2) during activity and exertion.  present for portion of session, with a discussion on the  getting training and education within the next couple of days to help patient transfer and walk within her room. Discussion with patient on ordering a RW for use in the community as well as looking into something to help elevate her head at home, as she doesn't have an adjustable bed and sleeps with her head elevated to help with her cough. Patient and  agreeable to ordering a RW.    OBJECTIVE:  Transfers:  Sit to Stand: Stand By Assistance, with increased time for completion  Stand to Sit:Stand By Assistance, with increased time for completion  To/From Bed and Chair: Stand By Assistance, with increased time for completion  -Patient completed transfers from various surfaces including recliner, wheelchair, couch, and recliner with stand by assistance throughout for assistance with oxygen line management. Patient able to complete with proper hand placement and safety awareness. Ambulation:  Stand By Assistance, [employer], with verbal cues , with increased time for completion  Distance: 70', 60', 20', 30', various distances throughout apartment and easy street  Surface: Level Tile, Uneven Surface, Carpet and Ramp  Device:No Device  Gait Deviations:  Decreased Step Length Bilaterally, Decreased Weight Shift Bilaterally, Decreased Gait Speed, Decreased Heel Strike Bilaterally and Unsteady Gait  -Patient ambulated on various surfaces with stand by assistance and occasional contact guard assistance due to mild unsteadiness on the stone path and when changing surfaces. Patient completed ambulation without an AD and verbal cueing provided when there was a change in surface to ensure patient picked her feet up to clear. Patient ambulated various distances intermittently. -Patient instructed in stepping over hurdles in the parallel bars with stand by assistance and the use of bilateral UE support. Patient completed forward stepping twice and lateral stepping through hurdles once, with the sets completed intermittently. Patient progressed to performing task with 1 UE support. Verbal cueing provided to ensure adequate foot clearance as patient occasionally demonstrated inability to clear the osmani. Verbal cueing for patient to maintain correct positioning during lateral side stepping, with patient starting to turn body forward to compensate for decreased step height. Patient demonstrates good carryover, however had a mild episode of dizziness and recovered quickly with a seated rest break.   Balance:  Static Standing Balance: Stand By Assistance  Dynamic Standing Balance: Contact Guard Assistance, with verbal cues , with increased time for completion   -Patient instructed in cone tapping with 3 different colored cones placed in front of her with contact guard assistance provided and no use of UE support. Patient instructed on which LE and colored cone to tap with foot. Patient performed bilaterally, progressing from 1 to 2 cones per repetition. Patient required close CGA to minimal assistance when weight shifting onto R LE and lifting her L LE due to mild unsteadiness. Patient however had no LOB. Patient instructed in cone tapping to challenge dynamic standing balance and lateral weight shifting. OT:   BALANCE:  Sitting Balance:  Modified Independent. Standing Balance: Stand By Assistance. TRANSFERS:  Sit to Stand:  Stand By Assistance. Recliner, standard chair. Good hand placement . Stand to Sit: Stand By Assistance. FUNCTIONAL MOBILITY:  Assistive Device: None  Assist Level:  Stand By Assistance. Distance: To easy street with rest break post task, within easy street market, market > car before requiring sitting rest break  ADDITIONAL ACTIVITIES:   Patient completed IADL shopping task of reaching outside OTIS on various heights of grocery shelves to place into cart, pt completed task with SBA with an endurance x8 min. Pt then ambulated to place items in car with SBA and no LOB. Skilled education completed on lifting restrictions with pt demo good insight. Patient completed BUE scap mobilizations 10 reps 1 set in scap retraction/protraction, depression/elevation and fwd/bwd cricles to decrease muscle tightness and increase flexibility for ease of donning her bra     ST:   NMES (Vital Stim) Treatment Mini  Does the patient have an area of neoplasm or infection in the area of electrode placement? No  Electrode Placement:  3-h  Range of e-stim strength tolerated: 6.0  Duration of e-stim: 32 minutes   Was the patient provided with constant supervision during the use of NMES? Yes  Was the patient's skin checked upon removal of electrodes?   Yes  Were there concerns regarding the skin's integrity following electrode removal? No    Short-Term Goals:  INTERVENTIONS: Patient reports no difficulty with consumption of full thin liquids diet over weekend. Patient consumed ice chips and thin liquids throughout treatment session with evidence of adequate labial seal, suspected adequate bolus control/containment, suspected timely swallow initiation; x5 throat clear throughout (though certainly cannot ruleout pharyngeal phase deficits or invasive airway events at bedside alone). Reviewed plans for completion of flow testing tomorrow with soups per patient request. Will teach and educate patient and  on how to utilize flow test and then further determine if any soups can be thinned out enough to meet thin liquid diet level criteria (IDDSI level 0). INTERVENTIONS: Completed the following pharyngeal exercises in conjunction with skilled demonstration and rationale on proper technique.   Effortful swallow -- 2 sets of 10 with good success  TBR ('kick') -- 3 sets of 10 with good success and retraction strength  Caren -- 2 sets of 10 with great success and timeliness of swallow trigger  Mendelsohn -- 2 sets of 10 with good success  Vocal Fold Closure -- 2 sets of 3 with fair success; low pitch and hoarseness impacting ability  Super-supraglottic Swallow -- 2 sets of 5 with good success upon provision of verbal cues for completion in unison with ST   *utilized model of pharyngeal swallow anatomy to further educate patient and  on respiratory and swallowing systems   *reviewed ST HEP; patient verbalized understanding       Review of Systems:   CONSTITUTIONAL:  positive for  fatigue  EYES:  Wears glasses  HEENT:  + Pharyngeal Dysphagia  RESPIRATORY:  positive for dyspnea, wheezing and on trach currently with 28% of supplemental O2 per trach mask  CARDIOVASCULAR:  negative  GASTROINTESTINAL:  Dysphagia with PEG; nutrition per tube feedings and comfort sips of thin liquids  GENITOURINARY:  negative  SKIN:  Healing incision  HEMATOLOGIC/LYMPHATIC:  Recent anemia; s/p 1 unit PRBC's  MUSCULOSKELETAL:  positive for  myalgias, arthralgias, pain, stiff joints, decreased range of motion and muscle weakness  NEUROLOGICAL:  positive for speech problems, gait problems, dysphagia and weakness  BEHAVIOR/PSYCH:  positive for decreased energy level, fatigue and anxiety  10 point system review otherwise negative      Objective:  Vitals:    02/22/22 0836   BP: (!) 109/56   Pulse: 76   Resp: 22   Temp: 99 °F (37.2 °C)   SpO2: 95%   awake  Orientation:   person, place, time  Mood: within normal limits  Affect: anxious and calm  General appearance: well groomed and in no acute distress     Memory:   normal,   Attention/Concentration: normal  Language:  abnormal - hoarse voice; hypernasal; using her talking valve     Cranial Nerves:  cranial nerves II-XII are grossly intact  ROM:  abnormal - decreased left shoulder 2/2 adhesive capsulitis  Tone:  normal  Muscle bulk: normal  Sensory:  Sensory intact     Skin: warm and dry  Peripheral vascular: Pulses: Normal upper and lower extremity pulses; Edema: no       Diagnostics:   Recent Results (from the past 24 hour(s))   Blood Gas, Arterial    Collection Time: 02/21/22  6:38 PM   Result Value Ref Range    pH, Blood Gas 7.41 7.35 - 7.45    PCO2 53 (H) 35 - 45 mmhg    PO2 65 (L) 71 - 104 mmhg    HCO3 33 (H) 23 - 28 mmol/l    Base Excess (Calculated) 7.2 (H) -2.5 - 2.5 mmol/l    O2 Sat 92 %    IFIO2 28     DEVICE T Collar     Max Test Positive     Source: R Radial     COLLECTED BY: [id number]        FL LESS THAN 1 HOUR [[id number]]    Resulted: 02/22/22 0956    Updated: 02/22/22 0959    Narrative:     PROCEDURE: FL LESS THAN 1 HOUR     CLINICAL INFORMATION: Diaphragmatic elevation     TECHNIQUE: Diaphragmatic motion was evaluated under fluoroscopy during periods of normal and rapid breathing (\"sniff test\"). 4 images were obtained. Total fluoroscopy time 0.6 minutes. COMPARISON: None     FINDINGS: The right hemidiaphragm is elevated.  Diaphragmatic motion is oropharyngeal  · Inhalation it and ingestion of other object causing obstruction of respiratory tract or suffocation  · Osteoporosis  · Hoarse voice  · Hypernasal speech  · Diaphragmatic paralysis  · Left adhesive capsulitis      Plan:  · Continue therapies. PT, OT, SLP  · Prophylaxis:  DVT: EPC cuffs; no oral anticoagulation secondary to bleeding risk. · GI:  Protonix 40 mg po bid   · Pain: OxyIR 5 mg po q 4 hours prn; Tylenol 650 mg per PEG q 6 hours prn;   · Nutrition:   Dietary to evaluate tube feedings to possibly decrease bolus volumes and increase bolus frequency to maintain adequate caloric intake  · Bowel: Glycolax 17 g po/PEG daily prn  · Anxiety: Zoloft 50 mg po/PEG  q day for anxiety   · Possible red button trial this week with SLP  · Monitor O2 needs   · Rehab Team Conferences Thursdays  · ENT consult for vocal cord paralysis and trach management, NO diaphragmatic paralysis noted on imaging.  Await ENT plan and recommendations  · Planning discharge to home on Monday, 2/28/22        Jacinto Torrez, APRN - CNP

## 2022-02-22 NOTE — PROGRESS NOTES
[address] [place] [address]  Recreational Therapy  Daily Note  [address]    Time Spent with Patient: 30 minutes    Date:  2/22/2022       Patient Name: Donnell Hernandes      MRN: [id number]      YOB: 1945 (72 y.o.)       Gender: female  Diagnosis: Acute Respiratory Failure  Referring Practitioner: Dr Sathya Coronel    RESTRICTIONS/PRECAUTIONS:  Restrictions/Precautions: General Precautions,Fall Risk  Vision: Impaired  Hearing: Within functional limits    PAIN: 0-no c/o pain     SUBJECTIVE:  This felt wonderful     OBJECTIVE:  Pt enjoyed getting her hair washed, trimmed and styled by our beautician this am-states it has been since January that she had her hair washed-affect bright and social-pt knew beautician's family and talked about her family too-so appreciative       Patient Education  New Education Provided: Importance of Leisure,     Electronically signed by: SHERIE Forte  Date: 2/22/2022

## 2022-02-22 NOTE — PROGRESS NOTES
[facility]  INPATIENT PHYSICAL THERAPY  DAILY NOTE  [address] [facility],4[address] Floor - [id number]    Time In: 0700  Time Out: 0800  Timed Code Treatment Minutes: 60 Minutes  Minutes: 60          Date: 2022  Patient Name: Paulino Santana,  Gender:  female        MRN: [id number]  : 1945  (64 y.o.)     Referring Practitioner: Nery Reyes MD  Diagnosis: Acute Respiratory Failure  Additional Pertinent Hx: Paulino Santana  is a 64 y.o. female admitted to the inpatient rehabilitation unit on 2/15/2022. The patient was originally admitted to the [facility] 2022 to the gynecology oncology service secondary to postmenopausal bleeding. She started with the bleeding in early 2021 and was followed by Dr. Gonzalo Soriano here at [facility]. Ms. Santos Thurman presented to [facility] (/[facility])  for initial consultation for Post-menopausal Bleeding and failed Endometrial Biopsy. PMB started in 2021. Had pelvic US that showed EMS 9mm. EMB was attempted twice in the office, no endometrial cells were identified. She was therefore referred to the [facility] at [facility] 22 for additional management. She had no pelvic pain. Only intermittent spotting, no heavy bleeding. Had history of Lichen sclerosis, was prescribed estrogen cream and steroid cream. Patient underwent abdominal hysterectomy on 2022, BSO via P. Pfannenstiel incision. Final pathology benign. Her hospitalization was complicated by respiratory failure (now on 3L supplemental O2 per trach mask,) and has PEG for pharyngeal dysphagia.   Also has a PMH positive for GERD, OA,  Paralysis of vocal cords, Polio (bulbar), Dysphagia, Inhalation and ingestion of other object causing obstruction of respiratory tract or suffocation, Osteoporosis, Hoarse voice, Hypernasal speech, Diaphragm paralysis, and Left adhesive capsulitis     Prior Level of Function:  Lives With: Spouse  Type of Home: House  Home Layout: Two level  Home Access: Stairs to enter without rails  Entrance Stairs - Number of Steps: ~4 steps at the front of the house without railings and 2 steps in the garage. Patient reports she has a few steps in the back of the house with railings. Patient reports typically she enters house from garage  Home Equipment:  (Patient reports she does not have an equipment at home)   Bathroom Shower/Tub: Walk-in shower (Patient reports she has a built in shower chair)  Bathroom Toilet: Standard  Bathroom Equipment: Grab bars in shower,Built-in shower seat    ADL Assistance: Independent  Homemaking Assistance: Independent  Homemaking Responsibilities: Yes  Ambulation Assistance: Independent  Transfer Assistance: Independent  Active : Yes  Additional Comments: Patient reports she was independent PTA and did not utilize an AD for mobility. Patient reports her daugther and son in law live next door and also would be able to help out as needed. Restrictions/Precautions:  Restrictions/Precautions: General Precautions,Fall Risk  Position Activity Restriction  Other position/activity restrictions: PEG, trach collar/mask, No lifting >10lbs     SUBJECTIVE: Patient in recliner with respiratory therapy present upon entry. Patient pleasant and agreeable to participate in therapy. RT suggested keeping the patient on 4L O2 (28% FiO2) during activity and exertion.  present for portion of session, with a discussion on the  getting training and education within the next couple of days to help patient transfer and walk within her room. Discussion with patient on ordering a RW for use in the community as well as looking into something to help elevate her head at home, as she doesn't have an adjustable bed and sleeps with her head elevated to help with her cough. Patient and  agreeable to ordering a RW.      PAIN: 0/10    Vitals: Oxygen: Monitored throughout; maintained at ~91-95% during session    OBJECTIVE:  Bed Mobility:  Not Tested    Transfers:  Sit to Stand: Stand By Assistance, with increased time for completion  Stand to Sit:Stand By Assistance, with increased time for completion  To/From Bed and Chair: Stand By Assistance, with increased time for completion  -Patient completed transfers from various surfaces including recliner, wheelchair, couch, and recliner with stand by assistance throughout for assistance with oxygen line management. Patient able to complete with proper hand placement and safety awareness. Ambulation:  Stand By Assistance, [address], with verbal cues , with increased time for completion  Distance: 70', 60', 20', 30', various distances throughout apartment and easy street  Surface: Level Tile, Uneven Surface, Carpet and Ramp  Device:No Device  Gait Deviations:  Decreased Step Length Bilaterally, Decreased Weight Shift Bilaterally, Decreased Gait Speed, Decreased Heel Strike Bilaterally and Unsteady Gait  -Patient ambulated on various surfaces with stand by assistance and occasional contact guard assistance due to mild unsteadiness on the stone path and when changing surfaces. Patient completed ambulation without an AD and verbal cueing provided when there was a change in surface to ensure patient picked her feet up to clear. Patient ambulated various distances intermittently. -Patient instructed in stepping over hurdles in the parallel bars with stand by assistance and the use of bilateral UE support. Patient completed forward stepping twice and lateral stepping through hurdles once, with the sets completed intermittently. Patient progressed to performing task with 1 UE support. Verbal cueing provided to ensure adequate foot clearance as patient occasionally demonstrated inability to clear the osmani.  Verbal cueing for patient to maintain correct positioning during lateral side stepping, with patient starting to turn body forward to compensate for decreased step height. Patient demonstrates good carryover, however had a mild episode of dizziness and recovered quickly with a seated rest break. Balance:  Static Standing Balance: Stand By Assistance  Dynamic Standing Balance: Contact Guard Assistance, with verbal cues , with increased time for completion   -Patient instructed in cone tapping with 3 different colored cones placed in front of her with contact guard assistance provided and no use of UE support. Patient instructed on which LE and colored cone to tap with foot. Patient performed bilaterally, progressing from 1 to 2 cones per repetition. Patient required close CGA to minimal assistance when weight shifting onto R LE and lifting her L LE due to mild unsteadiness. Patient however had no LOB. Patient instructed in cone tapping to challenge dynamic standing balance and lateral weight shifting. Functional Outcome Measures: Not completed       ASSESSMENT:  Assessment: Patient progressing toward established goals. Activity Tolerance:  Patient tolerance of  treatment: good. Equipment Recommendations:Equipment Needed: Yes (Continue to assess pending progress (may require RW))  Discharge Recommendations: Continue to assess pending progress  Plan: Times per week: 5x/wk 90min; 1x/wk 30 min  Times per day: Daily  Current Treatment Recommendations: Strengthening,Neuromuscular Re-education,Home Exercise Program,ROM,Safety Education & Ami Freedman Training,Patient/Caregiver Education & Training,Functional Mobility Training,Wheelchair Mobility Training,Transfer Training,Gait Training,Stair training    Patient Education  Patient Education: Transfers, Gait, Home Safety Education, Activity Pacing,  - Patient Verbalized Understanding, - Patient Requires Continued Education    Goals:  Patient goals :  To return to home  Short term goals  Time Frame for Short term goals: 1 week  Short term goal 1: Patient to perform supine<>sit with SBA without railings in order to assist with getting into and out of bed. Short term goal 2: Patient to perform sit to stand transfers wtih SBA with <=1 cue for hand placement in order to assist with safety with transfers from various surfaces. Short term goal 3: Patient to ambulate at least 75 feet with RW with SBA in order to assist with home mobility. Short term goal 4: Patient to ascend/descend 1 step with B handrails with CGA in order to assist with getting into and out of home. Short term goal 5: Patient to score at least 19/28 on the [place] in order to reduce risk for falls. Long term goals  Time Frame for Long term goals : 3 weeks  Long term goal 1: Patient to perform supine<>sit with Mod I without railings in order to assist with getting into and out of bed. Long term goal 2: Patient to perform sit to stand transfers with Mod I in order to assist with safety with transfers from various surfaces. Long term goal 3: Patient to ambulate at least 150 feet with RW with Mod I in order to assist with home mobility. Long term goal 4: Patient to ascend/descend 4 steps with 1 handrail with Supervision in order to assist with getting into and out of home. Long term goal 5: Patient to perform car transfer with Supervision in order to assist with getting to and from appointments. Following session, patient left in safe position with all fall risk precautions in place.

## 2022-02-23 ENCOUNTER — ANESTHESIA EVENT (OUTPATIENT)
Dept: OPERATING ROOM | Age: 77
End: 2022-02-23

## 2022-02-23 ENCOUNTER — APPOINTMENT (OUTPATIENT)
Dept: CT IMAGING | Age: 77
DRG: 189 | End: 2022-02-23
Attending: PHYSICAL MEDICINE & REHABILITATION
Payer: MEDICARE

## 2022-02-23 PROCEDURE — 97530 THERAPEUTIC ACTIVITIES: CPT

## 2022-02-23 PROCEDURE — 6370000000 HC RX 637 (ALT 250 FOR IP): Performed by: PHYSICAL MEDICINE & REHABILITATION

## 2022-02-23 PROCEDURE — 99232 SBSQ HOSP IP/OBS MODERATE 35: CPT | Performed by: PHYSICIAN ASSISTANT

## 2022-02-23 PROCEDURE — 99232 SBSQ HOSP IP/OBS MODERATE 35: CPT | Performed by: PHYSICAL MEDICINE & REHABILITATION

## 2022-02-23 PROCEDURE — 97110 THERAPEUTIC EXERCISES: CPT

## 2022-02-23 PROCEDURE — 97535 SELF CARE MNGMENT TRAINING: CPT

## 2022-02-23 PROCEDURE — 1180000000 HC REHAB R&B

## 2022-02-23 PROCEDURE — 70490 CT SOFT TISSUE NECK W/O DYE: CPT

## 2022-02-23 PROCEDURE — 92526 ORAL FUNCTION THERAPY: CPT | Performed by: SPEECH-LANGUAGE PATHOLOGIST

## 2022-02-23 PROCEDURE — 94760 N-INVAS EAR/PLS OXIMETRY 1: CPT

## 2022-02-23 PROCEDURE — 6370000000 HC RX 637 (ALT 250 FOR IP): Performed by: FAMILY MEDICINE

## 2022-02-23 PROCEDURE — 97116 GAIT TRAINING THERAPY: CPT

## 2022-02-23 RX ORDER — FUROSEMIDE 20 MG/1
20 TABLET ORAL DAILY
Status: DISCONTINUED | OUTPATIENT
Start: 2022-02-23 | End: 2022-03-01 | Stop reason: HOSPADM

## 2022-02-23 RX ADMIN — TRAZODONE HYDROCHLORIDE 50 MG: 50 TABLET ORAL at 20:30

## 2022-02-23 RX ADMIN — MELOXICAM 15 MG: 7.5 TABLET ORAL at 09:45

## 2022-02-23 RX ADMIN — PANTOPRAZOLE SODIUM 40 MG: 40 TABLET, DELAYED RELEASE ORAL at 06:39

## 2022-02-23 RX ADMIN — PANTOPRAZOLE SODIUM 40 MG: 40 TABLET, DELAYED RELEASE ORAL at 19:00

## 2022-02-23 RX ADMIN — Medication 400 MG: at 09:45

## 2022-02-23 RX ADMIN — FUROSEMIDE 20 MG: 20 TABLET ORAL at 16:00

## 2022-02-23 RX ADMIN — Medication 6 MG: at 20:30

## 2022-02-23 RX ADMIN — CYCLOSPORINE 2 DROP: 0.5 EMULSION OPHTHALMIC at 10:21

## 2022-02-23 RX ADMIN — Medication 1 TABLET: at 09:45

## 2022-02-23 RX ADMIN — SERTRALINE 50 MG: 50 TABLET, FILM COATED ORAL at 10:01

## 2022-02-23 ASSESSMENT — PAIN SCALES - GENERAL
PAINLEVEL_OUTOF10: 0
PAINLEVEL_OUTOF10: 0

## 2022-02-23 NOTE — PROGRESS NOTES
EN/PN NUTRITION SUPPORT    RECOMMENDATIONS/PLAN:   · Continue current diet as ordered- Full liquid (per SLP, patient can only have thin liquids- no pureed or thickened liquids). · Continue TF regimen of TwoCal HN bolus of 130 ml six times daily. Fluid flush of 60 ml before and after each bolus feed or per physician recommendations.   § Suggested times of 7A, 11A, 2P, 5P, 8P, 11P  § May wish to have patient sit up for bolus feeds. · Continue Ensure Clear daily. CURRENT NUTRITION THERAPIES  ADULT ORAL NUTRITION SUPPLEMENT; Lunch; Clear Liquid Oral Supplement  ADULT TUBE FEEDING; PEG; 2.0 Calorie; Bolus; 6 TIMES DAILY; 130; Syringe Push; 60; Before and after each bolus  ADULT DIET; Full Liquid  Current Tube Feeding (TF) Orders:  · Feeding Route: PEG  · Formula: 2.0 Calorie  · Schedule: Bolus   · Water Flushes: 60 ml before and after each bolus feed or per physician recommendations. · Current TF & Flush Orders Provides: Tolerating current TF regimen well. TwoCal HN bolus of 130 ml six times daily with 60 ml fluid flush before and after each bolus. · Goal TF & Flush Orders Provides: TwoCal HN bolus of 130 ml six times daily with 60 ml fluid flush before and after each bolus feed. TF regimen providing 1560 kcal, 65 gm protein, 170 gm CHO, 3.9 gm fiber, 546 ml free fluid. Total fluid provided: 1266 ml (546 TF, 720 fluid flush). Total goal volume: 1500 ml (780 TF, 720 fluid flush). COMPARATIVE STANDARDS  Energy (kcal):  4338-7227 kcal/day (25-30 kcal/kg); Weight Used for Energy Requirements:  Current, 58.4 kg     Protein (g):  64-76 gm/day (1.1-1.3 gm/kg);  Weight Used for Protein Requirements:  Current, 58.4 kg          NUTRITIONAL SUMMARY/STATUS:   Pt. nutritionally compromised AEB hx of dysphagia and need for EN, trach mask, and need for full liquid diet.  At risk for further nutrition compromise r/t admitted for debility r/t respiratory failure, recent hysterectomy and underlying medical condition (GERD, osteoarthritis, paralysis of vocal cord, dysphagia, diaphragm paralysis).      Writer met with patient and patient's significant other this date. Patient reports tolerating current TF regimen well and has been liking the new times set for bolus feedings. Home TF education to be completed 2/25 prior to planned discharge 2/28. Consuming varied PO oral intakes. Patient reports she likes the blended chicken noodle soup with chicken broth she had for breakfast this morning. Per RN, patient's /patient have been doing hands on TF boluses while admitted to get practice for discharge. NUTRITION RELATED FINDINGS:   Treatments: Plan for bedside fiberoptic laryngoscopy to evaluate for vocal cords today. PT/OT/ST following. PMV trials. Per RN, O2 off at this time to see how she does. TF Status: Tolerating current regimen well. GI Status: Last BM 2/22. Denies N/V/D. Pertinent Labs: (2/21) POC glu 118, Hgb 9.2  Pertinent Meds: mag-ox, mobic, MVI, protonix, zoloft, desyrel; PRN maalox, tums, milk of mag, oxycodone, glycolax, senokot  Current Weight: 128 lb 12 oz (58.4 kg) (2/21- edema present 2/22 RLE/LLE (2+ pitting)  Admission Weight:  128 lb 15.5 oz (58.5 kg) (2/15- RLE/LLE (1+ pitting) edema present 2/15)  Wounds: Surgical Incision (incision to pelvis anterior)  Malnutrition Status: No malnutrition    Please refer to initial nutrition assessment for additional details.    Karin Jaimes RD:    Contact Number: [phone]

## 2022-02-23 NOTE — PROGRESS NOTES
[facility]    Patient - Caron Nunez   MRN -  [id number]   [facility]   - 1945      Date of Admission -  2/15/2022  4:18 PM  Date of evaluation -  2022  Room - [address] [place] [address] Dr Fiorella MD Primary Care Physician - [facility]     Problem List      Active Hospital Problems    Diagnosis Date Noted    Personal history of poliomyelitis [Z86.12] 2022    Acute respiratory failure ([address] [J96.00] 02/15/2022     Reason for Consult    For management of her tracheostomy. HPI   History Obtained From: Patient and electronic medical record. Caron Nunez is a 64 y.o. female  was initially admitted under Maria Guadalupe Ireland MD service. Pulmonary medicine was consulted for further management of Tracheostomy and chronic respiratory failure. Tracheostomy placed:  Surgeon: Performed at [facility]. I went through care everywhere. There is no procedure note regarding tracheostomy. Type: [place]. Size:7.5  Fio2 supplementation at home prior to current hospitalization: 28%    At the time of my initial evaluation, she remain on tracheostomy with trach mask. Limited history obtained from the patient. Majority of the history obtained from the patient chart, care everywhere documents and discussion with the patient's  at bedside. According to patient's  and patient. Patient was referred to [facility] by her gynecologist for total abdominal hysterectomy and oophorectomy as a part of treatment for her postmenopausal bleeding due to her high risk for denise and postoperative pulmonary complications. She had hx of baseline hypercapnia and neuromuscular disorder. After her planned hysterectomy at [facility], patient had a complicated postoperative period with the development of hypercapnic respiratory failure.   Patient required 3 intubations with the last intubation performed on 28 December 2021 as per the note by Ms. Kourtney Dimas MD.  Patient underwent tracheostomy in the SICU at [facility] on ? 1/22/22 per the note by Ms. Kourtney Dimas MD     She used follow with Dr. Abimbola Joel MD at and her pulmonary disease in the past.  She was seen by Dr. Abimbola Joel MD for the last time on 10 March 2014 for post polio muscle weakness. According to his note patient had a below normal MEP and NIF testing. She was also diagnosed with vocal cord paralysis and follows with the ENT specialist at [facility]. Subjective/Events Past 24 hours/ROS   -She is tolerating trach collar well  -On 28% FiO2  -Not in distress  -Was seen by ENT consult.   She is waiting for bedside fiberoptic laryngoscopy to evaluate for vocal cords tomorrow morning  -Afebrile    PMHx   Past Medical History      Diagnosis Date    GERD (gastroesophageal reflux disease)     Osteoarthritis     Paralysis of vocal cords     Polio       Past Surgical History        Procedure Laterality Date    CHOLECYSTECTOMY      KNEE SURGERY      Right     Meds    Current Medications    cycloSPORINE  2 drop Both Eyes Daily    pantoprazole  40 mg Oral BID AC    sertraline  50 mg Oral Daily    melatonin  6 mg Oral Nightly    magnesium oxide  400 mg Oral Daily    traZODone  50 mg Oral Nightly    multivitamin  1 tablet Oral Daily    meloxicam  15 mg Oral Daily    polyvinyl alcohol  1 drop Both Eyes BID     magnesium hydroxide, aluminum & magnesium hydroxide-simethicone, calcium carbonate, oxyCODONE, polyethylene glycol, zinc oxide, acetaminophen, ipratropium, senna, ipratropium-albuterol  IV Drips/Infusions    Home Medications  Medications Prior to Admission: ipratropium (ATROVENT) 0.06 % nasal spray, 2 sprays by Nasal route 3 times daily as needed  meloxicam (MOBIC) 7.5 MG tablet, Take 15 mg by mouth daily   calcium carbonate (OSCAL) 500 MG TABS tablet, Take 500 mg by mouth daily  Multiple Vitamins-Minerals (PRESERVISION AREDS 2) CAPS, Take  by mouth 2 times daily. 2 tabs BID  multivitamin (THERAGRAN) per tablet, Take 1 tablet by mouth daily Alive 50+  [DISCONTINUED] Cholecalciferol (VITAMIN D3) 1.25 MG (08103 UT) CAPS, Take by mouth  [DISCONTINUED] estradiol (ESTRACE VAGINAL) 0.1 MG/GM vaginal cream, Place 2 g vaginally daily  Diet    ADULT ORAL NUTRITION SUPPLEMENT; Lunch; Clear Liquid Oral Supplement  ADULT TUBE FEEDING; PEG; 2.0 Calorie; Bolus; 6 TIMES DAILY; 130; Syringe Push; 60; Before and after each bolus  ADULT DIET; Full Liquid  Allergies    Latex  Family History    History reviewed. No pertinent family history. Sleep History    Never diagnosed with sleep apnea in the past.    Social History     Social History     Tobacco Use    Smoking status: Never Smoker    Smokeless tobacco: Never Used   Substance Use Topics    Alcohol use: Yes     Comment: Seldom    Drug use: No       Vitals     height is 5' (1.524 m) and weight is 128 lb 11.2 oz (58.4 kg). Her oral temperature is 99 °F (37.2 °C). Her blood pressure is 109/56 (abnormal) and her pulse is 75. Her respiration is 16 and oxygen saturation is 95%. Body mass index is 25.13 kg/m². SUPPLEMENTAL O2: O2 Flow Rate (L/min): 6 L/min       I/O        Intake/Output Summary (Last 24 hours) at 2/22/2022 1915  Last data filed at 2/22/2022 1358  Gross per 24 hour   Intake 750 ml   Output --   Net 750 ml     I/O last 3 completed shifts: In: 12 [P.O.:100; NG/GT:1410]  Out: 1 [Urine:1]   Patient Vitals for the past 96 hrs (Last 3 readings):   Weight   02/21/22 0654 128 lb 11.2 oz (58.4 kg)       Exam   Physical Exam   Constitutional:  Patient appears ill built and ill nourished. Patient remain on trach coller. Tolerating Passy-Ashlie valve trials well  Head: Normocephalic and atraumatic. Right Ear: External ear normal.   Left Ear: External ear normal.   Mouth/Throat: Moist oral mucosa.   Eyes: Pupils are equal, round, and sluggishly reacting to light.   Neck: Neck supple. No JVD present. Tracheostomy in place with trach coller. Cardiovascular: Normal rate, regular rhythm, normal heart sounds. Pulmonary/Chest: Bilateral air entry present with good breath sounds. Diminished breath sounds at the bases Right > Left . No respiratory distress on trach coller. No wheezes. No rales. Abdominal: Soft. Patient exhibits no distension. No organomegaly appreciated. BS +ve sluggish. Status post PEG in place  Musculoskeletal: Moving all limbs for verbal commands. Extremities: No edema. Neurological: Patient is awake and alert. Following verbal commands by moving limbs. Skin: Skin is warm and dry. Labs  - [facility]   ABG  Lab Results   Component Value Date    PH 7.41 02/21/2022    PH 7.38 06/06/2012    PO2 65 02/21/2022    PO2 108 06/06/2012    PCO2 53 02/21/2022    PCO2 51 06/06/2012    HCO3 33 02/21/2022    O2SAT 92 02/21/2022     Lab Results   Component Value Date    IFIO2 28 02/21/2022    MODE CPAP+PS 06/05/2012    SETPEEP 4 06/05/2012     CBC  Recent Labs     02/21/22  0821   HGB 9.2*   HCT 30.7*      BMP  No results for input(s): NA, K, CL, CO2, BUN, CREATININE, GLUCOSE, MG, PHOS, CALCIUM, IONCA, MG in the last 72 hours. LFT  No results for input(s): AST, ALT, ALB, BILITOT, ALKPHOS, LIPASE in the last 72 hours. Invalid input(s): AMYLASE  TROP  No results found for: TROPONINT  BNP  No results for input(s): BNP in the last 72 hours. Lactic Acid  No results for input(s): LACTA in the last 72 hours. INR  No results for input(s): INR, PROTIME in the last 72 hours. PTT  No results for input(s): APTT in the last 72 hours. Glucose  Recent Labs     02/21/22  0823   POCGLU 118*     UA No results for input(s): SPECGRAV, PHUR, COLORU, CLARITYU, MUCUS, PROTEINU, BLOODU, RBCUA, WBCUA, BACTERIA, NITRU, GLUCOSEU, BILIRUBINUR, UROBILINOGEN, KETUA, LABCAST, LABCASTTY, AMORPHOS in the last 72 hours.     Invalid input(s): CRYSTALS. PFTs performed on 3 January 2013               Sleep studies   None in epic    Cultures    None in epic    EKG   None in epic  Echocardiogram   None in epic    Radiology    CXR  Chest x-ray mobile view performed on 5 June 2012:  Accession Number:  Procedure Name:             Exam Date/Time:     [id number]      Chest Mobile Single         6/5/2012 6:42:00 PM       CPT4 code     [id number]        IMPRESSION:     RIGHT HEMIDIAPHRAGM ELEVATION AND ADJACENT ATELECTASIS. PROGRESS IMAGING     AS CLINICALLY DETERMINED. Chest x-ray PA and lateral views performed on 21 February 2022: Mon Feb 21, 2022  8   2 view chest x-ray   Comparison: None   1. Small bilateral pleural effusions. 2. Elevation of the right hemidiaphragm with mild right basilar atelectasis. Sniff test: Performed on 22 February 2022:  No evidence of diaphragmatic paralysis on right side     Assessment   -Chronic hypercapnic respiratory failure in the postoperative. S/p Tracheostomy. She is currently tolerating Passy-[place] valve trials well. She is on 28% trach collar. 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  -History of chronic right hemidiaphragmatic elevation with adjacent atelectasis. ? Paralysis per patient.  -Right side complete paralysis and left partial CP dysfunction. She is to follow-up with Dr. Sharonda Aguirre MD-ENT specialist at [facility].  -History of dysphagia. Patient underwent PEG tube placement  -Osteoarthritis  -Gastroesophageal reflux disease. Plan   -Will continue patient on Tracheostomy.  -Continue Passy-Ashlie valve trials as tolerated during daytime  -Continue routine trach care by respiratory therapy.  -Avoid all sedative medications if she is drowsy.   -Aspiration precautions.  -Continue PEG tube feeding  -ENT consult appreciated due to her history of vocal cord paralysis and tracheostomy management. Patient follows with the ENT service at [facility].  -Titrate Oxygen/Fio2 to keep saturations >92%. -Deep Venous Thrombosis Prophylaxis: Per primary service    - Connie Fierro and her  were educated about my impression and plan. They verbalizes understanding. Questions and concerns addressed.     Electronically signed by   Fabiano Cerda MD on 2/22/2022 at 7:15 PM

## 2022-02-23 NOTE — PROGRESS NOTES
Chair: Stand By Assistance, with increased time for completion  -Patient completed transfers from mat table <> wheelchair with stand by assistance and assistance with line management. Patient able to demonstrate good hand placement and safety awareness throughout. Ambulation:  Stand By Assistance, with increased time for completion  Distance: 100', 20', 10'  Surface: Level Tile  Device:No Device  Gait Deviations:  Decreased Step Length Bilaterally, Decreased Weight Shift Bilaterally, Decreased Gait Speed, Decreased Heel Strike Bilaterally, Wide Base of Support, Mild Path Deviations and Unsteady Gait    -Patient able to ambulate without AD and stand by assistance. Patient demonstrates mild unsteadiness and mild path deviations, however demonstrates good correction and safety awareness with no LOB. -Patient instructed in cone weaving with contact guard assistance to stand by assistance. Patient demonstrates improved gait speed and decreased hesitancy with task this session, however requires increased time for completion due to mild unsteadiness. Patient instructed in cone weaving to work on obstacle negotiation.  -Patient instructed in 10' of ambulation with cone taps with contact guard assistance provided. Patient demonstrates mild unsteadiness during SL stance and bilateral weight shifting, resulting in increased time for completion. Patient instructed in ambulating with cone taps to improve bilateral weight shifting and stability for improved ease of ambulation without an AD. Stairs:  Stand By Assistance, with increased time for completion  Number of Steps: curb step x2  Height: 6\" step with use of 1 HR, progressing to no HR   -Patient progressed from use of 1 HR to no UE with stand by assistance. Patient required increased time for completion and mild unsteadiness without UE support, however was able to complete with good mechanics and no LOB.     Balance:  Dynamic Sitting Balance: Supervision, with increased time for completion  Dynamic Standing Balance: Stand By Assistance, with increased time for completion    -Patient completed dynamic sitting balance in the bathroom and getting dressed at EOB. Assistance provided for line and trach management and dressing, however patient demonstrates ability to perform dynamic sitting with supervision and good stability. Patient completed dynamic standing balance for pericare and lower body dressing with stand by assistance. -Tinetti completed, see score below    Functional Outcome Measures: Completed  Balance Score: 11  Gait Score: 8  Tinetti Total Score: 19     A total score of:  27 or more: low fall risk  20-26: moderate fall risk  19 or less: high fall risk    ASSESSMENT:  Assessment:  Patient is making good pressing, having met 5/5 STG and 0/5 LTG. Patient is able to perform rolling modified independent, supine <> sit with supervision, and all transfers with SBA with patient demonstrating good safety awareness and hand placement without need of verbal cues. Patient able to ambulate 100' without AD and SBA, with patient demonstrating increased time for completion, wide OTIS, and mild unsteadiness. Patient able to complete 1 step with SBA and without use of HR. Patient scored a 19/28 on the Tinetti, indicating she is at a high risk for falls. Patient overall continues to demonstrate mild unsteadiness with bilateral weight shifting and dynamic balance tasks, however is making good progress. Patient would benefit from continued skilled therapy to improve bed mobility, transfers, gait, balance, and stairs with improved functional mobility. Activity Tolerance:  Patient tolerance of  treatment: good.       Equipment Recommendations:Equipment Needed: Yes (RW)  Discharge Recommendations:  Discharge Recommendations: Continue to assess pending progress,Patient would benefit from continued therapy after discharge    Plan: Times per week: 5x/wk 90min; 1x/wk 30 min  Times per day: Daily  Current Treatment Recommendations: Strengthening,Neuromuscular Re-education,Home Exercise Program,ROM,Safety Education & Daylene Few Training,Patient/Caregiver Education & Training,Functional Mobility Training,Wheelchair Mobility Training,Transfer Training,Gait Training,Stair training    Patient Education  Patient Education: [employer], Transfers, Gait, Stairs,  - Patient Verbalized Understanding, - Patient Requires Continued Education    Goals:  Patient goals : To return to home  Short term goals  Time Frame for Short term goals: 1 week  Short term goal 1: Patient to perform supine<>sit with SBA without railings in order to assist with getting into and out of bed. GOAL MET  Short term goal 2: Patient to perform sit to stand transfers wtih SBA with <=1 cue for hand placement in order to assist with safety with transfers from various surfaces. GOAL MET  Short term goal 3: Patient to ambulate at least 75 feet with RW with SBA in order to assist with home mobility. GOAL MET  Short term goal 4: Patient to ascend/descend 1 step with B handrails with CGA in order to assist with getting into and out of home. GOAL MET  Short term goal 5: Patient to score at least 19/28 on the Tinetti in order to reduce risk for falls. GOAL MET  Long term goals  Time Frame for Long term goals : 3 weeks  Long term goal 1: Patient to perform supine<>sit with Mod I without railings in order to assist with getting into and out of bed. GOAL NOT MET  Long term goal 2: Patient to perform sit to stand transfers with Mod I in order to assist with safety with transfers from various surfaces. GOAL NOT MET  Long term goal 3: Patient to ambulate at least 150 feet with RW with Mod I in order to assist with home mobility. GOAL NOT ADDRESSED  Long term goal 4: Patient to ascend/descend 4 steps with 1 handrail with Supervision in order to assist with getting into and out of home.  GOAL NOT ADDRESSED  Long term goal 5:

## 2022-02-23 NOTE — PROGRESS NOTES
[address] [address]  Inpatient Rehabilitation  Occupational Therapy  Progress Note  Time:  Time In: 8832  Time Out: 1430  Timed Code Treatment Minutes: 9 Minutes  Minutes: 9     Variance: -21 (Pt meeting with Otolaryngology/ENT)    Date: 2022  Patient Name: Annalise Watson,   Gender: female      Room: [facility]66/066-A  MRN: [id number]  : 1945  (64 y.o.)  Referring Practitioner: Dr Eduar Singletary  Diagnosis: Acute Respiratory Failure  Additional Pertinent Hx: Annalise Watson  is a 64 y.o. female admitted to the inpatient rehabilitation unit on 2/15/2022. The patient was originally admitted to the [facility] 2022 to the gynecology oncology service secondary to postmenopausal bleeding. She started with the bleeding in early 2021 and was followed by Dr. Booker helms at [facility]. Ms. Riana Livingston presented to [facility] (/[facility])  for initial consultation for Post-menopausal Bleeding and failed Endometrial Biopsy. PMB started in 2021. Had pelvic US that showed EMS 9mm. EMB was attempted twice in the office, no endometrial cells were identified. She was therefore referred to the [facility] at [facility] 22 for additional management. She had no pelvic pain. Only intermittent spotting, no heavy bleeding. Had history of Lichen sclerosis, was prescribed estrogen cream and steroid cream. Patient underwent abdominal hysterectomy on 2022, BSO via P. Pfannenstiel incision. Final pathology benign. Her hospitalization was complicated by respiratory failure (now on 3L supplemental O2 per trach mask,) and has PEG for pharyngeal dysphagia.   Also has a PMH positive for GERD, OA,  Paralysis of vocal cords, Polio (bulbar), Dysphagia, Inhalation and ingestion of other object causing obstruction of respiratory tract or suffocation, Osteoporosis, Hoarse voice, Hypernasal speech, Diaphragm paralysis, and Left adhesive capsulitis. Restrictions/Precautions:  Restrictions/Precautions: General Precautions,Fall Risk  Position Activity Restriction  Other position/activity restrictions: PEG, trach collar/mask, No lifting >10lbs    SUBJECTIVE: Pt seen immediately following visit with Otolaryngology/ENT. Pt reports it gives her hope for her vocal cords and voice. Pt agreeable to OT. PAIN: Denies /10:      Vitals: Oxygen: Pt continueing on room air trial.  02 90% during ambulation and emy to 94% with deep breathing     COGNITION: WFL    ADL:   Spouse donned patient's gait belt with min vcs for placement and technique    BALANCE:  Sitting Balance:  Independent. Standing Balance: Stand By Assistance. Standing for 4 minutes during bilateral release activity    BED MOBILITY:  Not Tested    TRANSFERS:  Sit to Stand:  Stand By Assistance. Stand to Sit: Stand By Assistance. FUNCTIONAL MOBILITY:  Assistive Device: Rolling Walker  Assist Level:  Stand By Assistance. Distance: RW used for ECT to ambulate to easy [address]. PT present for PM session and pt left to continue ambulation with PT      ASSESSMENT:  Activity Tolerance:  Patient tolerance of  treatment: good. Assessment:  Nuria Reynolds has made consistent progress since her IPR admission on 2/15. She has progressed to completing UB dressing with min A to fasten her bra. She is able to manage LB dressing and bathing tasks with SBA using cross legged technique and increased time. She is able to transfer on/off a standard height toilet with use of a grab bar on the wall. Recommendations for a grab bar to be installed as well as home safety recommendations have been provided. While not as frequent as when she was admitted, she continues to require rest breaks during ADL/IADL tasks and occasional cues for breathing to maintain 02 sats >90% during room air trial on 2/23.   Overall, her activity tolerance is improving, which is leading to completion of IADLs and longer standing tasks with SBA. Family education has been initiated with patient's spouse, Dianne Limon, in prep for discharge home on 2/28. Due to patient's performance deficits, patient would greatly benefit from home health occupational therapy for IADL remediation, endurance building, strengthening and education on energy conservation techniques to return to prior level of functioning and safe return to home environment. Discharge Recommendations: Home with Home health OT  Equipment Recommendations: Other: Pt has a built in shower seat. Recommendations for a grab bar near toilet provided. Spouse plans to purchase from [employer] [id number]. Plan: Times per week: 5x/wk for 90 min and 1x/wk for 30 min  Times per day: Daily  Current Treatment Recommendations: Strengthening,Balance Training,Endurance Training,Functional Mobility Training,Equipment Evaluation, Education, & procurement,Patient/Caregiver Education & Training,Self-Care / ADL,Safety Education & Training    Patient Education  Patient Education: Plan of Care, ADL's, Equipment Education and Home Safety    Goals  Short term goals  Time Frame for Short term goals: 1 week  Short term goal 1: Pt will complete UB dressing tasks with min A to improve indep with donning her bra at home. MET REVISE   Short term goal 2: Pt will complete LB dressing tasks with mod A to improve indep with donning socks and shoes at home. MET REVISE   Short term goal 3: Pt will tolerate 4 minute standing tasks with SBA during shayy release tasks to improve indep with sinkside grooming. MET REVISE   Short term goal 4: Pt will ambulate to and from bathroom with SBA and RW prn to improve activity tolerance needed for bathing. MET REVISE   Short term goal 5: Pt will tolerate dynamic standing IADL tasks with SBA to meet patient goal of baking cookies with grandchildren.  MET REVISE   Long term goals  Time Frame for Long term goals : 2-3 weeks  Long term goal 1: Pt will complete bathing and shower transfer with mod I to improve indep with showering at home. NOT MET CONTINUE  Long term goal 2: Pt will complete dressing and grooming tasks with mod I to improve indep with self care at home. NOT MET CONTINUE  Long term goal 3: Pt will complete a simple meal prep tasks with mod I and 0 vcs for ECT to improve indep with preparing lunch at home. NOT MET CONTINUE     Following session, patient left in safe position with all fall risk precautions in place.

## 2022-02-23 NOTE — PROGRESS NOTES
[address] [address]  INPATIENT PHYSICAL THERAPY  DAILY NOTE  [facility]    Time In: 1430  Time Out: 1500  Timed Code Treatment Minutes: 30 Minutes  Minutes: 30          Date: 2022  Patient Name: Georgiana Persaud,  Gender:  female        MRN: [id number]  : 1945  (64 y.o.)     Referring Practitioner: Glynda Fothergill, MD  Diagnosis: Acute Respiratory Failure  Additional Pertinent Hx: Georgiana Persaud  is a 64 y.o. female admitted to the inpatient rehabilitation unit on 2/15/2022. The patient was originally admitted to the [facility] 2022 to the gynecology oncology service secondary to postmenopausal bleeding. She started with the bleeding in early 2021 and was followed by Dr. Veena Lopez here at [address] [place] [address]. Ms. Jada Gonzales presented to [facility] (/[facility])  for initial consultation for Post-menopausal Bleeding and failed Endometrial Biopsy. PMB started in 2021. Had pelvic US that showed EMS 9mm. EMB was attempted twice in the office, no endometrial cells were identified. She was therefore referred to the [facility] at [facility] 22 for additional management. She had no pelvic pain. Only intermittent spotting, no heavy bleeding. Had history of Lichen sclerosis, was prescribed estrogen cream and steroid cream. Patient underwent abdominal hysterectomy on 2022, BSO via P. Pfannenstiel incision. Final pathology benign. Her hospitalization was complicated by respiratory failure (now on 3L supplemental O2 per trach mask,) and has PEG for pharyngeal dysphagia.   Also has a PMH positive for GERD, OA,  Paralysis of vocal cords, Polio (bulbar), Dysphagia, Inhalation and ingestion of other object causing obstruction of respiratory tract or suffocation, Osteoporosis, Hoarse voice, Hypernasal speech, Diaphragm paralysis, and Left adhesive capsulitis     Prior Level of Function:  Lives With: Spouse  Type of Home: House  Home Layout: Two level  Home Access: Stairs to enter without rails  Entrance Stairs - Number of Steps: ~4 steps at the front of the house without railings and 2 steps in the garage. Patient reports she has a few steps in the back of the house with railings. Patient reports typically she enters house from garage  Home Equipment:  (Patient reports she does not have an equipment at home)   Bathroom Shower/Tub: Walk-in shower (Patient reports she has a built in shower chair)  Bathroom Toilet: Standard  Bathroom Equipment: Grab bars in shower,Built-in shower seat    ADL Assistance: Independent  Homemaking Assistance: Independent  Homemaking Responsibilities: Yes  Ambulation Assistance: Independent  Transfer Assistance: Independent  Active : Yes  Additional Comments: Patient reports she was independent PTA and did not utilize an AD for mobility. Patient reports her daugther and son in law live next door and also would be able to help out as needed. Restrictions/Precautions:  Restrictions/Precautions: General Precautions,Fall Risk  Position Activity Restriction  Other position/activity restrictions: PEG, trach collar/mask, No lifting >10lbs     SUBJECTIVE: Patient walking with RW with OT towards rehab gym, with PT taking over. Patient's spouse present, and patient pleasant and agreeable to participate in therapy. Patient completed therapy without oxygen this session, with oxygen tank on standby. Patient requesting to use restroom upon return to room at end, with spouse assisting.     PAIN: 0/10    Vitals: Oxygen: Occasionally 86-88%, majority of session >90%; oxygen sat quickly increased with rest breaks and verbal cueing to slow breathing with focus on taking deep breaths    OBJECTIVE:  Bed Mobility:  Rolling to Right: Stand By Assistance, with head of bed raised, without rail, with increased time for completion   Supine to Sit: Stand By Assistance, with head of bed flat, without rail, with increased time for completion  Sit to Supine: Stand By Assistance, with head of bed flat, without rail, with increased time for completion   -Patient completed bed mobility at the mat table with a wedge placed under her head with stand by assistance to assess the use of a wedge at home. Patient and spouse were questioning what height wedge would be best for patient to ensure enough head elevation to assist with breathing. Discussion on various ideas to promote increased ease of sleep and comfort upon discharge. Verbal cueing provided to complete roll before performing supine to sit, patient demonstrated good carryover. Transfers:  Sit to Stand: Stand By Assistance, with increased time for completion  Stand to Sit:Stand By Assistance, with increased time for completion  -Patient completed various sit to stands from a standard chair this session. Ambulation:  Stand By Assistance, with increased time for completion  Distance: 50', 150'  Surface: Level Tile  Device:No Device  Gait Deviations:  Decreased Weight Shift Bilaterally, Decreased Gait Speed, Decreased Heel Strike Bilaterally, Wide Base of Support and Unsteady Gait  -Patient ambulated without an AD and stand by assistance provided due to mild unsteadiness and decreased gait speed. Oxygen monitored throughout due to increased distance. Verbal cueing provided for patient to take her time and focus on her breathing, patient motivated to ambulate further distances. Exercise:  Patient was guided in 1 set(s) 15 reps of exercise to both lower extremities. Standing marches, Standing hip abduction/adduction, Standing hip extension, Standing hamstring curls, Standing hip flexion, Mini squats and Step ups. Exercises were completed for increased independence with functional mobility.  -Patient completed standing exercises at the parallel bars with stand by assistance and bilateral UE support.  Patient encouraged to decrease UE support, progressing to light finger touch support. Verbal cueing provided to maintain upright posture and trunk control during exercises. -Patient completed forward step ups to 4\" step with stand by assistance. Patient completed 6 repetitions bilaterally. Patient progressed from bilateral UE support to single UE support. Patient able to complete with good stability, no LOB noted. Functional Outcome Measures: Not completed  Balance Score: 11  Gait Score: 8  Tinetti Total Score: 19    ASSESSMENT:  Assessment: Patient progressing toward established goals. Activity Tolerance:  Patient tolerance of  treatment: good. Equipment Recommendations:Equipment Needed: Yes (Continue to assess pending progress (may require RW))  Discharge Recommendations: Continue to assess pending progress  Plan: Times per week: 5x/wk 90min; 1x/wk 30 min  Times per day: Daily  Current Treatment Recommendations: Strengthening,Neuromuscular Re-education,Home Exercise Program,ROM,Safety Education & Rexanne Favor Training,Patient/Caregiver Education & Training,Functional Mobility Training,Wheelchair Mobility Training,Transfer Training,Gait Training,Stair training    Patient Education  Patient Education: [employer], Gait, Verbal Exercise Instruction, Pursed Lip Breathing,  - Patient Verbalized Understanding, - Patient Requires Continued Education    Goals:  Patient goals : To return to home  Short term goals  Time Frame for Short term goals: 1 week  Short term goal 1: Patient to perform supine<>sit with SBA without railings in order to assist with getting into and out of bed. GOAL MET  Short term goal 2: Patient to perform sit to stand transfers wtih [address] with <=1 cue for hand placement in order to assist with safety with transfers from various surfaces. Short term goal 3: Patient to ambulate at least 125 feet without AD with SBA in order to assist with home mobility.   Short term goal 4: Patient to ascend/descend 4 step with 1 handrail with SBA in order to assist with getting into and out of home. Short term goal 5: Patient to score at least 21/28 on the Tinetti in order to reduce risk for falls. Long term goals  Time Frame for Long term goals : 3 weeks  Long term goal 1: Patient to perform supine<>sit with Mod I without railings in order to assist with getting into and out of bed. Long term goal 2: Patient to perform sit to stand transfers with Mod I in order to assist with safety with transfers from various surfaces. Long term goal 3: Patient to ambulate at least 150 feet with RW with Mod I in order to assist with home mobility. Long term goal 4: Patient to ascend/descend 4 steps with 1 handrail with Supervision in order to assist with getting into and out of home. Long term goal 5: Patient to perform car transfer with Supervision in order to assist with getting to and from appointments. Following session, patient left in safe position with all fall risk precautions in place.

## 2022-02-23 NOTE — PLAN OF CARE
Problem: OXYGENATION/RESPIRATORY FUNCTION  Goal: Patient will maintain patent airway  Outcome: Met This Shift  Note: Patient has maintained a patent airway this shift     Problem: MECHANICAL VENTILATION  Goal: Tracheostomy will be managed safely  Outcome: Met This Shift  Note: Reed Heimlich maintained safely this shift     Problem: Falls - Risk of:  Goal: Will remain free from falls  Description: Will remain free from falls  Outcome: Met This Shift  Note: Patient free of falls this shift     Problem: Falls - Risk of:  Goal: Absence of physical injury  Description: Absence of physical injury  Outcome: Met This Shift  Note: Patient free of injury     Problem: Pain:  Goal: Pain level will decrease  Description: Pain level will decrease  Outcome: Met This Shift  Note: Patient denies pain     Problem: Pain:  Goal: Control of acute pain  Description: Control of acute pain  Outcome: Met This Shift     Problem: Pain:  Goal: Control of chronic pain  Description: Control of chronic pain  Outcome: Met This Shift     Problem: Skin Integrity:  Goal: Risk for impaired skin integrity will decrease  Description: Risk for impaired skin integrity will decrease  Outcome: Met This Shift  Note: Patient ambulating and changing position on herself      Problem: Skin Integrity:  Goal: Will show no infection signs and symptoms  Description: Will show no infection signs and symptoms  Outcome: Met This Shift  Note: No s/s of infection this shift     Problem: Skin Integrity:  Goal: Will show no infection signs and symptoms  Description: Will show no infection signs and symptoms  Outcome: Met This Shift  Note: No s/s of infection this shift     Problem: Skin Integrity:  Goal: Absence of new skin breakdown  Description: Absence of new skin breakdown  Outcome: Met This Shift  Note: No skin breakdown noted     Problem: Nutrition  Goal: Optimal nutrition therapy  Outcome: Met This Shift  Note: Patient has 6 TF per day      Problem: IP SWALLOWING  Goal: LTG - patient will tolerate the least restrictive diet consistency to allow for safe consumption of daily meals  Outcome: Met This Shift  Note: Patient on full liquids     Problem: DISCHARGE BARRIERS  Goal: Patient's continuum of care needs are met  Outcome: Met This Shift  Note: Patient and  being taught TF and trach care.

## 2022-02-23 NOTE — PLAN OF CARE
See full note filed 2/23 at 11:52AM.       Problem: Nutrition  Goal: Optimal nutrition therapy  2/23/2022 1152 by Marlon Domingo RD  Outcome: Ongoing  2/23/2022 1123 by Denisha Martino RN  Outcome: Met This Shift  Note: Patient has 6 TF per day      Nutrition Problem #1: Inadequate oral intake  Intervention: Food and/or Nutrient Delivery: Continue Current Diet,Continue Current Tube Feeding  Nutritional Goals: EN to provide % of nutritional needs until able to transition to adeuqate oral diet.

## 2022-02-23 NOTE — PROGRESS NOTES
[facility]  Recreational Therapy  Daily Note  [address]    Time Spent with Patient: 10 minutes    Date:  2/23/2022       Patient Name: Annalise Watson      MRN: [id number]      YOB: 1945 (64 y.o.)       Gender: female  Diagnosis: Acute Respiratory Failure  Referring Practitioner: Dr Eduar Singletary    RESTRICTIONS/PRECAUTIONS:  Restrictions/Precautions: General Precautions,Fall Risk  Vision: Impaired  Hearing: Within functional limits    PAIN: 0 -no c/o pain     SUBJECTIVE:  I don't need a thing     OBJECTIVE:  Pt and  visiting upon arrival-gave them a newspaper for today-affect bright and upbeat-supportive contact given      Patient Education  New Education Provided: Importance of Leisure,     Electronically signed by: SHERIE Oneil  Date: 2/23/2022

## 2022-02-23 NOTE — PROGRESS NOTES
[address]  Occupational Therapy  Daily Note  Time:    Time In: 1100  Time Out: 1200  Timed Code Treatment Minutes: 60 Minutes  Minutes: 60          Date: 2022  Patient Name: Georgiana Persaud,   Gender: female      Room: [facility]66/066-A  MRN: [id number]  : 1945  (64 y.o.)  Referring Practitioner: Dr Elidia Quiñones  Diagnosis: Acute Respiratory Failure  Additional Pertinent Hx: Georgiana Persaud  is a 64 y.o. female admitted to the inpatient rehabilitation unit on 2/15/2022. The patient was originally admitted to the [facility] 2022 to the gynecology oncology service secondary to postmenopausal bleeding. She started with the bleeding in early 2021 and was followed by Dr. Ally Welch here at [facility]. [facility]  for initial consultation for Post-menopausal Bleeding and failed Endometrial Biopsy. PMB started in 2021. Had pelvic US that showed EMS 9mm. EMB was attempted twice in the office, no endometrial cells were identified. She was therefore referred to the [facility] 22 for additional management. She had no pelvic pain. Only intermittent spotting, no heavy bleeding. Had history of Lichen sclerosis, was prescribed estrogen cream and steroid cream. Patient underwent abdominal hysterectomy on 2022, BSO via P. Pfannenstiel incision. Final pathology benign. Her hospitalization was complicated by respiratory failure (now on 3L supplemental O2 per trach mask,) and has PEG for pharyngeal dysphagia. Also has a PMH positive for GERD, OA,  Paralysis of vocal cords, Polio (bulbar), Dysphagia, Inhalation and ingestion of other object causing obstruction of respiratory tract or suffocation, Osteoporosis, Hoarse voice, Hypernasal speech, Diaphragm paralysis, and Left adhesive capsulitis.     Restrictions/Precautions:  Restrictions/Precautions: General Precautions,Fall Risk  Position Activity Restriction  Other position/activity restrictions: PEG, trach collar/mask, No lifting >10lbs      SUBJECTIVE: Pt pleasant and cooperative     PAIN: Denies /10:      Vitals: Oxygen: Patient on room air on arrival for trial  02 saturation monitored throughout session with pt dipping down to 81-83% when standing to brush teeth or washing body, or donning socks/shoes, but quickly emy back to 93% in 10 seconds once prompted to deep breathe during a rest break. Usually 93-94% during UB dressing or seated tasks or at rest.     Reviewed 02 tubing wth spouse and spouse hooked up oxygen tubing to 02 tank in case patient needing 02. Pt motivated to continue tral without 02. COGNITION: WFL    ADL:   Grooming: Stand By Assistance. standing at sink  Bathing: Stand By Assistance. during shower,    Upper Extremity Dressing: Minimal Assistance. assist needed to clasp bra   Lower Extremity Dressing: Stand By Assistance. to doff and don clothing with increased time needed   Toileting: Stand By Assistance. Toilet Transfer: Stand By Assistance. from standard toilet with grab bar   Shower Transfer: Stand By Assistance. Karoline Ovalle BALANCE:  Sitting Balance:  Supervision. Standing Balance: Stand By Assistance. BED MOBILITY:  Not Tested    TRANSFERS:  Sit to Stand:  Supervision, Stand By Assistance. Stand to Sit: Supervision, Stand By Assistance. FUNCTIONAL MOBILITY:  Assistive Device: None  Assist Level:  Stand By Assistance. Distance: To and from bathroom and To and from shower room     ASSESSMENT:  Activity Tolerance:  Patient tolerance of  treatment: good. Discharge Recommendations: Home with Home Health OT  Equipment Recommendations: Other: Pt has a built in shower seat. Recommendations for a grab bar near toilet provided.   Plan: Times per week: 5x/wk for 90 min and 1x/wk for 30 min  Times per day: Daily  Current Treatment Recommendations:

## 2022-02-23 NOTE — PROGRESS NOTES
Patient: Amaris Wilson  Unit/Bed: 7E-66/066-A  YOB: 1945  MRN: [id number] Acct: [de-identified]   Admitting Diagnosis: Acute respiratory failure ([facility] [J96.00]  Admit Date:  2/15/2022  Hospital Day: 8    Assessment:     Active Problems:    Acute respiratory failure ([facility]    Personal history of poliomyelitis  Resolved Problems:    * No resolved hospital problems. *      Plan:     Add lasix for the generalized edema she has. Subjective:     Patient has no complaint of CP or SOB. .   Medication side effects: none    Scheduled Meds:   furosemide  20 mg Oral Daily    cycloSPORINE  2 drop Both Eyes Daily    pantoprazole  40 mg Oral BID AC    sertraline  50 mg Oral Daily    melatonin  6 mg Oral Nightly    magnesium oxide  400 mg Oral Daily    traZODone  50 mg Oral Nightly    multivitamin  1 tablet Oral Daily    meloxicam  15 mg Oral Daily    polyvinyl alcohol  1 drop Both Eyes BID     Continuous Infusions:  PRN Meds:magnesium hydroxide, aluminum & magnesium hydroxide-simethicone, calcium carbonate, oxyCODONE, polyethylene glycol, zinc oxide, acetaminophen, ipratropium, senna, ipratropium-albuterol    Review of Systems  Pertinent items are noted in HPI. Objective:     Patient Vitals for the past 8 hrs:   BP Temp Temp src Pulse Resp SpO2   02/23/22 1045 -- -- -- -- 18 95 %   02/23/22 1005 127/62 98.8 °F (37.1 °C) Oral 72 20 97 %     I/O last 3 completed shifts: In: 7846 [P.O.:220; NG/GT:1570]  Out: 1 [Urine:1]  No intake/output data recorded.     /62   Pulse 72   Temp 98.8 °F (37.1 °C) (Oral)   Resp 18   Ht 5' (1.524 m)   Wt 128 lb 11.2 oz (58.4 kg)   SpO2 95%   BMI 25.13 kg/m²     General appearance: alert, appears stated age and cooperative  Head: Normocephalic, without obvious abnormality, atraumatic  Lungs: clear to auscultation bilaterally  Heart: regular rate and rhythm, S1, S2 normal, no murmur, click, rub or gallop  Abdomen: soft, non-tender; bowel sounds normal; no

## 2022-02-23 NOTE — PROGRESS NOTES
Physical Medicine & Rehabilitation   Progress Note      Leila Gentile MD     Chief Complaint:    Respiratory Failure with subsequent Debility; s/p JONI with BSO    Subjective: Patient and  seen up in the room. She was seen in consultation per ENT Dr. Donna Fu who performed bedside fiberoptic laryngoscopy to assess the vocal cords more closely. He is planning to take her to the OR tomorrow at 2:30 pm for further evaluation. He told patient and her  that he is 19% certain that he'll be able to d/c her trach. The Goodwins really like and trust him. They had no new concerns or questions today. Planning discharge from the IPR Unit to home is 2/28/22 with Home Health or Outpatient services including PT, OT, and ST (and HHA if Home Health). Rehabilitation:  PT:    OBJECTIVE:  Bed Mobility:  Rolling to Right: Stand By Assistance, with head of bed raised, without rail, with increased time for completion   Supine to Sit: Stand By Assistance, with head of bed flat, without rail, with increased time for completion  Sit to Supine: Stand By Assistance, with head of bed flat, without rail, with increased time for completion   -Patient completed bed mobility at the mat table with a wedge placed under her head with stand by assistance to assess the use of a wedge at home. Patient and spouse were questioning what height wedge would be best for patient to ensure enough head elevation to assist with breathing. Discussion on various ideas to promote increased ease of sleep and comfort upon discharge.  Verbal cueing provided to complete roll before performing supine to sit, patient demonstrated good carryover.     Transfers:  Sit to Stand: Stand By Assistance, with increased time for completion  Stand to Sit:Stand By Assistance, with increased time for completion  -Patient completed various sit to stands from a standard chair this session.     Ambulation:  Stand By Assistance, with increased time for completion  Distance: 48', 150'  Surface: Level Tile  Device:No Device  Gait Deviations:  Decreased Weight Shift Bilaterally, Decreased Gait Speed, Decreased Heel Strike Bilaterally, Wide Base of Support and Unsteady Gait  -Patient ambulated without an AD and stand by assistance provided due to mild unsteadiness and decreased gait speed. Oxygen monitored throughout due to increased distance. Verbal cueing provided for patient to take her time and focus on her breathing, patient motivated to ambulate further distances.        Exercise:  Patient was guided in 1 set(s) 15 reps of exercise to both lower extremities. Standing marches, Standing hip abduction/adduction, Standing hip extension, Standing hamstring curls, Standing hip flexion, Mini squats and Step ups. Exercises were completed for increased independence with functional mobility.  -Patient completed standing exercises at the parallel bars with stand by assistance and bilateral UE support. Patient encouraged to decrease UE support, progressing to light finger touch support. Verbal cueing provided to maintain upright posture and trunk control during exercises. -Patient completed forward step ups to 4\" step with stand by assistance. Patient completed 6 repetitions bilaterally. Patient progressed from bilateral UE support to single UE support. Patient able to complete with good stability, no LOB noted.     Functional Outcome Measures: Not completed  Balance Score: 11  Gait Score: 8  Tinetti Total Score: 19     ASSESSMENT:  Assessment: Patient progressing toward established goals. Activity Tolerance:  Patient tolerance of  treatment: good. Equipment Recommendations:Equipment Needed: Yes (Continue to assess pending progress (may require RW))    OT:   COGNITION: WFL     ADL:   Spouse donned patient's gait belt with min vcs for placement and technique     BALANCE:  Sitting Balance:  Independent. Standing Balance: Stand By Assistance.    Standing for 4 minutes during bilateral release activity     BED MOBILITY:  Not Tested     TRANSFERS:  Sit to Stand:  Stand By Assistance. Stand to Sit: Stand By Assistance.       FUNCTIONAL MOBILITY:  Assistive Device: Rolling Walker  Assist Level:  Stand By Assistance. Distance: RW used for ECT to ambulate to easy [address]. PT present for PM session and pt left to continue ambulation with PT       ASSESSMENT:  Activity Tolerance:  Patient tolerance of  treatment: good. Assessment:  Areta Aase has made consistent progress since her IPR admission on 2/15. She has progressed to completing UB dressing with min A to fasten her bra. She is able to manage LB dressing and bathing tasks with SBA using cross legged technique and increased time. She is able to transfer on/off a standard height toilet with use of a grab bar on the wall. Recommendations for a grab bar to be installed as well as home safety recommendations have been provided. While not as frequent as when she was admitted, she continues to require rest breaks during ADL/IADL tasks and occasional cues for breathing to maintain 02 sats >90% during room air trial on 2/23. Overall, her activity tolerance is improving, which is leading to completion of IADLs and longer standing tasks with SBA. Family education has been initiated with patient's spouse, Tracy Maier, in prep for discharge home on 2/28. Due to patient's performance deficits, patient would greatly benefit from home health occupational therapy for IADL remediation, endurance building, strengthening and education on energy conservation techniques to return to prior level of functioning and safe return to home environment. Discharge Recommendations: Home with Home health OT  Equipment Recommendations: Other: Pt has a built in shower seat. Recommendations for a grab bar near toilet provided. Spouse plans to purchase from [address].        ST:   NMES (Vital Stim) Treatment Mini  Does the patient have an area of neoplasm or infection in the area of electrode placement? No  Electrode Placement:  3-h  Range of e-stim strength tolerated: 6.0  Duration of e-stim: 32 minutes   Was the patient provided with constant supervision during the use of NMES? Yes  Was the patient's skin checked upon removal of electrodes? Yes  Were there concerns regarding the skin's integrity following electrode removal? No    Short-Term Goals:  INTERVENTIONS: Patient reports no difficulty with consumption of full thin liquids diet over weekend. Patient consumed ice chips and thin liquids throughout treatment session with evidence of adequate labial seal, suspected adequate bolus control/containment, suspected timely swallow initiation; x5 throat clear throughout (though certainly cannot ruleout pharyngeal phase deficits or invasive airway events at bedside alone). Reviewed plans for completion of flow testing tomorrow with soups per patient request. Will teach and educate patient and  on how to utilize flow test and then further determine if any soups can be thinned out enough to meet thin liquid diet level criteria (IDDSI level 0). INTERVENTIONS: Completed the following pharyngeal exercises in conjunction with skilled demonstration and rationale on proper technique.   Effortful swallow -- 2 sets of 10 with good success  TBR ('kick') -- 3 sets of 10 with good success and retraction strength  Caren -- 2 sets of 10 with great success and timeliness of swallow trigger  Mendelsohn -- 2 sets of 10 with good success  Vocal Fold Closure -- 2 sets of 3 with fair success; low pitch and hoarseness impacting ability  Super-supraglottic Swallow -- 2 sets of 5 with good success upon provision of verbal cues for completion in unison with ST   *utilized model of pharyngeal swallow anatomy to further educate patient and  on respiratory and swallowing systems   *reviewed ST HEP; patient verbalized understanding       Review of Systems:   CONSTITUTIONAL: positive for  fatigue  EYES:  Wears glasses  HEENT:  + Pharyngeal Dysphagia  RESPIRATORY:  positive for dyspnea, wheezing and on trach currently with 28% of supplemental O2 per trach mask  CARDIOVASCULAR:  negative  GASTROINTESTINAL:  Dysphagia with PEG; nutrition per tube feedings and comfort sips of thin liquids  GENITOURINARY:  negative  SKIN:  Healing incision  HEMATOLOGIC/LYMPHATIC:  Recent anemia; s/p 1 unit PRBC's  MUSCULOSKELETAL:  positive for  myalgias, arthralgias, pain, stiff joints, decreased range of motion and muscle weakness  NEUROLOGICAL:  positive for speech problems, gait problems, dysphagia and weakness  BEHAVIOR/PSYCH:  positive for decreased energy level, fatigue and anxiety  10 point system review otherwise negative      Objective:  Vitals:    02/23/22 1530   BP:    Pulse:    Resp:    Temp:    SpO2: 95%   awake  Orientation:   person, place, time  Mood: within normal limits  Affect: anxious and calm  General appearance: well groomed and in no acute distress     Memory:   normal,   Attention/Concentration: normal  Language:  abnormal - hoarse voice; hypernasal; using her talking valve     Cranial Nerves:  cranial nerves II-XII are grossly intact  ROM:  abnormal - decreased left shoulder 2/2 adhesive capsulitis  Tone:  normal  Muscle bulk: normal  Sensory:  Sensory intact     Skin: warm and dry  Peripheral vascular: Pulses: Normal upper and lower extremity pulses; Edema: no       Diagnostics:   No results found for this or any previous visit (from the past 24 hour(s)). Impression:    · Status-post total abdominal hysterectomy, bilateral salpingo-oophorectomy on 1/24/22 at [address] [place] [address] secondary to postmenopausal bleeding via pfannenstiel incision. Final pathology benign.   · Hypercarbic respiratory failure, now on supplemental O2 per trach mask    · Tracheostomy status  · History of diaphragmatic and vocal cord paralysis (baseline right vocal cord and diaphragm paralysis with inability to cough, rare nasal regurgitation of liquids believed to be secondary to bulbar polio  · Pharyngeal dysphagia  · Status post PEG placement 2/8/2022   · Zenker's diverticulum noted on a modified barium swallow study of 2/13/2022; GI consult deferred while at [facility]. · Lactose intolerant  · Acute blood loss anemia; transfused 1 unit of packed red blood cells on 2/10/2022. · 1 lower respiratory culture positive for Serratia and the patient is status post vancomycin 1/30 through 1/31, and cefepime 1/30 through 2/ 6. · Negative lower extremity Dopplers on 2/2/22. · Gastroesophageal reflux disease  · Osteoarthritis  · Paralysis of vocal cords  · Bulbar polio  · Dysphagia, moderate to severe oropharyngeal  · Inhalation it and ingestion of other object causing obstruction of respiratory tract or suffocation  · Osteoporosis  · Hoarse voice  · Hypernasal speech  · Diaphragmatic paralysis  · Left adhesive capsulitis      Plan:  · Continue therapies. PT, OT, SLP  · Prophylaxis:  DVT: EPC cuffs; no oral anticoagulation secondary to bleeding risk. · GI:  Protonix 40 mg po bid   · Pain: OxyIR 5 mg po q 4 hours prn; Tylenol 650 mg per PEG q 6 hours prn;   · Nutrition:   Dietary to evaluate tube feedings to possibly decrease bolus volumes and increase bolus frequency to maintain adequate caloric intake  · Bowel: Glycolax 17 g po/PEG daily prn  · Anxiety: Zoloft 50 mg po/PEG  q day for anxiety   · Possible red button trial this week with SLP  · Monitor O2 needs   · Rehab Team Conferences Thursdays  · ENT consult for vocal cord paralysis and trach management, NO diaphragmatic paralysis noted on imaging.   ENT plan as above  · Planning discharge to home on Monday, 2/28/22        Keven Merino MD

## 2022-02-23 NOTE — PROGRESS NOTES
[facility] NOTE    Conference Date: 2022  Admit Date:  2/15/2022  4:18 PM  Patient Name: Paulino Santana    MRN: [id number]    : 1945  (64 y.o.)  Rehabilitation Admitting Diagnosis:  Acute respiratory failure ([facility] [address] [J96.00]  Referring Practitioner: eNry Reyes MD      CASE MANAGEMENT  Current issues/needs regarding patient and family discharge status: Patient has progressed well with PT/OT/ST towards goal of discharge home. Anticipated discharge home on Monday, 2022, with spouse, Ayanna Dodge. Continued discussion during care conference regarding progress and discharge recommendations. Tube feedings being arranged through [employer]. SW to follow and maintain involvement in discharge planning. PHYSICAL THERAPY  Patient is making good pressing, having met 5/5 STG and 0/5 LTG. Patient is able to perform rolling modified independent, supine <> sit with supervision, and all transfers with SBA with patient demonstrating good safety awareness and hand placement without need of verbal cues. Patient able to ambulate 100' without AD and SBA, with patient demonstrating increased time for completion, wide OTIS, and mild unsteadiness. Patient able to complete 1 step with SBA and without use of HR. Patient scored a 19/28 on the Tinetti, indicating she is at a high risk for falls. Patient overall continues to demonstrate mild unsteadiness with bilateral weight shifting and dynamic balance tasks, however is making good progress. Patient would benefit from continued skilled therapy to improve bed mobility, transfers, gait, balance, and stairs with improved functional mobility. Equipment Needed: Yes (Continue to assess pending progress (may require RW))    SPEECH THERAPY  Patient met 2/3 short term goals and 0/1 long term goals this reporting period.  Patient with self report of increased ease with consumption of thin liquid consistencies, however remains heavily reliant on compensatory strategies (head turn to right, multiple swallows, spontaneous throat clear) for management of po intake. Patient consuming less than 25% of meals due to severity of pharyngeal deficits, maintaining necessity for non-oral nutrition (PEG tube). Patient tolerating NMES treatments well in conjunction with pharyngeal strengthening exercises. With regards to use of speaking valve, patient tolerating PMV placement throughout the day (when awake), with recommendations for pulmonology to follow to determine appropriateness for  trials and potential decannulation. Pulmonology holding  trials pending ENT consult due to right vocal fold paralysis. ENT planning on fiberoptic laryngoscopy later today. Will await further recommendations with regards to tracheostomy management. Recommending repeat instrumental assessment 4-6 weeks from prior MBS which was completed on 2/11/2022. Patient would benefit from continued intensive speech therapy services to maximize pharyngeal strengthening and respiratory support, to optimize airway closure and pharyngeal clearance to increase po intake safely. OCCUPATIONAL THERAPY  Leana Rodriguez has made consistent progress since her IPR admission on 2/15. She has progressed to completing UB dressing with min A to fasten her bra. She is able to manage LB dressing and bathing tasks with SBA using cross legged technique and increased time. She is able to transfer on/off a standard height toilet with use of a grab bar on the wall. Recommendations for a grab bar to be installed as well as home safety recommendations have been provided. While not as frequent as when she was admitted, she continues to require rest breaks during ADL/IADL tasks and occasional cues for breathing to maintain 02 sats >90% during room air trial on 2/23. Overall, her activity tolerance is improving, which is leading to completion of IADLs and longer standing tasks with SBA.  Family education has been initiated with patient's spouse, Dean Crowder, in prep for discharge home on 2/28. Due to patient's performance deficits, patient would greatly benefit from home health occupational therapy for IADL remediation, endurance building, strengthening and education on energy conservation techniques to return to prior level of functioning and safe return to home environment. Other: Pt has a built in shower seat. Recommendations for a grab bar near toilet provided. RECREATIONAL THERAPY  Patient has been offered participation in recreational therapy activities and participates as able. Pt enjoys spending time with her family, attending grandchildren's activities, she does the cooking and the cleaning at home, she enjoys gardening and attends [employer]-very pleasant- present and very supportive -she would like to get her hair washed and styled by our beautician next week-Pt enjoyed getting her hair washed, trimmed and styled by our beautician this am-states it has been since January that she had her hair washed-affect bright and social-pt knew beautician's family and talked about her family too-so appreciative  -Pt and  visiting upon arrival-gave them a newspaper for today-affect bright and upbeat-supportive contact given-both excited for discharge home next week    NUTRITION  Weight: 128 lb 11.2 oz (58.4 kg) / Body mass index is 25.13 kg/m². Current diet: Diet NPO Exceptions are: Sips of Water with Meds  Please see nutrition note for details. NURSING  Continent of Bowel: yes  Continent of Bladderyes  Pain is Managed:Tylenol every 6 hours  Sleep: melatonin  Signs and Symptoms of Infection: no  Signs and Symptoms of Skin Breakdown: no  Injury and/or Falls during Inpatient Rehabilitation Admission: No  Anticoagulants:coumadin  Diabetic:none  Consultations/Labs/X-rays:no  Oxygen while on IP Rehab:No .      No results for input(s): POCGLU in the last 72 hours.     No results found for: [address], [employer], [employer]      Vitals:    02/24/22 0100 02/24/22 0245 02/24/22 0824 02/24/22 0848   BP:   137/60    Pulse:   72    Resp:   18    Temp:   98.9 °F (37.2 °C)    TempSrc:       SpO2: 90% 95% (!) 89% 91%   Weight:       Height:              Family Education: Tube feedings and trach care  Fall Risk:  Yes  Is the patient appropriate for a stay in the functional apartment? yes    Discharge Plan   Estimated Discharge Date: 2/28/22   Destination:home  Services at Discharge: PCP  Is patient appropriate for an outpatient driving evaluation? No  Equipment at Discharge: Other: Pt has a built in shower seat. Recommendations for a grab bar near toilet provided. Factors facilitating achievement of predicted outcomes: Family support, Motivated, Cooperative, Pleasant and Good insight into deficits  Barriers to the achievement of predicted outcomes: Decreased endurance, Long standing deficits and Medical complications  Follow up with physiatrist? no     Team Members Present at Conference:  [place] [address] [place], [place]  Occupational Therapist:Wendy [id number] [place] Peyton Balderas PT  Speech Juan Francisco Urenaos M.S. [address]   Kailyn Lawler, RN  Psychologist: Calixto Torres, PhD.    I approve the established interdisciplinary plan of care as documented within the medical record of Kira Hernandez.     Arpan Yost MD

## 2022-02-23 NOTE — PROGRESS NOTES
Department of Otolaryngology  Progress Note    Chief Complaint:  Weak voice    SUBJECTIVE:  Patient remains admitted to inpatient rehab. She reports that she continues to tolerate PMV and denies shortness of breath. She denies any acute changes/events overnight. No other symptoms or concerns reported at this time. Initial ENT HPI 2/22/22- The patient is a 64 y.o. female with past medical history of poliomyelitis, respiratory failure, vocal cord paralysis, who presented to [facility] on 2/15/22 as a transfer from [facility] for inpatient rehab. The patient was admitted to [facility] in January for JONI-BSO secondary to post-menopausal bleeding (pathology reportedly negative). The patient became hypercapnic at the end of the procedure and required reintubation in the PACU. She was intubated on 3 separate occasions during the admission due to attempts to extubate and quickly needing re-intubated. Per Care Everywhere, patient was last intubated on 12/28/21. The patient had a tracheostomy placed on 2/1/22. The patient has been following with ENT at [facility] for many years due to right vocal cord paralysis which is believed to be secondary to polio. 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 Patient had a PEG placed due to concern for aspiration. Patient last seen in office by Dr Lisa [facility]) on 9/13/21. His office note states \"true vocal cord on the right is immobile and at a lower level. The left has restricted motion (25% of normal motion). She can separate the cord to the commissure. There is a small notch in the posterior third. \" ENT consulted to evaluation of vocal cord paralysis and trach management    REVIEW OF SYSTEMS:    A complete multi-organ review of systems was performed and reviewed by me.   ENT:  negative except as noted in HPI  CONSTITUTIONAL:  negative except as noted in HPI  EYES:  negative except as noted in HPI  RESPIRATORY:  negative except as noted in HPI  CARDIOVASCULAR:  negative except as noted in HPI  GASTROINTESTINAL:  negative except as noted in HPI  GENITOURINARY:  negative except as noted in HPI  MUSCULOSKELETAL:  negative except as noted in HPI  SKIN:  negative except as noted in HPI  ENDOCRINE/METABOLIC: negative except as noted in HPI  HEMATOLOGIC/LYMPHATIC:  negative except as noted in HPI  ALLERGY/IMMUN: negative except as noted in HPI  NEUROLOGICAL:  negative except as noted in HPI  BEHAVIOR/PSYCH:  negative except as noted in HPI    OBJECTIVE      Physical  VITALS:  /62   Pulse 72   Temp 98.8 °F (37.1 °C) (Oral)   Resp 18   Ht 5' (1.524 m)   Wt 128 lb 11.2 oz (58.4 kg)   SpO2 95%   BMI 25.13 kg/m²     This is a 64 y.o. female. Patient is alert and oriented to person, place and time. Patient appears well developed, well nourished. Flat mood and affect with anxiety during discussion of her care. Voice continues to be weak. Tracheostomy in place with PMV. Patient appears to be tolerating PMV without evidence of respiratory distress. No palpable crepitus or edema of the neck. No audible wheezing or stridor is noted. Oral cavity is moist without lesions. Fiberoptic laryngoscopy performed bedside today. See Dr Kayla Hernandes for further description of findings.      Data  CBC:   Lab Results   Component Value Date    WBC 6.5 02/16/2022    RBC 2.88 02/16/2022    HGB 9.2 02/21/2022    HCT 30.7 02/21/2022    .4 02/16/2022    MCH 30.6 02/16/2022    MCHC 30.1 02/16/2022     02/16/2022    MPV 9.2 02/16/2022     BMP:    Lab Results   Component Value Date     02/16/2022    K 4.5 02/16/2022     02/16/2022    CO2 29 02/16/2022    BUN 18 02/16/2022    LABALBU 4.1 04/06/2021    CREATININE 0.4 02/16/2022    CALCIUM 9.1 02/16/2022    LABGLOM >90 02/16/2022    GLUCOSE 91 02/16/2022     Radiology Review:       CXR 2/21/22-  Findings:   Elevation the right hemidiaphragm with mild right basilar atelectasis. Blunting of both costophrenic angles. No pneumothorax. Tracheostomy tube in place.       Normal size heart. No pulmonary vascular congestion. No acute fracture.           Impression   1. Small bilateral pleural effusions. 2. Elevation of the right hemidiaphragm with mild right basilar    atelectasis.      FL less than 1 Hour to check diaphragmatic motion 2/22/22-  FINDINGS: The right hemidiaphragm is elevated. Diaphragmatic motion is reduced on the right side compared to the left side. There is no paradoxical motion to suggest paralysis.         Impression       No evidence of diaphragmatic paralysis.      Inpatient Medications  Current Facility-Administered Medications: furosemide (LASIX) tablet 20 mg, 20 mg, Oral, Daily  cycloSPORINE (RESTASIS) 0.05 % ophthalmic emulsion 2 drop, 2 drop, Both Eyes, Daily  pantoprazole (PROTONIX) tablet 40 mg, 40 mg, Oral, BID AC  sertraline (ZOLOFT) tablet 50 mg, 50 mg, Oral, Daily  melatonin tablet 6 mg, 6 mg, Oral, Nightly  magnesium hydroxide (MILK OF MAGNESIA) 400 MG/5ML suspension 30 mL, 30 mL, Per G Tube, Daily PRN  aluminum & magnesium hydroxide-simethicone (MAALOX) 200-200-20 MG/5ML suspension 30 mL, 30 mL, Oral, Q6H PRN  calcium carbonate (TUMS) chewable tablet 500 mg, 500 mg, Oral, TID PRN  magnesium oxide (MAG-OX) tablet 400 mg, 400 mg, Oral, Daily  oxyCODONE (ROXICODONE) immediate release tablet 5 mg, 5 mg, Oral, Q4H PRN  polyethylene glycol (GLYCOLAX) packet 17 g, 17 g, Oral, Daily PRN  traZODone (DESYREL) tablet 50 mg, 50 mg, Oral, Nightly  zinc oxide (PINXAV) 30 % ointment, , Topical, 4x Daily PRN  acetaminophen (TYLENOL) tablet 650 mg, 650 mg, PEG Tube, Q6H PRN  ipratropium (ATROVENT) 0.06 % nasal spray 2 spray, 2 spray, Each Nostril, TID PRN  multivitamin 1 tablet, 1 tablet, Oral, Daily  senna (SENOKOT) tablet 8.6 mg, 1 tablet, Oral, Daily PRN  meloxicam (MOBIC) tablet 15 mg, 15 mg, Oral, Daily  polyvinyl alcohol (LIQUIFILM TEARS) 1.4 %

## 2022-02-24 ENCOUNTER — ANESTHESIA (OUTPATIENT)
Dept: OPERATING ROOM | Age: 77
End: 2022-02-24

## 2022-02-24 VITALS — DIASTOLIC BLOOD PRESSURE: 72 MMHG | SYSTOLIC BLOOD PRESSURE: 163 MMHG | OXYGEN SATURATION: 100 %

## 2022-02-24 PROCEDURE — 0DH63UZ INSERTION OF FEEDING DEVICE INTO STOMACH, PERCUTANEOUS APPROACH: ICD-10-PCS | Performed by: OTOLARYNGOLOGY

## 2022-02-24 PROCEDURE — 6370000000 HC RX 637 (ALT 250 FOR IP): Performed by: PHYSICAL MEDICINE & REHABILITATION

## 2022-02-24 PROCEDURE — 97116 GAIT TRAINING THERAPY: CPT

## 2022-02-24 PROCEDURE — 2500000003 HC RX 250 WO HCPCS: Performed by: STUDENT IN AN ORGANIZED HEALTH CARE EDUCATION/TRAINING PROGRAM

## 2022-02-24 PROCEDURE — 3600000004 HC SURGERY LEVEL 4 BASE: Performed by: OTOLARYNGOLOGY

## 2022-02-24 PROCEDURE — 2500000003 HC RX 250 WO HCPCS: Performed by: NURSE ANESTHETIST, CERTIFIED REGISTERED

## 2022-02-24 PROCEDURE — 1180000000 HC REHAB R&B

## 2022-02-24 PROCEDURE — 2709999900 HC NON-CHARGEABLE SUPPLY: Performed by: OTOLARYNGOLOGY

## 2022-02-24 PROCEDURE — 94760 N-INVAS EAR/PLS OXIMETRY 1: CPT

## 2022-02-24 PROCEDURE — 7100000000 HC PACU RECOVERY - FIRST 15 MIN: Performed by: OTOLARYNGOLOGY

## 2022-02-24 PROCEDURE — 99233 SBSQ HOSP IP/OBS HIGH 50: CPT | Performed by: PHYSICAL MEDICINE & REHABILITATION

## 2022-02-24 PROCEDURE — 97110 THERAPEUTIC EXERCISES: CPT

## 2022-02-24 PROCEDURE — 97535 SELF CARE MNGMENT TRAINING: CPT

## 2022-02-24 PROCEDURE — 31526 DX LARYNGOSCOPY W/OPER SCOPE: CPT | Performed by: OTOLARYNGOLOGY

## 2022-02-24 PROCEDURE — 31622 DX BRONCHOSCOPE/WASH: CPT | Performed by: OTOLARYNGOLOGY

## 2022-02-24 PROCEDURE — 97530 THERAPEUTIC ACTIVITIES: CPT

## 2022-02-24 PROCEDURE — 0BJ08ZZ INSPECTION OF TRACHEOBRONCHIAL TREE, VIA NATURAL OR ARTIFICIAL OPENING ENDOSCOPIC: ICD-10-PCS | Performed by: OTOLARYNGOLOGY

## 2022-02-24 PROCEDURE — 6360000002 HC RX W HCPCS: Performed by: OTOLARYNGOLOGY

## 2022-02-24 PROCEDURE — 3700000000 HC ANESTHESIA ATTENDED CARE: Performed by: OTOLARYNGOLOGY

## 2022-02-24 PROCEDURE — 7100000001 HC PACU RECOVERY - ADDTL 15 MIN: Performed by: OTOLARYNGOLOGY

## 2022-02-24 PROCEDURE — 92526 ORAL FUNCTION THERAPY: CPT

## 2022-02-24 PROCEDURE — 2580000003 HC RX 258: Performed by: NURSE ANESTHETIST, CERTIFIED REGISTERED

## 2022-02-24 PROCEDURE — 3700000001 HC ADD 15 MINUTES (ANESTHESIA): Performed by: OTOLARYNGOLOGY

## 2022-02-24 PROCEDURE — 3600000014 HC SURGERY LEVEL 4 ADDTL 15MIN: Performed by: OTOLARYNGOLOGY

## 2022-02-24 PROCEDURE — 0CJS8ZZ INSPECTION OF LARYNX, VIA NATURAL OR ARTIFICIAL OPENING ENDOSCOPIC: ICD-10-PCS | Performed by: OTOLARYNGOLOGY

## 2022-02-24 PROCEDURE — 6360000002 HC RX W HCPCS: Performed by: NURSE ANESTHETIST, CERTIFIED REGISTERED

## 2022-02-24 PROCEDURE — 2580000003 HC RX 258: Performed by: ANESTHESIOLOGY

## 2022-02-24 RX ORDER — LABETALOL 20 MG/4 ML (5 MG/ML) INTRAVENOUS SYRINGE
Status: DISPENSED
Start: 2022-02-24 | End: 2022-02-25

## 2022-02-24 RX ORDER — SODIUM CHLORIDE 0.9 % (FLUSH) 0.9 %
5-40 SYRINGE (ML) INJECTION EVERY 12 HOURS SCHEDULED
Status: DISCONTINUED | OUTPATIENT
Start: 2022-02-24 | End: 2022-02-27 | Stop reason: ALTCHOICE

## 2022-02-24 RX ORDER — DEXAMETHASONE SODIUM PHOSPHATE 4 MG/ML
INJECTION, SOLUTION INTRA-ARTICULAR; INTRALESIONAL; INTRAMUSCULAR; INTRAVENOUS; SOFT TISSUE PRN
Status: DISCONTINUED | OUTPATIENT
Start: 2022-02-24 | End: 2022-02-24 | Stop reason: SDUPTHER

## 2022-02-24 RX ORDER — ROCURONIUM BROMIDE 10 MG/ML
INJECTION, SOLUTION INTRAVENOUS PRN
Status: DISCONTINUED | OUTPATIENT
Start: 2022-02-24 | End: 2022-02-24 | Stop reason: SDUPTHER

## 2022-02-24 RX ORDER — SODIUM CHLORIDE 9 MG/ML
INJECTION, SOLUTION INTRAVENOUS CONTINUOUS PRN
Status: DISCONTINUED | OUTPATIENT
Start: 2022-02-24 | End: 2022-02-24 | Stop reason: SDUPTHER

## 2022-02-24 RX ORDER — KETOROLAC TROMETHAMINE 30 MG/ML
30 INJECTION, SOLUTION INTRAMUSCULAR; INTRAVENOUS
Status: COMPLETED | OUTPATIENT
Start: 2022-02-24 | End: 2022-02-24

## 2022-02-24 RX ORDER — FENTANYL CITRATE 50 UG/ML
50 INJECTION, SOLUTION INTRAMUSCULAR; INTRAVENOUS EVERY 5 MIN PRN
Status: DISCONTINUED | OUTPATIENT
Start: 2022-02-24 | End: 2022-02-28

## 2022-02-24 RX ORDER — KETOROLAC TROMETHAMINE 30 MG/ML
INJECTION, SOLUTION INTRAMUSCULAR; INTRAVENOUS
Status: DISPENSED
Start: 2022-02-24 | End: 2022-02-25

## 2022-02-24 RX ORDER — MEPERIDINE HYDROCHLORIDE 25 MG/ML
12.5 INJECTION INTRAMUSCULAR; INTRAVENOUS; SUBCUTANEOUS EVERY 5 MIN PRN
Status: DISCONTINUED | OUTPATIENT
Start: 2022-02-24 | End: 2022-02-25

## 2022-02-24 RX ORDER — PROPOFOL 10 MG/ML
INJECTION, EMULSION INTRAVENOUS CONTINUOUS PRN
Status: DISCONTINUED | OUTPATIENT
Start: 2022-02-24 | End: 2022-02-24 | Stop reason: SDUPTHER

## 2022-02-24 RX ORDER — SODIUM CHLORIDE 9 MG/ML
25 INJECTION, SOLUTION INTRAVENOUS PRN
Status: DISCONTINUED | OUTPATIENT
Start: 2022-02-24 | End: 2022-02-28

## 2022-02-24 RX ORDER — LIDOCAINE HYDROCHLORIDE 20 MG/ML
INJECTION, SOLUTION INFILTRATION; PERINEURAL PRN
Status: DISCONTINUED | OUTPATIENT
Start: 2022-02-24 | End: 2022-02-24 | Stop reason: SDUPTHER

## 2022-02-24 RX ORDER — FENTANYL CITRATE 50 UG/ML
25 INJECTION, SOLUTION INTRAMUSCULAR; INTRAVENOUS EVERY 5 MIN PRN
Status: DISCONTINUED | OUTPATIENT
Start: 2022-02-24 | End: 2022-02-28

## 2022-02-24 RX ORDER — ONDANSETRON 2 MG/ML
4 INJECTION INTRAMUSCULAR; INTRAVENOUS
Status: ACTIVE | OUTPATIENT
Start: 2022-02-24 | End: 2022-02-24

## 2022-02-24 RX ORDER — PROPOFOL 10 MG/ML
INJECTION, EMULSION INTRAVENOUS PRN
Status: DISCONTINUED | OUTPATIENT
Start: 2022-02-24 | End: 2022-02-24 | Stop reason: SDUPTHER

## 2022-02-24 RX ORDER — LABETALOL 20 MG/4 ML (5 MG/ML) INTRAVENOUS SYRINGE
10 EVERY 10 MIN PRN
Status: DISCONTINUED | OUTPATIENT
Start: 2022-02-24 | End: 2022-03-01 | Stop reason: HOSPADM

## 2022-02-24 RX ORDER — SODIUM CHLORIDE 0.9 % (FLUSH) 0.9 %
5-40 SYRINGE (ML) INJECTION PRN
Status: DISCONTINUED | OUTPATIENT
Start: 2022-02-24 | End: 2022-02-28

## 2022-02-24 RX ADMIN — LABETALOL 20 MG/4 ML (5 MG/ML) INTRAVENOUS SYRINGE 10 MG: at 16:55

## 2022-02-24 RX ADMIN — TRAZODONE HYDROCHLORIDE 50 MG: 50 TABLET ORAL at 20:26

## 2022-02-24 RX ADMIN — SUGAMMADEX 200 MG: 100 INJECTION, SOLUTION INTRAVENOUS at 16:41

## 2022-02-24 RX ADMIN — SODIUM CHLORIDE, PRESERVATIVE FREE 10 ML: 5 INJECTION INTRAVENOUS at 23:31

## 2022-02-24 RX ADMIN — Medication 6 MG: at 20:26

## 2022-02-24 RX ADMIN — POLYVINYL ALCOHOL 1 DROP: 14 SOLUTION/ DROPS OPHTHALMIC at 23:32

## 2022-02-24 RX ADMIN — SUGAMMADEX 200 MG: 100 INJECTION, SOLUTION INTRAVENOUS at 16:16

## 2022-02-24 RX ADMIN — LIDOCAINE HYDROCHLORIDE 100 MG: 20 INJECTION, SOLUTION INFILTRATION; PERINEURAL at 15:44

## 2022-02-24 RX ADMIN — KETOROLAC TROMETHAMINE 30 MG: 30 INJECTION, SOLUTION INTRAMUSCULAR; INTRAVENOUS at 17:05

## 2022-02-24 RX ADMIN — DEXAMETHASONE SODIUM PHOSPHATE 10 MG: 4 INJECTION, SOLUTION INTRAMUSCULAR; INTRAVENOUS at 15:58

## 2022-02-24 RX ADMIN — PROPOFOL 150 MCG/KG/MIN: 10 INJECTION, EMULSION INTRAVENOUS at 15:47

## 2022-02-24 RX ADMIN — ROCURONIUM BROMIDE 40 MG: 10 INJECTION INTRAVENOUS at 16:06

## 2022-02-24 RX ADMIN — OXYCODONE 5 MG: 5 TABLET ORAL at 18:03

## 2022-02-24 RX ADMIN — PROPOFOL 100 MG: 10 INJECTION, EMULSION INTRAVENOUS at 15:44

## 2022-02-24 RX ADMIN — SODIUM CHLORIDE: 9 INJECTION, SOLUTION INTRAVENOUS at 15:37

## 2022-02-24 ASSESSMENT — PULMONARY FUNCTION TESTS
PIF_VALUE: 0
PIF_VALUE: 7
PIF_VALUE: 14
PIF_VALUE: 0
PIF_VALUE: 0
PIF_VALUE: 2
PIF_VALUE: 26
PIF_VALUE: 8
PIF_VALUE: 27
PIF_VALUE: 17
PIF_VALUE: 25
PIF_VALUE: 0
PIF_VALUE: 0
PIF_VALUE: 2
PIF_VALUE: 2
PIF_VALUE: 0
PIF_VALUE: 23
PIF_VALUE: 0
PIF_VALUE: 27
PIF_VALUE: 1
PIF_VALUE: 23
PIF_VALUE: 2
PIF_VALUE: 10
PIF_VALUE: 9
PIF_VALUE: 10
PIF_VALUE: 5
PIF_VALUE: 0
PIF_VALUE: 24
PIF_VALUE: 23
PIF_VALUE: 20
PIF_VALUE: 10
PIF_VALUE: 17
PIF_VALUE: 2
PIF_VALUE: 23
PIF_VALUE: 24
PIF_VALUE: 0
PIF_VALUE: 23
PIF_VALUE: 22
PIF_VALUE: 12
PIF_VALUE: 12
PIF_VALUE: 9
PIF_VALUE: 10
PIF_VALUE: 10
PIF_VALUE: 27
PIF_VALUE: 6
PIF_VALUE: 0
PIF_VALUE: 24
PIF_VALUE: 14
PIF_VALUE: 23
PIF_VALUE: 10
PIF_VALUE: 33
PIF_VALUE: 6
PIF_VALUE: 2
PIF_VALUE: 16
PIF_VALUE: 10
PIF_VALUE: 23
PIF_VALUE: 0
PIF_VALUE: 11
PIF_VALUE: 29
PIF_VALUE: 0
PIF_VALUE: 23
PIF_VALUE: 24
PIF_VALUE: 24
PIF_VALUE: 12
PIF_VALUE: 20
PIF_VALUE: 23

## 2022-02-24 ASSESSMENT — PAIN SCALES - GENERAL
PAINLEVEL_OUTOF10: 5
PAINLEVEL_OUTOF10: 4
PAINLEVEL_OUTOF10: 3
PAINLEVEL_OUTOF10: 5
PAINLEVEL_OUTOF10: 6
PAINLEVEL_OUTOF10: 4

## 2022-02-24 ASSESSMENT — PAIN DESCRIPTION - DESCRIPTORS: DESCRIPTORS: ACHING

## 2022-02-24 ASSESSMENT — PAIN DESCRIPTION - FREQUENCY: FREQUENCY: CONTINUOUS

## 2022-02-24 ASSESSMENT — PAIN DESCRIPTION - LOCATION: LOCATION: THROAT

## 2022-02-24 NOTE — PROGRESS NOTES
[address] [facility] THERAPY  [address] [facility]  DAILY NOTE    TIME   SLP Individual Minutes  Time In: 1100  Time Out: 1120  Minutes: 20  Timed Code Treatment Minutes: 0 Minutes       Date: 2022  Patient Name: Sherman Heard      CSN: [id number]   : 1945  (64 y.o.)  Gender: female   Referring Physician: Lynette Haile MD  Diagnosis: Debility  Secondary Diagnosis: Dysphagia, Dysphonia   Precautions: Fall risk, trach, PEG, aspiration  Current Diet: Full THIN liquid diet  Swallowing Strategies: Right head turn, small bites/sips, 3-4 swallows, CLOSE pulmonary monitoring  Date of Last MBS/FEES: 22    Pain:  No pain reported. Subjective:  Patient seated upright in recliner for session, pleasant and inquisitive about goals/POC.  also present. Missed 10 minutes of session due to SW/MD rounding; missed additional 30 minutes of session due to patient NPO status (limited therapeutic tasks available) for planned upcoming procedure. Short-Term Goals:  SHORT TERM GOAL #1:  Goal 1: Patient will safely consume thin liquid diet (IDDSI level 0) with use of compensatory strategies with goal of consuming 25-50% of po meal to improve pharyngeal function and increase caloric intake. INTERVENTIONS:  requesting review of flow test. ST outlined handout outlining steps for proper flow test completion. Education provided verbally in detail. Patient also inquiring about other foods that are able to be thinned out to the appropriately (IDDSI level 0). ST suggested smoothies that include peanut butter to satisfy patients sweet tooth. Provided x2 smoothie recipes that are high in protein to assist in caloric intake, and reviewed ways to thin out smoothies. Suggested making smoothies at home and adding milk, juice, or water, then completing flow test to determine if patient is able to safely consume. Patient and  verbalized understanding. It should be noted that although patient is able to safely consume a full liquid diet, this is NOT optimal for maximizing nutrition levels. WithOUT ongoing feedings via PEG, patient unlikely to maintain adequate nutrition and would be at HIGH risk for malnourishment which could result in further deconditioning and recurrent hospitalizations. SHORT TERM GOAL #2:  Goal 2: Patient will complete pharyngeal strengthening exercises x10 each with good success in order to improve pharyngeal weakness and overall airway protection. INTERVENTIONS: Reviewed recommendations to complete exercises 2-3x per day; patient receptive and verbalized understanding. Not able to complete this date due to patient not allowed PO intake in prep for upcoming ENT procedure. SHORT TERM GOAL #3:  Goal 3: Patient will trial red button consistently without respiratory distress to permit potential decannulation. INTERVENTIONS: Patient on room air. Reports trials of room air last night while sleeping too, however, resulted in lack of sleep due to patient worrying about adequate oxygen intake. Patient insightful regarding implementation of deep breathing techniques. High likelihood of eventual decannulation pending ENT and pulmonology recommendations. Will closely monitor chart following ENT post-op note this date to determine next steps related to decannulation. Long-Term Goals:  Timeframe for Long-term Goals: 3 weeks    LONG TERM GOAL #1:  Goal 1: Patient will complete repeat instrumental to reevaluate readiness for initiation of solids as clinically indicated.        Comprehension: 6 - Complex ideas 90% or device (hearing aid or glasses- if patient is primarily a visual learner)  Expression: 7 - Patient expresses complex ideas/needs  Social Interaction: 6 - Patient requires medication for mood and/or effect  Problem Solvin - Patient independent with complex tasks  Memory: 6 - Patient requires device to recall (e.g. memory book)    EDUCATION:  Learner: Patient and Significant Other  Education:  Reviewed diet and strategies, Reviewed signs, symptoms and risks of aspiration, Reviewed ST goals and Plan of Care, Reviewed recommendations for follow-up, Education Related to Potential Risks and Complications Due to Impairment/Illness/Injury, Education Related to Prevention of Recurrence of Impairment/Illness/Injury, Education Related to [employer] and Wellness and Swallowing HEP Respiratory/swallow coordination  Evaluation of Education: Verbalizes understanding       ASSESSMENT/PLAN:      Activity Tolerance:  Patient tolerance of treatment: good. Assessment/Plan: Patient progressing toward established goals. Continues to require skilled care of licensed speech pathologist to progress toward achievement of established goals and plan of care.    Plan for Next Session:  pharyngeal exercises with GERMANIA Scott M.S. [address] Luis Alberto KOHLI

## 2022-02-24 NOTE — PLAN OF CARE
Problem: Falls - Risk of:  Goal: Will remain free from falls  Description: Will remain free from falls  Outcome: Ongoing   Gait belt and walker used when transferring. Call light within reach and bed alarm on. Problem: Skin Integrity:  Goal: Absence of new skin breakdown  Description: Absence of new skin breakdown  Outcome: Ongoing   Patient turned self throughout the night. No new skin breakdown noted this shift. Problem: Activity:  Goal: Sleeping patterns will improve  Description: Sleeping patterns will improve  Outcome: Ongoing   Gave patient extra blankets because she was cold. Patient slept all night without interruption.

## 2022-02-24 NOTE — PROGRESS NOTES
[facility] 58  OCCUPATIONAL THERAPY MISSED TREATMENT NOTE  [facility]- [address]  [id number]      Date: 2022  Patient Name: Kenisha Flower        CSN: [id number]   : 1945  (64 y.o.)  Gender: female   Referring Practitioner: Dr Lalo Fonseca  Diagnosis: Acute Respiratory Failure         REASON FOR MISSED TREATMENT: Upon arrival Pt. Getting ready to have a CT and follow up with ENT. 30 mins of OT missed this date.

## 2022-02-24 NOTE — PROGRESS NOTES
[address] [facility] PHYSICAL THERAPY  DAILY NOTE  [address] [facility] - [id number]    Time In: 1130  Time Out: 1200  Timed Code Treatment Minutes: 30 Minutes  Minutes: 30          Date: 2022  Patient Name: Maxim Redmond,  Gender:  female        MRN: [id number]  : 1945  (64 y.o.)     Referring Practitioner: Caryn Gagnon MD  Diagnosis: Acute Respiratory Failure  Additional Pertinent Hx: Maxim Redmond  is a 64 y.o. female admitted to the inpatient rehabilitation unit on 2/15/2022. The patient was originally admitted to the [facility] 2022 to the gynecology oncology service secondary to postmenopausal bleeding. She started with the bleeding in early 2021 and was followed by Dr. Jomar Taylor here at [facility]. Ms. Kwaku Arnold presented to [facility] (/[facility])  for initial consultation for Post-menopausal Bleeding and failed Endometrial Biopsy. PMB started in 2021. Had pelvic US that showed EMS 9mm. EMB was attempted twice in the office, no endometrial cells were identified. She was therefore referred to the [facility] for additional management. She had no pelvic pain. Only intermittent spotting, no heavy bleeding. Had history of Lichen sclerosis, was prescribed estrogen cream and steroid cream. Patient underwent abdominal hysterectomy on 2022, BSO via P. Pfannenstiel incision. Final pathology benign. Her hospitalization was complicated by respiratory failure (now on 3L supplemental O2 per trach mask,) and has PEG for pharyngeal dysphagia.   Also has a PMH positive for GERD, OA,  Paralysis of vocal cords, Polio (bulbar), Dysphagia, Inhalation and ingestion of other object causing obstruction of respiratory tract or suffocation, Osteoporosis, Hoarse voice, Hypernasal speech, Diaphragm paralysis, and Left adhesive capsulitis     Prior Level of Function:  Lives With: Spouse  Type of Home: House  Home Layout: Two level  Home Access: Stairs to enter without rails  Entrance Stairs - Number of Steps: ~4 steps at the front of the house without railings and 2 steps in the garage. Patient reports she has a few steps in the back of the house with railings. Patient reports typically she enters house from garage  Home Equipment:  (Patient reports she does not have an equipment at home)   Bathroom Shower/Tub: Walk-in shower (Patient reports she has a built in shower chair)  Bathroom Toilet: Standard  Bathroom Equipment: Grab bars in shower,Built-in shower seat    ADL Assistance: Independent  Homemaking Assistance: Independent  Homemaking Responsibilities: Yes  Ambulation Assistance: Independent  Transfer Assistance: Independent  Active : Yes  Additional Comments: Patient reports she was independent PTA and did not utilize an AD for mobility. Patient reports her daugther and son in law live next door and also would be able to help out as needed. Restrictions/Precautions:  Restrictions/Precautions: General Precautions,Fall Risk  Position Activity Restriction  Other position/activity restrictions: PEG, trach collar/mask, No lifting >10lbs     SUBJECTIVE: Patient seated in recliner upon arrival. Patient spouse present in room and attended PT session. Patient agreeable to earlier PT session then scheduled due to patient having procedure at 2:30. Patient spouse very supportive with asking questions throughout regarding HEP and spouse and wife educated that PT will provide and educate both on HEP prior to discharge.      PAIN: 0/10    Vitals: Monitored O2 throughtout session with varying between 85-88% with activity and returned to >90% with verbal cueing for deep breathing and rest breaks    OBJECTIVE:  Bed Mobility:  Not Tested    Transfers:  Sit to Stand: Supervision  Stand to Sit:Supervision  To/From Bed and Chair: Supervision    Ambulation:  Contact Guard Assistance  Distance: 20 feet x 2   Surface: Uneven Surface  Device:No Device  Gait Deviations:  Decreased Step Length Bilaterally, Decreased Arm Swing, Decreased Gait Speed, Decreased Heel Strike Bilaterally, Wide Base of Support, Mild Path Deviations and Decreased B hip flexion resulting in decreased toe clearance  -Patient instructed in ambulation over uneven mat initially without UE support and SBA performed with normalized gait pattern, then instructed in marching and lateral side stepping x 2 reps each. Patient required intermittent rest breaks due to decreased endurance. Patient required verbal cueing for increased step height with marching in order to assist with toe clearance. Patient demonstrated slight unsteadiness however able to self correct. Balance:  Dynamic Standing Balance: Contact Guard Assistance   -Patient instructed in standing dynamic balance while bouncing ball with CGA with varying feet including normalized OTIS, tandem stance and stepping forward. Patient had no episodes of LOB however had slight unsteadiness with ability to self correct. Functional Outcome Measures: Not completed       ASSESSMENT:  Assessment: Patient progressing toward established goals. Activity Tolerance:  Patient tolerance of  treatment: good.       Equipment Recommendations:Equipment Needed: Yes (Continue to assess pending progress (may require RW))  Discharge Recommendations: Home with Home Health PT  Plan: Times per week: 5x/wk 90min; 1x/wk 30 min  Times per day: Daily  Current Treatment Recommendations: Strengthening,Neuromuscular Re-education,Home Exercise Program,ROM,Safety Education & Hina Austin Training,Patient/Caregiver Education & Training,Functional Mobility Training,Wheelchair Mobility Training,Transfer Training,Gait Training,Stair training    Patient Education  Patient Education: Transfers, Gait,  - Patient Verbalized Understanding, - Patient Requires Continued Education    Goals:  Patient goals : To return to home  Short term goals  Time Frame for Short term goals: 1 week  Short term goal 1: Patient to perform supine<>sit with SBA without railings in order to assist with getting into and out of bed. GOAL MET  Short term goal 2: Patient to perform sit to stand transfers wtih [address] with <=1 cue for hand placement in order to assist with safety with transfers from various surfaces. Short term goal 3: Patient to ambulate at least 125 feet without AD with SBA in order to assist with home mobility. Short term goal 4: Patient to ascend/descend 4 step with 1 handrail with SBA in order to assist with getting into and out of home. Short term goal 5: Patient to score at least 21/28 on the Tinetti in order to reduce risk for falls. Long term goals  Time Frame for Long term goals : 3 weeks  Long term goal 1: Patient to perform supine<>sit with Mod I without railings in order to assist with getting into and out of bed. Long term goal 2: Patient to perform sit to stand transfers with Mod I in order to assist with safety with transfers from various surfaces. Long term goal 3: Patient to ambulate at least 150 feet with RW with Mod I in order to assist with home mobility. Long term goal 4: Patient to ascend/descend 4 steps with 1 handrail with Supervision in order to assist with getting into and out of home. Long term goal 5: Patient to perform car transfer with Supervision in order to assist with getting to and from appointments. Following session, patient left in safe position with all fall risk precautions in place.

## 2022-02-24 NOTE — PROGRESS NOTES
Respiratory Care Artificial Airway Evaluation    The patient's airway is older than seven days. The patient's airway does not have panic sutures. Patient's cuff was deflated. Patient experiencing no problems.     Type of airway:  [x] Tracheostomy  [] Larngectomy    Size 7.5    Type  [x]Shiley adult flexible tracheostomy tube with taperguard cuff   []Shiley disposable cannula cuffed tracheostomy tube (DCT)   []Shiley diposable cannula fenestrated cuffed tracheostomy tube (DFEN)  []Shiley disposable cannula cuffed percutaneous tracheostomy tube (PERC)  []Shiley disposable cannula cuffless tracheostomy tube (DCFS)  [] Shiley disposable cannula cuffless fenestrated tracheostomy tube [facility])  []Shiley extended length cuffless tracheostomy tube  []Shiley extended length cuffed tracheostomy tube  []Shiley laryngectomy tube (LGT)  []Shiley single cannula cuffed tracheostomy tube (SCT)  []Portex Bivona silicone tracheostomy tube  []Other:    Length  []standard  []99 mm  []120 mm  []Other:

## 2022-02-24 NOTE — PLAN OF CARE
Problem: DISCHARGE BARRIERS  Goal: Patient's continuum of care needs are met  2/24/2022 1611 by NORMA Zhong  Note: Team conference held Thursday, 02/24/2022. Recommendations of the team were explained to the patient and spouse, Nithin Lora, by NORMA Yee, and Dr. Jeannette Aranda. Team is recommending that patient continue on acute inpatient rehab for four more days, with expected discharge date of Monday, 02/28/2022. Prior to discharge, therapy and nursing staff will continue to provide education. Following discharge, team is recommending home health care services for RN/PT/OT/ST/HHA. Care plan reviewed with patient and spouse, Nithin Lora. Patient and spouse, Nithin Lora, verbalized understanding of the plan of care and contributed to goal setting. SW arranged tube feedings and supplies through [employer] [facility] to meet with patient and spouse, Nithin Lora, on Monday, 02/28/2022, from 10:00am to 11:00am to provide education and supplies for discharge. Therapy board updated accordingly. SW to follow and maintain involvement in discharge planning.

## 2022-02-24 NOTE — PROGRESS NOTES
[address] [address]  Recreational Therapy  Daily Note  [address]    Time Spent with Patient: 10 minutes    Date:  2/24/2022       Patient Name: Maxim Redmnod      MRN: [id number]      YOB: 1945 (64 y.o.)       Gender: female  Diagnosis: Acute Respiratory Failure  Referring Practitioner: Dr Valentino Bright    RESTRICTIONS/PRECAUTIONS:  Restrictions/Precautions: General Precautions,Fall Risk  Vision: Impaired  Hearing: Within functional limits    PAIN: 0-no c/o pain     SUBJECTIVE:  Thank you    OBJECTIVE:  Pt and  visiting in room-content resting in her recliner-no leisure needs-pleasant-affect bright-supportive contact given         Patient Education  New Education Provided: Importance of Leisure,     Electronically signed by: Leopoldo Angelucci, CTRS  Date: 2/24/2022

## 2022-02-24 NOTE — OP NOTE
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; right true vocal fold post paralysis atrophy; secondary airway obstruction; mild suprastomal tracheal stenosis       Procedure(s):  DIAGNOSTIC SUSPENSION MICROLARYNGOSCOPY BRONCHOSCOPY WITH JET VENTILATION    Surgeon(s):  Юлия Richard MD    Assistant:   * No surgical staff found *    Anesthesia: General    Estimated Blood Loss (mL): Minimal    Complications: None    Specimens:   * No specimens in log *    Implants:  * No implants in log *      Drains:   Gastrostomy/Enterostomy/Jejunostomy Percutaneous Endoscopic Gastrostomy (PEG) LLQ (Active)   Drainage Appearance None 02/21/22 1904   Site Description Healing 02/22/22 2059   Drain Status Bolus;Clamped;Irrigated 02/23/22 0640   Surrounding Skin Intact 02/22/22 2059   Dressing Status Old drainage 02/21/22 1208   Dressing Type Split gauze 02/22/22 1358   Tube Feeding Other Tube Feeding (must specify product in comment) 02/22/22 1358   Tube Feeding Supplement (Product) Other (Comment) 02/21/22 2315   Tube Feeding Supplement Amount (mL) 130 mL 02/23/22 2331   Tube Feeding Intake (mL) 130 ml 02/21/22 2315   Free Water Flush (mL) 90 mL 02/23/22 2331   Tube Placement Verified Yes 02/23/22 2331   Residual Volume (ml) 0 ml 02/23/22 2331       Findings: 1. 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 2.  No posterior commissure webbing seen  3. Trachea with mild suprastomal stenosis and no other tracheostomy related pathology    Prior to neuromuscular blockade        Suprastomal Shelf                                                                  Detailed Description of Procedure:    The patient was taken to the operating room awake and placed in the supine position. General anesthesia was induced and the patient's tracheostomy cannula was connected to the ventilatory circuit of the operating room and the cuff was inflated. The table was turned 90 degrees for the procedure. I performed a timeout verifying the patient's identity and planned procedure. The patient was draped in usual fashion for transoral surgery. Placing a heat activated dental guard in her oral cavity to protect her maxillary dentition, I then passed a Huang laryngoscope into her laryngeal inlet to extend the airway anteriorly and provided direct line of sight view the airway. This was a limited view through direct visualization but an excellent view with a 30 degree optical telescope which majority I used to examine the patient's airway. I examined the larynx with neuromuscular blockade withheld initially in order to be able to see what the word the potential was in the patient's airway. Her airway was fully blocked and appeared to be closed secondary to a normal-appearing left true cord sealing the airway. Neuromuscular blockade was added and the glottis began to open with the left true vocal fold slowly moving out to paramedian position and the right side appearing fixed in place across the midline with the medially rotated arytenoid. Using right going alligator forceps, I open the airway manually and could easily get the left cricoarytenoid joint to move away from the midline. The left side however did not move away from the midline. I used instruments to attempt to move the arytenoid. The left side was easily removed. The right side was very difficult to move. In isolating this movement, it became clear that the patient's right cricoarytenoid joint complex was across the midline encroaching on the mobile left side.   The vocal fold on the right was also markedly hypoplastic to the point of very little muscular substance could be seen on that side. I turned my telescope posteriorly and could see no webbing between the 2 cricoarytenoid joint capsules. The posterior commissure was scaphoid. Diagnostic bronchoscopy: I then passed the glottic aperture and entered the proximal and distal airway without difficulty by holding ventilation and deflating the patient's cough. I found no signs of cicatricial stenosis. The patient had a shallow suprastomal tracheal shelf of less than 20% obstruction of the airway. No granulation tissue was seen. No ulceration was seen. The cuff was reinflated without vital sign instability. Based on these findings I consider the operation concluded. I removed the transoral hardware and the patient was reversed from general anesthesia and taken to recovery in satisfactory condition. There was minimal estimated blood loss in the patient received less than half a liter of intravenous lactated Ringer's intraoperatively. No blood products were transfused. Counts were considered accurate x3. I was present for and participated in the patient's entire operation. Disposition: The patient will be readmitted to her rehab bed to continue her inpatient rehab process. I have found her to have a surgically correctable condition but I will confirm with an awake endoscopy him the office prior to posterior for surgery. She is a candidate for an augmentation lateralization procedure of the right cricoarytenoid joint and the right true vocal fold. By this method the patient's right hemilarynx can be brought to be a reasonable foil for voicing, relatively straight structure against which she conceal her airway or swallowing, and adequate airway for breathing without the tracheostomy. I would leave the tracheostomy in place until she is fully recovered from the initial operation if she chooses to accept.       Electronically signed by Shahnaz Babcock MD on 2/24/2022 at 4:49 PM

## 2022-02-24 NOTE — PROGRESS NOTES
Glasses and voice box came back with patient to the OR, secured in a bag with a patient label on it. Will take to PACU so she can have them for recovery.

## 2022-02-24 NOTE — RT PROTOCOL NOTE
RT Inhaler-Nebulizer Bronchodilator Protocol Note    There is a bronchodilator order in the chart from a provider indicating to follow the RT Bronchodilator Protocol and there is an Initiate RT Inhaler-Nebulizer Bronchodilator Protocol order as well (see protocol at bottom of note). CXR Findings:  No results found. The findings from the last RT Protocol Assessment were as follows:   History Pulmonary Disease: None or smoker <15 pack years  Respiratory Pattern: Regular pattern and RR 12-20 bpm  Breath Sounds: Slightly diminished and/or crackles  Cough: Strong, spontaneous, non-productive  Indication for Bronchodilator Therapy: Decreased or absent breath sounds  Bronchodilator Assessment Score: 2    Aerosolized bronchodilator medication orders have been revised according to the RT Inhaler-Nebulizer Bronchodilator Protocol below. Respiratory Therapist to perform RT Therapy Protocol Assessment initially then follow the protocol. Repeat RT Therapy Protocol Assessment PRN for score 0-3 or on second treatment, BID, and PRN for scores above 3. No Indications - adjust the frequency to every 6 hours PRN wheezing or bronchospasm, if no treatments needed after 48 hours then discontinue using Per Protocol order mode. If indication present, adjust the RT bronchodilator orders based on the Bronchodilator Assessment Score as indicated below. Use Inhaler orders unless patient has one or more of the following: on home nebulizer, not able to hold breath for 10 seconds, is not alert and oriented, cannot activate and use MDI correctly, or respiratory rate 25 breaths per minute or more, then use the equivalent nebulizer order(s) with same Frequency and PRN reasons based on the score. If a patient is on this medication at home then do not decrease Frequency below that used at home.     0-3 - enter or revise RT bronchodilator order(s) to equivalent RT Bronchodilator order with Frequency of every 4 hours PRN for wheezing or increased work of breathing using Per Protocol order mode. 4-6 - enter or revise RT Bronchodilator order(s) to two equivalent RT bronchodilator orders with one order with BID Frequency and one order with Frequency of every 4 hours PRN wheezing or increased work of breathing using Per Protocol order mode. 7-10 - enter or revise RT Bronchodilator order(s) to two equivalent RT bronchodilator orders with one order with TID Frequency and one order with Frequency of every 4 hours PRN wheezing or increased work of breathing using Per Protocol order mode. 11-13 - enter or revise RT Bronchodilator order(s) to one equivalent RT bronchodilator order with QID Frequency and an Albuterol order with Frequency of every 4 hours PRN wheezing or increased work of breathing using Per Protocol order mode. Greater than 13 - enter or revise RT Bronchodilator order(s) to one equivalent RT bronchodilator order with every 4 hours Frequency and an Albuterol order with Frequency of every 2 hours PRN wheezing or increased work of breathing using Per Protocol order mode. RT to enter RT Home Evaluation for COPD & MDI Assessment order using Per Protocol order mode.     Electronically signed by Be Pereyra RCP on 2/24/2022 at 8:55 AM

## 2022-02-24 NOTE — PROGRESS NOTES
Patient: Fabiana Fitch  Unit/Bed: 7E-66/066-A  YOB: 1945  MRN: [id number] Acct: [de-identified]   Admitting Diagnosis: Acute respiratory failure ([facility] [J96.00]  Admit Date:  2/15/2022  Hospital Day: 9    Assessment:     Active Problems:    Acute respiratory failure ([facility]    Personal history of poliomyelitis  Resolved Problems:    * No resolved hospital problems. *      Plan:     Check lytes in a few days. Continue the lasix        Subjective:     Patient has no complaint of CP or SOB and notices the increased urine output. .   Medication side effects: none    Scheduled Meds:   furosemide  20 mg Oral Daily    cycloSPORINE  2 drop Both Eyes Daily    pantoprazole  40 mg Oral BID AC    sertraline  50 mg Oral Daily    melatonin  6 mg Oral Nightly    magnesium oxide  400 mg Oral Daily    traZODone  50 mg Oral Nightly    multivitamin  1 tablet Oral Daily    meloxicam  15 mg Oral Daily    polyvinyl alcohol  1 drop Both Eyes BID     Continuous Infusions:  PRN Meds:magnesium hydroxide, aluminum & magnesium hydroxide-simethicone, calcium carbonate, oxyCODONE, polyethylene glycol, zinc oxide, acetaminophen, ipratropium, senna, ipratropium-albuterol    Review of Systems  Pertinent items are noted in HPI. Objective:     Patient Vitals for the past 8 hrs:   BP Temp Pulse Resp SpO2   02/24/22 0848 -- -- -- -- 91 %   02/24/22 0824 137/60 98.9 °F (37.2 °C) 72 18 (!) 89 %   02/24/22 0245 -- -- -- -- 95 %     I/O last 3 completed shifts: In: 1900 [P.O.:420; NG/GT:1480]  Out: -   No intake/output data recorded.     /60   Pulse 72   Temp 98.9 °F (37.2 °C)   Resp 18   Ht 5' (1.524 m)   Wt 128 lb 11.2 oz (58.4 kg)   SpO2 91%   BMI 25.13 kg/m²     General appearance: alert, appears stated age and cooperative  Head: Normocephalic, without obvious abnormality, atraumatic  Lungs: clear to auscultation bilaterally  Heart: regular rate and rhythm, S1, S2 normal, no murmur, click, rub or gallop  Abdomen: soft, non-tender; bowel sounds normal; no masses,  no organomegaly  Extremities: edema less tight  Skin: Skin color, texture, turgor normal. No rashes or lesions  Neurologic: weak    Electronically signed by Edith Grossman MD on 2/24/2022 at 10:15 AM

## 2022-02-24 NOTE — PROGRESS NOTES
[address]  Occupational Therapy  Daily Note  Time:   Time In: 1000  Time Out: 1100  Timed Code Treatment Minutes: 60 Minutes  Minutes: 60    Date: 2022  Patient Name: Donnell Hernandes,   Gender: female      Room: [facility]66/066-A  MRN: [id number]  : 1945  (64 y.o.)  Referring Practitioner: Dr Sathya Coronel  Diagnosis: Acute Respiratory Failure  Additional Pertinent Hx: Donnell Hernandes  is a 64 y.o. female admitted to the inpatient rehabilitation unit on 2/15/2022. The patient was originally admitted to the [facility] 2022 to the gynecology oncology service secondary to postmenopausal bleeding. She started with the bleeding in early 2021 and was followed by Dr. Ayla Ferrara here at [facility]. Ms. Yg Forrest presented to [facility] (/[facility])  for initial consultation for Post-menopausal Bleeding and failed Endometrial Biopsy. PMB started in 2021. Had pelvic US that showed EMS 9mm. EMB was attempted twice in the office, no endometrial cells were identified. She was therefore referred to the [facility] at [facility] for additional management. She had no pelvic pain. Only intermittent spotting, no heavy bleeding. Had history of Lichen sclerosis, was prescribed estrogen cream and steroid cream. Patient underwent abdominal hysterectomy on 2022, BSO via P. Pfannenstiel incision. Final pathology benign. Her hospitalization was complicated by respiratory failure (now on 3L supplemental O2 per trach mask,) and has PEG for pharyngeal dysphagia. Also has a PMH positive for GERD, OA,  Paralysis of vocal cords, Polio (bulbar), Dysphagia, Inhalation and ingestion of other object causing obstruction of respiratory tract or suffocation, Osteoporosis, Hoarse voice, Hypernasal speech, Diaphragm paralysis, and Left adhesive capsulitis.     Restrictions/Precautions:  Restrictions/Precautions: General Precautions,Fall Risk  Position Activity Restriction  Other position/activity restrictions: PEG, trach collar/mask, No lifting >10lbs     SUBJECTIVE: Patient pleasant and cooperative. Agreeable to OT. Supportive  present. PAIN: Denies pain      Vitals: Oxygen: spot checked throughout session, on room air: drops to 81 with activity but quickly recovers > 93 with pursed lip breathing     COGNITION: WFL    ADL:   Grooming: Supervision. hand hygiene, 2x this session. Toileting: Supervision. during clothing mgmt and hygiene  Toilet Transfer: Supervision. Cruz Cevrantes BALANCE:  Sitting Balance:  Modified Independent. Standing Balance: Supervision. BED MOBILITY:  Not Tested    TRANSFERS:  Sit to Stand:  Supervision. recliner, standard chair, w/c,   Stand to Sit: Supervision. FUNCTIONAL MOBILITY:  Assistive Device: None  Assist Level:  Stand By Assistance. Distance: within apartment, within room       ADDITIONAL ACTIVITIES:  Patient completed IADL task of retrieving linens from graded planes and heights including floor level, ambulating with SBA emerging supervision level. Skilled edu on using reacher to retrieve items for energy conservation + to maintain 02 sat's, pt was able to complete without issue. After a seated rest break, Pt then stood to fold linens without UE support standing x 4.5 min no LOB. Pt completed IADL task of making the bed, completing standing portions with supervision and demo no LOB, standing x6 min with mod fatigue exhibited     Skilled education on energy conservation and deep breathing techniques with handout provided - pt demo good insight        ASSESSMENT:     Activity Tolerance:  Patient tolerance of  treatment: good. Discharge Recommendations: Home with Home Health OT  Equipment Recommendations: Other: Pt has a built in shower seat. Recommendations for a grab bar near toilet provided.   Plan: Times per week: 5x/wk for 90 min and 1x/wk for 30 min  Times per day: Daily  Current Treatment Recommendations: Strengthening,Balance Training,Endurance Training,Functional Mobility Training,Equipment Evaluation, Education, & procurement,Patient/Caregiver Education & Training,Self-Care / ADL,Safety Education & Training    Patient Education  Patient Education: ADL's, IADL's, Energy Conservation, Family Education, Equipment Education and Reviewed Prior Education    Goals  Short term goals  Time Frame for Short term goals: 1 week  Short term goal 1: Pt will complete UB dressing tasks with mod I to improve indep with donning her bra at home. Short term goal 2: Pt will complete LB dressing tasks with mod I to improve indep with donning socks and shoes at home. Short term goal 3: Pt will tolerate 4 minute standing tasks with Mod I during shayy release tasks to improve indep with sinkside grooming. Short term goal 4: Pt will ambulate to and from bathroom with SBA and RW prn to improve activity tolerance needed for bathing. Short term goal 5: Pt will tolerate dynamic standing IADL tasks with Mod I to meet patient goal of baking cookies with grandchildren. Long term goals  Time Frame for Long term goals : 1 week  Long term goal 1: Pt will complete bathing and shower transfer with mod I to improve indep with showering at home. Long term goal 2: Pt will complete dressing and grooming tasks with mod I to improve indep with self care at home. Long term goal 3: Pt will complete a simple meal prep tasks with mod I and 0 vcs for ECT to improve indep with preparing lunch at home. Following session, patient left in safe position with all fall risk precautions in place.

## 2022-02-24 NOTE — PROGRESS NOTES
Physical Medicine & Rehabilitation   Progress Note      Tanika Ragland MD     Chief Complaint:    Respiratory Failure with subsequent Debility; s/p JONI with BSO    Subjective: Patient was seen on rounds with LAYLA Bernardo, AIDENW, following patient's inpatient Rehab Team Conference. We confirmed her date of discharge from the IPR Unit to home on 2/28/22 with [facility] PT, OT, ST, RN, and HHA. She was not using supplemental O2 this morning and her O2 sats dropped to 85% during exercise, but she recovered quickly after a few deep breaths. ENT Dr. Hughes Sanam taking her to the OR this afternoon.       Rehabilitation:  PT:  Reviewed during team conference  OT:  Reviewed during team conference  ST:   Reviewed during team conference       Review of Systems:   CONSTITUTIONAL:  positive for  fatigue  EYES:  Wears glasses  HEENT:  + Pharyngeal Dysphagia  RESPIRATORY:  positive for dyspnea, wheezing and on intermittent supplemental O2 per trach mask  CARDIOVASCULAR:  negative  GASTROINTESTINAL:  Dysphagia with PEG; nutrition per tube feedings and comfort sips of thin liquids  GENITOURINARY:  negative  SKIN:  Healing incision  HEMATOLOGIC/LYMPHATIC:  Recent anemia; s/p 1 unit PRBC's  MUSCULOSKELETAL:  positive for  myalgias, arthralgias, pain, stiff joints, decreased range of motion and muscle weakness  NEUROLOGICAL:  positive for speech problems, gait problems, dysphagia and weakness  BEHAVIOR/PSYCH:  positive for decreased energy level, fatigue and anxiety  10 point system review otherwise negative      Objective:  Vitals:    02/24/22 0848   BP:    Pulse:    Resp:    Temp:    SpO2: 91%   awake  Orientation:   person, place, time  Mood: within normal limits  Affect: anxious and calm  General appearance: well groomed and in no acute distress     Memory:   normal,   Attention/Concentration: normal  Language:  abnormal - hoarse voice; hypernasal; using her talking valve     Cranial Nerves:  cranial nerves II-XII are grossly intact  ROM:  abnormal - decreased left shoulder 2/2 adhesive capsulitis  Tone:  normal  Muscle bulk: normal  Sensory:  Sensory intact     Skin: warm and dry  Peripheral vascular: Pulses: Normal upper and lower extremity pulses; Edema: no       Diagnostics:   No results found for this or any previous visit (from the past 24 hour(s)). Impression:    · Status-post total abdominal hysterectomy, bilateral salpingo-oophorectomy on 1/24/22 at [facility] secondary to postmenopausal bleeding via pfannenstiel incision. Final pathology benign. · Hypercarbic respiratory failure, now on supplemental O2 per trach mask    · Tracheostomy status  · History of diaphragmatic and vocal cord paralysis (baseline right vocal cord and diaphragm paralysis with inability to cough, rare nasal regurgitation of liquids believed to be secondary to bulbar polio  · Pharyngeal dysphagia  · Status post PEG placement 2/8/2022   · Zenker's diverticulum noted on a modified barium swallow study of 2/13/2022; GI consult deferred while at [facility]. · Lactose intolerant  · Acute blood loss anemia; transfused 1 unit of packed red blood cells on 2/10/2022. · 1 lower respiratory culture positive for Serratia and the patient is status post vancomycin 1/30 through 1/31, and cefepime 1/30 through 2/ 6. · Negative lower extremity Dopplers on 2/2/22. · Gastroesophageal reflux disease  · Osteoarthritis  · Paralysis of vocal cords  · Bulbar polio  · Dysphagia, moderate to severe oropharyngeal  · Inhalation it and ingestion of other object causing obstruction of respiratory tract or suffocation  · Osteoporosis  · Hoarse voice  · Hypernasal speech  · Diaphragmatic paralysis  · Left adhesive capsulitis      Plan:  · Continue therapies. PT, OT, SLP  · Prophylaxis:  DVT: EPC cuffs; no oral anticoagulation secondary to bleeding risk.    · GI:  Protonix 40 mg po bid   · Pain: OxyIR 5 mg po q 4 hours prn; Tylenol 650 mg per PEG q 6 hours prn;   · Nutrition: Dietary to evaluate tube feedings to possibly decrease bolus volumes and increase bolus frequency to maintain adequate caloric intake  · Bowel: Glycolax 17 g po/PEG daily prn  · Anxiety: Zoloft 50 mg po/PEG  q day for anxiety   · Possible red button trial this week with SLP  · Monitor O2 needs   · Rehab Team Conferences Thursdays  · ENT consult for vocal cord paralysis and trach management, NO diaphragmatic paralysis noted on imaging.   ENT plan as above  · Planning discharge to home on Monday, 2/28/22   · OR this afternoon for patient with Dr. Brit Lazo     Missed time:  OT missed 30 minutes today for testing-CT and ENT    Sharath Trinh MD

## 2022-02-24 NOTE — ANESTHESIA PRE PROCEDURE
Department of Anesthesiology  Preprocedure Note       Name:  Caron Nunez   Age:  64 y.o.  :  1945                                          MRN:  [id number]         Date:  2022      Surgeon: Michael Dias):  Jermaine Verdin MD    Procedure: Procedure(s):  DIAGNOSTIC SUSPENSION MICROLARYNGOSCOPY BRONCHOSCOPY WITH JET VENTILATION    Medications prior to admission:   Prior to Admission medications    Medication Sig Start Date End Date Taking? Authorizing Provider   ipratropium (ATROVENT) 0.06 % nasal spray 2 sprays by Nasal route 3 times daily as needed 20  Yes Historical Provider, MD   meloxicam (MOBIC) 7.5 MG tablet Take 15 mg by mouth daily    Yes Historical Provider, MD   calcium carbonate (OSCAL) 500 MG TABS tablet Take 500 mg by mouth daily   Yes Historical Provider, MD   Multiple Vitamins-Minerals (PRESERVISION AREDS 2) CAPS Take  by mouth 2 times daily.  2 tabs BID   Yes Historical Provider, MD   multivitamin (THERAGRAN) per tablet Take 1 tablet by mouth daily Alive 50+   Yes Historical Provider, MD       Current medications:    Current Facility-Administered Medications   Medication Dose Route Frequency Provider Last Rate Last Admin    furosemide (LASIX) tablet 20 mg  20 mg Oral Daily Joao Lux MD   20 mg at 22 1600    cycloSPORINE (RESTASIS) 0.05 % ophthalmic emulsion 2 drop  2 drop Both Eyes Daily Joao Lux MD   2 drop at 22 1021    pantoprazole (PROTONIX) tablet 40 mg  40 mg Oral BID AC Maria Guadalupe Ireland MD   40 mg at 22 1900    sertraline (ZOLOFT) tablet 50 mg  50 mg Oral Daily Maria Guadalupe Ireland MD   50 mg at 22 1001    melatonin tablet 6 mg  6 mg Oral Nightly Maria Guadalupe Ireland MD   6 mg at 22 2030    magnesium hydroxide (MILK OF MAGNESIA) 400 MG/5ML suspension 30 mL  30 mL Per G Tube Daily PRN Maria Guadalupe Ireland MD   30 mL at 225    aluminum & magnesium hydroxide-simethicone (MAALOX) 200-200-20 MG/5ML suspension 30 mL  30 mL Oral Q6H PRN Titus Agustin MD   30 mL at 02/19/22 1819    calcium carbonate (TUMS) chewable tablet 500 mg  500 mg Oral TID PRN Titus Agustin MD        magnesium oxide (MAG-OX) tablet 400 mg  400 mg Oral Daily Titus Agustin MD   400 mg at 02/23/22 0945    oxyCODONE (ROXICODONE) immediate release tablet 5 mg  5 mg Oral Q4H PRN Titus Agustin MD        polyethylene glycol [facility]) packet 17 g  17 g Oral Daily PRN Titus Agustin MD        traZODone (DESYREL) tablet 50 mg  50 mg Oral Nightly Titus Agustin MD   50 mg at 02/23/22 2030    zinc oxide (PINXAV) 30 % ointment   Topical 4x Daily PRN Titus Agustin MD        acetaminophen (TYLENOL) tablet 650 mg  650 mg PEG Tube Q6H PRN Titus Agustin MD   650 mg at 02/16/22 1020    ipratropium (ATROVENT) 0.06 % nasal spray 2 spray  2 spray Each Nostril TID PRN Titus Agustin MD        multivitamin 1 tablet  1 tablet Oral Daily Titus Agustin MD   1 tablet at 02/23/22 0945    senna (SENOKOT) tablet 8.6 mg  1 tablet Oral Daily PRN Titus Agustin MD        meloxicam BALWINDER COBIAN [facility]) tablet 15 mg  15 mg Oral Daily Titus Agustin MD   15 mg at 02/23/22 0945    polyvinyl alcohol (LIQUIFILM TEARS) 1.4 % ophthalmic solution 1 drop  1 drop Both Eyes BID Titus Agustin MD   1 drop at 02/22/22 2251    ipratropium-albuterol (DUONEB) nebulizer solution 1 ampule  1 ampule Inhalation Q4H PRN Titus Agustin MD   1 ampule at 02/21/22 0130       Allergies:     Allergies   Allergen Reactions    Latex        Problem List:    Patient Active Problem List   Diagnosis Code    Age-related osteoporosis without current pathological fracture M81.0    Acute respiratory failure ([address] J96.00    Personal history of poliomyelitis Z86.12       Past Medical History:        Diagnosis Date    GERD (gastroesophageal reflux disease)     Osteoarthritis     Paralysis of vocal cords     Polio        Past Surgical History:        Procedure Laterality Date    CHOLECYSTECTOMY      KNEE SURGERY Right       Social History:    Social History     Tobacco Use    Smoking status: Never Smoker    Smokeless tobacco: Never Used   Substance Use Topics    Alcohol use: Yes     Comment: Seldom                                Counseling given: Not Answered      Vital Signs (Current):   Vitals:    02/24/22 0100 02/24/22 0245 02/24/22 0824 02/24/22 0848   BP:   137/60    Pulse:   72    Resp:   18    Temp:   98.9 °F (37.2 °C)    TempSrc:       SpO2: 90% 95% (!) 89% 91%   Weight:       Height:                                                  BP Readings from Last 3 Encounters:   02/24/22 137/60   10/05/21 (!) 146/66   04/28/21 (!) 157/70       NPO Status:                                                                                 BMI:   Wt Readings from Last 3 Encounters:   02/21/22 128 lb 11.2 oz (58.4 kg)   10/05/21 112 lb 3.2 oz (50.9 kg)   04/06/21 119 lb 3.2 oz (54.1 kg)     Body mass index is 25.13 kg/m². CBC:   Lab Results   Component Value Date    WBC 6.5 02/16/2022    RBC 2.88 02/16/2022    HGB 9.2 02/21/2022    HCT 30.7 02/21/2022    .4 02/16/2022     02/16/2022       CMP:   Lab Results   Component Value Date     02/16/2022    K 4.5 02/16/2022     02/16/2022    CO2 29 02/16/2022    BUN 18 02/16/2022    CREATININE 0.4 02/16/2022    LABGLOM >90 02/16/2022    GLUCOSE 91 02/16/2022    PROT 6.4 04/06/2021    CALCIUM 9.1 02/16/2022    BILITOT 0.2 04/06/2021    ALKPHOS 62 04/06/2021    AST 25 04/06/2021    ALT 18 04/06/2021       POC Tests: No results for input(s): POCGLU, POCNA, POCK, POCCL, POCBUN, POCHEMO, POCHCT in the last 72 hours.     Coags: No results found for: PROTIME, INR, APTT    HCG (If Applicable): No results found for: PREGTESTUR, PREGSERUM, HCG, HCGQUANT     ABGs: No results found for: PHART, PO2ART, RJY5DMY, AHK3EII, BEART, O4JJYDHX     Type & Screen (If Applicable):  No results found for: LABABO, LABRH    Drug/Infectious Status (If Applicable):  No results found for: HIV, HEPCAB    COVID-19 Screening (If Applicable): No results found for: COVID19        Anesthesia Evaluation  Patient summary reviewed and Nursing notes reviewed no history of anesthetic complications:   Airway: Mallampati: II  TM distance: >3 FB   Neck ROM: limited  Mouth opening: > = 3 FB Dental:          Pulmonary:   (+) decreased breath sounds,                            ROS comment: H/o acute respiratory failure - pt has tracheotomy   Vocal cord paralysis - pt is hoarse   Cardiovascular:Negative CV ROS  Exercise tolerance: good (>4 METS),                     Neuro/Psych:                ROS comment: H/o polio GI/Hepatic/Renal:   (+) GERD:,           Endo/Other: Negative Endo/Other ROS             Pt had no PAT visit       Abdominal:             Vascular: negative vascular ROS. Other Findings:             Anesthesia Plan      general     ASA 3       Induction: intravenous. MIPS: Prophylactic antiemetics administered. Anesthetic plan and risks discussed with patient and spouse. Plan discussed with CRNA.                   [address] [employer], DO   2/24/2022

## 2022-02-24 NOTE — PLAN OF CARE
Problem: OXYGENATION/RESPIRATORY FUNCTION  Goal: Patient will maintain patent airway  Outcome: Ongoing     Problem: Falls - Risk of:  Goal: Will remain free from falls  Description: Will remain free from falls  2/24/2022 0910 by Femi Almendarez RN  Outcome: Ongoing  2/24/2022 0128 by Ton Arredondo LPN  Outcome: Ongoing  Goal: Absence of physical injury  Description: Absence of physical injury  Outcome: Ongoing     Problem: Pain:  Goal: Pain level will decrease  Description: Pain level will decrease  Outcome: Ongoing  Goal: Control of acute pain  Description: Control of acute pain  Outcome: Ongoing  Goal: Control of chronic pain  Description: Control of chronic pain  Outcome: Ongoing     Problem: Skin Integrity:  Goal: Risk for impaired skin integrity will decrease  Description: Risk for impaired skin integrity will decrease  Outcome: Ongoing  Goal: Will show no infection signs and symptoms  Description: Will show no infection signs and symptoms  Outcome: Ongoing  Goal: Absence of new skin breakdown  Description: Absence of new skin breakdown  2/24/2022 0910 by Femi Almendarez RN  Outcome: Ongoing  2/24/2022 0128 by Ton Arredondo LPN  Outcome: Ongoing     Problem:  Activity:  Goal: Sleeping patterns will improve  Description: Sleeping patterns will improve  2/24/2022 0910 by Femi Almendarez RN  Outcome: Ongoing  2/24/2022 0128 by Ton Arredondo LPN  Outcome: Ongoing     Problem: Nutrition  Goal: Optimal nutrition therapy  Outcome: Ongoing     Problem: IP MOBILITY  Goal: LTG - patient will demonstrate safe mobility requirements  Outcome: Ongoing     Problem: IP DRESSINGS LOWER EXTREMITIES  Goal: LTG - patient will dress lower body with or without assistive device  Outcome: Ongoing     Problem: IP SWALLOWING  Goal: LTG - patient will tolerate the least restrictive diet consistency to allow for safe consumption of daily meals  Outcome: Ongoing     Problem: DISCHARGE BARRIERS  Goal: Patient's continuum of care needs are met  Outcome: Ongoing

## 2022-02-24 NOTE — BRIEF OP NOTE
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; right true vocal fold post paralysis atrophy; secondary airway obstruction; mild suprastomal tracheal stenosis       Procedure(s):  DIAGNOSTIC SUSPENSION MICROLARYNGOSCOPY BRONCHOSCOPY WITH JET VENTILATION; correction: No jet ventilation    Surgeon(s):  Jazmin Jones MD    Assistant:  IMELDA Coley  * No surgical staff found *    Anesthesia: General    Estimated Blood Loss (mL): Minimal    Complications: None    Specimens:   * No specimens in log *    Implants:  * No implants in log *      Drains:   Gastrostomy/Enterostomy/Jejunostomy Percutaneous Endoscopic Gastrostomy (PEG) LLQ (Active)   Drainage Appearance None 02/21/22 1904   Site Description Healing 02/22/22 2059   Drain Status Bolus;Clamped;Irrigated 02/23/22 0640   Surrounding Skin Intact 02/22/22 2059   Dressing Status Old drainage 02/21/22 1208   Dressing Type Split gauze 02/22/22 1358   Tube Feeding Other Tube Feeding (must specify product in comment) 02/22/22 1358   Tube Feeding Supplement (Product) Other (Comment) 02/21/22 2315   Tube Feeding Supplement Amount (mL) 130 mL 02/23/22 2331   Tube Feeding Intake (mL) 130 ml 02/21/22 2315   Free Water Flush (mL) 90 mL 02/23/22 2331   Tube Placement Verified Yes 02/23/22 2331   Residual Volume (ml) 0 ml 02/23/22 2331       Findings: 1. 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 2.  No posterior commissure webbing seen  3.   Trachea with mild suprastomal stenosis and no other tracheostomy related pathology    Electronically signed by Jazmin Jones MD on 2/24/2022 at 4:45 PM

## 2022-02-24 NOTE — PROGRESS NOTES
[address] [address]  INPATIENT PHYSICAL THERAPY  DAILY NOTE  [address] [facility],4[address] Floor - [id number]    Time In: 0700  Time Out: 0800  Timed Code Treatment Minutes: 60 Minutes  Minutes: 60          Date: 2022  Patient Name: Darius Escalante,  Gender:  female        MRN: [id number]  : 1945  (64 y.o.)     Referring Practitioner: Claudette Kim MD  Diagnosis: Acute Respiratory Failure  Additional Pertinent Hx: Darius Escalante  is a 64 y.o. female admitted to the inpatient rehabilitation unit on 2/15/2022. The patient was originally admitted to the [facility] 2022 to the gynecology oncology service secondary to postmenopausal bleeding. She started with the bleeding in early 2021 and was followed by Dr. Margarito Maurer here at [facility]. Ms. Jazmine Hernandez presented to [facility] (/[facility])  for initial consultation for Post-menopausal Bleeding and failed Endometrial Biopsy. PMB started in 2021. Had pelvic US that showed EMS 9mm. EMB was attempted twice in the office, no endometrial cells were identified. She was therefore referred to the [facility] at [facility] 22 for additional management. She had no pelvic pain. Only intermittent spotting, no heavy bleeding. Had history of Lichen sclerosis, was prescribed estrogen cream and steroid cream. Patient underwent abdominal hysterectomy on 2022, BSO via P. Pfannenstiel incision. Final pathology benign. Her hospitalization was complicated by respiratory failure (now on 3L supplemental O2 per trach mask,) and has PEG for pharyngeal dysphagia.   Also has a PMH positive for GERD, OA,  Paralysis of vocal cords, Polio (bulbar), Dysphagia, Inhalation and ingestion of other object causing obstruction of respiratory tract or suffocation, Osteoporosis, Hoarse voice, Hypernasal speech, Diaphragm paralysis, and Left adhesive capsulitis     Prior Level of Function:  Lives With: Spouse  Type of Home: House  Home Layout: Two level  Home Access: Stairs to enter without rails  Entrance Stairs - Number of Steps: ~4 steps at the front of the house without railings and 2 steps in the garage. Patient reports she has a few steps in the back of the house with railings. Patient reports typically she enters house from garage  Home Equipment:  (Patient reports she does not have an equipment at home)   Bathroom Shower/Tub: Walk-in shower (Patient reports she has a built in shower chair)  Bathroom Toilet: Standard  Bathroom Equipment: Grab bars in shower,Built-in shower seat    ADL Assistance: Independent  Homemaking Assistance: Independent  Homemaking Responsibilities: Yes  Ambulation Assistance: Independent  Transfer Assistance: Independent  Active : Yes  Additional Comments: Patient reports she was independent PTA and did not utilize an AD for mobility. Patient reports her daugther and son in law live next door and also would be able to help out as needed. Restrictions/Precautions:  Restrictions/Precautions: General Precautions,Fall Risk  Position Activity Restriction  Other position/activity restrictions: PEG, trach collar/mask, No lifting >10lbs     SUBJECTIVE: Patient sitting in recliner, pleasant and agreeable to participate in therapy. Patient reported not sleeping well last night. Patient stated she can't drink anything before her procedure this afternoon, impacting her sleep. Patient requested to use the restroom at end of session, able to complete with supervision to stand by assistance. Patient educated on performing deep breathing during ambulation and seated rest breaks to assist in keeping her oxygen levels up.     PAIN: 0/10    Vitals: Oxygen: Decreased to 85-88% after ambulation and steps, increased to 90-92% with seated rest breaks; on room air throughout session    OBJECTIVE:  Bed Mobility:  Not Tested    Transfers:  Sit to Stand: Supervision  Stand to [address] [place] [address], with increased time for completion  Car:Supervision, with increased time for completion, cues for hand placement  -Patient completed car transfer with supervision. Verbal cueing provided for hand placement when getting out of car, to avoid pulling or pushing on something that moves to ensure safety. Patient verbalized understanding and completed transfer safely. Patient demonstrates good mechanics, safely turning before sitting with no difficulty moving LE's into and out of car. Ambulation:  Stand By Assistance, with verbal cues , with increased time for completion  Distance: 125', 40', various distances throughout apartment  Surface: Level Tile and Carpet  Device:No Device  Gait Deviations:  Decreased Step Length Bilaterally, Decreased Weight Shift Bilaterally, Decreased Arm Swing, Decreased Gait Speed, Decreased Heel Strike Bilaterally, [address] of Support, Mild Path Deviations and Unsteady Gait  -Patient ambulated 125' at start of session with stand by assistance. Patient demonstrates trunk and arm stiffness and wide OTIS during ambulation due to mild unsteadiness and patient overall being nervous when ambulating. Patient demonstrated mild path deviations, with ability to correct and no LOB. Patient had increased fatigue and decreased oxygen saturation to 85% limiting her tolerance to further ambulation. Verbal instruction and demonstration provided to improve normal gait mechanics with increased trunk rotation and arm swing. Patient demonstrated good carryover with 40' ambulation at end of session.  -Patient instructed in retrieving cones in the apartment with stand by assistance. Cones were placed at various heights, requiring the patient to reach outside OTIS, bend over, and complete turns on level surface and carpet. Patient completed task twice, intermittently.  Patient instructed in activity to simulate her home environment and improve her ability to ambulate, maintain her balance, and retrieve objects safely. Balance:  Static Standing Balance: Supervision  Dynamic Standing Balance: Stand By Assistance, with increased time for completion  -Patient instructed in ring toss while standing on black foam pad. Patient completed activity twice, intermittently. Patient instructed to reach for rings in various directions outside of her OTIS. Patient progressed to a NBOS to further challenge her balance. Patient required increased time to reach up high or further out to the side to maintain balance. Patient instructed in ring toss to improve dynamic standing balance and overall endurance. Stairs:  Stand By Assistance, with verbal cues , with increased time for completion  Number of Steps: 4 x2  Height: 6\" step  -Patient completed 4 steps with stand by assistance. Patient initially demonstrated reciprocal gait pattern with ascent, with verbal cueing to use a non-reciprocal pattern for safety. Patient demonstrated good carryover and progressed from use of bilateral HR's to the use of one HR. Patient demonstrated mild SOB and increased fatigue completing four steps twice without a rest break. Exercise:  Patient was guided in 1 set(s) 15 reps of exercise to both lower extremities with use of 1 pound weight and orange theraband. Seated marches, Seated hamstring curls, Seated heel/toe raises, Long arc quads, Seated isometric hip adduction and Seated abduction/adduction. Exercises were completed for increased independence with functional mobility.  -Patient completed seated exercises this session to give her a break from standing and ambulation tasks due to increased fatigue. Functional Outcome Measures: Not completed       ASSESSMENT:  Assessment: Patient progressing toward established goals. Activity Tolerance:  Patient tolerance of  treatment: fair. Increased seated rest breaks this session with patient off oxygen.      Equipment Recommendations:Equipment Needed: Yes (Continue to assess pending progress (may require RW))  Discharge Recommendations: Continue to assess pending progress  Plan: Times per week: 5x/wk 90min; 1x/wk 30 min  Times per day: Daily  Current Treatment Recommendations: Strengthening,Neuromuscular Re-education,Home Exercise Program,ROM,Safety Education & Brigida Armin Training,Patient/Caregiver Education & Training,Functional Mobility Training,Wheelchair Mobility Training,Transfer Training,Gait Training,Stair training    Patient Education  Patient Education: Transfers, Gait, Stairs, Car Transfers, Verbal Exercise Instruction, Pursed Lip Breathing,  - Patient Verbalized Understanding, - Patient Requires Continued Education    Goals:  Patient goals : To return to home  Short term goals  Time Frame for Short term goals: 1 week  Short term goal 1: Patient to perform supine<>sit with SBA without railings in order to assist with getting into and out of bed. GOAL MET  Short term goal 2: Patient to perform sit to stand transfers wtih [address] with <=1 cue for hand placement in order to assist with safety with transfers from various surfaces. Short term goal 3: Patient to ambulate at least 125 feet without AD with SBA in order to assist with home mobility. Short term goal 4: Patient to ascend/descend 4 step with 1 handrail with SBA in order to assist with getting into and out of home. Short term goal 5: Patient to score at least 21/28 on the Tinetti in order to reduce risk for falls. Long term goals  Time Frame for Long term goals : 3 weeks  Long term goal 1: Patient to perform supine<>sit with Mod I without railings in order to assist with getting into and out of bed. Long term goal 2: Patient to perform sit to stand transfers with Mod I in order to assist with safety with transfers from various surfaces. Long term goal 3: Patient to ambulate at least 150 feet with RW with Mod I in order to assist with home mobility.   Long term goal 4: Patient to ascend/descend 4 steps with 1 handrail with Supervision in order to assist with getting into and out of home. Long term goal 5: Patient to perform car transfer with Supervision in order to assist with getting to and from appointments. Following session, patient left in safe position with all fall risk precautions in place.

## 2022-02-25 PROCEDURE — 97116 GAIT TRAINING THERAPY: CPT

## 2022-02-25 PROCEDURE — 97530 THERAPEUTIC ACTIVITIES: CPT

## 2022-02-25 PROCEDURE — 99232 SBSQ HOSP IP/OBS MODERATE 35: CPT | Performed by: NURSE PRACTITIONER

## 2022-02-25 PROCEDURE — 92526 ORAL FUNCTION THERAPY: CPT

## 2022-02-25 PROCEDURE — 6370000000 HC RX 637 (ALT 250 FOR IP): Performed by: FAMILY MEDICINE

## 2022-02-25 PROCEDURE — 97535 SELF CARE MNGMENT TRAINING: CPT

## 2022-02-25 PROCEDURE — 99232 SBSQ HOSP IP/OBS MODERATE 35: CPT | Performed by: INTERNAL MEDICINE

## 2022-02-25 PROCEDURE — 97110 THERAPEUTIC EXERCISES: CPT

## 2022-02-25 PROCEDURE — 99024 POSTOP FOLLOW-UP VISIT: CPT | Performed by: PHYSICIAN ASSISTANT

## 2022-02-25 PROCEDURE — 6370000000 HC RX 637 (ALT 250 FOR IP): Performed by: PHYSICAL MEDICINE & REHABILITATION

## 2022-02-25 PROCEDURE — 1180000000 HC REHAB R&B

## 2022-02-25 RX ADMIN — Medication 6 MG: at 22:46

## 2022-02-25 RX ADMIN — TRAZODONE HYDROCHLORIDE 50 MG: 50 TABLET ORAL at 22:46

## 2022-02-25 RX ADMIN — SENNOSIDES 8.6 MG: 8.6 TABLET, COATED ORAL at 22:46

## 2022-02-25 RX ADMIN — FUROSEMIDE 20 MG: 20 TABLET ORAL at 08:20

## 2022-02-25 RX ADMIN — POLYVINYL ALCOHOL 1 DROP: 14 SOLUTION/ DROPS OPHTHALMIC at 22:58

## 2022-02-25 RX ADMIN — SERTRALINE 50 MG: 50 TABLET, FILM COATED ORAL at 08:21

## 2022-02-25 RX ADMIN — ACETAMINOPHEN 650 MG: 325 TABLET ORAL at 22:59

## 2022-02-25 RX ADMIN — Medication 1 TABLET: at 08:20

## 2022-02-25 RX ADMIN — Medication 400 MG: at 08:20

## 2022-02-25 RX ADMIN — MELOXICAM 15 MG: 7.5 TABLET ORAL at 08:20

## 2022-02-25 RX ADMIN — PANTOPRAZOLE SODIUM 40 MG: 40 TABLET, DELAYED RELEASE ORAL at 06:10

## 2022-02-25 RX ADMIN — ACETAMINOPHEN 650 MG: 325 TABLET ORAL at 08:23

## 2022-02-25 ASSESSMENT — PAIN SCALES - GENERAL
PAINLEVEL_OUTOF10: 0
PAINLEVEL_OUTOF10: 4
PAINLEVEL_OUTOF10: 3
PAINLEVEL_OUTOF10: 0
PAINLEVEL_OUTOF10: 4

## 2022-02-25 NOTE — PLAN OF CARE
Problem: OXYGENATION/RESPIRATORY FUNCTION  Goal: Patient will maintain patent airway  Outcome: Ongoing  Trach care provided by this nurse. Patient declined to watch in mirror or attempt to do care her self. Problem: Falls - Risk of:  Goal: Will remain free from falls  Description: Will remain free from falls  Outcome: Ongoing  No falls sustained at this time. Patient alert to call light and uses appropriately to alert staff to needs. Bed/chair alarms in use. Problem: Skin Integrity:  Goal: Absence of new skin breakdown  Description: Absence of new skin breakdown  Outcome: Ongoing  No new skin issues noted this shift. Problem: Activity:  Goal: Sleeping patterns will improve  Description: Sleeping patterns will improve  Outcome: Ongoing  The problem of recurrent insomnia is discussed. Avoidance of caffeine sources is strongly encouraged. The use of sedative hypnotics for temporary relief is appropriate; we discussed the addictive nature of these drugs. Care plan reviewed with patient. Patient verbalize understanding of the plan of care and contribute to goal setting.

## 2022-02-25 NOTE — PROGRESS NOTES
was able to perform the medication pass through the PEG tube successfully. Will continue to work on tube feedings.

## 2022-02-25 NOTE — PLAN OF CARE
Problem: OXYGENATION/RESPIRATORY FUNCTION  Goal: Patient will maintain patent airway  2/25/2022 1416 by Srikanth Bethea RN  Outcome: Ongoing  2/25/2022 0108 by Ricki Gonzalez LPN  Outcome: Ongoing     Problem: Falls - Risk of:  Goal: Will remain free from falls  Description: Will remain free from falls  2/25/2022 1416 by Srikanth Bethea RN  Outcome: Ongoing  2/25/2022 0108 by Ricki Gonzalez LPN  Outcome: Ongoing  Goal: Absence of physical injury  Description: Absence of physical injury  Outcome: Ongoing     Problem: Pain:  Goal: Pain level will decrease  Description: Pain level will decrease  Outcome: Ongoing  Goal: Control of acute pain  Description: Control of acute pain  Outcome: Ongoing  Goal: Control of chronic pain  Description: Control of chronic pain  Outcome: Ongoing     Problem: Skin Integrity:  Goal: Risk for impaired skin integrity will decrease  Description: Risk for impaired skin integrity will decrease  Outcome: Ongoing  Goal: Will show no infection signs and symptoms  Description: Will show no infection signs and symptoms  Outcome: Ongoing  Goal: Absence of new skin breakdown  Description: Absence of new skin breakdown  2/25/2022 1416 by Srikanth Bethea RN  Outcome: Ongoing  2/25/2022 0108 by Ricki Gonzalez LPN  Outcome: Ongoing     Problem:  Activity:  Goal: Sleeping patterns will improve  Description: Sleeping patterns will improve  2/25/2022 1416 by Srikanth Bethea RN  Outcome: Ongoing  2/25/2022 0108 by Ricki Gonzalez LPN  Outcome: Ongoing     Problem: Nutrition  Goal: Optimal nutrition therapy  Outcome: Ongoing     Problem: IP MOBILITY  Goal: LTG - patient will demonstrate safe mobility requirements  Outcome: Ongoing     Problem: IP DRESSINGS LOWER EXTREMITIES  Goal: LTG - patient will dress lower body with or without assistive device  Outcome: Ongoing     Problem: IP SWALLOWING  Goal: LTG - patient will tolerate the least restrictive diet consistency to allow for safe consumption of daily meals  Outcome: Ongoing     Problem: DISCHARGE BARRIERS  Goal: Patient's continuum of care needs are met  Outcome: Ongoing

## 2022-02-25 NOTE — PROGRESS NOTES
[facility]  [address] [facility]  Occupational Therapy  Daily Note  Time:    Time In: 1030  Time Out: 1100  Timed Code Treatment Minutes: 30 Minutes  Minutes: 30          Date: 2022  Patient Name: Connie Fierro,   Gender: female      Room: [facility]66/066-A  MRN: [id number]  : 1945  (64 y.o.)  Referring Practitioner: Dr Keila Martínez  Diagnosis: Acute Respiratory Failure  Additional Pertinent Hx: Connie Fierro  is a 64 y.o. female admitted to the inpatient rehabilitation unit on 2/15/2022. The patient was originally admitted to the [facility] 2022 to the gynecology oncology service secondary to postmenopausal bleeding. She started with the bleeding in early 2021 and was followed by Dr. Kelli Dowling here at [place] [address]. [facility]  for initial consultation for Post-menopausal Bleeding and failed Endometrial Biopsy. PMB started in 2021. Had pelvic US that showed EMS 9mm. EMB was attempted twice in the office, no endometrial cells were identified. She was therefore referred to the [facility] at [address] [place] [address] 22 for additional management. She had no pelvic pain. Only intermittent spotting, no heavy bleeding. Had history of Lichen sclerosis, was prescribed estrogen cream and steroid cream. Patient underwent abdominal hysterectomy on 2022, BSO via P. Pfannenstiel incision. Final pathology benign. Her hospitalization was complicated by respiratory failure (now on 3L supplemental O2 per trach mask,) and has PEG for pharyngeal dysphagia. Also has a PMH positive for GERD, OA,  Paralysis of vocal cords, Polio (bulbar), Dysphagia, Inhalation and ingestion of other object causing obstruction of respiratory tract or suffocation, Osteoporosis, Hoarse voice, Hypernasal speech, Diaphragm paralysis, and Left adhesive capsulitis.     Restrictions/Precautions:  Restrictions/Precautions: General Precautions,Fall Risk  Position Activity Restriction  Other position/activity restrictions: PEG, trach collar/mask, No lifting >10lbs      SUBJECTIVE: Pt was resting in recliner upon arrival, pleasant and agreeable to OT. Pt's spouse present throughout session. Pt reported having procedure yesterday and has required O2 via trach mask, use of RW, and increased rest breaks since. PAIN: c/o sore throat, no rating provided    Vitals: Oxygen: Pt on 6L via trach mask, min c/o SOB and difficutly exhaling; however, remaining >93% throughout session. COGNITION: WFL    ADL:   Grooming: Supervision. standing at sink to wash hands  Toileting: Supervision. Toilet Transfer: Supervision. Cruz Cervantes BALANCE:  Sitting Balance:  Modified Independent. Standing Balance: Supervision. with 1-2 UE release    BED MOBILITY:  Not Tested    TRANSFERS:  Sit to Stand:  Supervision. Stand to Sit: Supervision. FUNCTIONAL MOBILITY:  Assistive Device: Rolling Walker  Assist Level:  Stand By Assistance. Distance: to/from garden room  Slow pace, steady throughout with no LOB. Pt did report increased difficulty with mobility this date. ADDITIONAL ACTIVITIES:  Pt completed IADL task of watering plants in greenhouse. Pt able to complete mobility within room and transport watering can with good balance and incorporation of ECT. Pt completed task with SBA and good balance. Pt demoed dynamic standing endurance >15min throughout session with 2 short standing rest breaks. Pt tolerated well. ASSESSMENT:     Activity Tolerance:  Patient tolerance of  treatment: good. Discharge Recommendations: Home with Home Health OT  Equipment Recommendations: Other: Pt has a built in shower seat. Recommendations for a grab bar near toilet provided.   Plan: Times per week: 5x/wk for 90 min and 1x/wk for 30 min  Times per day: Daily  Current Treatment Recommendations: Vaughn Salazar Mobility Training,Equipment Evaluation, Education, & procurement,Patient/Caregiver Education & Training,Self-Care / ADL,Safety Education & Training    Patient Education  Patient Education: Energy Conservation    Goals  Short term goals  Time Frame for Short term goals: 1 week  Short term goal 1: Pt will complete UB dressing tasks with mod I to improve indep with donning her bra at home. Short term goal 2: Pt will complete LB dressing tasks with mod I to improve indep with donning socks and shoes at home. Short term goal 3: Pt will tolerate 4 minute standing tasks with Mod I during shayy release tasks to improve indep with sinkside grooming. Short term goal 4: Pt will ambulate to and from bathroom with SBA and RW prn to improve activity tolerance needed for bathing. Short term goal 5: Pt will tolerate dynamic standing IADL tasks with Mod I to meet patient goal of baking cookies with grandchildren. Long term goals  Time Frame for Long term goals : 1 week  Long term goal 1: Pt will complete bathing and shower transfer with mod I to improve indep with showering at home. Long term goal 2: Pt will complete dressing and grooming tasks with mod I to improve indep with self care at home. Long term goal 3: Pt will complete a simple meal prep tasks with mod I and 0 vcs for ECT to improve indep with preparing lunch at home. Following session, patient left in safe position with all fall risk precautions in place.

## 2022-02-25 NOTE — PROGRESS NOTES
SW made referral to [address] [place] [address] as requested by patient.   Talked to [facility] patient discharging 2/28/2022 needing RN, HHA, PT, OT and ST.

## 2022-02-25 NOTE — PROGRESS NOTES
EN/PN NUTRITION SUPPORT    RECOMMENDATIONS/PLAN:   · Continue current diet as ordered- Full liquid (per SLP, patient can only have thin liquids- no pureed or thickened liquids).   · Continue TF regimen of TwoCal HN bolus of 130 ml six times daily. Fluid flush of 60 ml before and after each bolus feed or per physician recommendations.   § Suggested times of 7A, 11A, 2P, 5P, 8P, 11P  § May wish to have patient sit up for bolus feeds.   · Continue Ensure Clear daily.   · Home TF education completed 2/25 with educational material provided at bedside. CURRENT NUTRITION THERAPIES  ADULT DIET; Full Liquid  ADULT TUBE FEEDING; PEG; 2.0 Calorie; Bolus; 6 Times Daily; 130; Gravity; 30; Before and after each bolus  ADULT ORAL NUTRITION SUPPLEMENT; Lunch; Clear Liquid Oral Supplement  Current Tube Feeding (TF) Orders:  · Feeding Route: PEG  · Formula: 2.0 Calorie  · Schedule: Bolus   · Water Flushes: Fluid flush of 60 ml before and after each bolus feed. · Current TF & Flush Orders Provides: Patient reports tolerating current TF regimen well. TwoCal HN bolus of 130 ml - 6 times daily with fluid flush of 60 ml before and after each bolus feed. · Goal TF & Flush Orders Provides: TwoCal HN bolus of 130 ml six times daily with 60 ml fluid flush before and after each bolus feed. TF regimen providing 1560 kcal, 65 gm protein, 170 gm CHO, 3.9 gm fiber, 546 ml free fluid. Total fluid provided: 1266 ml (546 TF, 720 fluid flush). Total goal volume: 1500 ml (780 TF, 720 fluid flush). COMPARATIVE STANDARDS  Energy (kcal):  4596-2207 kcal/day (25-30 kcal/kg); Weight Used for Energy Requirements:  Current, 58.4 kg     Protein (g):  64-76 gm/day (1.1-1.3 gm/kg);  Weight Used for Protein Requirements:  Current, 58.4 kg          NUTRITIONAL SUMMARY/STATUS:   Pt. nutritionally compromised AEB hx of dysphagia and need for EN, trach mask, and need for full liquid diet.  At risk for further nutrition compromise r/t admitted for debility r/t respiratory failure, recent hysterectomy and underlying medical condition (GERD, osteoarthritis, paralysis of vocal cord, dysphagia, diaphragm paralysis).     Writer met with patient and patient's significant other this date. Patient reports tolerating current TF regimen well. Patient had questions about nocturnal continuous feeds vs bolus feeds. Writer answered all of patient's questions. Contact info provided to patient of OP dietitian in case patient is wanting to change TF regimen to nocturnal feeds. Patient was unsure of decision at time of visit. Will continue bolus feeds at this time. Home TF education provided this date with educational material provided at bedside. Patient's significant other reports he has been practicing TF boluses and bolusing meds for patient via G-tube. NUTRITION RELATED FINDINGS:   Treatments: s/p bronch 2/24- showing vocal cord paralysis with surgical option as outpatient. [facility] [address] [facility] [address] [place], PT/OT/ST 2/28. TF Status: Tolerating current regimen well. GI Status: Last BM (2/23)  Pertinent Labs: Labs reviewed, no updated labs since 2/21. Pertinent Meds: lasix, mag ox, protonix, zoloft, mobic, melatonin, MVI; PRN tums, milk of mag, oxycodone, glycolax, senokot  Current Weight: 128 lb 12 oz (58.4 kg) (2/21- edema present 2/22 RLE/LLE (2+ pitting)  Admission Weight:  128 lb 15.5 oz (58.5 kg) (2/15- RLE/LLE (1+ pitting) edema present 2/15)  Wounds: Surgical Incision (incision to pelvis anterior)  Malnutrition Status: No malnutrition    Please refer to initial nutrition assessment for additional details.    Karin Jaimes RD:    Contact Number: [phone]

## 2022-02-25 NOTE — PLAN OF CARE
See full note filed 2/25 at 3:45 PM.       Problem: Nutrition  Goal: Optimal nutrition therapy  2/25/2022 1546 by Amaury Bryan RD  Outcome: Ongoing  2/25/2022 1416 by Marquez Witt RN  Outcome: Ongoing     Nutrition Problem #1: Inadequate oral intake  Intervention: Food and/or Nutrient Delivery: Continue Current Diet,Continue Oral Nutrition Supplement,Continue Current Tube Feeding  Nutritional Goals: EN + oral diet to provide % of estimated nutritional needs daily throughout LOS

## 2022-02-25 NOTE — PROGRESS NOTES
[address] [place] [address]  INPATIENT PHYSICAL THERAPY  DAILY NOTE  [address] [facility] - 7E-66/066-A    Time In: 1330  Time Out: 1430  Timed Code Treatment Minutes: 60 Minutes  Minutes: 60          Date: 2022  Patient Name: Annalise Watson,  Gender:  female        MRN: [id number]  : 1945  (64 y.o.)     Referring Practitioner: Keven Merino MD  Diagnosis: Acute Respiratory Failure  Additional Pertinent Hx: Annalise Watson  is a 64 y.o. female admitted to the inpatient rehabilitation unit on 2/15/2022. The patient was originally admitted to the [facility] 2022 to the gynecology oncology service secondary to postmenopausal bleeding. She started with the bleeding in early 2021 and was followed by Dr. Parker Antoine here at [facility]. Ms. Riana Livingston presented to [facility] (/[facility])  for initial consultation for Post-menopausal Bleeding and failed Endometrial Biopsy. PMB started in 2021. Had pelvic US that showed EMS 9mm. EMB was attempted twice in the office, no endometrial cells were identified. She was therefore referred to the [facility] 22 for additional management. She had no pelvic pain. Only intermittent spotting, no heavy bleeding. Had history of Lichen sclerosis, was prescribed estrogen cream and steroid cream. Patient underwent abdominal hysterectomy on 2022, BSO via P. Pfannenstiel incision. Final pathology benign. Her hospitalization was complicated by respiratory failure (now on 3L supplemental O2 per trach mask,) and has PEG for pharyngeal dysphagia.   Also has a PMH positive for GERD, OA,  Paralysis of vocal cords, Polio (bulbar), Dysphagia, Inhalation and ingestion of other object causing obstruction of respiratory tract or suffocation, Osteoporosis, Hoarse voice, Hypernasal speech, Diaphragm paralysis, and Left adhesive capsulitis     Prior Level of Function:  Lives With: Spouse  Type of Home: House  Home Layout: Two level  Home Access: Stairs to enter without rails  Entrance Stairs - Number of Steps: ~4 steps at the front of the house without railings and 2 steps in the garage. Patient reports she has a few steps in the back of the house with railings. Patient reports typically she enters house from garage  Home Equipment:  (Patient reports she does not have an equipment at home)   Bathroom Shower/Tub: Walk-in shower (Patient reports she has a built in shower chair)  Bathroom Toilet: Standard  Bathroom Equipment: Grab bars in shower,Built-in shower seat    ADL Assistance: Independent  Homemaking Assistance: Independent  Homemaking Responsibilities: Yes  Ambulation Assistance: Independent  Transfer Assistance: Independent  Active : Yes  Additional Comments: Patient reports she was independent PTA and did not utilize an AD for mobility. Patient reports her daugther and son in law live next door and also would be able to help out as needed. Restrictions/Precautions:  Restrictions/Precautions: General Precautions,Fall Risk  Position Activity Restriction  Other position/activity restrictions: PEG, trach collar/mask, No lifting >10lbs     SUBJECTIVE: Patient seated in recliner upon arrival. Patient spouse present during session and PT provided education on car transfers, stair negotiation and HEP. Patient is pleasant and agreeable to therapy. PAIN: Patient reporting discomfort in throat from surgery yesterday but did not rate pain    Vitals: Monitored O2 throughout treatment with ability to maintain >90% with 4L O2 through trach mask. OBJECTIVE:  Bed Mobility:  Not Tested    Transfers:  Sit to Stand: Supervision  Stand to Sit:Supervision  To/From Bed and Chair: Supervision  Car:Stand By Assistance   -Patient performed car transfer with spouse with PT providing spouse and patient with education for turning to seat and sitting prior to attempting to lift legs into car. Outcome Measures: Not completed       ASSESSMENT:  Assessment: Patient progressing toward established goals. Activity Tolerance:  Patient tolerance of  treatment: good. Equipment Recommendations:Equipment Needed: Yes (Continue to assess pending progress (may require RW))  Discharge Recommendations: Home with Home Health PT  Plan: Times per week: 5x/wk 90min; 1x/wk 30 min  Times per day: Daily  Current Treatment Recommendations: Strengthening,Neuromuscular Re-education,Home Exercise Program,ROM,Safety Education & Maite Rivera Training,Patient/Caregiver Education & Training,Functional Mobility Training,Wheelchair Mobility Training,Transfer Training,Gait Training,Stair training    Patient Education  Patient Education: Home Exercise Program, Transfers, Gait, Stairs, Car Transfers,  - Patient Verbalized Understanding, - Patient Requires Continued Education    Goals:  Patient goals : To return to home  Short term goals  Time Frame for Short term goals: 1 week  Short term goal 1: Patient to perform supine<>sit with SBA without railings in order to assist with getting into and out of bed. GOAL MET  Short term goal 2: Patient to perform sit to stand transfers wtih [address] with <=1 cue for hand placement in order to assist with safety with transfers from various surfaces. Short term goal 3: Patient to ambulate at least 125 feet without AD with SBA in order to assist with home mobility. Short term goal 4: Patient to ascend/descend 4 step with 1 handrail with SBA in order to assist with getting into and out of home. Short term goal 5: Patient to score at least 21/28 on the [place]etti in order to reduce risk for falls. Long term goals  Time Frame for Long term goals : 3 weeks  Long term goal 1: Patient to perform supine<>sit with Mod I without railings in order to assist with getting into and out of bed.   Long term goal 2: Patient to perform sit to stand transfers with Mod I in order to assist with safety with transfers from various surfaces. Long term goal 3: Patient to ambulate at least 150 feet with RW with Mod I in order to assist with home mobility. Long term goal 4: Patient to ascend/descend 4 steps with 1 handrail with Supervision in order to assist with getting into and out of home. Long term goal 5: Patient to perform car transfer with Supervision in order to assist with getting to and from appointments. Following session, patient left in safe position with all fall risk precautions in place.

## 2022-02-25 NOTE — PROGRESS NOTES
[address]  Occupational Therapy  Daily Note  Time:   Time In: 0830  Time Out: 0930  Timed Code Treatment Minutes: 60 Minutes  Minutes: 60          Date: 2022  Patient Name: Meka Ledesma,   Gender: female      Room: [facility]66/066-A  MRN: [id number]  : 1945  (64 y.o.)  Referring Practitioner: Dr Carina Nguyen  Diagnosis: Acute Respiratory Failure  Additional Pertinent Hx: Meka Ledesma  is a 64 y.o. female admitted to the inpatient rehabilitation unit on 2/15/2022. The patient was originally admitted to the [facility] 2022 to the gynecology oncology service secondary to postmenopausal bleeding. She started with the bleeding in early 2021 and was followed by Dr. Sole Abarca here at [facility]. [facility] [place] [facility]  for initial consultation for Post-menopausal Bleeding and failed Endometrial Biopsy. PMB started in 2021. Had pelvic US that showed EMS 9mm. EMB was attempted twice in the office, no endometrial cells were identified. She was therefore referred to the [facility] 22 for additional management. She had no pelvic pain. Only intermittent spotting, no heavy bleeding. Had history of Lichen sclerosis, was prescribed estrogen cream and steroid cream. Patient underwent abdominal hysterectomy on 2022, BSO via P. Pfannenstiel incision. Final pathology benign. Her hospitalization was complicated by respiratory failure (now on 3L supplemental O2 per trach mask,) and has PEG for pharyngeal dysphagia. Also has a PMH positive for GERD, OA,  Paralysis of vocal cords, Polio (bulbar), Dysphagia, Inhalation and ingestion of other object causing obstruction of respiratory tract or suffocation, Osteoporosis, Hoarse voice, Hypernasal speech, Diaphragm paralysis, and Left adhesive capsulitis.     Restrictions/Precautions:  Restrictions/Precautions: General Precautions,Fall Risk  Position Activity Restriction  Other position/activity restrictions: PEG, trach collar/mask, No lifting >10lbs     SUBJECTIVE: Pt. In room and agreeable to OT session  Pt. Coughing throughout and self suctioning frequently. PAIN: reports sore throat from  procedue performed 2/24/22    Vitals: Vitals not assessed per clinical judgement, see nursing flowsheet  Oxygen:Pt. On trach mask at 4L    COGNITION: WFL    ADL:   Grooming: Stand By Assistance and with set-up. Bathing: Stand By Assistance and with set-up.  while seated on EOB  Upper Extremity Dressing: Minimal Assistance. to assist with trach mask donning and doffing  Lower Extremity Dressing: Stand By Assistance. except max A to don B socks and total A to don and tie shoes. BALANCE:  Sitting Balance:  Supervision. while seated at EOB unsupported  Standing Balance: Stand By Assistance. BED MOBILITY:  Supine to Sit: Stand By Assistance      TRANSFERS:  Sit to Stand:  Stand By Assistance. Stand to Sit: Stand By Assistance. FUNCTIONAL MOBILITY:  Assistive Device: None  Assist Level:  Stand By Assistance. Distance: from bed to recliner       ASSESSMENT:     Activity Tolerance:  Patient tolerance of  treatment: fair. Discharge Recommendations: Continue to assess pending progress  Equipment Recommendations: Other: Pt has a built in shower seat. Recommendations for a grab bar near toilet provided. Plan: Times per week: 5x/wk for 90 min and 1x/wk for 30 min  Times per day: Daily  Current Treatment Recommendations: Strengthening,Balance Training,Endurance Training,Functional Mobility Training,Equipment Evaluation, Education, & procurement,Patient/Caregiver Education & Training,Self-Care / ADL,Safety Education & Training    Patient Education  Patient Education: ADL's, and safety with functional mobility and transfers.     Goals  Short term goals  Time Frame for Short term goals: 1 week  Short term goal 1: Pt will complete UB dressing tasks with mod I to improve indep with donning her bra at home. Short term goal 2: Pt will complete LB dressing tasks with mod I to improve indep with donning socks and shoes at home. Short term goal 3: Pt will tolerate 4 minute standing tasks with Mod I during shayy release tasks to improve indep with sinkside grooming. Short term goal 4: Pt will ambulate to and from bathroom with SBA and RW prn to improve activity tolerance needed for bathing. Short term goal 5: Pt will tolerate dynamic standing IADL tasks with Mod I to meet patient goal of baking cookies with grandchildren. Long term goals  Time Frame for Long term goals : 1 week  Long term goal 1: Pt will complete bathing and shower transfer with mod I to improve indep with showering at home. Long term goal 2: Pt will complete dressing and grooming tasks with mod I to improve indep with self care at home. Long term goal 3: Pt will complete a simple meal prep tasks with mod I and 0 vcs for ECT to improve indep with preparing lunch at home. Following session, patient left in safe position with all fall risk precautions in place.

## 2022-02-25 NOTE — PROGRESS NOTES
Patient: Lincoln Olvera  Unit/Bed: 7E-66/066-A  YOB: 1945  MRN: [id number] Acct: [de-identified]   Admitting Diagnosis: Acute respiratory failure ([address] [J96.00]  Admit Date:  2/15/2022  Hospital Day: 10    Assessment:     Active Problems:    Acute respiratory failure ([address]    Personal history of poliomyelitis  Resolved Problems:    * No resolved hospital problems. *      Plan:     I asked her to let the staff know if the breathing feeling worsens and if so ENT could be notified. We discussed if the feeling is still present 2/28/22 perhaps ENT could see her before she leaves. Subjective:     Patient has no complaint of CP but states a feeling of something making it difficult to exhale completely on the left side of the neck. I told her perhaps it could be swelling from the surgery yesterday. .   Medication side effects: none    Scheduled Meds:   sodium chloride flush  5-40 mL IntraVENous 2 times per day    furosemide  20 mg Oral Daily    cycloSPORINE  2 drop Both Eyes Daily    pantoprazole  40 mg Oral BID AC    sertraline  50 mg Oral Daily    melatonin  6 mg Oral Nightly    magnesium oxide  400 mg Oral Daily    traZODone  50 mg Oral Nightly    multivitamin  1 tablet Oral Daily    meloxicam  15 mg Oral Daily    polyvinyl alcohol  1 drop Both Eyes BID     Continuous Infusions:   sodium chloride Stopped (02/24/22 1809)     PRN Meds:sodium chloride flush, sodium chloride, fentanNYL, fentanNYL, labetalol, magnesium hydroxide, aluminum & magnesium hydroxide-simethicone, calcium carbonate, oxyCODONE, polyethylene glycol, zinc oxide, acetaminophen, ipratropium, senna, ipratropium-albuterol    Review of Systems  Pertinent items are noted in HPI. Objective:     No data found. I/O last 3 completed shifts: In: 1930 [P.O.:30; I.V.:500; NG/GT:1400]  Out: -   I/O this shift:   In: 920 [P.O.:600; NG/GT:320]  Out: -     BP (!) 105/52   Pulse 83   Temp 98.1 °F (36.7 °C) (Oral)   Resp 16   Ht 5' (1.524 m)   Wt 128 lb 11.2 oz (58.4 kg)   SpO2 92%   BMI 25.13 kg/m²     General appearance: alert, appears stated age and cooperative  Head: Normocephalic, without obvious abnormality, atraumatic  Lungs: clear to auscultation bilaterally, she speaks comfortably, there is a slight audible wheeze on forced exhalation   Heart: regular rate and rhythm, S1, S2 normal, no murmur, click, rub or gallop  Abdomen: soft, non-tender; bowel sounds normal; no masses,  no organomegaly  Extremities: extremities normal, atraumatic, no cyanosis or edema  Skin: Skin color, texture, turgor normal. No rashes or lesions  Neurologic: weak    Electronically signed by Denise Burton MD on 2/25/2022 at 6:10 PM

## 2022-02-25 NOTE — PROGRESS NOTES
performed on 28 December 2021 as per the note by Ms. Marlene Dimas MD.  Patient underwent tracheostomy in the SICU at [facility] on ? 1/22/22 per the note by Ms. Marlene Dimas MD     She used follow with Dr. Joshua Sanders MD at and her pulmonary disease in the past.  She was seen by Dr. Joshua Sanders MD for the last time on 10 March 2014 for post polio muscle weakness. According to his note patient had a below normal MEP and NIF testing. She was also diagnosed with vocal cord paralysis and follows with the ENT specialist at [facility]. Subjective/Events Past 24 hours/ROS   -She is tolerating trach collar well  -On 28% FiO2  -Not in distress  -Was seen by Dr. Meaghan Howard MD from ENT service. She underwent suspension microlaryngoscopy, bronchoscopy with jet ventilation on 24 February 2022.   -Afebrile  -Rest of the review of systems reviewed    PMHx   Past Medical History      Diagnosis Date    GERD (gastroesophageal reflux disease)     Osteoarthritis     Paralysis of vocal cords     Polio       Past Surgical History        Procedure Laterality Date    CHOLECYSTECTOMY      KNEE SURGERY      Right     Meds    Current Medications    sodium chloride flush  5-40 mL IntraVENous 2 times per day    furosemide  20 mg Oral Daily    cycloSPORINE  2 drop Both Eyes Daily    pantoprazole  40 mg Oral BID AC    sertraline  50 mg Oral Daily    melatonin  6 mg Oral Nightly    magnesium oxide  400 mg Oral Daily    traZODone  50 mg Oral Nightly    multivitamin  1 tablet Oral Daily    meloxicam  15 mg Oral Daily    polyvinyl alcohol  1 drop Both Eyes BID     sodium chloride flush, sodium chloride, fentanNYL, fentanNYL, labetalol, magnesium hydroxide, aluminum & magnesium hydroxide-simethicone, calcium carbonate, oxyCODONE, polyethylene glycol, zinc oxide, acetaminophen, ipratropium, senna, ipratropium-albuterol  IV Drips/Infusions   sodium chloride Stopped (02/24/22 1809)     Home Medications  Medications Prior to Admission: ipratropium (ATROVENT) 0.06 % nasal spray, 2 sprays by Nasal route 3 times daily as needed  meloxicam (MOBIC) 7.5 MG tablet, Take 15 mg by mouth daily   calcium carbonate (OSCAL) 500 MG TABS tablet, Take 500 mg by mouth daily  Multiple Vitamins-Minerals (PRESERVISION AREDS 2) CAPS, Take  by mouth 2 times daily. 2 tabs BID  multivitamin (THERAGRAN) per tablet, Take 1 tablet by mouth daily Alive 50+  [DISCONTINUED] Cholecalciferol (VITAMIN D3) 1.25 MG (18634 UT) CAPS, Take by mouth  [DISCONTINUED] estradiol (ESTRACE VAGINAL) 0.1 MG/GM vaginal cream, Place 2 g vaginally daily  Diet    ADULT DIET; Full Liquid  ADULT TUBE FEEDING; PEG; 2.0 Calorie; Bolus; 6 Times Daily; 130; Gravity; 30; Before and after each bolus  ADULT ORAL NUTRITION SUPPLEMENT; Lunch; Clear Liquid Oral Supplement  Allergies    Latex  Family History    History reviewed. No pertinent family history. Sleep History    Never diagnosed with sleep apnea in the past.    Social History     Social History     Tobacco Use    Smoking status: Never Smoker    Smokeless tobacco: Never Used   Substance Use Topics    Alcohol use: Yes     Comment: Seldom    Drug use: No       Vitals     height is 5' (1.524 m) and weight is 128 lb 11.2 oz (58.4 kg). Her oral temperature is 98.1 °F (36.7 °C). Her blood pressure is 105/52 (abnormal) and her pulse is 83. Her respiration is 16 and oxygen saturation is 92%. Body mass index is 25.13 kg/m². SUPPLEMENTAL O2: O2 Flow Rate (L/min): 2 L/min       I/O        Intake/Output Summary (Last 24 hours) at 2/25/2022 1642  Last data filed at 2/25/2022 1423  Gross per 24 hour   Intake 1910 ml   Output --   Net 1910 ml     I/O last 3 completed shifts: In: 1930 [P.O.:30; I.V.:500; NG/GT:1400]  Out: -    No data found. Exam   Constitutional: Patient appears in no distress on trach collar  Mouth/Throat: Oropharynx is clear and moist.  No oral thrush. Neck: Neck supple. Cardiovascular: S1 and S2 heard. No murmurs. Pulmonary/Chest: Bilateral air entry present. Good breath sounds on both sides, diminished at bases Right > Left. No wheezes. No rales. Abdominal: Soft. No tenderness. Extremities: Patient exhibits no edema. Neurological: Patient is awake and alert. Labs  - Old records and notes have been reviewed in [facility]   ABG  Lab Results   Component Value Date    PH 7.41 02/21/2022    PH 7.38 06/06/2012    PO2 65 02/21/2022    PO2 108 06/06/2012    PCO2 53 02/21/2022    PCO2 51 06/06/2012    HCO3 33 02/21/2022    O2SAT 92 02/21/2022     Lab Results   Component Value Date    IFIO2 28 02/21/2022    MODE CPAP+PS 06/05/2012    SETPEEP 4 06/05/2012     CBC  No results for input(s): WBC, RBC, HGB, HCT, MCV, MCH, MCHC, RDW, PLT, MPV in the last 72 hours. BMP  No results for input(s): NA, K, CL, CO2, BUN, CREATININE, GLUCOSE, MG, PHOS, CALCIUM, IONCA, MG in the last 72 hours. LFT  No results for input(s): AST, ALT, ALB, BILITOT, ALKPHOS, LIPASE in the last 72 hours. Invalid input(s): AMYLASE  TROP  No results found for: TROPONINT  BNP  No results for input(s): BNP in the last 72 hours. Lactic Acid  No results for input(s): LACTA in the last 72 hours. INR  No results for input(s): INR, PROTIME in the last 72 hours. PTT  No results for input(s): APTT in the last 72 hours. Glucose  No results for input(s): POCGLU in the last 72 hours. UA No results for input(s): SPECGRAV, PHUR, COLORU, CLARITYU, MUCUS, PROTEINU, BLOODU, RBCUA, WBCUA, BACTERIA, NITRU, GLUCOSEU, BILIRUBINUR, UROBILINOGEN, KETUA, LABCAST, LABCASTTY, AMORPHOS in the last 72 hours. Invalid input(s): CRYSTALS.     PFTs performed on 3 January 2013               Sleep studies   None in epic    Cultures    None in epic    EKG   None in epic  Echocardiogram   None in epic    Radiology    CXR  Chest x-ray mobile view performed on 5 June 2012:  Accession Number:  Procedure Name:             Exam Date/Time: [id number]      Chest Mobile Single         6/5/2012 6:42:00 PM       CPT4 code     08679        IMPRESSION:     RIGHT HEMIDIAPHRAGM ELEVATION AND ADJACENT ATELECTASIS. PROGRESS IMAGING     AS CLINICALLY DETERMINED. Chest x-ray PA and lateral views performed on 21 February 2022: Mon Feb 21, 2022  8   2 view chest x-ray   Comparison: None   1. Small bilateral pleural effusions. 2. Elevation of the right hemidiaphragm with mild right basilar atelectasis. Sniff test: Performed on 22 February 2022:  No evidence of diaphragmatic paralysis on right side     Assessment   -Chronic hypercapnic respiratory failure in the postoperative. S/p Tracheostomy. She is currently tolerating Passy-[place] valve trials well. She is on 28% trach collar. 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  -History of chronic right hemidiaphragmatic elevation with adjacent atelectasis. ? Paralysis per patient.  -Right side complete paralysis and left partial CP dysfunction. She is to follow-up with Dr. Sharonda Aguirre MD-ENT specialist at [facility].  -History of dysphagia. Patient underwent PEG tube placement  -Osteoarthritis  -Gastroesophageal reflux disease. Plan   -ENT service by Dr. Ren Escobar MD is managing the tracheostomy.  -Continue [place] valve trials as tolerated during daytime  -Continue routine trach care by respiratory therapy.  -Avoid all sedative medications if she is drowsy. -Aspiration precautions.  -Continue PEG tube feeding  -Titrate Oxygen/Fio2 to keep saturations >92%. -Deep Venous Thrombosis Prophylaxis: Per primary service.  -Follow with my ( Dr. Lupe Ramirez) clinic at [place] for pulmonary medicine in 2 to 3months for a clinical evaluation regarding her chronic respiratory failure.     Jeffy Love and her  were educated about my impression and plan. They verbalizes understanding. Questions and concerns addressed.  -Will sign off on the case from pulmonary point of view. -Will follow PRN. -Call [phone] with questions.     Electronically signed by   Jayne Zapata MD on 2/25/2022 at 4:42 PM

## 2022-02-25 NOTE — PROGRESS NOTES
[address] [address]  INPATIENT PHYSICAL THERAPY  Daily Note  [facility]    Time In: 0930  Time Out: 1000  Timed Code Treatment Minutes: 30 Minutes  Minutes: 30          Date: 2022  Patient Name: Umm Madison,  Gender:  female        MRN: [id number]  : 1945  (64 y.o.)     Referring Practitioner: Talia Gomez MD  Diagnosis: Acute Respiratory Failure  Additional Pertinent Hx: Umm Madison  is a 64 y.o. female admitted to the inpatient rehabilitation unit on 2/15/2022. The patient was originally admitted to the [facility] 2022 to the gynecology oncology service secondary to postmenopausal bleeding. She started with the bleeding in early 2021 and was followed by Dr. Bennett Herrera here at [facility]. Ms. Jerrica Summers presented to [facility] (/[facility])  for initial consultation for Post-menopausal Bleeding and failed Endometrial Biopsy. PMB started in 2021. Had pelvic US that showed EMS 9mm. EMB was attempted twice in the office, no endometrial cells were identified. She was therefore referred to the [facility] for additional management. She had no pelvic pain. Only intermittent spotting, no heavy bleeding. Had history of Lichen sclerosis, was prescribed estrogen cream and steroid cream. Patient underwent abdominal hysterectomy on 2022, BSO via P. Pfannenstiel incision. Final pathology benign. Her hospitalization was complicated by respiratory failure (now on 3L supplemental O2 per trach mask,) and has PEG for pharyngeal dysphagia.   Also has a PMH positive for GERD, OA,  Paralysis of vocal cords, Polio (bulbar), Dysphagia, Inhalation and ingestion of other object causing obstruction of respiratory tract or suffocation, Osteoporosis, Hoarse voice, Hypernasal speech, Diaphragm paralysis, and Left adhesive capsulitis     Prior Level of Function:  Lives With: Spouse  Type of Home: House  Home Layout: Two level  Home Access: Stairs to enter without rails  Entrance Stairs - Number of Steps: ~4 steps at the front of the house without railings and 2 steps in the garage. Patient reports she has a few steps in the back of the house with railings. Patient reports typically she enters house from garage  Home Equipment:  (Patient reports she does not have an equipment at home)   Bathroom Shower/Tub: Walk-in shower (Patient reports she has a built in shower chair)  Bathroom Toilet: Standard  Bathroom Equipment: Grab bars in shower,Built-in shower seat    ADL Assistance: Independent  Homemaking Assistance: Independent  Homemaking Responsibilities: Yes  Ambulation Assistance: Independent  Transfer Assistance: Independent  Active : Yes  Additional Comments: Patient reports she was independent PTA and did not utilize an AD for mobility. Patient reports her daugther and son in law live next door and also would be able to help out as needed. Restrictions/Precautions:  Restrictions/Precautions: General Precautions,Fall Risk  Position Activity Restriction  Other position/activity restrictions: PEG, trach collar/mask, No lifting >10lbs     SUBJECTIVE: Pt. Seated on BS chair and pleasantly agrees to therapy session. Pt. Requesting to use restroom at beginning of session. Spouse present and attending session.      PAIN: Not rated: Throat    Vitals: Vitals not assessed per clinical judgement, see nursing flowsheet    OBJECTIVE:  Bed Mobility:  Not Tested    Transfers:  Sit to Stand: Stand By Assistance  Stand to Sit:Stand By Assistance    Ambulation:  Stand By Assistance, Wil Resources Assistance  Distance: 150' x 2  Surface: Level Tile  Device:Rolling Walker  Gait Deviations:  Slow Margot, Decreased Step Length Bilaterally, Decreased Gait Speed and Decreased Heel Strike Bilaterally    Balance:  None    Exercise:  Patient was guided in 1 set(s) 10 reps of exercises: Glut sets, Seated marches, Seated hamstring curls, Seated heel/toe raises, Long arc quads, Seated isometric hip adduction, Seated abduction/adduction. Exercises were completed for increased independence with functional mobility. Provided HEP handout for continued strengthening. Functional Outcome Measures: Not completed       ASSESSMENT:  Assessment: Patient progressing toward established goals. Activity Tolerance:  Patient tolerance of  treatment: good. Equipment Recommendations:Equipment Needed: Yes (Continue to assess pending progress (may require RW))  Discharge Recommendations: Continue to assess pending progress,Patient would benefit from continued therapy after discharge     Plan: Times per week: 5x/wk 90min; 1x/wk 30 min  Times per day: Daily  Current Treatment Recommendations: Strengthening,Neuromuscular Re-education,Home Exercise Program,ROM,Safety Education & Brigida Armin Training,Patient/Caregiver Education & Training,Functional Mobility Training,Wheelchair Mobility Training,Transfer Training,Gait Training,Stair training    Patient Education  Patient Education: Plan of Care, Home Exercise Program, Gait, Verbal Exercise Instruction    Goals:  Patient goals : To return to home  Short term goals  Time Frame for Short term goals: 1 week  Short term goal 1: Patient to perform supine<>sit with SBA without railings in order to assist with getting into and out of bed. GOAL MET  Short term goal 2: Patient to perform sit to stand transfers wtih [address] with <=1 cue for hand placement in order to assist with safety with transfers from various surfaces. Short term goal 3: Patient to ambulate at least 125 feet without AD with SBA in order to assist with home mobility. Short term goal 4: Patient to ascend/descend 4 step with 1 handrail with SBA in order to assist with getting into and out of home.   Short term goal 5: Patient to score at least 21/28 on the Tinetti in order to reduce risk for falls.  Long term goals  Time Frame for Long term goals : 3 weeks  Long term goal 1: Patient to perform supine<>sit with Mod I without railings in order to assist with getting into and out of bed. Long term goal 2: Patient to perform sit to stand transfers with Mod I in order to assist with safety with transfers from various surfaces. Long term goal 3: Patient to ambulate at least 150 feet with RW with Mod I in order to assist with home mobility. Long term goal 4: Patient to ascend/descend 4 steps with 1 handrail with Supervision in order to assist with getting into and out of home. Long term goal 5: Patient to perform car transfer with Supervision in order to assist with getting to and from appointments. Following session, patient left in safe position with all fall risk precautions in place.

## 2022-02-25 NOTE — PROGRESS NOTES
[address] [facility] THERAPY  [facility]  DAILY NOTE    TIME   SLP Individual Minutes  Time In: 1100  Time Out: 1200  Minutes: 60  Timed Code Treatment Minutes: 0 Minutes       Date: 2022  Patient Name: Layne Reyes      CSN: [id number]   : 1945  (64 y.o.)  Gender: female   Referring Physician: Marilyn Moody MD  Diagnosis: Debility  Secondary Diagnosis: Dysphagia, Dysphonia   Precautions: Fall risk, trach, PEG, aspiration  Current Diet: Full THIN liquid diet  Swallowing Strategies: Right head turn, small bites/sips, 3-4 swallows, CLOSE pulmonary monitoring  Date of Last MBS/FEES: 22    Pain:  5/10 - Pain location: sore throat, RN aware    Subjective:  Patient seated upright in recliner for duration of session. Pleasant and motivated to participate in dysphagia interventions.  also present. At end of session, patient and  confirming their request for [address] services; relayed request to Saloni Fermin. NMES (Vital Stim) Treatment Mini    Does the patient have an area of neoplasm or infection in the area of electrode placement? No    Electrode Placement: 3-h  Range of e-stim strength tolerated: 6.0  Duration of e-stim: 25 minutes     Was the patient provided with constant supervision during the use of NMES? Yes  Was the patient's skin checked upon removal of electrodes? Yes  Were there concerns regarding the skin's integrity following electrode removal? No      Short-Term Goals:  SHORT TERM GOAL #1:  Goal 1: Patient will safely consume thin liquid diet (IDDSI level 0) with use of compensatory strategies with goal of consuming 25-50% of po meal to improve pharyngeal function and increase caloric intake. INTERVENTIONS: Patient consumed thin liquids in conjunction with NMES without overt s/s of aspiration (though certainly cannot ruleout at bedside alone).      Handout providing reiterating the following information: current diet level, foods allowed/not allowed, how to complete flow test, swallowing strategies, how to maintain adequate nutritional intake with PEG and \"pleasure feeds\" consisting of thin liquids, and pharyngeal exercise program to be completed at home.  and patient verbalized understanding. It should continue to be noted that although patient is able to safely consume a full liquid diet, this is NOT optimal for maximizing nutrition levels. WithOUT ongoing feedings via PEG, patient unlikely to maintain adequate nutrition and would be at HIGH risk for malnourishment which could result in further deconditioning and recurrent hospitalizations. SHORT TERM GOAL #2:  Goal 2: Patient will complete pharyngeal strengthening exercises x10 each with good success in order to improve pharyngeal weakness and overall airway protection. INTERVENTIONS: Completed the following pharyngeal exercises in conjunction with skilled demonstration and rationale on proper technique. Effortful swallow -- 2 sets of 10 with good success  TBR ('kick') -- 2 sets of 20 with good success and retraction strength  Caren -- 2 sets of 10 with great success and timeliness of swallow trigger  Bonnielee Rushing -- 2 sets of 10 with good success and effort  EMST -- 2 sets of 10 aiming for up to 250; achieved total of 7/20 deep breaths  *required liquid wash throughout to assist with swallow trigger; suspect due to sore throat   *reviewed ST HEP; patient verbalized understanding    SHORT TERM GOAL #3:  Goal 3: Patient will trial red button consistently without respiratory distress to permit potential decannulation. INTERVENTIONS:  Per ENT operative note from 2/24 (yesterday), MD Lisa Redman reported the following:    Findings:   1. 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 2.  No posterior commissure webbing seen  3.   Trachea with mild suprastomal stenosis and no other tracheostomy related pathology    Disposition: The patient will be readmitted to her rehab bed to continue her inpatient rehab process. I have found her to have a surgically correctable condition but I will confirm with an awake endoscopy him the office prior to posterior for surgery. She is a candidate for an augmentation lateralization procedure of the right cricoarytenoid joint and the right true vocal fold. By this method the patient's right hemilarynx can be brought to be a reasonable foil for voicing, relatively straight structure against which she conceal her airway or swallowing, and adequate airway for breathing without the tracheostomy. I would leave the tracheostomy in place until she is fully recovered from the initial operation if she chooses to accept. Reviewed ENT recommendations with patient. Patient and  inquiring about any concern for the aforementioned procedure impacting swallow function; ST explained that this procedure should not worsen swallow ability, albeit also not improve swallow function alone. However, ST does suspect it would improve overall airway protection and potentially allow for elimination of head turn strategy during PO intake. ST recommended that if patient moves forward with this procedure, a repeat MBS should not be completed until AFTER the procedure to fully re-evaluate pharyngeal function. Both verbalized understanding. Long-Term Goals:  Timeframe for Long-term Goals: 3 weeks    LONG TERM GOAL #1:  Goal 1: Patient will complete repeat instrumental to reevaluate readiness for initiation of solids as clinically indicated.        Comprehension: 6 - Complex ideas 90% or device (hearing aid or glasses- if patient is primarily a visual learner)  Expression: 7 - Patient expresses complex ideas/needs  Social Interaction: 6 - Patient requires medication for mood and/or effect  Problem Solvin - Patient independent with complex tasks  Memory: 6 - Patient requires device to recall (e.g. memory book)    EDUCATION:  Learner: Patient and Significant Other  Education:  Reviewed diet and strategies, Reviewed signs, symptoms and risks of aspiration, Reviewed ST goals and Plan of Care, Reviewed recommendations for follow-up, Education Related to Potential Risks and Complications Due to Impairment/Illness/Injury, Education Related to Prevention of Recurrence of Impairment/Illness/Injury, Education Related to [employer] and [facility]ness and Swallowing HEP Respiratory/swallow coordination  Evaluation of Education: Verbalizes understanding       ASSESSMENT/PLAN:      Activity Tolerance:  Patient tolerance of treatment: good. Assessment/Plan: Patient progressing toward established goals. Continues to require skilled care of licensed speech pathologist to progress toward achievement of established goals and plan of care.    Plan for Next Session:  pharyngeal exercises with NMES       Matt Mendes M.S. [address] [place]

## 2022-02-25 NOTE — PROGRESS NOTES
Patient reports that she is doing pretty well post operatively. She reports mild, but very manageable sore throat since surgery. She did have a few episodes of blood tinged mucous with coughing, but that has seemed to resolve. She feels that her breathing is at baseline compared to before surgery yesterday. No evidence of complication. The patient and her  report that she will likely be discharged from inpatient rehab on 2/28/22. ENT ok with discharge once patient is deemed ready by primary. Patient will follow up with ENT as outpatient for further fiberoptic examination for potential surgical planning in future. She is scheduled to follow up on 3/8/22 at 2:30PM with Dr Brit Lazo. Contact ENT with further questions/concerns in the meantime.

## 2022-02-25 NOTE — PROGRESS NOTES
Physical Medicine & Rehabilitation   Progress Note      IMELDA Rivas - CNP     Chief Complaint:    Respiratory Failure with subsequent Debility; s/p JONI with BSO    Subjective: Patient was seen today sitting up in chair.  at bedside. Patient with bronchoscopy yesterday with Dr Darryl West which noted right vocal cord paralysis with surgical option as OP.  with questions today about HH, therapy, O2 and discharge home. Reviewed all questions. Will have O2 home screen completed Sunday. Planned discharge from the [facility] to home on 2/28/22 with Nile Ambriz PT, OT, ST, RN, and HHA. Rehabilitation:  PT:   Transfers:  Sit to Stand: Supervision  Stand to Sit:Supervision  To/From Bed and Chair: Supervision  Ambulation:  Contact Guard Assistance  Distance: 20 feet x 2   Surface: Uneven Surface  Device:No Device  Gait Deviations:  Decreased Step Length Bilaterally, Decreased Arm Swing, Decreased Gait Speed, Decreased Heel Strike Bilaterally, Wide Base of Support, Mild Path Deviations and Decreased B hip flexion resulting in decreased toe clearance  -Patient instructed in ambulation over uneven mat initially without UE support and SBA performed with normalized gait pattern, then instructed in marching and lateral side stepping x 2 reps each. Patient required intermittent rest breaks due to decreased endurance. Patient required verbal cueing for increased step height with marching in order to assist with toe clearance. Patient demonstrated slight unsteadiness however able to self correct. Balance:  Dynamic Standing Balance: Contact Guard Assistance   -Patient instructed in standing dynamic balance while bouncing ball with CGA with varying feet including normalized OTIS, tandem stance and stepping forward. Patient had no episodes of LOB however had slight unsteadiness with ability to self correct. OT:   ADL:   Grooming: Supervision. hand hygiene, 2x this session. Toileting: Supervision. during clothing mgmt and hygiene  Toilet Transfer: Supervision. Abby Lemon BALANCE:  Sitting Balance:  Modified Independent. Standing Balance: Supervision. TRANSFERS:  Sit to Stand:  Supervision. recliner, standard chair, w/c,   Stand to Sit: Supervision. FUNCTIONAL MOBILITY:  Assistive Device: None  Assist Level:  Stand By Assistance. Distance: within apartment, within room     ADDITIONAL ACTIVITIES:  Patient completed IADL task of retrieving linens from graded planes and heights including floor level, ambulating with SBA emerging supervision level. Skilled edu on using reacher to retrieve items for energy conservation + to maintain 02 sat's, pt was able to complete without issue. After a seated rest break, Pt then stood to fold linens without UE support standing x 4.5 min no LOB. Pt completed IADL task of making the bed, completing standing portions with supervision and demo no LOB, standing x6 min with mod fatigue exhibited   Skilled education on energy conservation and deep breathing techniques with handout provided - pt demo good insight      ST:    INTERVENTIONS:  requesting review of flow test. ST outlined handout outlining steps for proper flow test completion. Education provided verbally in detail. Patient also inquiring about other foods that are able to be thinned out to the appropriately (IDDSI level 0). ST suggested smoothies that include peanut butter to satisfy patients sweet tooth. Provided x2 smoothie recipes that are high in protein to assist in caloric intake, and reviewed ways to thin out smoothies. Suggested making smoothies at home and adding milk, juice, or water, then completing flow test to determine if patient is able to safely consume. Patient and  verbalized understanding. It should be noted that although patient is able to safely consume a full liquid diet, this is NOT optimal for maximizing nutrition levels.  WithOUT ongoing feedings via PEG, patient unlikely to maintain adequate nutrition and would be at HIGH risk for malnourishment which could result in further deconditioning and recurrent hospitalizations. INTERVENTIONS: Reviewed recommendations to complete exercises 2-3x per day; patient receptive and verbalized understanding. Not able to complete this date due to patient not allowed PO intake in prep for upcoming ENT procedure. INTERVENTIONS: Patient on room air. Reports trials of room air last night while sleeping too, however, resulted in lack of sleep due to patient worrying about adequate oxygen intake. Patient insightful regarding implementation of deep breathing techniques. High likelihood of eventual decannulation pending ENT and pulmonology recommendations.  Will closely monitor chart following ENT post-op note this date to determine next steps related to decannulation.       Review of Systems:   CONSTITUTIONAL:  positive for  fatigue  EYES:  Wears glasses  HEENT:  + Pharyngeal Dysphagia  RESPIRATORY:  positive for dyspnea, wheezing and on intermittent supplemental O2 per trach mask  CARDIOVASCULAR:  negative  GASTROINTESTINAL:  Dysphagia with PEG; nutrition per tube feedings and comfort sips of thin liquids  GENITOURINARY:  negative  SKIN:  Healing incision  HEMATOLOGIC/LYMPHATIC:  Recent anemia; s/p 1 unit PRBC's  MUSCULOSKELETAL:  positive for  myalgias, arthralgias, pain, stiff joints, decreased range of motion and muscle weakness  NEUROLOGICAL:  positive for speech problems, gait problems, dysphagia and weakness  BEHAVIOR/PSYCH:  positive for decreased energy level, fatigue and anxiety  10 point system review otherwise negative      Objective:  Vitals:    02/25/22 0818   BP: (!) 105/52   Pulse: 83   Resp: 16   Temp:    SpO2: 92%   awake  Orientation:   person, place, time  Mood: within normal limits  Affect: anxious and calm  General appearance: well groomed and in no acute distress     Memory:   normal,   Attention/Concentration: normal  Language: abnormal - hoarse voice; hypernasal; using her talking valve     Cranial Nerves:  cranial nerves II-XII are grossly intact  ROM:  abnormal - decreased left shoulder 2/2 adhesive capsulitis  Tone:  normal  Muscle bulk: normal  Sensory:  Sensory intact     Skin: warm and dry. PEG and trach sites  Peripheral vascular: Pulses: Normal upper and lower extremity pulses; Edema: no       Diagnostics:   No results found for this or any previous visit (from the past 24 hour(s)). Impression:    · Status-post total abdominal hysterectomy, bilateral salpingo-oophorectomy on 1/24/22 at [facility] secondary to postmenopausal bleeding via pfannenstiel incision. Final pathology benign. · Hypercarbic respiratory failure, now on supplemental O2 per trach mask    · Tracheostomy status  · History of diaphragmatic and vocal cord paralysis (baseline right vocal cord and diaphragm paralysis with inability to cough, rare nasal regurgitation of liquids believed to be secondary to bulbar polio  · Pharyngeal dysphagia  · Status post PEG placement 2/8/2022   · Zenker's diverticulum noted on a modified barium swallow study of 2/13/2022; GI consult deferred while at [facility]. · Lactose intolerant  · Acute blood loss anemia; transfused 1 unit of packed red blood cells on 2/10/2022. · 1 lower respiratory culture positive for Serratia and the patient is status post vancomycin 1/30 through 1/31, and cefepime 1/30 through 2/ 6. · Negative lower extremity Dopplers on 2/2/22. · Gastroesophageal reflux disease  · Osteoarthritis  · Paralysis of vocal cords  · Bulbar polio  · Dysphagia, moderate to severe oropharyngeal  · Inhalation it and ingestion of other object causing obstruction of respiratory tract or suffocation  · Osteoporosis  · Hoarse voice  · Hypernasal speech  · Diaphragmatic paralysis  · Left adhesive capsulitis      Plan:  · Continue therapies.  PT, OT, SLP  · Prophylaxis:  DVT: EPC cuffs; no oral anticoagulation secondary to bleeding

## 2022-02-25 NOTE — ANESTHESIA POSTPROCEDURE EVALUATION
Department of Anesthesiology  Postprocedure Note    Patient: Maxim Redmond  MRN: [id number]  YOB: 1945  Date of evaluation: 2/24/2022  Time:  8:51 PM     Procedure Summary     Date: 02/24/22 Room / Location: [address] [place] [address]    Anesthesia Start: 5140 Anesthesia Stop: 1660    Procedure: DIAGNOSTIC SUSPENSION MICROLARYNGOSCOPY BRONCHOSCOPY WITH JET VENTILATION (N/A ) Diagnosis: (Vocal cord dysfunction)    Surgeons: Myrna Weeks MD Responsible Provider: Marcos Holliday DO    Anesthesia Type: general ASA Status: 3          Anesthesia Type: general    Carol Ann Phase I: Carol Ann Score: 9    Carol Ann Phase II:      Last vitals: Reviewed and per EMR flowsheets.        Anesthesia Post Evaluation    Patient location during evaluation: PACU  Patient participation: complete - patient participated  Level of consciousness: awake and alert  Airway patency: patent  Nausea & Vomiting: no vomiting and no nausea  Complications: no  Cardiovascular status: hemodynamically stable  Respiratory status: acceptable and T-piece  Hydration status: stable

## 2022-02-25 NOTE — PROGRESS NOTES
Patient resting quietly in bed. Call light in reach. Trach mask in place. Inner cannula of trach changed, and passe kyler valve removed. Vitals are stable. Pain is 5/10. Will continue to assess and monitor.

## 2022-02-26 LAB
ANION GAP SERPL CALCULATED.3IONS-SCNC: 10 MEQ/L (ref 8–16)
BUN BLDV-MCNC: 22 MG/DL (ref 7–22)
CALCIUM SERPL-MCNC: 9.3 MG/DL (ref 8.5–10.5)
CHLORIDE BLD-SCNC: 98 MEQ/L (ref 98–111)
CO2: 31 MEQ/L (ref 23–33)
CREAT SERPL-MCNC: 0.5 MG/DL (ref 0.4–1.2)
GFR SERPL CREATININE-BSD FRML MDRD: > 90 ML/MIN/1.73M2
GLUCOSE BLD-MCNC: 88 MG/DL (ref 70–108)
HCT VFR BLD CALC: 32.6 % (ref 37–47)
HEMOGLOBIN: 9.7 GM/DL (ref 12–16)
POTASSIUM SERPL-SCNC: 4.6 MEQ/L (ref 3.5–5.2)
SODIUM BLD-SCNC: 139 MEQ/L (ref 135–145)

## 2022-02-26 PROCEDURE — 1180000000 HC REHAB R&B

## 2022-02-26 PROCEDURE — 6370000000 HC RX 637 (ALT 250 FOR IP): Performed by: PHYSICAL MEDICINE & REHABILITATION

## 2022-02-26 PROCEDURE — 85018 HEMOGLOBIN: CPT

## 2022-02-26 PROCEDURE — 94760 N-INVAS EAR/PLS OXIMETRY 1: CPT

## 2022-02-26 PROCEDURE — 36415 COLL VENOUS BLD VENIPUNCTURE: CPT

## 2022-02-26 PROCEDURE — 85014 HEMATOCRIT: CPT

## 2022-02-26 PROCEDURE — 6370000000 HC RX 637 (ALT 250 FOR IP): Performed by: FAMILY MEDICINE

## 2022-02-26 PROCEDURE — 80048 BASIC METABOLIC PNL TOTAL CA: CPT

## 2022-02-26 PROCEDURE — 2700000000 HC OXYGEN THERAPY PER DAY

## 2022-02-26 RX ADMIN — SERTRALINE 50 MG: 50 TABLET, FILM COATED ORAL at 10:53

## 2022-02-26 RX ADMIN — ACETAMINOPHEN 650 MG: 325 TABLET ORAL at 10:53

## 2022-02-26 RX ADMIN — ACETAMINOPHEN 650 MG: 325 TABLET ORAL at 05:36

## 2022-02-26 RX ADMIN — FUROSEMIDE 20 MG: 20 TABLET ORAL at 10:53

## 2022-02-26 RX ADMIN — PANTOPRAZOLE SODIUM 40 MG: 40 TABLET, DELAYED RELEASE ORAL at 05:36

## 2022-02-26 RX ADMIN — Medication 400 MG: at 10:53

## 2022-02-26 RX ADMIN — Medication 1 TABLET: at 10:53

## 2022-02-26 RX ADMIN — PANTOPRAZOLE SODIUM 40 MG: 40 TABLET, DELAYED RELEASE ORAL at 16:48

## 2022-02-26 RX ADMIN — TRAZODONE HYDROCHLORIDE 50 MG: 50 TABLET ORAL at 21:07

## 2022-02-26 RX ADMIN — MELOXICAM 15 MG: 7.5 TABLET ORAL at 10:53

## 2022-02-26 RX ADMIN — Medication 6 MG: at 21:06

## 2022-02-26 RX ADMIN — CYCLOSPORINE 2 DROP: 0.5 EMULSION OPHTHALMIC at 09:25

## 2022-02-26 ASSESSMENT — PAIN SCALES - GENERAL
PAINLEVEL_OUTOF10: 3
PAINLEVEL_OUTOF10: 0
PAINLEVEL_OUTOF10: 0
PAINLEVEL_OUTOF10: 3

## 2022-02-26 NOTE — PLAN OF CARE
Problem: OXYGENATION/RESPIRATORY FUNCTION  Goal: Patient will maintain patent airway  Outcome: Ongoing  Note: Trach mask, passy faua valve     Problem: Falls - Risk of:  Goal: Will remain free from falls  Description: Will remain free from falls  Outcome: Ongoing  Note: Fall precautions ,bed and chair alarm in use      Problem: Falls - Risk of:  Goal: Absence of physical injury  Description: Absence of physical injury  Outcome: Ongoing  Note: No injury     Problem: Pain:  Goal: Pain level will decrease  Description: Pain level will decrease  Outcome: Ongoing  Note: No pain at this time      Problem: Pain:  Goal: Control of acute pain  Description: Control of acute pain  Outcome: Ongoing     Problem: Skin Integrity:  Goal: Risk for impaired skin integrity will decrease  Description: Risk for impaired skin integrity will decrease  Outcome: Ongoing  Note: Turn and reposition, wt shifts, waffle cushion      Problem: Skin Integrity:  Goal: Will show no infection signs and symptoms  Description: Will show no infection signs and symptoms  Outcome: Ongoing  Note: Monitor labs, monitor vs      Problem: Skin Integrity:  Goal: Absence of new skin breakdown  Description: Absence of new skin breakdown  Outcome: Ongoing  Note: Turn and reposition, wt shifts      Problem: Nutrition  Goal: Optimal nutrition therapy  Outcome: Ongoing  Note: Monitor intake and output      Problem: DISCHARGE BARRIERS  Goal: Patient's continuum of care needs are met  Outcome: Ongoing  Note: Discharge monday

## 2022-02-27 PROCEDURE — 2700000000 HC OXYGEN THERAPY PER DAY

## 2022-02-27 PROCEDURE — 97110 THERAPEUTIC EXERCISES: CPT

## 2022-02-27 PROCEDURE — 6370000000 HC RX 637 (ALT 250 FOR IP): Performed by: PHYSICAL MEDICINE & REHABILITATION

## 2022-02-27 PROCEDURE — 94760 N-INVAS EAR/PLS OXIMETRY 1: CPT

## 2022-02-27 PROCEDURE — 97535 SELF CARE MNGMENT TRAINING: CPT

## 2022-02-27 PROCEDURE — 1180000000 HC REHAB R&B

## 2022-02-27 PROCEDURE — 94761 N-INVAS EAR/PLS OXIMETRY MLT: CPT

## 2022-02-27 PROCEDURE — 97116 GAIT TRAINING THERAPY: CPT

## 2022-02-27 PROCEDURE — 6370000000 HC RX 637 (ALT 250 FOR IP): Performed by: FAMILY MEDICINE

## 2022-02-27 RX ADMIN — CYCLOSPORINE 2 DROP: 0.5 EMULSION OPHTHALMIC at 12:43

## 2022-02-27 RX ADMIN — FUROSEMIDE 20 MG: 20 TABLET ORAL at 10:34

## 2022-02-27 RX ADMIN — PANTOPRAZOLE SODIUM 40 MG: 40 TABLET, DELAYED RELEASE ORAL at 10:34

## 2022-02-27 RX ADMIN — SERTRALINE 50 MG: 50 TABLET, FILM COATED ORAL at 10:34

## 2022-02-27 RX ADMIN — Medication 6 MG: at 21:06

## 2022-02-27 RX ADMIN — Medication 400 MG: at 10:34

## 2022-02-27 RX ADMIN — Medication 1 TABLET: at 10:34

## 2022-02-27 RX ADMIN — MELOXICAM 15 MG: 7.5 TABLET ORAL at 10:34

## 2022-02-27 RX ADMIN — TRAZODONE HYDROCHLORIDE 50 MG: 50 TABLET ORAL at 21:06

## 2022-02-27 RX ADMIN — PANTOPRAZOLE SODIUM 40 MG: 40 TABLET, DELAYED RELEASE ORAL at 17:13

## 2022-02-27 ASSESSMENT — PAIN SCALES - GENERAL
PAINLEVEL_OUTOF10: 0
PAINLEVEL_OUTOF10: 0

## 2022-02-27 ASSESSMENT — PAIN DESCRIPTION - DESCRIPTORS: DESCRIPTORS: ACHING

## 2022-02-27 ASSESSMENT — PAIN DESCRIPTION - FREQUENCY: FREQUENCY: CONTINUOUS

## 2022-02-27 ASSESSMENT — PAIN DESCRIPTION - LOCATION: LOCATION: THROAT

## 2022-02-27 NOTE — PLAN OF CARE
Care plan reviewed with patient and  verbalize understanding of the plan of care and contribute to goal setting. Problem: Falls - Risk of:  Goal: Will remain free from falls  Description: Will remain free from falls  Outcome: Ongoing  Goal: Absence of physical injury  Description: Absence of physical injury  Outcome: Ongoing    Pt ambulates with gait belt. Pt has steady gait. Appropriate use of call light. Problem: Skin Integrity:  Goal: Will show no infection signs and symptoms  Description: Will show no infection signs and symptoms  Outcome: Ongoing    Pt shows no s/sx of infection. Pt afebrile. Goal: Absence of new skin breakdown  Description: Absence of new skin breakdown  Outcome: Ongoing    Pt skin assessed. Pt absent of new skin breakdown.

## 2022-02-27 NOTE — PROGRESS NOTES
[facility]  INPATIENT PHYSICAL THERAPY  DAILY NOTE  [address] [facility],4Th Floor - 7E-66/066-A    Time In: 1000  Time Out: 1030  Timed Code Treatment Minutes: 30 Minutes  Minutes: 30          Date: 2022  Patient Name: Balbina Burroughs,  Gender:  female        MRN: [id number]  : 1945  (64 y.o.)     Referring Practitioner: Kaci Gates MD  Diagnosis: Acute Respiratory Failure  Additional Pertinent Hx: Balbina Burroughs  is a 64 y.o. female admitted to the inpatient rehabilitation unit on 2/15/2022. The patient was originally admitted to the [facility] 2022 to the gynecology oncology service secondary to postmenopausal bleeding. She started with the bleeding in early 2021 and was followed by Dr. Blanca Thibodeaux here at [place] [address]. Ms. Shilo Chambers presented to [facility] (/[facility])  for initial consultation for Post-menopausal Bleeding and failed Endometrial Biopsy. PMB started in 2021. Had pelvic US that showed EMS 9mm. EMB was attempted twice in the office, no endometrial cells were identified. She was therefore referred to the [facility] at [facility] 22 for additional management. She had no pelvic pain. Only intermittent spotting, no heavy bleeding. Had history of Lichen sclerosis, was prescribed estrogen cream and steroid cream. Patient underwent abdominal hysterectomy on 2022, BSO via P. Pfannenstiel incision. Final pathology benign. Her hospitalization was complicated by respiratory failure (now on 3L supplemental O2 per trach mask,) and has PEG for pharyngeal dysphagia.   Also has a PMH positive for GERD, OA,  Paralysis of vocal cords, Polio (bulbar), Dysphagia, Inhalation and ingestion of other object causing obstruction of respiratory tract or suffocation, Osteoporosis, Hoarse voice, Hypernasal speech, Diaphragm paralysis, and Left adhesive capsulitis     Prior Level of Function:  Lives With: Spouse  Type of Home: House  Home Layout: Two level  Home Access: Stairs to enter without rails  Entrance Stairs - Number of Steps: ~4 steps at the front of the house without railings and 2 steps in the garage. Patient reports she has a few steps in the back of the house with railings. Patient reports typically she enters house from garage  Home Equipment:  (Patient reports she does not have an equipment at home)   Bathroom Shower/Tub: Walk-in shower (Patient reports she has a built in shower chair)  Bathroom Toilet: Standard  Bathroom Equipment: Grab bars in shower,Built-in shower seat    ADL Assistance: Independent  Homemaking Assistance: Independent  Homemaking Responsibilities: Yes  Ambulation Assistance: Independent  Transfer Assistance: Independent  Active : Yes  Additional Comments: Patient reports she was independent PTA and did not utilize an AD for mobility. Patient reports her daugther and son in law live next door and also would be able to help out as needed. Restrictions/Precautions:  Restrictions/Precautions: General Precautions,Fall Risk  Position Activity Restriction  Other position/activity restrictions: PEG, trach collar/mask, No lifting >10lbs     SUBJECTIVE: Patient seated in BS chair upon PTA arrival. Patient pleasant and agreeable to therapy treatment. Patients spouse present throughout therapy treatment and very supportive to patient progress. PAIN: 0/10: Patient denies pain. Vitals: Vitals not assessed per clinical judgement, see nursing flowsheet    OBJECTIVE:  Bed Mobility:  Supine to Sit: Stand By Assistance  Sit to Supine: Stand By Assistance    Patient began coughing upon initial transfer into supine and had to transfer back to EOB due to cough. Pt's O2 stats monitored WFL. Transfers:  Sit to Stand: Supervision  Stand to Sit:Supervision    Ambulation:  Contact Guard Assistance  Distance: 125 feet x 1 and short distances throughout her room.   Surface: Level Tile  Device:No Device  Gait Deviations: Forward Flexed Posture, Decreased Step Length Bilaterally, Decreased Arm Swing, Decreased Heel Strike Bilaterally and Unsteady Gait    Balance:  Not Tested    Exercise:  Patient was guided in 1 set(s) 15 reps of exercise to both lower extremities. Seated marches, Seated hamstring curls, Seated heel/toe raises, Long arc quads, Seated isometric hip adduction and Seated abduction/adduction. Exercises were completed for increased independence with functional mobility. Functional Outcome Measures: Not completed       ASSESSMENT:  Assessment: Patient progressing toward established goals. Activity Tolerance:  Patient tolerance of  treatment: good. Equipment Recommendations:Equipment Needed: Yes (Continue to assess pending progress (may require RW))  Discharge Recommendations: Continue to assess pending progress  Plan: Times per week: 5x/wk 90min; 1x/wk 30 min  Times per day: Daily  Current Treatment Recommendations: Strengthening,Neuromuscular Re-education,Home Exercise Program,ROM,Safety Education & Mary Randall Training,Patient/Caregiver Education & Training,Functional Mobility Training,Wheelchair Mobility Training,Transfer Training,Gait Training,Stair training    Patient Education  Patient Education: Plan of Care, Bed Mobility, Transfers, Gait,  - Patient Verbalized Understanding    Goals:  Patient goals : To return to home  Short term goals  Time Frame for Short term goals: 1 week  Short term goal 1: Patient to perform supine<>sit with SBA without railings in order to assist with getting into and out of bed. GOAL MET  Short term goal 2: Patient to perform sit to stand transfers wtih [address] with <=1 cue for hand placement in order to assist with safety with transfers from various surfaces. Short term goal 3: Patient to ambulate at least 125 feet without AD with SBA in order to assist with home mobility.   Short term goal 4: Patient to ascend/descend 4 step with 1 handrail with SBA in order to assist with getting into and out of home. Short term goal 5: Patient to score at least 21/28 on the Tinetti in order to reduce risk for falls. Long term goals  Time Frame for Long term goals : 3 weeks  Long term goal 1: Patient to perform supine<>sit with Mod I without railings in order to assist with getting into and out of bed. Long term goal 2: Patient to perform sit to stand transfers with Mod I in order to assist with safety with transfers from various surfaces. Long term goal 3: Patient to ambulate at least 150 feet with RW with Mod I in order to assist with home mobility. Long term goal 4: Patient to ascend/descend 4 steps with 1 handrail with Supervision in order to assist with getting into and out of home. Long term goal 5: Patient to perform car transfer with Supervision in order to assist with getting to and from appointments. Following session, patient left in safe position with all fall risk precautions in place.

## 2022-02-27 NOTE — PROGRESS NOTES
RT Evaluation for Home Oxygen    Resting (Room Air):  SpO2: 93    HR: 82      During Walk (Room Air):  SpO2: 92  HR: 89  Walk/Assistance Device:none used    After Walk:  SpO2: 94  HR: 91  O2 Device:room air  Comments: Pt tolerated walk well. No increased work of breathing noted and no tachycardia noted  Does the Patient Qualify for Home O2: not at rest or with exertion.  Will order nocturnal study for tonight        Electronically signed by Murali Turner RRT

## 2022-02-27 NOTE — PROGRESS NOTES
[facility]  Recreational Therapy  Discharge Note  Inpatient Rehabilitation Unit         Date:  2/27/2022       Patient Name: Balbina Burroughs      MRN: [id number]       YOB: 1945 (72 y.o.)       Gender: female  Diagnosis: Acute Respiratory Failure  Referring Practitioner: Dr Roxana Garcia    Patient discharged from Recreational Therapy at this time. See recreational therapy notes for details.     Electronically signed by: SHERIE Ragland  Date: 2/27/2022

## 2022-02-27 NOTE — PROGRESS NOTES
[address]  Occupational Therapy  Daily Note  Time:   Time In: 0700  Time Out: 0730  Timed Code Treatment Minutes: 30 Minutes  Minutes: 30          Date: 2022  Patient Name: Meka Ledesma,   Gender: female      Room: [facility]66/066-A  MRN: [id number]  : 1945  (64 y.o.)  Referring Practitioner: Dr Carina Nguyen  Diagnosis: Acute Respiratory Failure  Additional Pertinent Hx: Meka Ledesma  is a 64 y.o. female admitted to the inpatient rehabilitation unit on 2/15/2022. The patient was originally admitted to the [facility] 2022 to the gynecology oncology service secondary to postmenopausal bleeding. She started with the bleeding in early 2021 and was followed by Dr. Sole Abarca here at [facility]. [facility]  for initial consultation for Post-menopausal Bleeding and failed Endometrial Biopsy. PMB started in 2021. Had pelvic US that showed EMS 9mm. EMB was attempted twice in the office, no endometrial cells were identified. She was therefore referred to the [facility] at [facility] 22 for additional management. She had no pelvic pain. Only intermittent spotting, no heavy bleeding. Had history of Lichen sclerosis, was prescribed estrogen cream and steroid cream. Patient underwent abdominal hysterectomy on 2022, BSO via P. Pfannenstiel incision. Final pathology benign. Her hospitalization was complicated by respiratory failure (now on 3L supplemental O2 per trach mask,) and has PEG for pharyngeal dysphagia. Also has a PMH positive for GERD, OA,  Paralysis of vocal cords, Polio (bulbar), Dysphagia, Inhalation and ingestion of other object causing obstruction of respiratory tract or suffocation, Osteoporosis, Hoarse voice, Hypernasal speech, Diaphragm paralysis, and Left adhesive capsulitis.     Restrictions/Precautions:  Restrictions/Precautions: General Precautions,Fall Risk  Position Activity Restriction  Other position/activity restrictions: PEG, trach collar/mask, No lifting >10lbs     SUBJECTIVE: Upon entering room pt seated in recliner chair, agreeable to OT. Pleasant and cooperative, requesting to get up and sponge bath. PAIN: none reported     Vitals: Oxygen: room air 93-95% with activity     COGNITION: WFL    ADL:   Grooming: Supervision. standing sink side  Bathing: Stand By Assistance. Standing sink side sponge bathing, SBA for safety during functional reaching   Upper Extremity Dressing: Independent. retriving clothing from closet, doffed and donned shirt  Lower Extremity Dressing: Minimal Assistance. requiring assist to jennifer shoes only due to RA   Toileting: Modified Independent. using grab bar  Toilet Transfer: Modified Independent. using grab bar. BALANCE:  Sitting Balance:  Independent. seated EOB, donning clothing  Standing Balance: Supervision. for safety with balance    BED MOBILITY:  Not Tested    TRANSFERS:  Sit to Stand:  Supervision. for safety  Stand to Sit: Supervision. for safety     FUNCTIONAL MOBILITY:  Assistive Device: None  Assist Level:  Supervision. Distance: To and from bathroom  No LOB, however increased supervision for quick pace, transitioning into reaching and squatting      ASSESSMENT:     Activity Tolerance:  Patient tolerance of  treatment: good. Pt demonstrated good motivation to return home with         Discharge Recommendations: Home with Home Health OT  Equipment Recommendations: Other: Pt has a built in shower seat. Recommendations for a grab bar near toilet provided.   Plan: Times per week: 5x/wk for 90 min and 1x/wk for 30 min  Times per day: Daily  Current Treatment Recommendations: Strengthening,Balance Training,Endurance Training,Functional Mobility Training,Equipment Evaluation, Education, & procurement,Patient/Caregiver Education & Training,Self-Care / ADL,Safety Education & Training    Patient Education  Patient Education: Role of OT, Plan of Care, ADL's and Energy Conservation    Goals  Short term goals  Time Frame for Short term goals: 1 week  Short term goal 1: Pt will complete UB dressing tasks with mod I to improve indep with donning her bra at home. Short term goal 2: Pt will complete LB dressing tasks with mod I to improve indep with donning socks and shoes at home. Short term goal 3: Pt will tolerate 4 minute standing tasks with Mod I during shayy release tasks to improve indep with sinkside grooming. Short term goal 4: Pt will ambulate to and from bathroom with SBA and RW prn to improve activity tolerance needed for bathing. Short term goal 5: Pt will tolerate dynamic standing IADL tasks with Mod I to meet patient goal of baking cookies with grandchildren. Long term goals  Time Frame for Long term goals : 1 week  Long term goal 1: Pt will complete bathing and shower transfer with mod I to improve indep with showering at home. Long term goal 2: Pt will complete dressing and grooming tasks with mod I to improve indep with self care at home. Long term goal 3: Pt will complete a simple meal prep tasks with mod I and 0 vcs for ECT to improve indep with preparing lunch at home. Following session, patient left in safe position with all fall risk precautions in place.

## 2022-02-28 PROBLEM — J96.02 ACUTE RESPIRATORY FAILURE WITH HYPERCAPNIA (HCC): Status: ACTIVE | Noted: 2022-02-15

## 2022-02-28 PROBLEM — Z90.710 HX OF TOTAL HYSTERECTOMY: Status: ACTIVE | Noted: 2022-02-28

## 2022-02-28 PROBLEM — J38.00 PARALYSIS OF VOCAL CORDS: Status: ACTIVE | Noted: 2022-02-28

## 2022-02-28 PROBLEM — J96.00 ACUTE RESPIRATORY FAILURE (HCC): Status: RESOLVED | Noted: 2022-02-15 | Resolved: 2022-02-28

## 2022-02-28 PROBLEM — Z93.0 TRACHEOSTOMY STATUS (HCC): Status: ACTIVE | Noted: 2022-02-28

## 2022-02-28 PROBLEM — R13.10 DYSPHAGIA: Status: ACTIVE | Noted: 2022-02-28

## 2022-02-28 PROBLEM — K21.9 GERD (GASTROESOPHAGEAL REFLUX DISEASE): Status: ACTIVE | Noted: 2022-02-28

## 2022-02-28 PROBLEM — J98.6 DIAPHRAGMATIC PARALYSIS: Status: ACTIVE | Noted: 2022-02-28

## 2022-02-28 LAB
ALLEN TEST: POSITIVE
BASE EXCESS (CALCULATED): 5.4 MMOL/L (ref -2.5–2.5)
COLLECTED BY:: ABNORMAL
DEVICE: ABNORMAL
HCO3: 30 MMOL/L (ref 23–28)
O2 SATURATION: 94 %
PCO2: 42 MMHG (ref 35–45)
PH BLOOD GAS: 7.46 (ref 7.35–7.45)
PO2: 65 MMHG (ref 71–104)
SOURCE, BLOOD GAS: ABNORMAL

## 2022-02-28 PROCEDURE — 6370000000 HC RX 637 (ALT 250 FOR IP): Performed by: FAMILY MEDICINE

## 2022-02-28 PROCEDURE — 36600 WITHDRAWAL OF ARTERIAL BLOOD: CPT

## 2022-02-28 PROCEDURE — 97535 SELF CARE MNGMENT TRAINING: CPT

## 2022-02-28 PROCEDURE — 94762 N-INVAS EAR/PLS OXIMTRY CONT: CPT

## 2022-02-28 PROCEDURE — 97530 THERAPEUTIC ACTIVITIES: CPT

## 2022-02-28 PROCEDURE — 99233 SBSQ HOSP IP/OBS HIGH 50: CPT | Performed by: NURSE PRACTITIONER

## 2022-02-28 PROCEDURE — 94760 N-INVAS EAR/PLS OXIMETRY 1: CPT

## 2022-02-28 PROCEDURE — 6370000000 HC RX 637 (ALT 250 FOR IP): Performed by: NURSE PRACTITIONER

## 2022-02-28 PROCEDURE — APPSS30 APP SPLIT SHARED TIME 16-30 MINUTES: Performed by: NURSE PRACTITIONER

## 2022-02-28 PROCEDURE — 1180000000 HC REHAB R&B

## 2022-02-28 PROCEDURE — 82803 BLOOD GASES ANY COMBINATION: CPT

## 2022-02-28 PROCEDURE — 97116 GAIT TRAINING THERAPY: CPT

## 2022-02-28 PROCEDURE — 6370000000 HC RX 637 (ALT 250 FOR IP): Performed by: PHYSICAL MEDICINE & REHABILITATION

## 2022-02-28 PROCEDURE — 99232 SBSQ HOSP IP/OBS MODERATE 35: CPT | Performed by: INTERNAL MEDICINE

## 2022-02-28 RX ORDER — SENNA PLUS 8.6 MG/1
1 TABLET ORAL DAILY PRN
Status: ON HOLD | COMMUNITY
Start: 2022-02-28 | End: 2022-03-04 | Stop reason: ALTCHOICE

## 2022-02-28 RX ORDER — FUROSEMIDE 20 MG/1
20 TABLET ORAL DAILY
Qty: 60 TABLET | Refills: 3 | Status: SHIPPED | OUTPATIENT
Start: 2022-02-28 | End: 2022-04-18

## 2022-02-28 RX ORDER — PANTOPRAZOLE SODIUM 40 MG/1
40 TABLET, DELAYED RELEASE ORAL
Qty: 60 TABLET | Refills: 3 | Status: ON HOLD | OUTPATIENT
Start: 2022-02-28 | End: 2022-04-15

## 2022-02-28 RX ORDER — MAGNESIUM HYDROXIDE/ALUMINUM HYDROXICE/SIMETHICONE 120; 1200; 1200 MG/30ML; MG/30ML; MG/30ML
30 SUSPENSION ORAL EVERY 6 HOURS PRN
Refills: 0 | Status: ON HOLD | COMMUNITY
Start: 2022-02-28 | End: 2022-04-15

## 2022-02-28 RX ORDER — CYCLOSPORINE 0.5 MG/ML
2 EMULSION OPHTHALMIC DAILY
Qty: 1 EACH | Refills: 0
Start: 2022-02-28

## 2022-02-28 RX ORDER — LANOLIN ALCOHOL/MO/W.PET/CERES
6 CREAM (GRAM) TOPICAL NIGHTLY PRN
Status: ON HOLD | COMMUNITY
Start: 2022-02-28 | End: 2022-04-15

## 2022-02-28 RX ORDER — TRAZODONE HYDROCHLORIDE 50 MG/1
50 TABLET ORAL NIGHTLY
Qty: 30 TABLET | Refills: 3 | Status: ON HOLD | OUTPATIENT
Start: 2022-02-28 | End: 2022-05-26

## 2022-02-28 RX ADMIN — CYCLOSPORINE 2 DROP: 0.5 EMULSION OPHTHALMIC at 10:59

## 2022-02-28 RX ADMIN — Medication 400 MG: at 10:59

## 2022-02-28 RX ADMIN — FUROSEMIDE 20 MG: 20 TABLET ORAL at 11:00

## 2022-02-28 RX ADMIN — MELOXICAM 15 MG: 7.5 TABLET ORAL at 10:59

## 2022-02-28 RX ADMIN — Medication 6 MG: at 21:33

## 2022-02-28 RX ADMIN — PANTOPRAZOLE SODIUM 40 MG: 40 TABLET, DELAYED RELEASE ORAL at 06:39

## 2022-02-28 RX ADMIN — Medication 1 TABLET: at 10:59

## 2022-02-28 RX ADMIN — TRAZODONE HYDROCHLORIDE 50 MG: 50 TABLET ORAL at 21:33

## 2022-02-28 RX ADMIN — SERTRALINE 50 MG: 50 TABLET, FILM COATED ORAL at 10:59

## 2022-02-28 RX ADMIN — PANTOPRAZOLE SODIUM 40 MG: 40 TABLET, DELAYED RELEASE ORAL at 18:34

## 2022-02-28 ASSESSMENT — PAIN SCALES - GENERAL
PAINLEVEL_OUTOF10: 0

## 2022-02-28 NOTE — PROGRESS NOTES
[address] [place] [address]  Inpatient Rehabilitation  Occupational Therapy  Daily Note  Time:  Time In: 0900  Time Out: 1000  Timed Code Treatment Minutes: 60 Minutes  Minutes: 60    Date: 2022  Patient Name: Yousif Marley,   Gender: female      Room: [facility]66/066-A  MRN: [id number]  : 1945  (64 y.o.)  Referring Practitioner: Dr Bulmaro Washington  Diagnosis: Acute Respiratory Failure  Additional Pertinent Hx: Yousif Marley  is a 64 y.o. female admitted to the inpatient rehabilitation unit on 2/15/2022. The patient was originally admitted to the [facility] 2022 to the gynecology oncology service secondary to postmenopausal bleeding. She started with the bleeding in early 2021 and was followed by Dr. Bia Clement here at [facility]. Ms. Eliana Thompson presented to [facility] (/[facility])  for initial consultation for Post-menopausal Bleeding and failed Endometrial Biopsy. PMB started in 2021. Had pelvic US that showed EMS 9mm. EMB was attempted twice in the office, no endometrial cells were identified. She was therefore referred to the [facility] at [facility] 22 for additional management. She had no pelvic pain. Only intermittent spotting, no heavy bleeding. Had history of Lichen sclerosis, was prescribed estrogen cream and steroid cream. Patient underwent abdominal hysterectomy on 2022, BSO via P. Pfannenstiel incision. Final pathology benign. Her hospitalization was complicated by respiratory failure (now on 3L supplemental O2 per trach mask,) and has PEG for pharyngeal dysphagia. Also has a PMH positive for GERD, OA,  Paralysis of vocal cords, Polio (bulbar), Dysphagia, Inhalation and ingestion of other object causing obstruction of respiratory tract or suffocation, Osteoporosis, Hoarse voice, Hypernasal speech, Diaphragm paralysis, and Left adhesive capsulitis.     Restrictions/Precautions:  Restrictions/Precautions: General Precautions,Fall Risk  Position Activity Restriction  Other position/activity restrictions: PEG, trach collar/mask, No lifting >10lbs    SUBJECTIVE: Patient pleasant and cooperative. Agreeable to OT     PAIN: Patient denies     Vitals: Oxygen: > 95 on room air, spot checked during session. COGNITION: WFL    ADL:   EATING:Independent. Clement Fly CARE Score: 6. ORAL HYGIENE:Independent. standing sinkside, no LOB. CARE Score: 6. TOILETING HYGIENE:Independent. no LOB. CARE Score: 6. TOILET TRANSFER: Independent. STS. CARE Score: 6.        Pt's spouse asking questions re: how to wash hair while protecting trach. Reviewed positioning of head, use of HH shower head, and using towels / beautician cape to cover trach. Verbalized understanding. BALANCE:  Sitting Balance:  Modified Independent. Standing Balance: Independent. BED MOBILITY:  Not Tested    TRANSFERS:  Sit to Stand:  Modified Independent. recliner   Stand to Sit: Modified Independent. FUNCTIONAL MOBILITY:  Assistive Device: None  Assist Level:  Modified Independent. Distance: within room, no LOB      ADDITIONAL ACTIVITIES:  Patient completed IADL task of packing up room, completing with independence and no LOB. Pt completed task without UE support and good tolerance, able to maintain 02 sats > 95 throughout. Pt demo endurance x 9 min, x 6 min and x 7 min with a seated rest break between each event. ASSESSMENT:  Activity Tolerance:  Patient tolerance of  treatment: good. Assessment: Assessment: Britt Daniel has made excellent progress on IP Rehab. She has progressed to an independent level with her functional transfers, mobility and her ADL and IADL routine. Britt Daniel has demo improved endurance and is able to stand during her ADL/IADL for longer periods of time and demo appropriate rest breaks. she is able to maintain her 02 saturations > 95 on room air during her daily activities.  Britt Daniel would cont to benefit from OT on a [facility] basis at discharge. Discharge Recommendations: Home with Home health OT  Equipment Recommendations: Other: Pt has a built in shower seat. Recommendations for a grab bar near toilet provided. Plan: Times per week: 5x/wk for 90 min and 1x/wk for 30 min  Times per day: Daily  Current Treatment Recommendations: Strengthening,Balance Training,Endurance Training,Functional Mobility Training,Equipment Evaluation, Education, & procurement,Patient/Caregiver Education & Training,Self-Care / ADL,Safety Education & Training    Patient Education  Patient Education: ADL's, IADL's, Family Education and Reviewed Prior Education      Goals  Short term goals  Time Frame for Short term goals: 1 week  Short term goal 1: Pt will complete UB dressing tasks with mod I to improve indep with donning her bra at home. Short term goal 2: Pt will complete LB dressing tasks with mod I to improve indep with donning socks and shoes at home. Short term goal 3: Pt will tolerate 4 minute standing tasks with Mod I during shayy release tasks to improve indep with sinkside grooming. Short term goal 4: Pt will ambulate to and from bathroom with SBA and RW prn to improve activity tolerance needed for bathing. Short term goal 5: Pt will tolerate dynamic standing IADL tasks with Mod I to meet patient goal of baking cookies with grandchildren. Long term goals  Time Frame for Long term goals : 1 week  Long term goal 1: Pt will complete bathing and shower transfer with mod I to improve indep with showering at home. Long term goal 2: Pt will complete dressing and grooming tasks with mod I to improve indep with self care at home. Long term goal 3: Pt will complete a simple meal prep tasks with mod I and 0 vcs for ECT to improve indep with preparing lunch at home. Following session, patient left in safe position with all fall risk precautions in place.

## 2022-02-28 NOTE — PROGRESS NOTES
[facility] THERAPY  [facility]  COMMUNICATION  NOTE  (NOT A TREATMENT NOTE)      LESS THAN 8 MINUTES SPENT WITH PATIENT AND  DUE TO CRITICAL CARE NP, RESPIRATORY THERAPY, AND PRIMARY RN WORKING ON HOME OXYGEN EVALUATION/EDUCATION. Date: 2022  Patient Name: Kenisha Flower      CSN: [id number]   : 1945  (64 y.o.)  Gender: female   Referring Physician: Shabbir Villarreal MD  Diagnosis: Debility  Secondary Diagnosis: Dysphagia, Dysphonia   Precautions: Fall risk, trach, PEG, aspiration  Current Diet: Full THIN liquid diet  Swallowing Strategies:  Right head turn, small bites/sips, 3-4 swallows, CLOSE pulmonary monitoring  Date of Last MBS/FEES:  22    Pain:  No pain reported. Subjective:  Patient seated upright in recliner.  present. RN Ritesh Vines completing trach education in prep for discharge home; late initiation of nursing education due to respiratory therapy and critical care NP providing home oxygen education. ST spent ~5 minutes with patient and  to briefly wrap up any remaining questions related to swallow function. Informed patient and  that the home health ST was contacted and given details on patient's case, progress, prognosis, etc. Both verbalized understanding and expressed appreciation for services. Short-Term Goals:  SHORT TERM GOAL #1:  Goal 1: Patient will safely consume thin liquid diet (IDDSI level 0) with use of compensatory strategies with goal of consuming 25-50% of po meal to improve pharyngeal function and increase caloric intake. - GOAL MET   INTERVENTIONS: Not addressed due to mistreat/brief education. PREVIOUS SESSION:   Patient consumed thin liquids in conjunction with NMES without overt s/s of aspiration (though certainly cannot ruleout at bedside alone).      Handout providing reiterating the following information: current diet level, foods allowed/not allowed, how to complete flow test, swallowing strategies, how to maintain adequate nutritional intake with PEG and \"pleasure feeds\" consisting of thin liquids, and pharyngeal exercise program to be completed at home.  and patient verbalized understanding. It should continue to be noted that although patient is able to safely consume a full liquid diet, this is NOT optimal for maximizing nutrition levels. WithOUT ongoing feedings via PEG, patient unlikely to maintain adequate nutrition and would be at HIGH risk for malnourishment which could result in further deconditioning and recurrent hospitalizations. SHORT TERM GOAL #2:  Goal 2: Patient will complete pharyngeal strengthening exercises x10 each with good success in order to improve pharyngeal weakness and overall airway protection. - GOAL MET   INTERVENTIONS:  Not addressed due to mistreat/brief education. PREVIOUS SESSION:   Completed the following pharyngeal exercises in conjunction with skilled demonstration and rationale on proper technique. Effortful swallow -- 2 sets of 10 with good success  TBR ('kick') -- 2 sets of 20 with good success and retraction strength  Caren -- 2 sets of 10 with great success and timeliness of swallow trigger  Tildon Maha -- 2 sets of 10 with good success and effort  EMST -- 2 sets of 10 aiming for up to 250; achieved total of 7/20 deep breaths  *required liquid wash throughout to assist with swallow trigger; suspect due to sore throat   *reviewed ST HEP; patient verbalized understanding    SHORT TERM GOAL #3:  Goal 3: Patient will trial red button consistently without respiratory distress to permit potential decannulation. - GOAL NOT MET  INTERVENTIONS: Not addressed due to mistreat/brief education. PREVIOUS SESSION:   Per ENT operative note from 2/24 (yesterday), MD Kaitlyn Wheeler reported the following:    Findings:   1.   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 memory book)       ST FIM ASSESSMENT:     Admission score Current score Goal Status   COMMUNICATION 6 - Complex ideas 90% or device (hearing aid or glasses- if patient is primarily a visual learner)   7 - Patient understands complex ideas (math/planning)   Progressing   EXPRESSION 6 - Device used to express complex ideas/needs     7 - Patient expresses complex ideas/needs   Progressing   SOCIAL INTERACTION 5 - Patient is appropriate with supervision/cues   6 - Patient requires medication for mood and/or effect   Progressing   PROBLEM SOLVING 5 - Patient able to solve simple/routine tasks   7 - Patient independent with complex tasks   Progressing   MEMORY 3 - Patient remembers 50%-74% of the time   6 - Patient requires device to recall (e.g. memory book)   Progressing         EDUCATION:  Learner: Patient and Significant Other  Education:  Reviewed diet and strategies, Reviewed ST goals and Plan of Care, Reviewed recommendations for follow-up and Swallowing HEP  Evaluation of Education: Verbalizes understanding       ASSESSMENT/PLAN:    Patient has met 2/3 STG's and 0/1 LTG this therapy period. Improvements noted in safe consumption of thin liquids, execution of compensatory strategies, demonstration/understanding of flow test, and completion of pharyngeal strengthening exercises. Ongoing deficits noted in endurance related to completion of swallowing exercises, and lack of coordination with swallowing + respiratory functions. Patient with recent self-report of increased ease with consumption of thin liquid consistencies, however remains heavily reliant on compensatory strategies (head turn to right, multiple swallows, single sips, spontaneous throat clear) for management of po intake.  Patient consuming less than 25% of meals due to severity of pharyngeal deficits, maintaining necessity for non-oral nutrition (PEG tube). Patient has also been tolerating NMES treatments well in conjunction with pharyngeal strengthening exercises. Continue to recommend repeat instrumental assessment 4-6 weeks from prior MBS which was completed on 2/11/2022. With regards to use of speaking valve, patient tolerating PMV placement throughout the day (when awake), with recommendations for pulmonology and ENT to follow in outpatient setting to determine appropriateness for  trials and potential eventual decannulation. Patient would HIGHLY benefit from continued intensive speech therapy services via home health setting to maximize pharyngeal strengthening and respiratory support, in order to optimize airway closure and pharyngeal clearance to increase po intake safely. Activity Tolerance:  Patient tolerance of treatment: good. Assessment/Plan: Patient discharged from Speech Therapy at this time due to discharge from unit. Discharge Disposition: home. Continued Speech Therapy Services recommended: [facility] - home health speech therapy.          Bianka Townsend M.S. [address] [id number]

## 2022-02-28 NOTE — PLAN OF CARE
Discharge to home with Home Health. Patient and  leaving with belongings and extra trach and feeding tube supplies, via car.

## 2022-02-28 NOTE — DISCHARGE INSTR - COC
Continuity of Care Form    Patient Name: Wander Cohen   :  1945  MRN:  [id number]    Admit date:  2/15/2022  Discharge date:  ***    Code Status Order: Full Code   Advance Directives:      Admitting Physician: Clemente Dee MD  PCP: Yecenia Leong    Discharging Nurse: [facility] Unit/Room#: 7E-66/066-A  Discharging Unit Phone Number: ***    Emergency Contact:   Extended Emergency Contact Information  Primary Emergency Contact: Stan Rodgers  Address: [phone]  [facility], [address] [place] [address] Phone: [phone]  Relation: Spouse    Past Surgical History:  Past Surgical History:   Procedure Laterality Date    CHOLECYSTECTOMY      KNEE SURGERY      Right    LARYNGOSCOPY N/A 2022    DIAGNOSTIC SUSPENSION MICROLARYNGOSCOPY BRONCHOSCOPY WITH JET VENTILATION performed by Ying Staton MD at [facility]       Immunization History: There is no immunization history for the selected administration types on file for this patient.     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    Tracheostomy status ([address] Z93.0    Hx of total hysterectomy Z90.710       Isolation/Infection:   Isolation            No Isolation          Patient Infection Status       None to display            Nurse Assessment:  Last Vital Signs: BP (!) 128/58   Pulse 72   Temp 99.1 °F (37.3 °C) (Oral)   Resp 16   Ht 5' (1.524 m)   Wt 128 lb 9.6 oz (58.3 kg)   SpO2 93%   BMI 25.12 kg/m²     Last documented pain score (0-10 scale): Pain Level: 0  Last Weight:   Wt Readings from Last 1 Encounters:   22 128 lb 9.6 oz (58.3 kg)     Mental Status:  {IP PT MENTAL STATUS:}    IV Access:  [facility] IV ZIBNVB:745088707}    Nursing Mobility/ADLs:  Walking   {CHP DME RTO}  Transfer  {CHP DME DFVM:024936890}  Bathing  {CHP DME QSRI:110233795}  Dressing  {CHP DME WDGE:860817240}  Toileting  {CHP DME AQTN:929708363}  Feeding  {CHP DME TLKJ:922633222}  Med Admin  {P DME GOSD:687538692}  Med Delivery   {[facility] MED Delivery:743893821}    Wound Care Documentation and Therapy:        Elimination:  Continence: Bowel: {YES / JX:39640}  Bladder: {YES / UR:47455}  Urinary Catheter: {Urinary Catheter:476548491}   Colostomy/Ileostomy/Ileal Conduit: {YES / SB:01365}       Date of Last BM: ***    Intake/Output Summary (Last 24 hours) at 2022 1239  Last data filed at 2022 2334  Gross per 24 hour   Intake 1280 ml   Output --   Net 1280 ml     I/O last 3 completed shifts:   In:  [NG/GT:0]  Out: -     Safety Concerns:     [address] Safety Concerns:710585011}    Impairments/Disabilities:      [address] Impairments/Disabilities:717940745}    Nutrition Therapy:  Current Nutrition Therapy:   [address] Diet List:279241167}    Routes of Feeding: {[facility] DME Other Feedings:681995200}  Liquids: {Slp liquid thickness:28614}  Daily Fluid Restriction: {CHP DME Yes amt example:968245296}  Last Modified Barium Swallow with Video (Video Swallowing Test): {Done Not Done NHAS:434474789}    Treatments at the Time of Hospital Discharge:   Respiratory Treatments: ***  Oxygen Therapy:  {Therapy; copd oxygen:45907}  Ventilator:    { CC Vent HZWV:972785216}    Rehab Therapies: {THERAPEUTIC INTERVENTION:1324652230}  Weight Bearing Status/Restrictions: [address] [employer] Weight Bearin}  Other Medical Equipment (for information only, NOT a DME order):  {EQUIPMENT:055455240}  Other Treatments: ***    Patient's personal belongings (please select all that are sent with patient):  {CHP DME Belongings:649066393}    RN SIGNATURE:  {Esignature:750412537}

## 2022-02-28 NOTE — PROGRESS NOTES
[facility]  INPATIENT PHYSICAL THERAPY  Daily Note  [facility] - 7E-66/066-A    Time In: 0730  Time Out: 0830  Timed Code Treatment Minutes: 60 Minutes  Minutes: 60          Date: 2022  Patient Name: Layne Reyes,  Gender:  female        MRN: [id number]  : 1945  (64 y.o.)     Referring Practitioner: Marilyn Moody MD  Diagnosis: Acute Respiratory Failure  Additional Pertinent Hx: Layne Reyes  is a 64 y.o. female admitted to the inpatient rehabilitation unit on 2/15/2022. The patient was originally admitted to the [address] [place] [address] 2022 to the gynecology oncology service secondary to postmenopausal bleeding. She started with the bleeding in early 2021 and was followed by Dr. Edmund Phillip here at [facility]. Ms. Alanis Gregory presented to [facility] (/[facility])  for initial consultation for Post-menopausal Bleeding and failed Endometrial Biopsy. PMB started in 2021. Had pelvic US that showed EMS 9mm. EMB was attempted twice in the office, no endometrial cells were identified. She was therefore referred to the [facility] for additional management. She had no pelvic pain. Only intermittent spotting, no heavy bleeding. Had history of Lichen sclerosis, was prescribed estrogen cream and steroid cream. Patient underwent abdominal hysterectomy on 2022, BSO via P. Pfannenstiel incision. Final pathology benign. Her hospitalization was complicated by respiratory failure (now on 3L supplemental O2 per trach mask,) and has PEG for pharyngeal dysphagia.   Also has a PMH positive for GERD, OA,  Paralysis of vocal cords, Polio (bulbar), Dysphagia, Inhalation and ingestion of other object causing obstruction of respiratory tract or suffocation, Osteoporosis, Hoarse voice, Hypernasal speech, Diaphragm paralysis, and Left adhesive capsulitis     Prior Level of Function:  Lives With: Spouse  Type of Home: House  Home Layout: Two level  Home Access: Stairs to enter without rails  Entrance Stairs - Number of Steps: ~4 steps at the front of the house without railings and 2 steps in the garage. Patient reports she has a few steps in the back of the house with railings. Patient reports typically she enters house from garage  Home Equipment:  (Patient reports she does not have an equipment at home)    Vitals: Oxygen: Maintained >93% throughout all activity    Restrictions/Precautions:  Restrictions/Precautions: General Precautions,Fall Risk  Position Activity Restriction  Other position/activity restrictions: PEG, trach collar/mask, No lifting >10lbs    SUBJECTIVE: Patient in recliner upon entry, RN present. Patient pleasant and agreeable to participate in therapy. Patient on room air in room and with all activity with oxygen maintained >93% throughout. Spouse present for portion of session, answered any questions before discharge home. Patient and  provided with address to  her RW and educated for use for prolonged distances or increased fatigue. PAIN: 0/10    OBJECTIVE:  Bed Mobility:  Rolling to Left: Modified Independent, with head of bed flat, without rail   Rolling to Right: Modified Independent, with head of bed flat, without rail   Supine to Sit: Modified Independent, with head of bed flat, without rail  Sit to Supine: Modified Independent, with head of bed flat, without rail, with increased time for completion     Transfers:  Sit to Stand: Modified Independent, to/from chair with arms  Stand to Sit: Modified Independent  -Patient completed sit to stands from various surfaces including mat table, recliner, and wheelchair, modified independent throughout. Patient able to complete transfers with proper hand placement.   To/From Bed and Chair: Modified Independent, with increased time for completion  -Patient completed transfers from wheelchair <> mat table modified independent and demonstrates proper hand placement with good safety awareness. Car: Supervision, with increased time for completion   -Patient completed car transfer with supervision with increased time for completion to lift LE's into the car. Patient demonstrates good hand placement. Ambulation:  Stand by Assistance to Supervision, with verbal cues , with increased time for completion  Distance: 150' x2, 20', various short distances in rehab gym and easy street  Surface: Level Tile and Uneven Surface  Device:No Device  Gait Deviations:  Decreased Weight Shift Bilaterally, Decreased Gait Speed, Decreased Heel Strike Bilaterally and Wide Base of Support  -Patient ambulated with supervision for shorter distances and stand by assistance for prolonged distances and over uneven surfaces without an AD. Patient demonstrates improved trunk rotation and arm swing however continues to demonstrate slower gait speed to maintain steadiness and endurance. Patient ambulated without an AD, educated for use of RW for prolonged distances at home or out in the community.   -Patient instructed in forward and lateral stepping over hurdles with contact guard assistance and no use of AD. Patient able to complete with no LOB, a couple episodes of mild unsteadiness however patient required no assistance to recover. Patient instructed in osmani stepping to improve bilateral step height, SL weight shifting, and balance for ambulation without an AD. Stairs:  Supervision, with increased time for completion  Number of Steps: curb step, 4, 12  Height: 6\" step with One Handrail   -Patient completed steps with supervision and use of 1 HR. Patient instructed to use a step to pattern to ensure balance and safety, with patient verbalizing understanding. Patient demonstrates increased ease of steps this session.      Balance:  Static Sitting Balance:  Modified Independent  Static Standing Balance: Modified Independent  Dynamic Standing Balance: Stand By Assistance, with increased time for completion  -Patient instructed in pod taps to a 6\" step with use of 1 HR for support and stand by assistance. Patient progressed to 1 pod tap to sequences of 2-3 pods. Patient completed 2 sets intermittently. Patient instructed in pod taps to improve bilateral weight shifting, SL stance, and dynamic standing balance.  -Patient able to  an object from the floor with stand by assistance and increased time for completion. Patient educated to use a reacher at home to ensure safety.  -Tinetti Balance Test completed (see scores below)      Functional Outcome Measures: Completed  Balance Score: 14  Gait Score: 11  Tinetti Total Score: 25   A total score of:  27 or more: low fall risk  20-26: moderate fall risk  19 or less: high fall risk    ASSESSMENT:  Assessment:  Patient has demonstrated good progress, and has met 5/5 STG and 5/5 LTG. Patient is able to perform bed mobility and all transfers Modified Independent with good safety awareness and proper hand placement. Patient is able to ambulate with stand by assistance for prolonged distances and supervision for shorter distances without use of an AD and demonstrates improved gait mechanics and steadiness throughout. Patient is able to perform up to 12 steps with 1 HR and car transfers with supervision. Patient is able to  an object from the floor with stand by assistance. Patient scored a 25/28 on the Tinetti Balance test, indicating she is at a moderate risk for falls. Patient overall demonstrates improved ease of mobility, endurance, and strength and maintained her oxygen levels on room air >93% throughout all activity this session. Patient demonstrates improved independence with bed mobility, transfers, gait, stairs, balance, and all functional mobility tasks and is safe to discharge home with  who is also retired and can help as needed. Activity Tolerance:  Patient tolerance of  treatment: good. Equipment Recommendations:Equipment Needed: Yes (Continue to assess pending progress (may require RW))  Discharge Recommendations:  Discharge Recommendations: Home with Home health PT  Plan: Home with Home health PT      Patient Education  Patient Education: Home Exercise Program, Family Education, Gait, Stairs, Car Transfers,  - Patient Verbalized Understanding, - Patient Requires Continued Education    Goals:  Patient goals : To return to home  Short term goals  Time Frame for Short term goals: 1 week  Short term goal 1: Patient to perform supine<>sit with SBA without railings in order to assist with getting into and out of bed. GOAL MET  Short term goal 2: Patient to perform sit to stand transfers wtih [address] with <=1 cue for hand placement in order to assist with safety with transfers from various surfaces. GOAL MET  Short term goal 3: Patient to ambulate at least 125 feet without AD with SBA in order to assist with home mobility. GOAL MET  Short term goal 4: Patient to ascend/descend 4 step with 1 handrail with SBA in order to assist with getting into and out of home. GOAL MET  Short term goal 5: Patient to score at least 21/28 on the Tinetti in order to reduce risk for falls. GOAL MET  Long term goals  Time Frame for Long term goals : 3 weeks  Long term goal 1: Patient to perform supine<>sit with Mod I without railings in order to assist with getting into and out of bed. GOAL MET  Long term goal 2: Patient to perform sit to stand transfers with Mod I in order to assist with safety with transfers from various surfaces. GOAL MET  Long term goal 3: Patient to ambulate at least 150 feet with RW with Mod I in order to assist with home mobility. GOAL MET  Long term goal 4: Patient to ascend/descend 4 steps with 1 handrail with Supervision in order to assist with getting into and out of home.  GOAL MET  Long term goal 5: Patient to perform car transfer with Supervision in order to assist with getting to and from appointments.  GOAL MET

## 2022-02-28 NOTE — PLAN OF CARE
Problem: DISCHARGE BARRIERS  Goal: Patient's continuum of care needs are met  Note: Patient qualified for home oxygen with nocturnal use. JORGE LUIS contacted [facility] with referral for new home oxygen. Referral information and discharge date of today, 02/28/2022, provided to [place]. Referral information and qualifying documentation faxed to [facility]. JORGE LUIS updated patient and spouse, Rahul Clay, of home oxygen process. JORGE LUIS contacted [facility] with discharge notification of today, 02/28/2022. Information provided to [facility]. Agency has access to discharge instructions and face to face encounter.

## 2022-02-28 NOTE — DISCHARGE SUMMARY
Physical Medicine & Rehabilitation   Discharge Summary     Patient Identification:  Fabiana Fitch  : 1945  Admit date: 2/15/2022  Discharge date: 22   Attending provider: Abiodun Sweeney MD        Primary care provider: Jeremy Nguyen     Discharge Diagnoses:   Primary impairment requiring rehabilitation: 12 Debility     Etiologic Diagnosis that led to the condition: Hypercarbic respiratory failure     Comorbid conditions affecting rehabilitation:  · Status-post total abdominal hysterectomy, bilateral salpingo-oophorectomy on 22 at [facility] secondary to postmenopausal bleeding via pfannenstiel incision.  Final pathology benign. · Hypercarbic respiratory failure, now on supplemental O2 per trach mask    · *Tracheostomy status  · History of diaphragmatic and vocal cord paralysis (baseline right vocal cord and diaphragm paralysis with inability to cough, rare nasal regurgitation of liquids believed to be secondary to bulbar polio  · *Pharyngeal dysphagia  ? Status post PEG placement 2022   · Zenker's diverticulum noted on a modified barium swallow study of 2022; GI consult deferred while at [facility]. · Lactose intolerant  · Acute blood loss anemia; transfused 1 unit of packed red blood cells on 2/10/2022. · 1 lower respiratory culture positive for Serratia and the patient is status post vancomycin  through , and cefepime  through . · Negative lower extremity Dopplers on 22. · Gastroesophageal reflux disease  · Osteoarthritis  · *Paralysis of vocal cords  · Bulbar polio  · Dysphagia, moderate to severe oropharyngeal  · Inhalation it and ingestion of other object causing obstruction of respiratory tract or suffocation  · Osteoporosis  · Hoarse voice  · Hypernasal speech  · Diaphragmatic paralysis  · Left adhesive capsulitis      Discharge Functional Status:    Occupational Therapy:  EATING:Independent. CARE Score: 6  ORAL HYGIENE:Independent.  CARE Score: 6  TOILETING HYGIENE:Independent. CARE Score: 6. SHOWERING/BATHING:Independent. CARE Score: 6  UPPER BODY DRESSING:Independent. CARE Score: 6  LOWER BODY DRESSING:Independent. CARE Score: 6  FOOTWEAR:Independent. CARE Score: 6  TOILET TRANSFER: Independent. CARE Score: 6    Physical Therapy:  Bed Mobility:  Supine to Sit: Stand By Assistance  Sit to Supine: Stand By Assistance    Patient began coughing upon initial transfer into supine and had to transfer back to EOB due to cough. Pt's O2 stats monitored WFL. Transfers:  Sit to Stand: Supervision  Stand to Sit:Supervision  Ambulation:  Contact Guard Assistance  Distance: 125 feet x 1 and short distances throughout her room. Surface: Level Tile  Device:No Device  Gait Deviations: Forward Flexed Posture, Decreased Step Length Bilaterally, Decreased Arm Swing, Decreased Heel Strike Bilaterally and Unsteady Gait    Speech therapy:    Comprehension: 7 - Patient understands complex ideas (math/planning)  Expression: 7 - Patient expresses complex ideas/needs  Social Interaction: 6 - Patient requires medication for mood and/or effect  Problem Solvin - Patient independent with complex tasks  Memory: 6 - Patient requires device to recall (e.g. memory book)    Inpatient Acute Hospital Course:   Whitley Wren  is a 64 y.o. female admitted to the inpatient rehabilitation unit on 2/15/2022. The patient was originally admitted to the [facility] 2022 to the gynecology oncology service secondary to postmenopausal bleeding. She started with the bleeding in early 2021 and was followed by Dr. Armida Zavala here at [place] [address].     [facility] En Pay[facility]  for initial consultation for Post-menopausal Bleeding and failed Endometrial Biopsy. PMB started in 2021. Had pelvic US that showed EMS 9mm. EMB was attempted twice in the office, no endometrial cells were identified.  She was therefore referred to the [facility] 1/24/22 for additional management. She had no pelvic pain. Only intermittent spotting, no heavy bleeding. No issues with urination/BM. Had history of Lichen sclerosis, was prescribed estrogen cream and steroid cream. Was recommended to stop the estrogen cream since starting with PMB.        D&C was considered with Ms. Aspen Lamb, but not completed given history of bulbar polio and concern for difficulty with intubation. She instead underwent JONI-BSO 1/24/22. She became hypercapnic at the end of the surgery requiring intubation in the PACU and transferred to the SICU. She had minimal respiratory drive and subsequent worsening of respiratory acidosis requiring reintubation. She was intubated x3, the last time 12/28/2021. Her tracheostomy was placed to 122 per SICU. She passed her speaking valve trial on 2/5/2022 and speech pathology continues to follow. On 2/11/2022 she was noted to have increased secretions with an increase in the white blood cell count to 11.9, chest x-ray without acute findings consistent with infection (shallow lung volumes, small to moderate sized layering bilateral pleural effusion). By 2/14/22 her white blood cell count was 8. 3.      Nutritional needs were initially met per NG tube feeds. On 2/7/2022 she had a fees with concern for aspiration with p.o. intake; she was able to take comfort sips of thin liquids with safe swallow strategies in place. She is status post PEG placement 2/8/2022. She transitioned to bolus tube feeds, and is has been tolerating without difficulty. On 2/13/2022 she noted increased gas pain with feeding, improved after tube feeds. Nutrition modified her feedings secondary to lactose intolerance. She also had a modified barium swallow study on 2/13/2022, and was okayed for thin liquid diet as tolerated.   Possible Zenker's diverticulum also noted with inpatient GI consult deferred at that time.     Patient also had acute blood loss anemia with preop hemoglobin of 12.2, and a hemoglobin of 6.8 on 2/10/2022. She was transfused 1 unit of packed red blood cells.     Concern for pneumonia is improved with T-max of 1-1.7 on 1/26/2022. X-ray at that time was with mild pulmonary edema, small layering pleural effusions. Repeat chest x-ray 1/30/2022 showed no acute processes. She did have 1 lower respiratory culture 1 out of 28 which was positive for Serratia and is status post vancomycin 1/30/2022 through 1/31/2022 and cefepime 1/30/2022 through 2/6/2022.     Lower extremity Dopplers were negative 2/2/22.     In regards to her postmenopausal bleeding and thickened endometrial stripe, 2/3/2021 CT scan showed endometrial thickening 10 mm.  3/16/2021 pelvic ultrasound showed a 1-1/2 cm fibroid, endometrial fluid-filled, 6.8 mm, with 2 small cystic areas on the anterior/posterior wall;. Ovaries were normal Janeth biopsy showed endocervical glandular epithelium negative for dysplasia without endometrium present. 11/16 and 11/23/2021 again endometrial biopsy attempted with no endometrial cells. 11/23/2021 pelvic ultrasound showed the endometrial stripe at 9 mm and the ovaries normal 1/24/2022 status post total abdominal hysterectomy, BSO via P. Pfannenstiel incision. Final pathology benign    Her hospitalization was complicated by respiratory failure (now on 3L supplemental O2 per trach mask,) and has PEG for pharyngeal dysphagia. Also has a PMH positive for GERD, OA,  Paralysis of vocal cords, Polio (bulbar), Dysphagia, Inhalation and ingestion of other object causing obstruction of respiratory tract or suffocation, Osteoporosis, Hoarse voice, Hypernasal speech, Diaphragm paralysis, and Left adhesive capsulitis    Inpatient Rehabilitation Course:   Parisa Mccloud is a 64 y.o. female admitted to inpatient rehabilitation on 2/15/2022 for debility and respiratory failure.   The patient participated in an aggressive multidisciplinary inpatient rehabilitation program involving 3 hours per day, 5 days per week of rehabilitation. Patient progressed well during her stay on [facility]. Patient continued with tracheostomy during her IPR stay. ENT consulted due to tracheostomy status and vocal cord paralysis. Completed diagnostic suspension microlaryngoscopy bronchoscopy with jet ventilation on 2/24/22 and patient found to be a candidate for augmentation lateralization procedure of the right cricoarytenoid joint and the right true vocal fold. This is to be discussed further as OP. Appropriate DVT prophylaxis options were continued throughout rehabilitation stay with early ambulation, patient walking good distances shortly after admission. Patient with ongoing O2 needs during stay, 28% for the majority of the time. Patient with home O2 eval and did not qualify for home O2 with rest or activity. However, with overnight O2 eval, patient was noted to desat to 87% multiple times. Pulmonary evaluated and placed order for O2 at night for patient at home. Patient was asked to possibly stay to evaluate O2 further, however her and her  declined this option and will follow up with readings to the office tomorrow. Case discussed with Ban Jimenes NP with pulmonary. Dr Andre Jaimes followed during the IPR stay for medical management    Patient was discharged Home with [facility] in Stable condition.     Consults:   Family Medicine, pulmonary/intensive care and ENT    Significant Diagnostics:   CBC with Differential:    Lab Results   Component Value Date    WBC 6.5 02/16/2022    RBC 2.88 02/16/2022    HGB 9.7 02/26/2022    HCT 32.6 02/26/2022     02/16/2022    .4 02/16/2022    MCH 30.6 02/16/2022    MCHC 30.1 02/16/2022    NRBC 0 02/16/2022    SEGSPCT 71.0 02/16/2022    MONOPCT 10.7 02/16/2022    MONOSABS 0.7 02/16/2022    LYMPHSABS 1.0 02/16/2022    EOSABS 0.1 02/16/2022    BASOSABS 0.0 02/16/2022     CMP:    Lab Results   Component Value Date    NA 139 02/26/2022    K 4.6 02/26/2022    K 4.5 02/16/2022    CL 98 02/26/2022    CO2 31 02/26/2022    BUN 22 02/26/2022    CREATININE 0.5 02/26/2022    LABGLOM >90 02/26/2022    GLUCOSE 88 02/26/2022    PROT 6.4 04/06/2021    LABALBU 4.1 04/06/2021    CALCIUM 9.3 02/26/2022    BILITOT 0.2 04/06/2021    ALKPHOS 62 04/06/2021    AST 25 04/06/2021    ALT 18 04/06/2021     Albumin:    Lab Results   Component Value Date    LABALBU 4.1 04/06/2021     Calcium:    Lab Results   Component Value Date    CALCIUM 9.3 02/26/2022     Magnesium:    Lab Results   Component Value Date    MG 2.0 04/06/2021     Phosphorus:    Lab Results   Component Value Date    PHOS 3.3 04/06/2021     U/A:    Lab Results   Component Value Date    COLORU Yellow 02/12/2021    UROBILINOGEN 0.20 02/12/2021    BILIRUBINUR Negative 02/12/2021    BLOODU Negative 02/12/2021    GLUCOSEU Negative 02/12/2021     ABG:    Lab Results   Component Value Date    PH 7.46 02/28/2022    PH 7.38 06/06/2012    PCO2 42 02/28/2022    PCO2 51 06/06/2012    PO2 65 02/28/2022    PO2 108 06/06/2012    HCO3 30 02/28/2022    O2SAT 94 02/28/2022     TSH:    Lab Results   Component Value Date    TSH 1.390 04/06/2021       Patient Instructions:   Home with Home Health   Therapy orders: PT, OT and SLP   Discharge lab work: none  Code status: Full Code   Activity: activity as tolerated  Diet: ADULT DIET; Full Liquid  ADULT TUBE FEEDING; PEG; 2.0 Calorie;  Bolus; 6 Times Daily; 130; Gravity; 30; Before and after each bolus  ADULT ORAL NUTRITION SUPPLEMENT; Lunch; Clear Liquid Oral Supplement    Wound Care: keep wound clean and dry and as directed    Follow-up visits: See after visit summary from hospitalization       Discharge Medications:  Current Discharge Medication List           Details   aluminum & magnesium hydroxide-simethicone (MAALOX) 200-200-20 MG/5ML SUSP suspension Take 30 mLs by mouth every 6 hours as needed for Indigestion  Refills: 0      sertraline (ZOLOFT) 50 MG tablet Take 1 tablet by mouth daily  Qty: 30 tablet, Refills: 3      traZODone (DESYREL) 50 MG tablet Take 1 tablet by mouth nightly  Qty: 30 tablet, Refills: 3      zinc oxide (PINXAV) 30 % OINT Apply 113.4 g topically 4 times daily as needed (around PEG site for irritation PRN)  Refills: 0      furosemide (LASIX) 20 MG tablet Take 1 tablet by mouth daily  Qty: 60 tablet, Refills: 3      senna (SENOKOT) 8.6 MG tablet Take 1 tablet by mouth daily as needed (Constipation)      cycloSPORINE (RESTASIS) 0.05 % ophthalmic emulsion Place 2 drops into both eyes daily  Qty: 1 each, Refills: 0      pantoprazole (PROTONIX) 40 MG tablet Take 1 tablet by mouth 2 times daily (before meals)  Qty: 60 tablet, Refills: 3      magnesium oxide (MAG-OX) 400 (241.3 Mg) MG TABS tablet Take 1 tablet by mouth daily  Qty: 30 tablet, Refills: 3      melatonin 3 MG TABS tablet Take 2 tablets by mouth nightly as needed (sleep)              Details   ipratropium (ATROVENT) 0.06 % nasal spray 2 sprays by Nasal route 3 times daily as needed      meloxicam (MOBIC) 7.5 MG tablet Take 15 mg by mouth daily       calcium carbonate (OSCAL) 500 MG TABS tablet Take 500 mg by mouth daily      Multiple Vitamins-Minerals (PRESERVISION AREDS 2) CAPS Take  by mouth 2 times daily.  2 tabs BID      multivitamin (THERAGRAN) per tablet Take 1 tablet by mouth daily Alive 50+                45 minutes spent preparing the patient for discharge    Epifanio Gonsales, APRN - CNP

## 2022-02-28 NOTE — PROGRESS NOTES
Physical Medicine & Rehabilitation   Progress Note      eKnan [place], IMELDA - CNP     Chief Complaint:    Respiratory Failure with subsequent Debility; s/p JONI with BSO    Subjective: Planned discharge from the IPR Unit to home on 2/28/22 with [facility] PT, OT, ST, RN, and HHA. Patient with TF education, overnight pulse ox education. PEG and trach education at discharge. Patient returned home. [address] [facility], and [facility] declined taking the patient due to no time to prepare for education. Reportedly needed 2-3 days to prepare for this. Patient with home O2 eval required less than 48 hours prior to discharge. Unsure how this timeline is to work for patient's wellbeing. Patient and  were contacted by myself and recommended return to [facility] in order for patient to get the O2 she needs overnight. Joao Fernando NP contacted and updated. Recommend completing Home O2 eval overnight again, with O2. Will start at 32% and titrate as needed.  and patient in agreement to return to the unit. Rehabilitation:  PT:   Transfers:  Sit to Stand: Supervision  Stand to Sit:Supervision  To/From Bed and Chair: Supervision  Ambulation:  Contact Guard Assistance  Distance: 20 feet x 2   Surface: Uneven Surface  Device:No Device  Gait Deviations:  Decreased Step Length Bilaterally, Decreased Arm Swing, Decreased Gait Speed, Decreased Heel Strike Bilaterally, Wide Base of Support, Mild Path Deviations and Decreased B hip flexion resulting in decreased toe clearance  -Patient instructed in ambulation over uneven mat initially without UE support and SBA performed with normalized gait pattern, then instructed in marching and lateral side stepping x 2 reps each. Patient required intermittent rest breaks due to decreased endurance. Patient required verbal cueing for increased step height with marching in order to assist with toe clearance. Patient demonstrated slight unsteadiness however able to self correct.    Balance:  Dynamic Standing Balance: Contact Guard Assistance   -Patient instructed in standing dynamic balance while bouncing ball with CGA with varying feet including normalized OTIS, tandem stance and stepping forward. Patient had no episodes of LOB however had slight unsteadiness with ability to self correct. OT:   ADL:   Grooming: Supervision. hand hygiene, 2x this session. Toileting: Supervision. during clothing mgmt and hygiene  Toilet Transfer: Supervision. Cash Rascon BALANCE:  Sitting Balance:  Modified Independent. Standing Balance: Supervision. TRANSFERS:  Sit to Stand:  Supervision. recliner, standard chair, w/c,   Stand to Sit: Supervision. FUNCTIONAL MOBILITY:  Assistive Device: None  Assist Level:  Stand By Assistance. Distance: within apartment, within room     ADDITIONAL ACTIVITIES:  Patient completed IADL task of retrieving linens from graded planes and heights including floor level, ambulating with SBA emerging supervision level. Skilled edu on using reacher to retrieve items for energy conservation + to maintain 02 sat's, pt was able to complete without issue. After a seated rest break, Pt then stood to fold linens without UE support standing x 4.5 min no LOB. Pt completed IADL task of making the bed, completing standing portions with supervision and demo no LOB, standing x6 min with mod fatigue exhibited   Skilled education on energy conservation and deep breathing techniques with handout provided - pt demo good insight      ST:    INTERVENTIONS:  requesting review of flow test. ST outlined handout outlining steps for proper flow test completion. Education provided verbally in detail. Patient also inquiring about other foods that are able to be thinned out to the appropriately (IDDSI level 0). ST suggested smoothies that include peanut butter to satisfy patients sweet tooth.  Provided x2 smoothie recipes that are high in protein to assist in caloric intake, and reviewed ways to thin out smoothies. Suggested making smoothies at home and adding milk, juice, or water, then completing flow test to determine if patient is able to safely consume. Patient and  verbalized understanding. It should be noted that although patient is able to safely consume a full liquid diet, this is NOT optimal for maximizing nutrition levels. WithOUT ongoing feedings via PEG, patient unlikely to maintain adequate nutrition and would be at HIGH risk for malnourishment which could result in further deconditioning and recurrent hospitalizations. INTERVENTIONS: Reviewed recommendations to complete exercises 2-3x per day; patient receptive and verbalized understanding. Not able to complete this date due to patient not allowed PO intake in prep for upcoming ENT procedure. INTERVENTIONS: Patient on room air. Reports trials of room air last night while sleeping too, however, resulted in lack of sleep due to patient worrying about adequate oxygen intake. Patient insightful regarding implementation of deep breathing techniques. High likelihood of eventual decannulation pending ENT and pulmonology recommendations.  Will closely monitor chart following ENT post-op note this date to determine next steps related to decannulation.       Review of Systems:   CONSTITUTIONAL:  positive for  fatigue  EYES:  Wears glasses  HEENT:  + Pharyngeal Dysphagia  RESPIRATORY:  positive for dyspnea, wheezing and on intermittent supplemental O2 per trach mask  CARDIOVASCULAR:  negative  GASTROINTESTINAL:  Dysphagia with PEG; nutrition per tube feedings and comfort sips of thin liquids  GENITOURINARY:  negative  SKIN:  Healing incision  HEMATOLOGIC/LYMPHATIC:  Recent anemia; s/p 1 unit PRBC's  MUSCULOSKELETAL:  positive for  myalgias, arthralgias, pain, stiff joints, decreased range of motion and muscle weakness  NEUROLOGICAL:  positive for speech problems, gait problems, dysphagia and weakness  BEHAVIOR/PSYCH:  positive for decreased energy level, fatigue and anxiety  10 point system review otherwise negative      Objective:  Vitals:    02/28/22 1041   BP: (!) 128/58   Pulse: 72   Resp: 16   Temp: 99.1 °F (37.3 °C)   SpO2: 93%   awake  Orientation:   person, place, time  Mood: within normal limits  Affect: anxious and calm  General appearance: well groomed and in no acute distress     Memory:   normal,   Attention/Concentration: normal  Language:  abnormal - hoarse voice; hypernasal; using her talking valve     Cranial Nerves:  cranial nerves II-XII are grossly intact  ROM:  abnormal - decreased left shoulder 2/2 adhesive capsulitis  Tone:  normal  Muscle bulk: normal  Sensory:  Sensory intact     Skin: warm and dry. PEG and trach sites  Peripheral vascular: Pulses: Normal upper and lower extremity pulses; Edema: no       Diagnostics:   Recent Results (from the past 24 hour(s))   Blood Gas, Arterial    Collection Time: 02/28/22 11:06 AM   Result Value Ref Range    pH, Blood Gas 7.46 (H) 7.35 - 7.45    PCO2 42 35 - 45 mmhg    PO2 65 (L) 71 - 104 mmhg    HCO3 30 (H) 23 - 28 mmol/l    Base Excess (Calculated) 5.4 (H) -2.5 - 2.5 mmol/l    O2 Sat 94 %    DEVICE Room Air     Max Test Positive     Source: R Radial     COLLECTED BY: [id number]          Impression:    · Status-post total abdominal hysterectomy, bilateral salpingo-oophorectomy on 1/24/22 at [facility] secondary to postmenopausal bleeding via pfannenstiel incision. Final pathology benign. · Hypercarbic respiratory failure, now on supplemental O2 per trach mask    · Tracheostomy status  · History of diaphragmatic and vocal cord paralysis (baseline right vocal cord and diaphragm paralysis with inability to cough, rare nasal regurgitation of liquids believed to be secondary to bulbar polio  · Pharyngeal dysphagia  · Status post PEG placement 2/8/2022   · Zenker's diverticulum noted on a modified barium swallow study of 2/13/2022; GI consult deferred while at [facility].   · Lactose intolerant  · Acute blood loss anemia; transfused 1 unit of packed red blood cells on 2/10/2022. · 1 lower respiratory culture positive for Serratia and the patient is status post vancomycin 1/30 through 1/31, and cefepime 1/30 through 2/ 6. · Negative lower extremity Dopplers on 2/2/22. · Gastroesophageal reflux disease  · Osteoarthritis  · Paralysis of vocal cords  · Bulbar polio  · Dysphagia, moderate to severe oropharyngeal  · Inhalation it and ingestion of other object causing obstruction of respiratory tract or suffocation  · Osteoporosis  · Hoarse voice  · Hypernasal speech  · Diaphragmatic paralysis  · Left adhesive capsulitis      Plan:  · Continue therapies. PT, OT, SLP  · Prophylaxis:  DVT: EPC cuffs; no oral anticoagulation secondary to bleeding risk.    · GI:  Protonix 40 mg po bid   · Pain: OxyIR 5 mg po q 4 hours prn; Tylenol 650 mg per PEG q 6 hours prn;   · Nutrition:   Dietary to evaluate tube feedings to possibly decrease bolus volumes and increase bolus frequency to maintain adequate caloric intake  · Bowel: Glycolax 17 g po/PEG daily prn  · Anxiety: Zoloft 50 mg po/PEG  q day for anxiety   · Rehab Team Conferences Thursdays  · Planning discharge to home on Monday, 2/28/22 in the afternoon - PATIENT TO RETURN TO  DUE TO OXYGEN REQUIREMENTS - further discharge planning continues   · Home O2 screening 2/28 with O2       Missed therapy time:  · None    Catherine Sneed, APRN - CNP

## 2022-02-28 NOTE — PROGRESS NOTES
Education with patients , for trach care with sterile water and sterile long q tips.  performed removal and replacement of inner canula with sterile water cleaning inside inner cannula with no issues, and questions answered.

## 2022-02-28 NOTE — PROGRESS NOTES
Patient was evaluated today for the diagnosis of Chronic hypoxic respiratory failure/ history of poliomyelitis . I entered a DME order for home oxygen because the diagnosis and testing requires the patient to have supplemental oxygen. Condition will improve or be benefited by oxygen use. The patient is  able to perform good mobility in a home setting and therefore does not require the use of a portable oxygen system. The need for this equipment was discussed with the patient and she understands and is in agreement.     3LPM or 32% fiO2 to tracheostomy at night     Electronically signed by IMELDA Donald CNP on 2/28/2022 at 11:47 AM

## 2022-02-28 NOTE — PROGRESS NOTES
Patient is currently on home O2 eval overnight. Alarm was continuously going off because the patients O2 was dropping to 87. Respiratory was notified and I was told to change the parameters on the home eval machine down to 86 and to not apply any O2.

## 2022-02-28 NOTE — PROGRESS NOTES
neuromuscular disorder. After her planned hysterectomy at [facility], patient had a complicated postoperative period with the development of hypercapnic respiratory failure. Patient required 3 intubations with the last intubation performed on 28 December 2021 as per the note by Ms. Chet Dimas MD.  Patient underwent tracheostomy in the SICU at [facility] on ? 1/22/22 per the note by Ms. Chet Dimas MD     She used follow with Dr. Celia Live MD at and her pulmonary disease in the past.  She was seen by Dr. Celia Live MD for the last time on 10 March 2014 for post polio muscle weakness. According to his note patient had a below normal MEP and NIF testing. She was also diagnosed with vocal cord paralysis and follows with the ENT specialist at [facility].     Subjective/Events Past 24 hours/ROS   -Home today   -Overnight pulse oximetry did show significant hypoxia > 218 min <88%  -Refusing any BiPAP therapy or evaluation     -Rest of the review of systems reviewed    PMHx   Past Medical History      Diagnosis Date    GERD (gastroesophageal reflux disease)     Osteoarthritis     Paralysis of vocal cords     Polio       Past Surgical History        Procedure Laterality Date    CHOLECYSTECTOMY      KNEE SURGERY      Right    LARYNGOSCOPY N/A 2/24/2022    DIAGNOSTIC SUSPENSION MICROLARYNGOSCOPY BRONCHOSCOPY WITH JET VENTILATION performed by Kassi Miller MD at [address]    Current Medications    furosemide  20 mg Oral Daily    cycloSPORINE  2 drop Both Eyes Daily    pantoprazole  40 mg Oral BID AC    sertraline  50 mg Oral Daily    melatonin  6 mg Oral Nightly    magnesium oxide  400 mg Oral Daily    traZODone  50 mg Oral Nightly    multivitamin  1 tablet Oral Daily    meloxicam  15 mg Oral Daily    polyvinyl alcohol  1 drop Both Eyes BID     sodium chloride flush, sodium chloride, fentanNYL, fentanNYL, labetalol, magnesium hydroxide, aluminum & magnesium hydroxide-simethicone, calcium carbonate, oxyCODONE, polyethylene glycol, zinc oxide, acetaminophen, ipratropium, senna, ipratropium-albuterol  IV Drips/Infusions   sodium chloride Stopped (02/24/22 1809)     Home Medications  Medications Prior to Admission: ipratropium (ATROVENT) 0.06 % nasal spray, 2 sprays by Nasal route 3 times daily as needed  meloxicam (MOBIC) 7.5 MG tablet, Take 15 mg by mouth daily   calcium carbonate (OSCAL) 500 MG TABS tablet, Take 500 mg by mouth daily  Multiple Vitamins-Minerals (PRESERVISION AREDS 2) CAPS, Take  by mouth 2 times daily. 2 tabs BID  multivitamin (THERAGRAN) per tablet, Take 1 tablet by mouth daily Alive 50+  [DISCONTINUED] Cholecalciferol (VITAMIN D3) 1.25 MG (23137 UT) CAPS, Take by mouth  [DISCONTINUED] estradiol (ESTRACE VAGINAL) 0.1 MG/GM vaginal cream, Place 2 g vaginally daily  Diet    ADULT DIET; Full Liquid  ADULT TUBE FEEDING; PEG; 2.0 Calorie; Bolus; 6 Times Daily; 130; Gravity; 30; Before and after each bolus  ADULT ORAL NUTRITION SUPPLEMENT; Lunch; Clear Liquid Oral Supplement  Allergies    Latex  Family History    History reviewed. No pertinent family history. Sleep History    Never diagnosed with sleep apnea in the past.    Social History     Social History     Tobacco Use    Smoking status: Never Smoker    Smokeless tobacco: Never Used   Substance Use Topics    Alcohol use: Yes     Comment: Seldom    Drug use: No       Vitals     height is 5' (1.524 m) and weight is 128 lb 9.6 oz (58.3 kg). Her oral temperature is 99.1 °F (37.3 °C). Her blood pressure is 128/58 (abnormal) and her pulse is 72. Her respiration is 16 and oxygen saturation is 93%. Body mass index is 25.12 kg/m². SUPPLEMENTAL O2: O2 Flow Rate (L/min): 6 L/min       I/O        Intake/Output Summary (Last 24 hours) at 2/28/2022 1152  Last data filed at 2/27/2022 2334  Gross per 24 hour   Intake 1280 ml   Output --   Net 1280 ml     I/O last 3 completed shifts:   In: 1920 Thrombosis Prophylaxis: Per primary service.  -Follow with my ( Dr. Servando Stern) clinic at [place] for pulmonary medicine in 2 to 3months for a clinical evaluation regarding her chronic respiratory failure.  -Patient refusing any home BiPAP evaluation at this time  -Agreeable to overnight oxygen to tracheostomy  -Will send patient home on 3LPM/32% at night with nocturnal study to be done tonight and dropped back off tomorrow- will review and titrate if necessary-order entered and faxed to [facility]   -DME note placed for home oxygen at 32% with nocturnal use only   -Outpatient order placed for nocturnal study to be done tonight   -Patient has follow up 3/8/22 with Dr. Irina Tompkins and her  were educated about my impression and plan. They verbalizes understanding. Questions and concerns addressed.  -Will sign off on the case from pulmonary point of view. -Will follow PRN. -Call [phone] with questions. Electronically signed by   IMELDA Campbell CNP on 2/28/2022 at 11:52 AM     Addendum by Dr. Carlos Alberto Mancini MD:  I have seen and examined the patient independently. Face to face evaluation and examination was performed. The above evaluation and note has been reviewed. Labs and radiographs were reviewed. I Have discussed with Ms. EVONNE Campbell CNP about this patient in detail. Physical exam was performed by me. See below for details. More than half of the total time for this encounter spent by me. The above assessment and plan has been reviewed. Please see my modifications mentioned below. My modifications:    Physical Exam:  Constitutional: Patient appears in no distress on room air at rest with Monica Munch valve in place. Mouth/Throat: Oropharynx is clear and moist.    Neck: Neck supple. Tracheostomy in place  Cardiovascular: S1 and S2 heard. Pulmonary/Chest: Bilateral air entry present.  Good breath sounds on both sides, diminished at bases Right side > Left side. No wheezes. No rales. Abdominal: Soft. No tenderness. Extremities: Patient exhibits no edema. Neurological: Patient is awake and alert. Interview of patient's chronic neuromuscular disease with history of polio, chronic right hemidiaphragmatic elevation and right side vocal cord paralysis and chronic history of respiratory failure, patient and the patient  advised to go on treatment with home BiPAP to use with her tracheostomy. However, at the end of the discussion both patient and patient  refused to go on BiPAP therapy. They agreed to go on oxygen therapy with trach collar at nighttime due to her nocturnal hypoxia. Patient and patient  verbalized understanding of consequences of their decision including worsening of hypercapnic respiratory failure without BiPAP therapy leading to death.       Maryam Mota MD 2/28/2022 4:49 PM

## 2022-03-01 VITALS
HEART RATE: 65 BPM | SYSTOLIC BLOOD PRESSURE: 127 MMHG | DIASTOLIC BLOOD PRESSURE: 60 MMHG | HEIGHT: 60 IN | WEIGHT: 128.6 LBS | RESPIRATION RATE: 16 BRPM | TEMPERATURE: 98.6 F | OXYGEN SATURATION: 97 % | BODY MASS INDEX: 25.25 KG/M2

## 2022-03-01 PROCEDURE — 97112 NEUROMUSCULAR REEDUCATION: CPT

## 2022-03-01 PROCEDURE — 97530 THERAPEUTIC ACTIVITIES: CPT

## 2022-03-01 PROCEDURE — 97110 THERAPEUTIC EXERCISES: CPT

## 2022-03-01 PROCEDURE — 6370000000 HC RX 637 (ALT 250 FOR IP): Performed by: NURSE PRACTITIONER

## 2022-03-01 PROCEDURE — 92526 ORAL FUNCTION THERAPY: CPT | Performed by: SPEECH-LANGUAGE PATHOLOGIST

## 2022-03-01 PROCEDURE — 94762 N-INVAS EAR/PLS OXIMTRY CONT: CPT

## 2022-03-01 PROCEDURE — 99239 HOSP IP/OBS DSCHRG MGMT >30: CPT | Performed by: NURSE PRACTITIONER

## 2022-03-01 PROCEDURE — 97116 GAIT TRAINING THERAPY: CPT

## 2022-03-01 PROCEDURE — 97535 SELF CARE MNGMENT TRAINING: CPT

## 2022-03-01 PROCEDURE — 99232 SBSQ HOSP IP/OBS MODERATE 35: CPT | Performed by: INTERNAL MEDICINE

## 2022-03-01 RX ADMIN — FUROSEMIDE 20 MG: 20 TABLET ORAL at 10:12

## 2022-03-01 RX ADMIN — PANTOPRAZOLE SODIUM 40 MG: 40 TABLET, DELAYED RELEASE ORAL at 15:31

## 2022-03-01 RX ADMIN — Medication 400 MG: at 10:12

## 2022-03-01 RX ADMIN — MELOXICAM 15 MG: 7.5 TABLET ORAL at 10:12

## 2022-03-01 RX ADMIN — Medication 1 TABLET: at 10:12

## 2022-03-01 RX ADMIN — PANTOPRAZOLE SODIUM 40 MG: 40 TABLET, DELAYED RELEASE ORAL at 06:49

## 2022-03-01 RX ADMIN — SERTRALINE 50 MG: 50 TABLET, FILM COATED ORAL at 10:12

## 2022-03-01 ASSESSMENT — PAIN SCALES - GENERAL
PAINLEVEL_OUTOF10: 0

## 2022-03-01 NOTE — PROGRESS NOTES
[address] [address]  [facility]  Occupational Therapy  Daily Note    Time In: 1400  Time Out: 1430  Timed Code Treatment Minutes: 30 Minutes  Minutes: 30        Date: 3/1/2022  Patient Name: Maxim Redmond,   Gender: female      Room: [facility]66/066-A  MRN: [id number]  : 1945  (64 y.o.)  Referring Practitioner: Dr Valentino Bright  Diagnosis: Acute Respiratory Failure  Additional Pertinent Hx: Maxim Redmond  is a 64 y.o. female admitted to the inpatient rehabilitation unit on 2/15/2022. The patient was originally admitted to the [address] [place] [address] 2022 to the gynecology oncology service secondary to postmenopausal bleeding. She started with the bleeding in early 2021 and was followed by Dr. Veronica Gill here at [facility]. Ms. Kwaku Arnold presented to [facility] (/[facility])  for initial consultation for Post-menopausal Bleeding and failed Endometrial Biopsy. PMB started in 2021. Had pelvic US that showed EMS 9mm. EMB was attempted twice in the office, no endometrial cells were identified. She was therefore referred to the [facility] at [facility] 22 for additional management. She had no pelvic pain. Only intermittent spotting, no heavy bleeding. Had history of Lichen sclerosis, was prescribed estrogen cream and steroid cream. Patient underwent abdominal hysterectomy on 2022, BSO via P. Pfannenstiel incision. Final pathology benign. Her hospitalization was complicated by respiratory failure (now on 3L supplemental O2 per trach mask,) and has PEG for pharyngeal dysphagia. Also has a PMH positive for GERD, OA,  Paralysis of vocal cords, Polio (bulbar), Dysphagia, Inhalation and ingestion of other object causing obstruction of respiratory tract or suffocation, Osteoporosis, Hoarse voice, Hypernasal speech, Diaphragm paralysis, and Left adhesive capsulitis.     Restrictions/Precautions:  Restrictions/Precautions: General Precautions,Fall Risk  Position Activity Restriction  Other position/activity restrictions: PEG, trach collar/mask, No lifting >10lbs       SUBJECTIVE: Pt. Pleasant throughout session and motivated to return home. PAIN: 0/10:      Vitals: Vitals not assessed per clinical judgement, see nursing flowsheet    COGNITION: WFL    ADL:   No ADL's completed this session. Clement Fly BALANCE:  Sitting Balance:  Independent. Standing Balance: Modified Independent    BED MOBILITY:  Not Tested    TRANSFERS:  Sit to Stand:  Modified Independent. Stand to Sit: Modified Independent. FUNCTIONAL MOBILITY:  Assistive Device: None  Assist Level:  Modified Independent. Distance: To and from therapy apartment     Pt. Utilized the porch entrance to/from [employer] apartment and able to manage step in/door with MI.  Pt. Did demo 1 LOB with CGA for correction as pt forgot there was a step to descend into the apartment. Pt. Became frazzled with error made and able to redirect. ADDITIONAL ACTIVITIES:  Pt. Engaged in item retrieval at different height levels to address pt's ability to reach OBOS without use of AD, pt able to complete with MI. Task performed to enhance I with IADL task completion once returned home. OTR monitored O2 sats throughout and pt's O2 sats during OT session varried from 96%-99% SPO2 at room air. Pt/spouse education performed as they were inquiring about [facility] services, OTR answered questions to best of ability to enhance continuity of care. Pt. Requested for site around peg tube to be cleaned and required mod A for completion. ASSESSMENT:     Activity Tolerance:  Patient tolerance of  treatment: good. Discharge Recommendations: Home with Home Health OT  Equipment Recommendations: Other: Pt has a built in shower seat. Recommendations for a grab bar near toilet provided.   Plan: Times per week: 5x/wk for 90 min and 1x/wk for 30 min  Times per day: Daily  Current Treatment Recommendations: Strengthening,Balance Training,Endurance Training,Functional Mobility Training,Equipment Evaluation, Education, & procurement,Patient/Caregiver Education & Training,Self-Care / ADL,Safety Education & Training    Patient Education  Patient Education: Plan of Care    Goals  Short term goals  Time Frame for Short term goals: 1 week  Short term goal 1: Pt will complete UB dressing tasks with mod I to improve indep with donning her bra at home. Short term goal 2: Pt will complete LB dressing tasks with mod I to improve indep with donning socks and shoes at home. Short term goal 3: Pt will tolerate 4 minute standing tasks with Mod I during shayy release tasks to improve indep with sinkside grooming. Short term goal 4: Pt will ambulate to and from bathroom with SBA and RW prn to improve activity tolerance needed for bathing. Short term goal 5: Pt will tolerate dynamic standing IADL tasks with Mod I to meet patient goal of baking cookies with grandchildren. Long term goals  Time Frame for Long term goals : 1 week  Long term goal 1: Pt will complete bathing and shower transfer with mod I to improve indep with showering at home. Long term goal 2: Pt will complete dressing and grooming tasks with mod I to improve indep with self care at home. Long term goal 3: Pt will complete a simple meal prep tasks with mod I and 0 vcs for ECT to improve indep with preparing lunch at home. Following session, patient left in safe position with all fall risk precautions in place.

## 2022-03-01 NOTE — PROGRESS NOTES
[address]  Occupational Therapy  Daily Note    Time In: 0830  Time Out: 0930  Timed Code Treatment Minutes: 60 Minutes  Minutes: 60          Date: 3/1/2022  Patient Name: Caron Nunez,   Gender: female      Room: [facility]66/066-A  MRN: [id number]  : 1945  (64 y.o.)  Referring Practitioner: Dr Kathe Barakat  Diagnosis: Acute Respiratory Failure  Additional Pertinent Hx: Caron Nunez  is a 64 y.o. female admitted to the inpatient rehabilitation unit on 2/15/2022. The patient was originally admitted to the [facility] 2022 to the gynecology oncology service secondary to postmenopausal bleeding. She started with the bleeding in early 2021 and was followed by Dr. Melba Faulkner here at [facility]. [facility]  for initial consultation for Post-menopausal Bleeding and failed Endometrial Biopsy. PMB started in 2021. Had pelvic US that showed EMS 9mm. EMB was attempted twice in the office, no endometrial cells were identified. She was therefore referred to the [facility] 22 for additional management. She had no pelvic pain. Only intermittent spotting, no heavy bleeding. Had history of Lichen sclerosis, was prescribed estrogen cream and steroid cream. Patient underwent abdominal hysterectomy on 2022, BSO via P. Pfannenstiel incision. Final pathology benign. Her hospitalization was complicated by respiratory failure (now on 3L supplemental O2 per trach mask,) and has PEG for pharyngeal dysphagia. Also has a PMH positive for GERD, OA,  Paralysis of vocal cords, Polio (bulbar), Dysphagia, Inhalation and ingestion of other object causing obstruction of respiratory tract or suffocation, Osteoporosis, Hoarse voice, Hypernasal speech, Diaphragm paralysis, and Left adhesive capsulitis.     Restrictions/Precautions:  Restrictions/Precautions: General Precautions,Fall Risk  Position Activity Restriction  Other position/activity restrictions: PEG, trach collar/mask, No lifting >10lbs      SUBJECTIVE: Pt. Pleasant and cooperative throughout. Pt. Reports she d/c to home and [place] did not come to set up [place] O2 and returned back to this facility on 2/28/22. Pt. Reports her goal is to be able to return home when able with [place] assistance. PAIN: Pt. Expresses she has a \"sore throat\", no numeric pain rating given. Vitals: Vitals not assessed per clinical judgement, see nursing flowsheet    COGNITION: WFL    ADL:   Feeding: Modified Independent. to eat breakfast (thin liquid diet). Grooming: Modified Independent. while standing sinkside, no cues required for safety. Bathing: Modified Independent. for sponge bathing  Upper Extremity Dressing: Modified Independent. Lower Extremity Dressing: Modified Independent. \  Toileting: Modified Independent. Toilet Transfer: Modified Independent. .    Pt. Completed item retrieval from closet/dresser area and demo good initiation to use walker for transportation of items. Once in bathroom, pt positioned walker to the side and completed ADLs with no AD. Pt. Demo good safety throughout. Pt. Did require assistance to change dressing around peg tub, pt reports her  has been hands on helping and is able to assist with task at home. BALANCE:  Sitting Balance:  Modified Independent. Standing Balance: Modified Independent. BED MOBILITY:  Not Tested    TRANSFERS:  Sit to Stand:  Modified Independent. Stand to Sit: Modified Independent. FUNCTIONAL MOBILITY:  Assistive Device: RW to bathroom for item transport, no AD from bathroom or within bathroom. Assist Level:  Modified Independent. Distance:  within room, to/from bathroom        ADDITIONAL ACTIVITIES:  Pt. Demo good carryover with breathing techniques to conserve energy throughout ADLs.   Pt. Motivated to engage and to return home when able.     ASSESSMENT:     Activity Tolerance:  Patient tolerance of  treatment: good. Discharge Recommendations: Home with Home Health OT  Equipment Recommendations: Other: Pt has a built in shower seat. Recommendations for a grab bar near toilet provided. Plan: Times per week: 5x/wk for 90 min and 1x/wk for 30 min  Times per day: Daily  Current Treatment Recommendations: Strengthening,Balance Training,Endurance Training,Functional Mobility Training,Equipment Evaluation, Education, & procurement,Patient/Caregiver Education & Training,Self-Care / ADL,Safety Education & Training    Patient Education  Patient Education: ADL's, POC    Goals  Short term goals  Time Frame for Short term goals: 1 week  Short term goal 1: Pt will complete UB dressing tasks with mod I to improve indep with donning her bra at home. Short term goal 2: Pt will complete LB dressing tasks with mod I to improve indep with donning socks and shoes at home. Short term goal 3: Pt will tolerate 4 minute standing tasks with Mod I during shayy release tasks to improve indep with sinkside grooming. Short term goal 4: Pt will ambulate to and from bathroom with SBA and RW prn to improve activity tolerance needed for bathing. Short term goal 5: Pt will tolerate dynamic standing IADL tasks with Mod I to meet patient goal of baking cookies with grandchildren. Long term goals  Time Frame for Long term goals : 1 week  Long term goal 1: Pt will complete bathing and shower transfer with mod I to improve indep with showering at home. Long term goal 2: Pt will complete dressing and grooming tasks with mod I to improve indep with self care at home. Long term goal 3: Pt will complete a simple meal prep tasks with mod I and 0 vcs for ECT to improve indep with preparing lunch at home. Following session, patient left in safe position with all fall risk precautions in place.

## 2022-03-01 NOTE — PROGRESS NOTES
Patient discharged in stable condition as per order of attending physician to Home with [address] [facility] per staff at Time: [phone] Wheelchair to car. AVS provided by RN at time of discharge, which includes all necessary medical information pertaining to the patients current course of illness, treatment, medications, post-discharge goals of care, and treatment preferences. Patient/ family verbalize understanding of discharge plan and are in agreement with goal/plan/treatment preferences. Belongings including Clothing sent with patient. Home medications sent home with patient N/A    Availability of \"My Chart\" offered to patient as a tool for updated health record.   Steps for activation discussed with patient as mentioned on AVS.

## 2022-03-01 NOTE — PROGRESS NOTES
[address] [facility] PHYSICAL THERAPY  Discharge Note  [address] [facility] - [id number]    Time In: 1430  Time Out: 2728  Timed Code Treatment Minutes: 46 Minutes  Minutes: 46       Date: 3/1/2022  Patient Name: Afia Nelson,  Gender:  female        MRN: [id number]  : 1945  (64 y.o.)     Referring Practitioner: Adriana Hebert MD  Diagnosis: Acute Respiratory Failure  Additional Pertinent Hx: Afia Nelson  is a 64 y.o. female admitted to the inpatient rehabilitation unit on 2/15/2022. The patient was originally admitted to the [facility] 2022 to the gynecology oncology service secondary to postmenopausal bleeding. She started with the bleeding in early 2021 and was followed by Dr. Lynnette Loomis here at [facility]. Ms. Juan Antonio Aguirre presented to [facility] (/[facility])  for initial consultation for Post-menopausal Bleeding and failed Endometrial Biopsy. PMB started in 2021. Had pelvic US that showed EMS 9mm. EMB was attempted twice in the office, no endometrial cells were identified. She was therefore referred to the [email or web address] at [address] [place] [address] 22 for additional management. She had no pelvic pain. Only intermittent spotting, no heavy bleeding. Had history of Lichen sclerosis, was prescribed estrogen cream and steroid cream. Patient underwent abdominal hysterectomy on 2022, BSO via P. Pfannenstiel incision. Final pathology benign. Her hospitalization was complicated by respiratory failure (now on 3L supplemental O2 per trach mask,) and has PEG for pharyngeal dysphagia.   Also has a PMH positive for GERD, OA,  Paralysis of vocal cords, Polio (bulbar), Dysphagia, Inhalation and ingestion of other object causing obstruction of respiratory tract or suffocation, Osteoporosis, Hoarse voice, Hypernasal speech, Diaphragm paralysis, and Left adhesive capsulitis     Prior Level of Function:  Lives With: Spouse  Type of Home: House  Home Layout: Two level  Home Access: Stairs to enter without rails  Entrance Stairs - Number of Steps: ~4 steps at the front of the house without railings and 2 steps in the garage. Patient reports she has a few steps in the back of the house with railings. Patient reports typically she enters house from garage  Home Equipment:  (Patient reports she does not have an equipment at home)    Vitals: Oxygen: 92-96% on room air with activty    Restrictions/Precautions:  Restrictions/Precautions: General Precautions,Fall Risk  Position Activity Restriction  Other position/activity restrictions: PEG, trach collar/mask, No lifting >10lbs    SUBJECTIVE: Patient in room in chair, agreeable to PT. Pt cooperative and pleasant, hopeful to return to home this date. PAIN: no complaints of pain    OBJECTIVE:  Bed Mobility:  Not Tested    Transfers:  Sit to Stand: Modified Independent  Stand to Sit:Modified Independent    Ambulation:  Supervision, Stand By Assistance  Distance: 160', 12', ~120' with gait task in therapy gym, ~155'  Surface: Level Tile  Device:No Device  Gait Deviations: Forward Flexed Posture, Slow Margot, Decreased Step Length Bilaterally, Decreased Arm Swing, Decreased Gait Speed and Decreased Heel Strike Bilaterally  *Education on pt with tendency to demonstrate instability when turning head while ambulating and to take caution with this    1. Patient ambulated around therapy gym, searching for objects and placing in bucket she was carrying. Completed to improve patient's ability to turn head and scan the room while ambulating. 2. Patient weaved in/out of cones while turning head slowly side to side ~45'    Exercise:  Patient was guided in 1 set(s) 10 reps of exercise to both lower extremities. Standing: step ups x10 each LE 4\" step. Seated: long arc quad, march, hamstring curl, hip abduction, hip adduction all with yellow theraband resistance, ankle pumps.   Standing: anterior step taps 4\" step x10 alternating no UE support. Exercises were completed for increased independence with functional mobility. Functional Outcome Measures: Not completed       ASSESSMENT:  Assessment:  At discharge patient met 5/5 STG's and 4/5 LTG's. Patient has made great gains since initial evaluation, progressing sit < > stand from CGA to modified independnece, supine to sit from min assist to modified independence, gait from CGA to supervision/SBA and increased distances and tinetti from 15 to 25/28. Patient is able to ambulate 150-160' with no AD and SBA/supervision. Pt demonstrates some instability with fatigue and turning head while ambulating. Patient is able to complete mobility on room air and maintain O2 sats at safe levels with seated rest breaks with fatigue. Patient's tinetti score of 25/28 indicates moderate fall risk, although this is improved from evaluation where she was a high fall risk. Pt is returning to her home with her spouse who is supportive and able to assist her. Pt is returning to home with home health PT  Activity Tolerance:  Patient tolerance of  treatment: good. Equipment Recommendations:Equipment Needed: RW--ordered  Discharge Recommendations:  Discharge Recommendations: Home with Home health PT    Plan: Patient is returning to home with spouse and home health PT    Patient Education  Patient Education: Gait, Verbal Exercise Instruction,  - Patient Verbalized Understanding    Goals:  Patient goals : To return to home  Short term goals  Time Frame for Short term goals: 1 week  Short term goal 1: Patient to perform supine<>sit with SBA without railings in order to assist with getting into and out of bed. GOAL MET  Short term goal 2: Patient to perform sit to stand transfers wtih [address] with <=1 cue for hand placement in order to assist with safety with transfers from various surfaces.  GOAL MET  Short term goal 3: Patient to ambulate at least 125 feet

## 2022-03-01 NOTE — PROGRESS NOTES
Vick Padilla  DISCHARGE NOTE       Date: 3/1/2022  Patient Name: Parisa Mccloud      CSN: [id number]   : 1945  (64 y.o.)  Gender: female   Referring Physician: Titus Agustin MD  Diagnosis: Debility  Secondary Diagnosis: Dysphagia, Dysphonia   Precautions: Fall risk, trach, PEG, aspiration  Current Diet: Full THIN liquid diet  Swallowing Strategies:  Right head turn, small bites/sips, 3-4 swallows, CLOSE pulmonary monitoring  Date of Last MBS/FEES:  22    Pain:  No pain reported. Subjective:  Patient seated upright in recliner.  present. Alert and cooperative throughout session.  with multiple questions regarding transition to [facility], and when/how to repeat the next instrumental assessment. Patient reporting that they have a F/U with Dr. Jordana Bledsoe next week with plans to discuss further surgical procedure to address vocal fold paralysis and to assist with possible trach decannulation.  reporting that he would like to hold on a repeat swallow assessment following ENT procedure. Short-Term Goals:  SHORT TERM GOAL #1:  Goal 1: Patient will safely consume thin liquid diet (IDDSI level 0) with use of compensatory strategies with goal of consuming 25-50% of po meal to improve pharyngeal function and increase caloric intake. - GOAL MET   INTERVENTIONS: Not addressed due to mistreat/brief education. PREVIOUS SESSION:   Patient consumed thin liquids in conjunction with NMES without overt s/s of aspiration (though certainly cannot ruleout at bedside alone). Handout providing reiterating the following information: current diet level, foods allowed/not allowed, how to complete flow test, swallowing strategies, how to maintain adequate nutritional intake with PEG and \"pleasure feeds\" consisting of thin liquids, and pharyngeal exercise program to be completed at home.   and patient verbalized understanding. It should continue to be noted that although patient is able to safely consume a full liquid diet, this is NOT optimal for maximizing nutrition levels. WithOUT ongoing feedings via PEG, patient unlikely to maintain adequate nutrition and would be at HIGH risk for malnourishment which could result in further deconditioning and recurrent hospitalizations. SHORT TERM GOAL #2:  Goal 2: Patient will complete pharyngeal strengthening exercises x10 each with good success in order to improve pharyngeal weakness and overall airway protection. - GOAL MET   INTERVENTIONS:    Completed the following pharyngeal exercises in conjunction with skilled demonstration and rationale on proper technique. Effortful swallow -- 2 sets of 10 with good success  TBR ('kick') -- 1 set of 20 with good success and retraction strength  Caren -- 2 sets of 10 with great success and timeliness of swallow trigger  Mendelsohn -- 1 set of 10 with good success and effort  Supraglottic- 1 set of 10 with fair success, needed max verbal cues and models for technique     *reviewed ST HEP; patient verbalized understanding    SHORT TERM GOAL #3:  Goal 3: Patient will trial red button consistently without respiratory distress to permit potential decannulation. - GOAL NOT MET  INTERVENTIONS: Not addressed due to mistreat/brief education. PREVIOUS SESSION:   Per ENT operative note from 2/24 (yesterday), MD Donna Fu reported the following:    Findings:   1. 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 2.  No posterior commissure webbing seen  3. Trachea with mild suprastomal stenosis and no other tracheostomy related pathology    Disposition: The patient will be readmitted to her rehab bed to continue her inpatient rehab process.   I have found her to have a surgically correctable condition but I will confirm with an awake endoscopy him the office prior to posterior for surgery. She is a candidate for an augmentation lateralization procedure of the right cricoarytenoid joint and the right true vocal fold. By this method the patient's right hemilarynx can be brought to be a reasonable foil for voicing, relatively straight structure against which she conceal her airway or swallowing, and adequate airway for breathing without the tracheostomy. I would leave the tracheostomy in place until she is fully recovered from the initial operation if she chooses to accept. Reviewed ENT recommendations with patient. Patient and  inquiring about any concern for the aforementioned procedure impacting swallow function; ST explained that this procedure should not worsen swallow ability, albeit also not improve swallow function alone. However, ST does suspect it would improve overall airway protection and potentially allow for elimination of head turn strategy during PO intake. ST recommended that if patient moves forward with this procedure, a repeat MBS should not be completed until AFTER the procedure to fully re-evaluate pharyngeal function. Both verbalized understanding. Long-Term Goals:  Timeframe for Long-term Goals: 3 weeks    LONG TERM GOAL #1:  Goal 1: Patient will complete repeat instrumental to reevaluate readiness for initiation of solids as clinically indicated.  - GOAL NOT MET, CONTINUE DURING HOME HEALTH WITH GOAL OF REPEAT INSTRUMENTAL       Comprehension: 7 - Patient understands complex ideas (math/planning)  Expression: 7 - Patient expresses complex ideas/needs  Social Interaction: 6 - Patient requires medication for mood and/or effect  Problem Solvin - Patient independent with complex tasks  Memory: 6 - Patient requires device to recall (e.g. memory book)       ST FIM ASSESSMENT:     Admission score Current score Goal Status   COMMUNICATION 6 - Complex ideas 90% or device (hearing aid or glasses- if patient is primarily a visual learner)   7 - Patient understands complex ideas (math/planning)   Progressing   EXPRESSION 6 - Device used to express complex ideas/needs     7 - Patient expresses complex ideas/needs   Progressing   SOCIAL INTERACTION 5 - Patient is appropriate with supervision/cues   6 - Patient requires medication for mood and/or effect   Progressing   PROBLEM SOLVING 5 - Patient able to solve simple/routine tasks   7 - Patient independent with complex tasks   Progressing   MEMORY 3 - Patient remembers 50%-74% of the time   6 - Patient requires device to recall (e.g. memory book)   Progressing         EDUCATION:  Learner: Patient and Significant Other  Education:  Reviewed diet and strategies, Reviewed ST goals and Plan of Care, Reviewed recommendations for follow-up and Swallowing HEP  Evaluation of Education: Verbalizes understanding       ASSESSMENT/PLAN:    Patient has met 2/3 STG's and 0/1 LTG this therapy period. Improvements noted in safe consumption of thin liquids, execution of compensatory strategies, demonstration/understanding of flow test, and completion of pharyngeal strengthening exercises. Ongoing deficits noted in endurance related to completion of swallowing exercises, and lack of coordination with swallowing + respiratory functions. Patient with recent self-report of increased ease with consumption of thin liquid consistencies, however remains heavily reliant on compensatory strategies (head turn to right, multiple swallows, single sips, spontaneous throat clear) for management of po intake. Patient consuming less than 25% of meals due to severity of pharyngeal deficits, maintaining necessity for non-oral nutrition (PEG tube). Patient has also been tolerating NMES treatments well in conjunction with pharyngeal strengthening exercises. Continue to recommend repeat instrumental assessment 4-6 weeks from prior MBS which was completed on 2/11/2022.  With regards to use of speaking valve, patient tolerating PMV placement throughout the day (when awake), with recommendations for pulmonology and ENT to follow in outpatient setting to determine appropriateness for  trials and potential eventual decannulation. Patient would HIGHLY benefit from continued intensive speech therapy services via home health setting to maximize pharyngeal strengthening and respiratory support, in order to optimize airway closure and pharyngeal clearance to increase po intake safely. Activity Tolerance:  Patient tolerance of treatment: good. Assessment/Plan: Patient discharged from Speech Therapy at this time due to discharge from unit. Discharge Disposition: home. Continued Speech Therapy Services recommended: Yes - home health speech therapy. Manisha Vera.  [address] [facility] [address], [address]

## 2022-03-01 NOTE — PROGRESS NOTES
[facility]  INPATIENT PHYSICAL THERAPY  DAILY NOTE  [facility]    Time In: 1146  Time Out: 1230  Timed Code Treatment Minutes: 44 Minutes  Minutes: 44     Minute Variance  Variance: -16   *Pt utilizing restroom upon arrival and then hospital  into room to talk to patient. Will attempt to see additional time in afternoon. Date: 3/1/2022  Patient Name: Lincoln Olvera,  Gender:  female        MRN: [id number]  : 1945  (64 y.o.)     Referring Practitioner: Tino Chowdary MD  Diagnosis: Acute Respiratory Failure  Additional Pertinent Hx: Lincoln Olvera  is a 64 y.o. female admitted to the inpatient rehabilitation unit on 2/15/2022. The patient was originally admitted to the [address] [place] [address] 2022 to the gynecology oncology service secondary to postmenopausal bleeding. She started with the bleeding in early 2021 and was followed by Dr. Mahesh Leonard here at [facility]. [facility]  for initial consultation for Post-menopausal Bleeding and failed Endometrial Biopsy. PMB started in 2021. Had pelvic US that showed EMS 9mm. EMB was attempted twice in the office, no endometrial cells were identified. She was therefore referred to the [facility] 22 for additional management. She had no pelvic pain. Only intermittent spotting, no heavy bleeding. Had history of Lichen sclerosis, was prescribed estrogen cream and steroid cream. Patient underwent abdominal hysterectomy on 2022, BSO via P. Pfannenstiel incision. Final pathology benign. Her hospitalization was complicated by respiratory failure (now on 3L supplemental O2 per trach mask,) and has PEG for pharyngeal dysphagia.   Also has a PMH positive for GERD, OA,  Paralysis of vocal cords, Polio (bulbar), Dysphagia, Inhalation and ingestion of other object causing obstruction of respiratory tract or suffocation, Osteoporosis, Hoarse voice, Hypernasal speech, Diaphragm paralysis, and Left adhesive capsulitis     Prior Level of Function:  Lives With: Spouse  Type of Home: House  Home Layout: Two level  Home Access: Stairs to enter without rails  Entrance Stairs - Number of Steps: ~4 steps at the front of the house without railings and 2 steps in the garage. Patient reports she has a few steps in the back of the house with railings. Patient reports typically she enters house from garage  Home Equipment:  (Patient reports she does not have an equipment at home)   Bathroom Shower/Tub: Walk-in shower (Patient reports she has a built in shower chair)  Bathroom Toilet: Standard  Bathroom Equipment: Grab bars in shower,Built-in shower seat    ADL Assistance: Independent  Homemaking Assistance: Independent  Homemaking Responsibilities: Yes  Ambulation Assistance: Independent  Transfer Assistance: Independent  Active : Yes  Additional Comments: Patient reports she was independent PTA and did not utilize an AD for mobility. Patient reports her daugther and son in law live next door and also would be able to help out as needed. Restrictions/Precautions:  Restrictions/Precautions: General Precautions,Fall Risk  Position Activity Restriction  Other position/activity restrictions: PEG, trach collar/mask, No lifting >10lbs     SUBJECTIVE: Patient using restroom upon arrival and then hospital  in to pt's room. Pt cooperative and pleasant throughout treatment this date, motivated to return to home. Pt notes some frustration with having to return to inpatient rehab, just wanting to go home and stay there.     PAIN: no complaints of pain    Vitals: Oxygen: % on room air    OBJECTIVE:  Bed Mobility:  Not Tested    Transfers:  Sit to Stand: Modified Independent  Stand to Sit:Modified Independent    Ambulation:  Supervision, Stand By Assistance  Distance: 160' x2 plus short distances in therapy gym. Surface: Level Tile  Device:No Device  Gait Deviations:  Slow Margot, Decreased Step Length Bilaterally, Decreased Arm Swing, Decreased Trunk Rotation, Decreased Gait Speed and Decreased Heel Strike Bilaterally  *Verbal cues for increased stride length and arm swing. 1. Patient stepping over fly swatters with reciprocal pattern, completed to increase stride length. Heel strike emphasized with further trials. Stairs:  Contact Guard Assistance  Number of Steps: 1 x2  Height: 4\" step with No Device   First time pt utilized post to descend. Balance:  Static Standing Balance: Supervision, Stand By Assistance  Dynamic Standing Balance: Stand By Assistance, Contact Guard Assistance, Minimal Assistance     1. Patient completing heel taps onto balance pods and then stepping over balance pod. Completed with 4 balance pods x2.  2. Patient completing forward step with emphasis on heel strike and weight shifting forward onto forward leg. Completed to improve lateral and forward weight shift as well as SLS for improved balance with ambulation and to improve heel strike. 3. Patient completing forward step with both feet then both feet back, both feet to the left and both feet to the right. Completed with no UE support. Completed to improve LE coordination and balance with dynamic activity in various directions. *Pt completing with CGA and occasional min assist    Exercise:  Patient was guided in 1 set(s) 12 reps of exercise to both lower extremities. Standing: hip flexion, hip extension, hamstring curls, hip abduction, heel raise. Verbal cues for upright posture  Exercises were completed for increased independence with functional mobility. Functional Outcome Measures: Not completed       ASSESSMENT:  Assessment: Patient progressing toward established goals. Activity Tolerance:  Patient tolerance of  treatment: good.       Equipment Recommendations:Equipment Needed: Yes (Continue to assess pending progress (may require RW))  Discharge Recommendations: Continue to assess pending progress and Home with Home Health PT  Plan: Times per week: 5x/wk 90min; 1x/wk 30 min  Times per day: Daily  Current Treatment Recommendations: Strengthening,Neuromuscular Re-education,Home Exercise Program,ROM,Safety Education & Leeta Clan Training,Patient/Caregiver Education & Training,Functional Mobility Training,Wheelchair Mobility Training,Transfer Training,Gait Training,Stair training    Patient Education  Patient Education: Gait, Verbal Exercise Instruction,  - Patient Verbalized Understanding, - Patient Requires Continued Education    Goals:  Patient goals : To return to home  Short term goals  Time Frame for Short term goals: 1 week  Short term goal 1: Patient to perform supine<>sit with SBA without railings in order to assist with getting into and out of bed. GOAL MET  Short term goal 2: Patient to perform sit to stand transfers wtih [address] with <=1 cue for hand placement in order to assist with safety with transfers from various surfaces. Short term goal 3: Patient to ambulate at least 125 feet without AD with SBA in order to assist with home mobility. Short term goal 4: Patient to ascend/descend 4 step with 1 handrail with SBA in order to assist with getting into and out of home. Short term goal 5: Patient to score at least 21/28 on the Tinetti in order to reduce risk for falls. Long term goals  Time Frame for Long term goals : 3 weeks  Long term goal 1: Patient to perform supine<>sit with Mod I without railings in order to assist with getting into and out of bed. Long term goal 2: Patient to perform sit to stand transfers with Mod I in order to assist with safety with transfers from various surfaces. Long term goal 3: Patient to ambulate at least 150 feet with RW with Mod I in order to assist with home mobility.   Long term goal 4: Patient to ascend/descend 4 steps with 1 handrail with Supervision in order to assist with getting into and out of home. Long term goal 5: Patient to perform car transfer with Supervision in order to assist with getting to and from appointments. Following session, patient left in safe position with all fall risk precautions in place.

## 2022-03-01 NOTE — PROGRESS NOTES
[address] [address]  Recreational Therapy  Daily Note  [address]    Time Spent with Patient: 25 minutes    Date:  3/1/2022       Patient Name: Farrukh Calderón      MRN: [id number]      YOB: 1945 (72 y.o.)       Gender: female  Diagnosis: Acute Respiratory Failure  Referring Practitioner: Dr Yolanda Luna    RESTRICTIONS/PRECAUTIONS:  Restrictions/Precautions: General Precautions,Fall Risk  Vision: Impaired  Hearing: Within functional limits    PAIN: 0    SUBJECTIVE:  That's ok     OBJECTIVE:  Brought pt to our recreational therapy room to get her hair done by our beautician before she leaves today but our beautician didn't show up-pt so kind and social-she talked about her marlen and her involvement in her Sikhism-so pleasant-ambulating in room without AD and S-gave her money back to her-she is so ready to go home and stay home          Patient Education  New Education Provided: Importance of Leisure,     Electronically signed by: [place] Record, CTRS  Date: 3/1/2022 None

## 2022-03-01 NOTE — DISCHARGE SUMMARY
Physical Medicine & Rehabilitation   Discharge Summary     Patient Identification:  Lincoln Olvera  : 1945  Admit date: 2/15/2022  Discharge date: 3/1/22  Attending provider: Tino Chowdary MD        Primary care provider: Blossom Coppola     Discharge Diagnoses:   Primary impairment requiring rehabilitation: 12 Debility     Etiologic Diagnosis that led to the condition: Hypercarbic respiratory failure     Comorbid conditions affecting rehabilitation:  · Status-post total abdominal hysterectomy, bilateral salpingo-oophorectomy on 22 at [facility] secondary to postmenopausal bleeding via pfannenstiel incision.  Final pathology benign. · Hypercarbic respiratory failure, now on supplemental O2 per trach mask    · *Tracheostomy status  · History of diaphragmatic and vocal cord paralysis (baseline right vocal cord and diaphragm paralysis with inability to cough, rare nasal regurgitation of liquids believed to be secondary to bulbar polio  · *Pharyngeal dysphagia  ? Status post PEG placement 2022   · Zenker's diverticulum noted on a modified barium swallow study of 2022; GI consult deferred while at [facility]. · Lactose intolerant  · Acute blood loss anemia; transfused 1 unit of packed red blood cells on 2/10/2022. · 1 lower respiratory culture positive for Serratia and the patient is status post vancomycin  through , and cefepime  through . · Negative lower extremity Dopplers on 22. · Gastroesophageal reflux disease  · Osteoarthritis  · *Paralysis of vocal cords  · Bulbar polio  · Dysphagia, moderate to severe oropharyngeal  · Inhalation it and ingestion of other object causing obstruction of respiratory tract or suffocation  · Osteoporosis  · Hoarse voice  · Hypernasal speech  · Diaphragmatic paralysis  · Left adhesive capsulitis      Discharge Functional Status:    Occupational Therapy:  EATING:Independent. CARE Score: 6  ORAL HYGIENE:Independent.  CARE Score: 6  TOILETING HYGIENE:Independent. CARE Score: 6. SHOWERING/BATHING:Independent. UPPER BODY DRESSING:Independent. CARE Score: 6  LOWER BODY DRESSING:Independent. CARE Score: 6  FOOTWEAR:Independent. CARE Score: 6  TOILET TRANSFER: Independent. CARE Score: 6    Physical Therapy:  Bed Mobility:  Supine to Sit: Stand By Assistance  Sit to Supine: Stand By Assistance    Patient began coughing upon initial transfer into supine and had to transfer back to EOB due to cough. Pt's O2 stats monitored WFL. Transfers:  Sit to Stand: Supervision  Stand to Sit:Supervision  Ambulation:  Contact Guard Assistance  Distance: 125 feet x 1 and short distances throughout her room. Surface: Level Tile  Device:No Device  Gait Deviations: Forward Flexed Posture, Decreased Step Length Bilaterally, Decreased Arm Swing, Decreased Heel Strike Bilaterally and Unsteady Gait    Speech therapy:    Comprehension: 7 - Patient understands complex ideas (math/planning)  Expression: 7 - Patient expresses complex ideas/needs  Social Interaction: 6 - Patient requires medication for mood and/or effect  Problem Solvin - Patient independent with complex tasks  Memory: 6 - Patient requires device to recall (e.g. memory book)    Inpatient Acute Hospital Course:   Darius Escalante  is a 64 y.o. female admitted to the inpatient rehabilitation unit on 2/15/2022. The patient was originally admitted to the [address] [place] [address] 2022 to the gynecology oncology service secondary to postmenopausal bleeding. She started with the bleeding in early 2021 and was followed by Dr. Becky Sharpe here at [facility].     [facility] initial consultation for Post-menopausal Bleeding and failed Endometrial Biopsy. PMB started in 2021. Had pelvic US that showed EMS 9mm. EMB was attempted twice in the office, no endometrial cells were identified.  She was therefore referred to the [facility] at [facility] 1/24/22 for additional management. She had no pelvic pain. Only intermittent spotting, no heavy bleeding. No issues with urination/BM. Had history of Lichen sclerosis, was prescribed estrogen cream and steroid cream. Was recommended to stop the estrogen cream since starting with PMB.        D&C was considered with Ms. Kavon Bell, but not completed given history of bulbar polio and concern for difficulty with intubation. She instead underwent JONI-BSO 1/24/22. She became hypercapnic at the end of the surgery requiring intubation in the PACU and transferred to the SICU. She had minimal respiratory drive and subsequent worsening of respiratory acidosis requiring reintubation. She was intubated x3, the last time 12/28/2021. Her tracheostomy was placed to 122 per SICU. She passed her speaking valve trial on 2/5/2022 and speech pathology continues to follow. On 2/11/2022 she was noted to have increased secretions with an increase in the white blood cell count to 11.9, chest x-ray without acute findings consistent with infection (shallow lung volumes, small to moderate sized layering bilateral pleural effusion). By 2/14/22 her white blood cell count was 8. 3.      Nutritional needs were initially met per NG tube feeds. On 2/7/2022 she had a fees with concern for aspiration with p.o. intake; she was able to take comfort sips of thin liquids with safe swallow strategies in place. She is status post PEG placement 2/8/2022. She transitioned to bolus tube feeds, and is has been tolerating without difficulty. On 2/13/2022 she noted increased gas pain with feeding, improved after tube feeds. Nutrition modified her feedings secondary to lactose intolerance. She also had a modified barium swallow study on 2/13/2022, and was okayed for thin liquid diet as tolerated.   Possible Zenker's diverticulum also noted with inpatient GI consult deferred at that time.     Patient also had acute blood loss anemia with program involving 3 hours per day, 5 days per week of rehabilitation. Patient progressed well during her stay on IPR. Patient continued with tracheostomy during her IPR stay. ENT consulted due to tracheostomy status and vocal cord paralysis. Completed diagnostic suspension microlaryngoscopy bronchoscopy with jet ventilation on 2/24/22 and patient found to be a candidate for augmentation lateralization procedure of the right cricoarytenoid joint and the right true vocal fold. This is to be discussed further as OP. Appropriate DVT prophylaxis options were continued throughout rehabilitation stay with early ambulation, patient walking good distances shortly after admission. Patient with ongoing O2 needs during stay, 28% for the majority of the time. Patient with home O2 eval and did not qualify for home O2 with rest or activity. However, with overnight O2 eval, patient was noted to desat to 87% multiple times. Pulmonary evaluated and placed order for O2 at night for patient at home. Patient was asked to possibly stay to evaluate O2 further, however her and her  declined this option and will follow up with readings to the office tomorrow. Case discussed with Bertha Carver NP with pulmonary. 2/28/22: Patient with TF education, overnight pulse ox education. PEG and trach education at discharge. Patient returned home. [address] [facility], and [facility] declined taking the patient due to no time to prepare for education. Reportedly needed 2-3 days to prepare for this. Patient with home O2 eval required less than 48 hours prior to discharge. Unsure how this timeline is to work for patient's wellbeing. Patient and  were contacted by myself and recommended return to [facility] in order for patient to get the O2 she needs overnight. Bertha Carver NP contacted and updated. Recommend completing Home O2 eval overnight again, with O2. Will start at 32% and titrate as needed.   and patient returned to the unit    3/1/22: Patient with overnight O2 eval that was completed with room air, ordered to be completed with 32% O2. [employer] in agreement to provide O2 at discharge. Coordinated care for discharge with Estephania Stanton, NP and orders placed for trach supplies at home. Education provided from nursing manager and respiratory department managers on need of repeating home oxygen evaluation overnight at discharge. Dr Pilo Raphael followed during the IPR stay for medical management    Patient was discharged Home with [place] in Stable condition.     Consults:   Family Medicine, pulmonary/intensive care and ENT    Significant Diagnostics:   CBC with Differential:    Lab Results   Component Value Date    WBC 6.5 02/16/2022    RBC 2.88 02/16/2022    HGB 9.7 02/26/2022    HCT 32.6 02/26/2022     02/16/2022    .4 02/16/2022    MCH 30.6 02/16/2022    MCHC 30.1 02/16/2022    NRBC 0 02/16/2022    SEGSPCT 71.0 02/16/2022    MONOPCT 10.7 02/16/2022    MONOSABS 0.7 02/16/2022    LYMPHSABS 1.0 02/16/2022    EOSABS 0.1 02/16/2022    BASOSABS 0.0 02/16/2022     CMP:    Lab Results   Component Value Date     02/26/2022    K 4.6 02/26/2022    K 4.5 02/16/2022    CL 98 02/26/2022    CO2 31 02/26/2022    BUN 22 02/26/2022    CREATININE 0.5 02/26/2022    LABGLOM >90 02/26/2022    GLUCOSE 88 02/26/2022    PROT 6.4 04/06/2021    LABALBU 4.1 04/06/2021    CALCIUM 9.3 02/26/2022    BILITOT 0.2 04/06/2021    ALKPHOS 62 04/06/2021    AST 25 04/06/2021    ALT 18 04/06/2021     Albumin:    Lab Results   Component Value Date    LABALBU 4.1 04/06/2021     Calcium:    Lab Results   Component Value Date    CALCIUM 9.3 02/26/2022     Magnesium:    Lab Results   Component Value Date    MG 2.0 04/06/2021     Phosphorus:    Lab Results   Component Value Date    PHOS 3.3 04/06/2021     U/A:    Lab Results   Component Value Date    COLORU Yellow 02/12/2021    UROBILINOGEN 0.20 02/12/2021    BILIRUBINUR Negative 02/12/2021    BLOODU Negative 02/12/2021    GLUCOSEU Negative 02/12/2021     ABG:    Lab Results   Component Value Date    PH 7.46 02/28/2022    PH 7.38 06/06/2012    PCO2 42 02/28/2022    PCO2 51 06/06/2012    PO2 65 02/28/2022    PO2 108 06/06/2012    HCO3 30 02/28/2022    O2SAT 94 02/28/2022     TSH:    Lab Results   Component Value Date    TSH 1.390 04/06/2021       Patient Instructions:   Home with Home Health   Therapy orders: PT, OT and SLP   Discharge lab work: none  Code status: Full Code   Activity: activity as tolerated  Diet: ADULT DIET; Full Liquid  ADULT TUBE FEEDING; PEG; 2.0 Calorie;  Bolus; 6 Times Daily; 130; Gravity; 30; Before and after each bolus  ADULT ORAL NUTRITION SUPPLEMENT; Lunch; Clear Liquid Oral Supplement    Wound Care: keep wound clean and dry and as directed    Follow-up visits: See after visit summary from hospitalization       Discharge Medications:  Current Discharge Medication List           Details   aluminum & magnesium hydroxide-simethicone (MAALOX) 200-200-20 MG/5ML SUSP suspension Take 30 mLs by mouth every 6 hours as needed for Indigestion  Refills: 0      sertraline (ZOLOFT) 50 MG tablet Take 1 tablet by mouth daily  Qty: 30 tablet, Refills: 3      traZODone (DESYREL) 50 MG tablet Take 1 tablet by mouth nightly  Qty: 30 tablet, Refills: 3      zinc oxide (PINXAV) 30 % OINT Apply 113.4 g topically 4 times daily as needed (around PEG site for irritation PRN)  Refills: 0      furosemide (LASIX) 20 MG tablet Take 1 tablet by mouth daily  Qty: 60 tablet, Refills: 3      senna (SENOKOT) 8.6 MG tablet Take 1 tablet by mouth daily as needed (Constipation)      cycloSPORINE (RESTASIS) 0.05 % ophthalmic emulsion Place 2 drops into both eyes daily  Qty: 1 each, Refills: 0      pantoprazole (PROTONIX) 40 MG tablet Take 1 tablet by mouth 2 times daily (before meals)  Qty: 60 tablet, Refills: 3      magnesium oxide (MAG-OX) 400 (241.3 Mg) MG TABS tablet Take 1 tablet by mouth daily  Qty: 30 tablet, Refills: 3 melatonin 3 MG TABS tablet Take 2 tablets by mouth nightly as needed (sleep)              Details   ipratropium (ATROVENT) 0.06 % nasal spray 2 sprays by Nasal route 3 times daily as needed      meloxicam (MOBIC) 7.5 MG tablet Take 15 mg by mouth daily       calcium carbonate (OSCAL) 500 MG TABS tablet Take 500 mg by mouth daily      Multiple Vitamins-Minerals (PRESERVISION AREDS 2) CAPS Take  by mouth 2 times daily.  2 tabs BID      multivitamin (THERAGRAN) per tablet Take 1 tablet by mouth daily Alive 50+                45 minutes spent preparing the patient for discharge    Mingo Whitehead, APRN - CNP

## 2022-03-01 NOTE — PROGRESS NOTES
[facility]    Patient - Maxim Redmond   MRN -  [id number]   [facility] - [de-identified]   - 1945      Date of Admission -  2/15/2022  4:18 PM  Date of evaluation -  3/1/2022  Room - 450 Carter Willams MD Primary Care Physician - Libertad Carvajal     Problem List      Active Hospital Problems    Diagnosis Date Noted    GERD (gastroesophageal reflux disease) [K21.9] 2022    Paralysis of vocal cords [J38.00] 2022    Dysphagia [R13.10] 2022    Diaphragmatic paralysis [J98.6] 2022    Tracheostomy status ([address] [Z93.0] 2022    Hx of total hysterectomy [Z90.710] 2022    Personal history of poliomyelitis [Z86.12] 2022    Acute respiratory failure with hypercapnia [facility]) [J96.02] 02/15/2022     Reason for Consult    For management of tracheostomy  HPI   History Obtained From: Patient and electronic medical record. Maxim Redmond is a 64 y.o. female  was initially admitted under Caryn Gagnon MD service. Pulmonary medicine was consulted for further management of Tracheostomy and chronic respiratory failure. Tracheostomy placed:  Surgeon: Performed at [facility]. I went through care everywhere. There is no procedure note regarding tracheostomy. Type: [place]. Size:7.5  Fio2 supplementation at home prior to current hospitalization: 28%    At the time of my initial evaluation, she remain on tracheostomy with trach mask. Limited history obtained from the patient. Majority of the history obtained from the patient chart, care everywhere documents and discussion with the patient's  at bedside. According to patient's  and patient.   Patient was referred to [facility] by her gynecologist for total abdominal hysterectomy and oophorectomy as a part of treatment for her postmenopausal bleeding due to her high risk for denise and postoperative pulmonary complications. She had hx of baseline hypercapnia and neuromuscular disorder. After her planned hysterectomy at [facility], patient had a complicated postoperative period with the development of hypercapnic respiratory failure. Patient required 3 intubations with the last intubation performed on 28 December 2021 as per the note by Ms. Francoise Dimas MD.  Patient underwent tracheostomy in the SICU at [facility] on ? 1/22/22 per the note by Ms. Francoise Dimas MD     She used follow with Dr. Rosa Rossi MD at and her pulmonary disease in the past.  She was seen by Dr. Rosa Rossi MD for the last time on 10 March 2014 for post polio muscle weakness. According to his note patient had a below normal MEP and NIF testing. She was also diagnosed with vocal cord paralysis and follows with the ENT specialist at [facility].     Subjective/Events Past 24 hours/ROS   -Home today   -Overnight pulse oximetry did show significant hypoxia > 218 min <88%  -Refusing any BiPAP therapy or evaluation     -Rest of the review of systems reviewed    PMHx   Past Medical History      Diagnosis Date    GERD (gastroesophageal reflux disease)     Osteoarthritis     Paralysis of vocal cords     Polio       Past Surgical History        Procedure Laterality Date    CHOLECYSTECTOMY      KNEE SURGERY      Right    LARYNGOSCOPY N/A 2/24/2022    DIAGNOSTIC SUSPENSION MICROLARYNGOSCOPY BRONCHOSCOPY WITH JET VENTILATION performed by Chester Sheth MD at [address]    Current Medications    furosemide  20 mg Oral Daily    cycloSPORINE  2 drop Both Eyes Daily    pantoprazole  40 mg Oral BID AC    sertraline  50 mg Oral Daily    melatonin  6 mg Oral Nightly    magnesium oxide  400 mg Oral Daily    traZODone  50 mg Oral Nightly    multivitamin  1 tablet Oral Daily    meloxicam  15 mg Oral Daily    polyvinyl alcohol  1 drop Both Eyes BID     labetalol, magnesium hydroxide, aluminum & magnesium hydroxide-simethicone, calcium carbonate, oxyCODONE, polyethylene glycol, zinc oxide, acetaminophen, ipratropium, senna, ipratropium-albuterol  IV Drips/Infusions    Home Medications  Medications Prior to Admission: ipratropium (ATROVENT) 0.06 % nasal spray, 2 sprays by Nasal route 3 times daily as needed  meloxicam (MOBIC) 7.5 MG tablet, Take 15 mg by mouth daily   calcium carbonate (OSCAL) 500 MG TABS tablet, Take 500 mg by mouth daily  Multiple Vitamins-Minerals (PRESERVISION AREDS 2) CAPS, Take  by mouth 2 times daily. 2 tabs BID  multivitamin (THERAGRAN) per tablet, Take 1 tablet by mouth daily Alive 50+  [DISCONTINUED] Cholecalciferol (VITAMIN D3) 1.25 MG (35768 UT) CAPS, Take by mouth  [DISCONTINUED] estradiol (ESTRACE VAGINAL) 0.1 MG/GM vaginal cream, Place 2 g vaginally daily  Diet    ADULT DIET; Full Liquid  ADULT TUBE FEEDING; PEG; 2.0 Calorie; Bolus; 6 Times Daily; 130; Gravity; 30; Before and after each bolus  ADULT ORAL NUTRITION SUPPLEMENT; Lunch; Clear Liquid Oral Supplement  Allergies    Latex  Family History    History reviewed. No pertinent family history. Sleep History    Never diagnosed with sleep apnea in the past.    Social History     Social History     Tobacco Use    Smoking status: Never Smoker    Smokeless tobacco: Never Used   Substance Use Topics    Alcohol use: Yes     Comment: Seldom    Drug use: No       Vitals     height is 5' (1.524 m) and weight is 128 lb 9.6 oz (58.3 kg). Her oral temperature is 98.5 °F (36.9 °C). Her blood pressure is 135/59 (abnormal) and her pulse is 70. Her respiration is 18 and oxygen saturation is 94%. Body mass index is 25.12 kg/m². SUPPLEMENTAL O2: O2 Flow Rate (L/min): 6 L/min       I/O        Intake/Output Summary (Last 24 hours) at 3/1/2022 0756  Last data filed at 2/28/2022 2115  Gross per 24 hour   Intake 880 ml   Output    Net 880 ml     I/O last 3 completed shifts:   In: 1520 [P.O.:120; NG/GT:1400]  Out: -    Patient Vitals for the past 96 hrs (Last 3 readings):   Weight   02/28/22 0458 128 lb 9.6 oz (58.3 kg)       Exam   Constitutional: Patient appears in no distress on trach collar  Mouth/Throat: Oropharynx is clear and moist.  No oral thrush. Neck: Neck supple. Cardiovascular: S1 and S2 heard. No murmurs. Pulmonary/Chest: Bilateral air entry present. Good breath sounds on both sides, diminished at bases Right > Left. No wheezes. No rales. Abdominal: Soft. No tenderness. Extremities: Patient exhibits no edema. Neurological: Patient is awake and alert. Labs  - Old records and notes have been reviewed in [facility]   ABG  Lab Results   Component Value Date    PH 7.46 02/28/2022    PH 7.38 06/06/2012    PO2 65 02/28/2022    PO2 108 06/06/2012    PCO2 42 02/28/2022    PCO2 51 06/06/2012    HCO3 30 02/28/2022    O2SAT 94 02/28/2022     Lab Results   Component Value Date    IFIO2 28 02/21/2022    MODE CPAP+PS 06/05/2012    SETPEEP 4 06/05/2012     CBC  No results for input(s): WBC, RBC, HGB, HCT, MCV, MCH, MCHC, RDW, PLT, MPV in the last 72 hours. BMP  No results for input(s): NA, K, CL, CO2, BUN, CREATININE, GLUCOSE, MG, PHOS, CALCIUM, IONCA, MG in the last 72 hours. LFT  No results for input(s): AST, ALT, ALB, BILITOT, ALKPHOS, LIPASE in the last 72 hours. Invalid input(s): AMYLASE  TROP  No results found for: TROPONINT  BNP  No results for input(s): BNP in the last 72 hours. Lactic Acid  No results for input(s): LACTA in the last 72 hours. INR  No results for input(s): INR, PROTIME in the last 72 hours. PTT  No results for input(s): APTT in the last 72 hours. Glucose  No results for input(s): POCGLU in the last 72 hours. UA No results for input(s): SPECGRAV, PHUR, COLORU, CLARITYU, MUCUS, PROTEINU, BLOODU, RBCUA, WBCUA, BACTERIA, NITRU, GLUCOSEU, BILIRUBINUR, UROBILINOGEN, KETUA, LABCAST, LABCASTTY, AMORPHOS in the last 72 hours. Invalid input(s): CRYSTALS.     PFTs performed on 3 January 2013               Sleep studies   None in epic    Cultures    None in epic    EKG   None in epic  Echocardiogram   None in epic    Radiology    CXR  Chest x-ray mobile view performed on 5 June 2012:  Accession Number:  Procedure Name:             Exam Date/Time:     [id number]      Chest Mobile Single         6/5/2012 6:42:00 PM       CPT4 code     [id number]        IMPRESSION:     RIGHT HEMIDIAPHRAGM ELEVATION AND ADJACENT ATELECTASIS. PROGRESS IMAGING     AS CLINICALLY DETERMINED. Chest x-ray PA and lateral views performed on 21 February 2022: Mon Feb 21, 2022  8   2 view chest x-ray   Comparison: None   1. Small bilateral pleural effusions. 2. Elevation of the right hemidiaphragm with mild right basilar atelectasis. Sniff test: Performed on 22 February 2022:  No evidence of diaphragmatic paralysis on right side     Assessment   -Chronic hypercapnic respiratory failure in the postoperative. S/p Tracheostomy. She is currently tolerating Passy-Ashlie valve trials well. She is on 28% trach collar. 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  -History of chronic right hemidiaphragmatic elevation with adjacent atelectasis. ? Paralysis per patient.  -Right side complete paralysis and left partial CP dysfunction. She is to follow-up with Dr. Claudine Brennan MD-ENT specialist at [facility].  -History of dysphagia. Patient underwent PEG tube placement  -Osteoarthritis  -Gastroesophageal reflux disease.  -Full Code     Plan   -ENT service by Dr. Wesley Gallardo MD is managing the tracheostomy.  -Continue Passy-Ashlie valve trials as tolerated during daytime  -Continue routine trach care by respiratory therapy.  -Avoid all sedative medications if she is drowsy. -Aspiration precautions.  -Continue PEG tube feeding  -Titrate Oxygen/Fio2 to keep saturations >92%.    -Deep Venous Thrombosis Prophylaxis: Per primary service.  -Follow with my ( Dr. Naga Ramirez) clinic at [facility] in 2 to 3months for a clinical evaluation regarding her chronic respiratory failure.  -Patient refusing any home BiPAP evaluation at this time  -Agreeable to overnight oxygen to tracheostomy  -Will send patient home on 3LPM/32% at night with nocturnal study to be done tonight and dropped back off tomorrow- will review and titrate if necessary-order entered and faxed to [facility]   -DME note placed for home oxygen at 32% with nocturnal use only   -Outpatient order placed for nocturnal study to be done tonight   -Patient has follow up 3/8/22 with Dr. Rolf Palm and her  were educated about my impression and plan. They verbalizes understanding. Questions and concerns addressed.  -Will sign off on the case from pulmonary point of view. -Will follow PRN. -Call [phone] with questions. Electronically signed by   IMELDA Jacobo CNP on 3/1/2022 at 7:56 AM     Addendum by Dr. Almas Hanson MD:  I have seen and examined the patient independently. Face to face evaluation and examination was performed. The above evaluation and note has been reviewed. Labs and radiographs were reviewed. I Have discussed with Ms. EVONNE Jacobo CNP about this patient in detail. Physical exam was performed by me. See below for details. More than half of the total time for this encounter spent by me. The above assessment and plan has been reviewed. Please see my modifications mentioned below. My modifications:    Physical Exam:  Constitutional: Patient appears in no distress on room air at rest with Marie Mark valve in place. Mouth/Throat: Oropharynx is clear and moist.    Neck: Neck supple. Tracheostomy in place  Cardiovascular: S1 and S2 heard. Pulmonary/Chest: Bilateral air entry present. Good breath sounds on both sides, diminished at bases Right side > Left side. No wheezes.  No rales.   Abdominal: Soft. No tenderness. Extremities: Patient exhibits no edema. Neurological: Patient is awake and alert. Interview of patient's chronic neuromuscular disease with history of polio, chronic right hemidiaphragmatic elevation and right side vocal cord paralysis and chronic history of respiratory failure, patient and the patient  advised to go on treatment with home BiPAP to use with her tracheostomy. However, at the end of the discussion both patient and patient  refused to go on BiPAP therapy. They agreed to go on oxygen therapy with trach collar at nighttime due to her nocturnal hypoxia. Patient and patient  verbalized understanding of consequences of their decision including worsening of hypercapnic respiratory failure without BiPAP therapy leading to death.       IMELDA Emerson - CNP 3/1/2022 7:56 AM

## 2022-03-01 NOTE — PROGRESS NOTES
Physical Medicine & Rehabilitation   Progress Note      IMELDA Toure - CNP     Chief Complaint:    Respiratory Failure with subsequent Debility; s/p JONI with BSO    Subjective: Planned discharge from the IPR Unit to home on 2/28/22 with [facility] PT, OT, ST, RN, and HHA. Patient returned home. [facility], [facility], and [facility] declined taking the patient due to no time to prepare for education.  and patient returned to the unit for further oxygen needs. Patient seen today,  present. Discussed therapy today and working on O2 needs for home.  understanding that mistakes are made, feels the process could be better. Appreciated husbands insight with this as this is the first time this has happened on our unit. Will have Britniye Lillien reach out to  as well. Patient denies any needs at this time. Understanding with plan for today.        Rehabilitation:  PT: reviewed    OT: reviewed    ST: reviewed        Review of Systems:   CONSTITUTIONAL:  positive for  fatigue  EYES:  Wears glasses  HEENT:  + Pharyngeal Dysphagia  RESPIRATORY:  positive for dyspnea, wheezing and on intermittent supplemental O2 per trach mask  CARDIOVASCULAR:  negative  GASTROINTESTINAL:  Dysphagia with PEG; nutrition per tube feedings and comfort sips of thin liquids  GENITOURINARY:  negative  SKIN:  Healing incision  HEMATOLOGIC/LYMPHATIC:  Recent anemia; s/p 1 unit PRBC's  MUSCULOSKELETAL:  positive for  myalgias, arthralgias, pain, stiff joints, decreased range of motion and muscle weakness  NEUROLOGICAL:  positive for speech problems, gait problems, dysphagia and weakness  BEHAVIOR/PSYCH:  positive for decreased energy level, fatigue and anxiety  10 point system review otherwise negative      Objective:  Vitals:    03/01/22 0051   BP:    Pulse:    Resp: 18   Temp:    SpO2: 94%   awake  Orientation:   person, place, time  Mood: within normal limits  Affect: anxious and calm  General appearance: well groomed and in no acute vocal cords  · Bulbar polio  · Dysphagia, moderate to severe oropharyngeal  · Inhalation it and ingestion of other object causing obstruction of respiratory tract or suffocation  · Osteoporosis  · Hoarse voice  · Hypernasal speech  · Diaphragmatic paralysis  · Left adhesive capsulitis      Plan:  · Continue therapies. PT, OT, SLP  · Prophylaxis:  DVT: EPC cuffs; no oral anticoagulation secondary to bleeding risk. · GI:  Protonix 40 mg po bid   · Pain: OxyIR 5 mg po q 4 hours prn; Tylenol 650 mg per PEG q 6 hours prn;   · Nutrition:   Dietary to evaluate tube feedings to possibly decrease bolus volumes and increase bolus frequency to maintain adequate caloric intake  · Bowel: Glycolax 17 g po/PEG daily prn  · Anxiety: Zoloft 50 mg po/PEG  q day for anxiety   · Rehab Team Conferences Thursdays  · Discharge planning continues - SW working with [employer].           Missed therapy time:  · None    Catherine Sneed, APRN - CNP

## 2022-03-01 NOTE — PROGRESS NOTES
[facility]    Patient - Maxim Redmond   MRN -  [id number]   [facility]   - 1945      Date of Admission -  2/15/2022  4:18 PM  Date of evaluation -  3/1/2022  Room - [facility] 80 Day - 14  Consulting - No att. providers found Primary Care Physician - Libertad Carvajal     Problem List      Active Hospital Problems    Diagnosis Date Noted    GERD (gastroesophageal reflux disease) [K21.9] 2022    Paralysis of vocal cords [J38.00] 2022    Dysphagia [R13.10] 2022    Diaphragmatic paralysis [J98.6] 2022    Tracheostomy status ([facility] [address] [Z93.0] 2022    Hx of total hysterectomy [Z90.710] 2022    Personal history of poliomyelitis [Z86.12] 2022    Acute respiratory failure with hypercapnia [facility]) [J96.02] 02/15/2022     Reason for Consult    For management of tracheostomy  HPI   History Obtained From: Patient and electronic medical record. Maxim Redmond is a 64 y.o. female  was initially admitted under Caryn Gagnon MD service. Pulmonary medicine was consulted for further management of Tracheostomy and chronic respiratory failure. Tracheostomy placed:  Surgeon: Performed at [facility]. I went through care everywhere. There is no procedure note regarding tracheostomy. Type: [place]. Size:7.5  Fio2 supplementation at home prior to current hospitalization: 28%    At the time of my initial evaluation, she remain on tracheostomy with trach mask. Limited history obtained from the patient. Majority of the history obtained from the patient chart, care everywhere documents and discussion with the patient's  at bedside. According to patient's  and patient.   Patient was referred to [facility] by her gynecologist for total abdominal hysterectomy and oophorectomy as a part of treatment for her postmenopausal bleeding due to her high risk for denise and postoperative pulmonary complications. She had hx of baseline hypercapnia and neuromuscular disorder. After her planned hysterectomy at [facility], patient had a complicated postoperative period with the development of hypercapnic respiratory failure. Patient required 3 intubations with the last intubation performed on 28 December 2021 as per the note by Ms. Chet Dimas MD.  Patient underwent tracheostomy in the SICU at [facility] on ? 1/22/22 per the note by Ms. Chet Dimas MD     She used follow with Dr. Celia Live MD at and her pulmonary disease in the past.  She was seen by Dr. Celia Live MD for the last time on 10 March 2014 for post polio muscle weakness. According to his note patient had a below normal MEP and NIF testing. She was also diagnosed with vocal cord paralysis and follows with the ENT specialist at [facility]. Subjective/Events Past 24 hours/ROS   -Home today   -She was asked to come back to hospital- Her DME company I[employer] not able to set up on home O2 last night.  -Overnight pulse oximetry did show significant hypoxia > 218 min <88%  -Refused BiPAP therapy or evaluation   -Rest of the review of systems reviewed    PMHx   Past Medical History      Diagnosis Date    GERD (gastroesophageal reflux disease)     Osteoarthritis     Paralysis of vocal cords     Polio       Past Surgical History        Procedure Laterality Date    CHOLECYSTECTOMY      KNEE SURGERY      Right    LARYNGOSCOPY N/A 2/24/2022    DIAGNOSTIC SUSPENSION MICROLARYNGOSCOPY BRONCHOSCOPY WITH JET VENTILATION performed by Kassi Miller MD at [address] [facility]    Current Medications       IV Drips/Infusions    Home Medications  No medications prior to admission. Diet    No diet orders on file  Allergies    Latex  Family History    History reviewed. No pertinent family history.   Sleep History    Never diagnosed with sleep apnea in the past.    Social History     Social History     Tobacco Use    Smoking status: Never Smoker    Smokeless tobacco: Never Used   Substance Use Topics    Alcohol use: Yes     Comment: Seldom    Drug use: No       Vitals     height is 5' (1.524 m) and weight is 128 lb 9.6 oz (58.3 kg). Her oral temperature is 98.6 °F (37 °C). Her blood pressure is 127/60 and her pulse is 65. Her respiration is 16 and oxygen saturation is 97%. Body mass index is 25.12 kg/m². SUPPLEMENTAL O2: O2 Flow Rate (L/min): 6 L/min       I/O        Intake/Output Summary (Last 24 hours) at 3/1/2022 1836  Last data filed at 3/1/2022 1230  Gross per 24 hour   Intake 1710 ml   Output --   Net 1710 ml     I/O last 3 completed shifts: In: 1520 [P.O.:120; NG/GT:1400]  Out: -    Patient Vitals for the past 96 hrs (Last 3 readings):   Weight   02/28/22 0458 128 lb 9.6 oz (58.3 kg)       Exam   Constitutional: Patient appears in no distress on trach with PM valve. Mouth/Throat: Oropharynx is clear and moist.  No oral thrush. Neck: Neck supple. Tracheostomy in place. Cardiovascular: S1 and S2 heard. No murmurs. Pulmonary/Chest: Bilateral air entry present. Good breath sounds on left side. diminished at bases Right > Left. No wheezes. No rales. Abdominal: Soft. No tenderness. Extremities: Patient exhibits no edema. Neurological: Patient is awake and alert. Labs  - Old records and notes have been reviewed in [facility]   ABG  Lab Results   Component Value Date    PH 7.46 02/28/2022    PH 7.38 06/06/2012    PO2 65 02/28/2022    PO2 108 06/06/2012    PCO2 42 02/28/2022    PCO2 51 06/06/2012    HCO3 30 02/28/2022    O2SAT 94 02/28/2022     Lab Results   Component Value Date    IFIO2 28 02/21/2022    MODE CPAP+PS 06/05/2012    SETPEEP 4 06/05/2012     CBC  No results for input(s): WBC, RBC, HGB, HCT, MCV, MCH, MCHC, RDW, PLT, MPV in the last 72 hours.    BMP  No results for input(s): NA, K, CL, CO2, BUN, CREATININE, GLUCOSE, MG, PHOS, CALCIUM, IONCA, MG in the last 72 hours.  LFT  No results for input(s): AST, ALT, ALB, BILITOT, ALKPHOS, LIPASE in the last 72 hours. Invalid input(s): AMYLASE  TROP  No results found for: TROPONINT  BNP  No results for input(s): BNP in the last 72 hours. Lactic Acid  No results for input(s): LACTA in the last 72 hours. INR  No results for input(s): INR, PROTIME in the last 72 hours. PTT  No results for input(s): APTT in the last 72 hours. Glucose  No results for input(s): POCGLU in the last 72 hours. UA No results for input(s): SPECGRAV, PHUR, COLORU, CLARITYU, MUCUS, PROTEINU, BLOODU, RBCUA, WBCUA, BACTERIA, NITRU, GLUCOSEU, BILIRUBINUR, UROBILINOGEN, KETUA, LABCAST, LABCASTTY, AMORPHOS in the last 72 hours. Invalid input(s): CRYSTALS. PFTs performed on 3 January 2013               Sleep studies   None in epic    Cultures    None in epic    EKG   None in epic  Echocardiogram   None in epic    Radiology    CXR  Chest x-ray mobile view performed on 5 June 2012:  Accession Number:  Procedure Name:             Exam Date/Time:     [id number]      Chest Mobile Single         6/5/2012 6:42:00 PM       CPT4 code     56176        IMPRESSION:     RIGHT HEMIDIAPHRAGM ELEVATION AND ADJACENT ATELECTASIS. PROGRESS IMAGING     AS CLINICALLY DETERMINED. Chest x-ray PA and lateral views performed on 21 February 2022: Mon Feb 21, 2022  8   2 view chest x-ray   Comparison: None   1. Small bilateral pleural effusions. 2. Elevation of the right hemidiaphragm with mild right basilar atelectasis. Sniff test: Performed on 22 February 2022:  No evidence of diaphragmatic paralysis on right side     Assessment   -Chronic hypercapnic respiratory failure in the postoperative. S/p Tracheostomy. She is currently tolerating Passy-B[place]ze[place] valve trials well. She is on 28% trach collar.   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  -History of chronic right hemidiaphragmatic elevation with adjacent atelectasis. ? Paralysis per patient.  -Right side complete paralysis and left partial CP dysfunction. She is to follow-up with Dr. Elvira Carson MD-ENT specialist at [facility].  -History of dysphagia. Patient underwent PEG tube placement  -Osteoarthritis  -Gastroesophageal reflux disease.  -Full Code     Plan   -ENT service by Dr. Araceli Downs MD is managing the tracheostomy.  -Continue Passy-Ashlie valve trials as tolerated during daytime  -Continue routine trach care by respiratory therapy.  -Avoid all sedative medications if she is drowsy. -Aspiration precautions.  -Continue PEG tube feeding  -Titrate Oxygen/Fio2 to keep saturations >92%. -Deep Venous Thrombosis Prophylaxis: Per primary service.  -Follow with my ( Dr. Luong Nurse) clinic at [facility] [place] for pulmonary medicine in 2 to 3months for a clinical evaluation regarding her chronic respiratory failure.  -Patient refused home BiPAP/Non invasive ventilator evaluation at this time  -Agreeable to overnight oxygen to tracheostomy   -Will send patient home on 3LPM/32% at night with nocturnal study to be done tonight and dropped back off tomorrow- will review and titrate if necessary-order entered and faxed to [facility]   -DME note placed for home oxygen at 32% with nocturnal use only   -Outpatient order placed for nocturnal study to be done tonight   -Patient has follow up 3/8/22 with Dr. Megan Frye and her  were educated about my impression and plan. They verbalizes understanding. Questions and concerns addressed.     Electronically signed by   Beka Osorio MD on 3/1/2022 at 6:36 PM

## 2022-03-02 ENCOUNTER — TELEPHONE (OUTPATIENT)
Dept: PULMONOLOGY | Age: 77
End: 2022-03-02

## 2022-03-02 ENCOUNTER — TELEPHONE (OUTPATIENT)
Dept: ENT CLINIC | Age: 77
End: 2022-03-02

## 2022-03-02 DIAGNOSIS — G47.34 NOCTURNAL HYPOXIA: ICD-10-CM

## 2022-03-02 DIAGNOSIS — J96.11 CHRONIC RESPIRATORY FAILURE WITH HYPOXIA (HCC): ICD-10-CM

## 2022-03-02 DIAGNOSIS — Z86.12 PERSONAL HISTORY OF POLIOMYELITIS: ICD-10-CM

## 2022-03-02 NOTE — TELEPHONE ENCOUNTER
I received a call regarding this patient and her tracheostomy. Patient was discharged yesterday from inpatient rehab. Social work working on supplies for her at home. Unfortunately, the medical supply company does not carry a size 7.5 cuffed tracheostomy like the patient has. I was contacted regarding recommendations. I contacted social work to discuss. I recommended having the DME company supply a size 7 and a size 6 cuffed tracheostomy and Dr. Conner Miramontes might be able to change that during her office visit on 3/8/2022.  will also ask DME company if a size 7 inner cannula would be compatible with a size 7-1/2 tracheostomy. Otherwise, patient is likely going to have to repeatedly and vigorously clean the inner cannula between now and her appointment. Prior to discharge, patient was found to have some desaturations overnight per discussion with Sejal Davila CNP. Several attempts were made to perform overnight pulse ox while patient was still inpatient, but was not performed with correct oxygen settings. Patient will likely need BiPAP, but patient does not want to use that at this time reportedly. Patient had an overnight pulse ox last night while at home and appropriate oxygen saturations were maintained with 3 L/min at 32% via trach mask per pulmonary.

## 2022-03-02 NOTE — TELEPHONE ENCOUNTER
Discussed results of overnight pulse oximetry study with patients  Janeen Evans. Patients wife Fuad Valerio is to sleep with 3LPM/32% via trach mask at night only. Patients  verbalized understanding with no other questions at this time.

## 2022-03-03 ENCOUNTER — APPOINTMENT (OUTPATIENT)
Dept: GENERAL RADIOLOGY | Age: 77
DRG: 871 | End: 2022-03-03
Payer: MEDICARE

## 2022-03-03 ENCOUNTER — HOSPITAL ENCOUNTER (INPATIENT)
Age: 77
LOS: 2 days | Discharge: HOME HEALTH | DRG: 871 | End: 2022-03-05
Attending: EMERGENCY MEDICINE | Admitting: HOSPITALIST
Payer: MEDICARE

## 2022-03-03 DIAGNOSIS — T17.500A MUCUS PLUGGING OF BRONCHI: Primary | ICD-10-CM

## 2022-03-03 DIAGNOSIS — R09.02 HYPOXIA: ICD-10-CM

## 2022-03-03 PROBLEM — J96.01 ACUTE RESPIRATORY FAILURE WITH HYPOXIA (HCC): Status: ACTIVE | Noted: 2022-03-03

## 2022-03-03 LAB
ANION GAP SERPL CALCULATED.3IONS-SCNC: 10 MEQ/L (ref 8–16)
BASE EXCESS MIXED: 7.6 MMOL/L (ref -2–3)
BASOPHILS # BLD: 0.4 %
BASOPHILS ABSOLUTE: 0 THOU/MM3 (ref 0–0.1)
BUN BLDV-MCNC: 24 MG/DL (ref 7–22)
CALCIUM SERPL-MCNC: 10.1 MG/DL (ref 8.5–10.5)
CHLORIDE BLD-SCNC: 96 MEQ/L (ref 98–111)
CO2: 34 MEQ/L (ref 23–33)
COLLECTED BY:: ABNORMAL
CREAT SERPL-MCNC: 0.5 MG/DL (ref 0.4–1.2)
EOSINOPHIL # BLD: 1.6 %
EOSINOPHILS ABSOLUTE: 0.1 THOU/MM3 (ref 0–0.4)
ERYTHROCYTE [DISTWIDTH] IN BLOOD BY AUTOMATED COUNT: 13.6 % (ref 11.5–14.5)
ERYTHROCYTE [DISTWIDTH] IN BLOOD BY AUTOMATED COUNT: 50.1 FL (ref 35–45)
FLU A ANTIGEN: POSITIVE
FLU B ANTIGEN: NEGATIVE
GFR SERPL CREATININE-BSD FRML MDRD: > 90 ML/MIN/1.73M2
GLUCOSE BLD-MCNC: 91 MG/DL (ref 70–108)
HCO3, MIXED: 34 MMOL/L (ref 23–28)
HCT VFR BLD CALC: 35 % (ref 37–47)
HEMOGLOBIN: 10.7 GM/DL (ref 12–16)
IMMATURE GRANS (ABS): 0.03 THOU/MM3 (ref 0–0.07)
IMMATURE GRANULOCYTES: 0.4 %
LACTIC ACID, SEPSIS: 2.4 MMOL/L (ref 0.5–1.9)
LACTIC ACID, SEPSIS: 2.5 MMOL/L (ref 0.5–1.9)
LYMPHOCYTES # BLD: 15.5 %
LYMPHOCYTES ABSOLUTE: 1.3 THOU/MM3 (ref 1–4.8)
MCH RBC QN AUTO: 30.8 PG (ref 26–33)
MCHC RBC AUTO-ENTMCNC: 30.6 GM/DL (ref 32.2–35.5)
MCV RBC AUTO: 100.9 FL (ref 81–99)
MONOCYTES # BLD: 9.7 %
MONOCYTES ABSOLUTE: 0.8 THOU/MM3 (ref 0.4–1.3)
NUCLEATED RED BLOOD CELLS: 0 /100 WBC
O2 SAT, MIXED: 50 %
OSMOLALITY CALCULATION: 283 MOSMOL/KG (ref 275–300)
PCO2, MIXED VENOUS: 53 MMHG (ref 41–51)
PH, MIXED: 7.41 (ref 7.31–7.41)
PLATELET # BLD: 230 THOU/MM3 (ref 130–400)
PMV BLD AUTO: 9.7 FL (ref 9.4–12.4)
PO2 MIXED: 27 MMHG (ref 25–40)
POTASSIUM REFLEX MAGNESIUM: 4.3 MEQ/L (ref 3.5–5.2)
PRO-BNP: 273.1 PG/ML (ref 0–1800)
PROCALCITONIN: 0.05 NG/ML (ref 0.01–0.09)
RBC # BLD: 3.47 MILL/MM3 (ref 4.2–5.4)
RSV COMMENT: NORMAL
SARS-COV-2, NAAT: NOT  DETECTED
SEG NEUTROPHILS: 72.4 %
SEGMENTED NEUTROPHILS ABSOLUTE COUNT: 6 THOU/MM3 (ref 1.8–7.7)
SODIUM BLD-SCNC: 140 MEQ/L (ref 135–145)
TROPONIN T: < 0.01 NG/ML
WBC # BLD: 8.3 THOU/MM3 (ref 4.8–10.8)

## 2022-03-03 PROCEDURE — 99223 1ST HOSP IP/OBS HIGH 75: CPT | Performed by: HOSPITALIST

## 2022-03-03 PROCEDURE — 94640 AIRWAY INHALATION TREATMENT: CPT

## 2022-03-03 PROCEDURE — 85025 COMPLETE CBC W/AUTO DIFF WBC: CPT

## 2022-03-03 PROCEDURE — 71045 X-RAY EXAM CHEST 1 VIEW: CPT

## 2022-03-03 PROCEDURE — 80048 BASIC METABOLIC PNL TOTAL CA: CPT

## 2022-03-03 PROCEDURE — 82803 BLOOD GASES ANY COMBINATION: CPT

## 2022-03-03 PROCEDURE — 87804 INFLUENZA ASSAY W/OPTIC: CPT

## 2022-03-03 PROCEDURE — 36415 COLL VENOUS BLD VENIPUNCTURE: CPT

## 2022-03-03 PROCEDURE — 2060000000 HC ICU INTERMEDIATE R&B

## 2022-03-03 PROCEDURE — 93005 ELECTROCARDIOGRAM TRACING: CPT | Performed by: STUDENT IN AN ORGANIZED HEALTH CARE EDUCATION/TRAINING PROGRAM

## 2022-03-03 PROCEDURE — 2580000003 HC RX 258: Performed by: STUDENT IN AN ORGANIZED HEALTH CARE EDUCATION/TRAINING PROGRAM

## 2022-03-03 PROCEDURE — 6360000002 HC RX W HCPCS: Performed by: STUDENT IN AN ORGANIZED HEALTH CARE EDUCATION/TRAINING PROGRAM

## 2022-03-03 PROCEDURE — 94761 N-INVAS EAR/PLS OXIMETRY MLT: CPT

## 2022-03-03 PROCEDURE — 84484 ASSAY OF TROPONIN QUANT: CPT

## 2022-03-03 PROCEDURE — 87040 BLOOD CULTURE FOR BACTERIA: CPT

## 2022-03-03 PROCEDURE — 83605 ASSAY OF LACTIC ACID: CPT

## 2022-03-03 PROCEDURE — 87635 SARS-COV-2 COVID-19 AMP PRB: CPT

## 2022-03-03 PROCEDURE — 83880 ASSAY OF NATRIURETIC PEPTIDE: CPT

## 2022-03-03 PROCEDURE — 6370000000 HC RX 637 (ALT 250 FOR IP): Performed by: STUDENT IN AN ORGANIZED HEALTH CARE EDUCATION/TRAINING PROGRAM

## 2022-03-03 PROCEDURE — 84145 PROCALCITONIN (PCT): CPT

## 2022-03-03 PROCEDURE — 99285 EMERGENCY DEPT VISIT HI MDM: CPT

## 2022-03-03 RX ORDER — POLYVINYL ALCOHOL 14 MG/ML
2 SOLUTION/ DROPS OPHTHALMIC DAILY
Status: DISCONTINUED | OUTPATIENT
Start: 2022-03-04 | End: 2022-03-05 | Stop reason: HOSPADM

## 2022-03-03 RX ORDER — MAGNESIUM SULFATE IN WATER 40 MG/ML
2000 INJECTION, SOLUTION INTRAVENOUS PRN
Status: DISCONTINUED | OUTPATIENT
Start: 2022-03-03 | End: 2022-03-05 | Stop reason: HOSPADM

## 2022-03-03 RX ORDER — ACETYLCYSTEINE 200 MG/ML
600 SOLUTION ORAL; RESPIRATORY (INHALATION) ONCE
Status: COMPLETED | OUTPATIENT
Start: 2022-03-03 | End: 2022-03-03

## 2022-03-03 RX ORDER — FUROSEMIDE 20 MG/1
20 TABLET ORAL DAILY
Status: DISCONTINUED | OUTPATIENT
Start: 2022-03-04 | End: 2022-03-05 | Stop reason: HOSPADM

## 2022-03-03 RX ORDER — POLYETHYLENE GLYCOL 3350 17 G/17G
17 POWDER, FOR SOLUTION ORAL DAILY PRN
Status: DISCONTINUED | OUTPATIENT
Start: 2022-03-03 | End: 2022-03-05 | Stop reason: HOSPADM

## 2022-03-03 RX ORDER — MELOXICAM 7.5 MG/1
15 TABLET ORAL DAILY
Status: DISCONTINUED | OUTPATIENT
Start: 2022-03-04 | End: 2022-03-05 | Stop reason: HOSPADM

## 2022-03-03 RX ORDER — ACETAMINOPHEN 650 MG/1
650 SUPPOSITORY RECTAL EVERY 6 HOURS PRN
Status: DISCONTINUED | OUTPATIENT
Start: 2022-03-03 | End: 2022-03-05 | Stop reason: HOSPADM

## 2022-03-03 RX ORDER — OSELTAMIVIR PHOSPHATE 6 MG/ML
75 FOR SUSPENSION ORAL 2 TIMES DAILY
Status: DISCONTINUED | OUTPATIENT
Start: 2022-03-03 | End: 2022-03-05 | Stop reason: HOSPADM

## 2022-03-03 RX ORDER — SODIUM CHLORIDE 9 MG/ML
25 INJECTION, SOLUTION INTRAVENOUS PRN
Status: DISCONTINUED | OUTPATIENT
Start: 2022-03-03 | End: 2022-03-05 | Stop reason: HOSPADM

## 2022-03-03 RX ORDER — LANOLIN ALCOHOL/MO/W.PET/CERES
6 CREAM (GRAM) TOPICAL NIGHTLY PRN
Status: DISCONTINUED | OUTPATIENT
Start: 2022-03-03 | End: 2022-03-05 | Stop reason: HOSPADM

## 2022-03-03 RX ORDER — ONDANSETRON 4 MG/1
4 TABLET, ORALLY DISINTEGRATING ORAL EVERY 8 HOURS PRN
Status: DISCONTINUED | OUTPATIENT
Start: 2022-03-03 | End: 2022-03-05 | Stop reason: HOSPADM

## 2022-03-03 RX ORDER — IPRATROPIUM BROMIDE AND ALBUTEROL SULFATE 2.5; .5 MG/3ML; MG/3ML
2 SOLUTION RESPIRATORY (INHALATION) ONCE
Status: COMPLETED | OUTPATIENT
Start: 2022-03-03 | End: 2022-03-03

## 2022-03-03 RX ORDER — SODIUM CHLORIDE 0.9 % (FLUSH) 0.9 %
5-40 SYRINGE (ML) INJECTION EVERY 12 HOURS SCHEDULED
Status: DISCONTINUED | OUTPATIENT
Start: 2022-03-03 | End: 2022-03-05 | Stop reason: HOSPADM

## 2022-03-03 RX ORDER — SODIUM CHLORIDE 0.9 % (FLUSH) 0.9 %
5-40 SYRINGE (ML) INJECTION PRN
Status: DISCONTINUED | OUTPATIENT
Start: 2022-03-03 | End: 2022-03-05 | Stop reason: HOSPADM

## 2022-03-03 RX ORDER — TRAZODONE HYDROCHLORIDE 50 MG/1
50 TABLET ORAL NIGHTLY
Status: DISCONTINUED | OUTPATIENT
Start: 2022-03-03 | End: 2022-03-05 | Stop reason: HOSPADM

## 2022-03-03 RX ORDER — SENNA PLUS 8.6 MG/1
1 TABLET ORAL DAILY PRN
Status: DISCONTINUED | OUTPATIENT
Start: 2022-03-03 | End: 2022-03-05 | Stop reason: HOSPADM

## 2022-03-03 RX ORDER — SODIUM CHLORIDE 9 MG/ML
INJECTION, SOLUTION INTRAVENOUS CONTINUOUS
Status: DISCONTINUED | OUTPATIENT
Start: 2022-03-03 | End: 2022-03-04

## 2022-03-03 RX ORDER — POTASSIUM CHLORIDE 7.45 MG/ML
10 INJECTION INTRAVENOUS PRN
Status: DISCONTINUED | OUTPATIENT
Start: 2022-03-03 | End: 2022-03-05 | Stop reason: HOSPADM

## 2022-03-03 RX ORDER — ACETAMINOPHEN 325 MG/1
650 TABLET ORAL EVERY 6 HOURS PRN
Status: DISCONTINUED | OUTPATIENT
Start: 2022-03-03 | End: 2022-03-05 | Stop reason: HOSPADM

## 2022-03-03 RX ORDER — PANTOPRAZOLE SODIUM 40 MG/1
40 TABLET, DELAYED RELEASE ORAL
Status: DISCONTINUED | OUTPATIENT
Start: 2022-03-04 | End: 2022-03-04

## 2022-03-03 RX ORDER — POTASSIUM CHLORIDE 20 MEQ/1
40 TABLET, EXTENDED RELEASE ORAL PRN
Status: DISCONTINUED | OUTPATIENT
Start: 2022-03-03 | End: 2022-03-05 | Stop reason: HOSPADM

## 2022-03-03 RX ORDER — ONDANSETRON 2 MG/ML
4 INJECTION INTRAMUSCULAR; INTRAVENOUS EVERY 6 HOURS PRN
Status: DISCONTINUED | OUTPATIENT
Start: 2022-03-03 | End: 2022-03-05 | Stop reason: HOSPADM

## 2022-03-03 RX ADMIN — ACETYLCYSTEINE 600 MG: 200 SOLUTION ORAL; RESPIRATORY (INHALATION) at 15:48

## 2022-03-03 RX ADMIN — ACETAMINOPHEN 650 MG: 325 TABLET ORAL at 23:52

## 2022-03-03 RX ADMIN — SODIUM CHLORIDE, PRESERVATIVE FREE 10 ML: 5 INJECTION INTRAVENOUS at 23:55

## 2022-03-03 RX ADMIN — TRAZODONE HYDROCHLORIDE 50 MG: 50 TABLET ORAL at 23:53

## 2022-03-03 RX ADMIN — Medication 6 MG: at 23:53

## 2022-03-03 RX ADMIN — IPRATROPIUM BROMIDE AND ALBUTEROL SULFATE 2 AMPULE: .5; 3 SOLUTION RESPIRATORY (INHALATION) at 15:42

## 2022-03-03 RX ADMIN — OSELTAMIVIR PHOSPHATE 75 MG: 6 POWDER, FOR SUSPENSION ORAL at 23:54

## 2022-03-03 RX ADMIN — SODIUM CHLORIDE: 9 INJECTION, SOLUTION INTRAVENOUS at 18:30

## 2022-03-03 ASSESSMENT — ENCOUNTER SYMPTOMS
COUGH: 1
SINUS PRESSURE: 0
SORE THROAT: 0
ABDOMINAL PAIN: 0
BACK PAIN: 0
CONSTIPATION: 0
WHEEZING: 0
COLOR CHANGE: 0
SHORTNESS OF BREATH: 1
DIARRHEA: 0
CHOKING: 1
VOMITING: 0
NAUSEA: 0
SINUS PAIN: 0
CHEST TIGHTNESS: 1

## 2022-03-03 ASSESSMENT — PAIN SCALES - GENERAL: PAINLEVEL_OUTOF10: 4

## 2022-03-03 NOTE — TELEPHONE ENCOUNTER
The patient is prone to hypoventilation as evidenced by her increased desaturations following a general anesthetic. This does not necessarily mean she needs mechanical ventilation such as BiPAP for her tracheostomy. It may just mean that she needs oxygen supplementation as long as her hypoventilation does not produce hypercarbia. This would be manifested by persistent confusion, as 1 sign, after napping rather than shortness of breath with exertion or other manifestations of hypoxemia. Pulmonary needs to weigh in on this. As far as the replacement tracheostomy, she has a longstanding cuffed cannula in place. To change it out will be challenging and likely cause significant bleeding but is not likely to be dangerous. We can do this for her in the clinic. If she does not need positive pressure ventilation, she does not need a cuffed cannula to be the replacement cannula. One half size smaller compared to what she is wearing now, would be an acceptable option. Larger, is not.     PFC

## 2022-03-03 NOTE — ED NOTES
Pt assessed at this time, pt resting in bed watching tv, family at bedside. No concerns voiced at this time. Pt appears comfortable.       Juan Jose Guzman RN  03/03/22 3118

## 2022-03-03 NOTE — ED NOTES
Patient to ED via private vehicle from home for difficulty breathing. Patient had a recent trach placed. Patient is being seen by home health. Patient reports having a paralyzed diaphragm which makes it more difficult to expelled sputum. Patients sputum is thick white. Patient denies fevers. 02 95% RA. Family at bedside.      Aylin Reyes, HAN  03/03/22 9853

## 2022-03-03 NOTE — ED NOTES
Patient resting in bed. Patient appears more comfortable after inner cannula change. Patient currently receiving breathing tx.  Chest xray completed     Flaca Lopez, RN  03/03/22 7547

## 2022-03-03 NOTE — ED NOTES
Patient ambulated to bathroom and back without difficulty. No concerns voiced at this time.  Call light within reach, side rails up x2     Ami Torres, HAN  03/03/22 2263

## 2022-03-03 NOTE — ED PROVIDER NOTES
[place] ENCOUNTER        Pt Name: Farrukh [facility]  MRN: [id number]  [place] 1945  Date of evaluation: 3/3/2022  Treating Resident Physician: Sondra Berger DO  Supervising Physician: [facility]       Chief Complaint   Patient presents with    Cough    Shortness of Breath     History obtained from the patient and . HISTORY OF PRESENT ILLNESS    HPI  Farrukh Calderón is a 64 y.o. female who presents to the emergency department for evaluation of shortness of breath, cough, inability to clear mucus. Patient with significant history of polio resulting in vocal cord and diaphragmatic paralysis. She recently had difficulty getting off the vent after hysterectomy down in [place]. She is being followed here for her trach and rehab management. Tish Vargas has been changing her in her trach cannula as directed however has been having increased difficulty breathing. She has a consistent cough and has difficulty answering any of my questions. Her  was kind enough to provide the majority of history. The patient has no other acute complaints at this time. REVIEW OF SYSTEMS   Review of Systems   Constitutional: Negative for chills and fever. HENT: Positive for congestion. Negative for sinus pressure, sinus pain and sore throat. Respiratory: Positive for cough, choking, chest tightness and shortness of breath. Negative for wheezing. Cardiovascular: Positive for leg swelling. Negative for chest pain and palpitations. Gastrointestinal: Negative for abdominal pain, constipation, diarrhea, nausea and vomiting. Endocrine: Negative for cold intolerance and heat intolerance. Genitourinary: Negative for difficulty urinating and dysuria. Musculoskeletal: Negative for back pain, neck pain and neck stiffness. Skin: Negative for color change and rash.    Neurological: Negative for dizziness, syncope, weakness, light-headedness, numbness and headaches.          PAST MEDICAL AND SURGICAL HISTORY     Past Medical History:   Diagnosis Date    GERD (gastroesophageal reflux disease)     Osteoarthritis     Paralysis of vocal cords     Polio      Past Surgical History:   Procedure Laterality Date    CHOLECYSTECTOMY      KNEE SURGERY      Right    LARYNGOSCOPY N/A 2/24/2022    DIAGNOSTIC SUSPENSION MICROLARYNGOSCOPY BRONCHOSCOPY WITH JET VENTILATION performed by Ran Oneill MD at [address]     Current Facility-Administered Medications:     0.9 % sodium chloride infusion, , IntraVENous, Continuous, Yolanda Rosas DO, Last Rate: 100 mL/hr at 03/03/22 1830, New Bag at 03/03/22 1830    Current Outpatient Medications:     aluminum & magnesium hydroxide-simethicone (MAALOX) 200-200-20 MG/5ML SUSP suspension, Take 30 mLs by mouth every 6 hours as needed for Indigestion, Disp: , Rfl: 0    sertraline (ZOLOFT) 50 MG tablet, Take 1 tablet by mouth daily, Disp: 30 tablet, Rfl: 3    traZODone (DESYREL) 50 MG tablet, Take 1 tablet by mouth nightly, Disp: 30 tablet, Rfl: 3    zinc oxide (PINXAV) 30 % OINT, Apply 113.4 g topically 4 times daily as needed (around PEG site for irritation PRN), Disp: , Rfl: 0    furosemide (LASIX) 20 MG tablet, Take 1 tablet by mouth daily, Disp: 60 tablet, Rfl: 3    senna (SENOKOT) 8.6 MG tablet, Take 1 tablet by mouth daily as needed (Constipation), Disp: , Rfl:     cycloSPORINE (RESTASIS) 0.05 % ophthalmic emulsion, Place 2 drops into both eyes daily, Disp: 1 each, Rfl: 0    pantoprazole (PROTONIX) 40 MG tablet, Take 1 tablet by mouth 2 times daily (before meals), Disp: 60 tablet, Rfl: 3    magnesium oxide (MAG-OX) 400 (241.3 Mg) MG TABS tablet, Take 1 tablet by mouth daily, Disp: 30 tablet, Rfl: 3    melatonin 3 MG TABS tablet, Take 2 tablets by mouth nightly as needed (sleep), Disp: , Rfl:     ipratropium (ATROVENT) 0.06 % nasal spray, 2 sprays by Nasal route 3 times daily as needed, Disp: , Rfl:     meloxicam (MOBIC) 7.5 MG tablet, Take 15 mg by mouth daily , Disp: , Rfl:     calcium carbonate (OSCAL) 500 MG TABS tablet, Take 500 mg by mouth daily, Disp: , Rfl:     Multiple Vitamins-Minerals (PRESERVISION AREDS 2) CAPS, Take  by mouth 2 times daily. 2 tabs BID, Disp: , Rfl:     multivitamin (THERAGRAN) per tablet, Take 1 tablet by mouth daily Alive 50+, Disp: , Rfl:       SOCIAL HISTORY     Social History     Social History Narrative    Not on file     Social History     Tobacco Use    Smoking status: Never Smoker    Smokeless tobacco: Never Used   Substance Use Topics    Alcohol use: Yes     Comment: Seldom    Drug use: No         ALLERGIES     Allergies   Allergen Reactions    Latex          FAMILY HISTORY   No family history on file. PREVIOUS RECORDS   Previous records reviewed: Patient last seen in this department 1 year ago for kidney stone. Karoline Ovalle PHYSICAL EXAM     ED Triage Vitals [03/03/22 1519]   BP Temp Temp Source Pulse Resp SpO2 Height Weight   (!) 141/59 98.2 °F (36.8 °C) Oral 89 22 90 % 5' (1.524 m) 128 lb 8 oz (58.3 kg)     Initial vital signs and nursing assessment reviewed and normal. Body mass index is 25.1 kg/m². Pulsoximetry is normal per my interpretation. Additional Vital Signs:  Vitals:    03/03/22 1810   BP:    Pulse:    Resp:    Temp:    SpO2: (!) 87%       Physical Exam  Constitutional:       General: She is not in acute distress. Appearance: She is obese. She is not toxic-appearing or diaphoretic. HENT:      Mouth/Throat:      Mouth: Mucous membranes are moist.      Pharynx: Oropharynx is clear. No oropharyngeal exudate or posterior oropharyngeal erythema. Eyes:      General: No scleral icterus. Right eye: No discharge. Left eye: No discharge. Extraocular Movements: Extraocular movements intact.       Conjunctiva/sclera: Conjunctivae normal.   Cardiovascular:      Rate and Rhythm: Normal rate and regular rhythm. Pulses: Normal pulses. Heart sounds: No murmur heard. No friction rub. No gallop. Pulmonary:      Effort: Pulmonary effort is normal.      Breath sounds: No wheezing, rhonchi or rales. Abdominal:      Palpations: Abdomen is soft. Tenderness: There is no abdominal tenderness. There is no guarding or rebound. Musculoskeletal:      Cervical back: Normal range of motion. No rigidity. Skin:     General: Skin is warm and dry. Capillary Refill: Capillary refill takes less than 2 seconds. Neurological:      General: No focal deficit present. Mental Status: She is alert and oriented to person, place, and time. MEDICAL DECISION MAKING   Initial Assessment:   1. Pleasant 68-year-old female in a mild amount of respiratory distress secondary to cough and congestion. Patient with trach placed in [place] 1 month ago after prolonged vent time. Being seen by home health for trach maintenance and follow-up therapy. Oxygen levels in the low 90s upon arrival.  Respiratory already at bedside for suctioning. After removal of the inner cannula there was a large amount of sputum production with stenosis of the inner cannula. Significant improvement in respiratory status after cannula exchange and irrigation. Differential diagnosis includes was not limited to mucous plugging, trach malfunction, URI, COVID-19, pneumonia, CHF exacerbation, pulmonary edema, ACS.   Plan:    Respiratory labs including procalcitonin   Chest x-ray   Mucomyst   Duo nebs   Oxygen support as needed   Expected disposition discharge home as long as patient shows significant improvement in work of breathing and congestion        ED RESULTS   Laboratory results:  Labs Reviewed   CBC WITH AUTO DIFFERENTIAL - Abnormal; Notable for the following components:       Result Value    RBC 3.47 (*)     Hemoglobin 10.7 (*)     Hematocrit 35.0 (*)     .9 (*)     MCHC 30.6 (*)     RDW-SD 50.1 (*)     All other components within normal limits   BASIC METABOLIC PANEL W/ REFLEX TO MG FOR LOW K - Abnormal; Notable for the following components:    Chloride 96 (*)     CO2 34 (*)     BUN 24 (*)     All other components within normal limits   LACTATE, SEPSIS - Abnormal; Notable for the following components:    Lactic Acid, Sepsis 2.5 (*)     All other components within normal limits   LACTATE, SEPSIS - Abnormal; Notable for the following components:    Lactic Acid, Sepsis 2.4 (*)     All other components within normal limits   BLOOD GAS, VENOUS - Abnormal; Notable for the following components:    PCO2, MIXED VENOUS 53 (*)     HCO3, Mixed 34 (*)     Base Exc, Mixed 7.6 (*)     All other components within normal limits   CULTURE, BLOOD 2   CULTURE, BLOOD 1   COVID-19, RAPID   RAPID INFLUENZA A/B ANTIGENS   RSV RAPID ANTIGEN   TROPONIN   BRAIN NATRIURETIC PEPTIDE   PROCALCITONIN   ANION GAP   GLOMERULAR FILTRATION RATE, ESTIMATED   OSMOLALITY       Radiologic studies results:  XR CHEST PORTABLE   Final Result      No acute intrathoracic process. **This report has been created using voice recognition software. It may contain minor errors which are inherent in voice recognition technology. **      Final report electronically signed by Dr. Sharon Anderson on 3/3/2022 4:14 PM          ED Medications administered this visit:   Medications   0.9 % sodium chloride infusion ( IntraVENous New Bag 3/3/22 1830)   acetylcysteine (MUCOMYST) 20 % solution 600 mg (600 mg Inhalation Given 3/3/22 1548)   ipratropium-albuterol (DUONEB) nebulizer solution 2 ampule (2 ampules Inhalation Given 3/3/22 1542)         ED COURSE     ED Course as of 03/03/22 1938   Thu Mar 03, 2022   1643 Patient feeling much better on reevaluation.   Currently receiving breathing treatment with trach mask. [EM]   1643 XR CHEST PORTABLE  No acute process [EM]   1644 Troponin T: < 0.010 [EM]   1644 Procalcitonin: 0.05 [EM]   1813 Lactic Acid, Sepsis(!): 2.5 [EM]   [id number] Physician called to bedside because the patient desaturated to 87% with a good Pleth. Patient to be admitted for concern of persistent mucous plugging and new hypoxia. [EM]   1937 Covid, RSV, influenza added the request of the hospitalist. [EM]      ED Course User Index  [EM] Xin Escalante DO               MEDICATION CHANGES     Current Discharge Medication List            FINAL DISPOSITION     Final diagnoses:   Mucus plugging of bronchi   Hypoxia     Condition: condition: stable  Dispo: Admit to telemetry      This transcription was electronically signed. Parts of this transcriptions may have been dictated by use of voice recognition software and electronically transcribed, and parts may have been transcribed with the assistance of an ED scribe. The transcription may contain errors not detected in proofreading. Please refer to my supervising physician's documentation if my documentation differs.     Electronically Signed: Xin Escalante DO, 03/03/22, 7:38 PM       Xin Escalante DO  Resident  03/03/22 1555 [facility],   Resident  03/03/22 2100

## 2022-03-03 NOTE — ED NOTES
ED to inpatient nurses report    Chief Complaint   Patient presents with    Cough    Shortness of Breath      Present to ED from home  LOC: alert and orientated to name, place, date  Vital signs   Vitals:    03/03/22 1555 03/03/22 1601 03/03/22 1610 03/03/22 1723   BP:  (!) 132/58  (!) 117/91   Pulse:  81  102   Resp: 24 20 26 24   Temp:       TempSrc:       SpO2:  100% 97% 95%   Weight:       Height:          Oxygen Baseline none    Current needs required none   LDAs:   Peripheral IV 03/03/22 Left Antecubital (Active)   Site Assessment Clean;Dry; Intact 03/03/22 1538   Line Status Brisk blood return;Flushed 03/03/22 1538   Dressing Status Clean;Dry; Intact 03/03/22 1538   Dressing Intervention New 03/03/22 1538     Mobility: Independent  Pending ED orders: none  Present condition: stable    Electronically signed by Sherrie Nelson, RN on 3/3/2022 at 6:14 PM       Brigette Peña RN  03/03/22 4611

## 2022-03-03 NOTE — PROGRESS NOTES
Called to ED to suction patient trach. Patient was coughing upon my arrival.  RCP removed PMV and inner cannula. Pt's inner cannula was oculded. Dr Spencer Narvaez aware. RCP did NOT replace inner cannula or PMV at this time.

## 2022-03-03 NOTE — ED NOTES
Patients 02 dropped to 87% RA  Dr Citlaly Whitlock notified.  VBG and supplemental 02 ordered     Ayesha Damon, RN  03/03/22 7803

## 2022-03-03 NOTE — ED NOTES
Pt assessed at this time, no concerns voiced. Pt resting in bed at this time with family at bedside.       Tristan Medina RN  03/03/22 1026

## 2022-03-04 LAB
ANION GAP SERPL CALCULATED.3IONS-SCNC: 8 MEQ/L (ref 8–16)
BUN BLDV-MCNC: 20 MG/DL (ref 7–22)
CALCIUM SERPL-MCNC: 8.8 MG/DL (ref 8.5–10.5)
CHLORIDE BLD-SCNC: 101 MEQ/L (ref 98–111)
CO2: 31 MEQ/L (ref 23–33)
CREAT SERPL-MCNC: 0.4 MG/DL (ref 0.4–1.2)
EKG ATRIAL RATE: 85 BPM
EKG P AXIS: 12 DEGREES
EKG P-R INTERVAL: 128 MS
EKG Q-T INTERVAL: 348 MS
EKG QRS DURATION: 78 MS
EKG QTC CALCULATION (BAZETT): 414 MS
EKG R AXIS: 95 DEGREES
EKG T AXIS: 60 DEGREES
EKG VENTRICULAR RATE: 85 BPM
ERYTHROCYTE [DISTWIDTH] IN BLOOD BY AUTOMATED COUNT: 13.8 % (ref 11.5–14.5)
ERYTHROCYTE [DISTWIDTH] IN BLOOD BY AUTOMATED COUNT: 51.9 FL (ref 35–45)
GFR SERPL CREATININE-BSD FRML MDRD: > 90 ML/MIN/1.73M2
GLUCOSE BLD-MCNC: 103 MG/DL (ref 70–108)
HCT VFR BLD CALC: 29.1 % (ref 37–47)
HEMOGLOBIN: 8.6 GM/DL (ref 12–16)
LACTIC ACID, SEPSIS: 0.9 MMOL/L (ref 0.5–1.9)
MCH RBC QN AUTO: 30.4 PG (ref 26–33)
MCHC RBC AUTO-ENTMCNC: 29.6 GM/DL (ref 32.2–35.5)
MCV RBC AUTO: 102.8 FL (ref 81–99)
PLATELET # BLD: 179 THOU/MM3 (ref 130–400)
PMV BLD AUTO: 9.9 FL (ref 9.4–12.4)
POTASSIUM REFLEX MAGNESIUM: 4.3 MEQ/L (ref 3.5–5.2)
RBC # BLD: 2.83 MILL/MM3 (ref 4.2–5.4)
SODIUM BLD-SCNC: 140 MEQ/L (ref 135–145)
WBC # BLD: 12.4 THOU/MM3 (ref 4.8–10.8)

## 2022-03-04 PROCEDURE — 2580000003 HC RX 258: Performed by: STUDENT IN AN ORGANIZED HEALTH CARE EDUCATION/TRAINING PROGRAM

## 2022-03-04 PROCEDURE — 87186 SC STD MICRODIL/AGAR DIL: CPT

## 2022-03-04 PROCEDURE — 6370000000 HC RX 637 (ALT 250 FOR IP): Performed by: STUDENT IN AN ORGANIZED HEALTH CARE EDUCATION/TRAINING PROGRAM

## 2022-03-04 PROCEDURE — 87205 SMEAR GRAM STAIN: CPT

## 2022-03-04 PROCEDURE — 97110 THERAPEUTIC EXERCISES: CPT

## 2022-03-04 PROCEDURE — 89220 SPUTUM SPECIMEN COLLECTION: CPT

## 2022-03-04 PROCEDURE — 85027 COMPLETE CBC AUTOMATED: CPT

## 2022-03-04 PROCEDURE — 2700000000 HC OXYGEN THERAPY PER DAY

## 2022-03-04 PROCEDURE — 94640 AIRWAY INHALATION TREATMENT: CPT

## 2022-03-04 PROCEDURE — 2060000000 HC ICU INTERMEDIATE R&B

## 2022-03-04 PROCEDURE — 80048 BASIC METABOLIC PNL TOTAL CA: CPT

## 2022-03-04 PROCEDURE — 94760 N-INVAS EAR/PLS OXIMETRY 1: CPT

## 2022-03-04 PROCEDURE — 36415 COLL VENOUS BLD VENIPUNCTURE: CPT

## 2022-03-04 PROCEDURE — 97535 SELF CARE MNGMENT TRAINING: CPT

## 2022-03-04 PROCEDURE — 97166 OT EVAL MOD COMPLEX 45 MIN: CPT

## 2022-03-04 PROCEDURE — 83605 ASSAY OF LACTIC ACID: CPT

## 2022-03-04 PROCEDURE — 99232 SBSQ HOSP IP/OBS MODERATE 35: CPT | Performed by: INTERNAL MEDICINE

## 2022-03-04 PROCEDURE — 87077 CULTURE AEROBIC IDENTIFY: CPT

## 2022-03-04 PROCEDURE — 92610 EVALUATE SWALLOWING FUNCTION: CPT

## 2022-03-04 PROCEDURE — 6360000002 HC RX W HCPCS: Performed by: STUDENT IN AN ORGANIZED HEALTH CARE EDUCATION/TRAINING PROGRAM

## 2022-03-04 PROCEDURE — 87070 CULTURE OTHR SPECIMN AEROBIC: CPT

## 2022-03-04 RX ORDER — PANTOPRAZOLE SODIUM 40 MG/10ML
40 INJECTION, POWDER, LYOPHILIZED, FOR SOLUTION INTRAVENOUS DAILY
Status: DISCONTINUED | OUTPATIENT
Start: 2022-03-05 | End: 2022-03-05 | Stop reason: HOSPADM

## 2022-03-04 RX ORDER — IPRATROPIUM BROMIDE AND ALBUTEROL SULFATE 2.5; .5 MG/3ML; MG/3ML
1 SOLUTION RESPIRATORY (INHALATION) EVERY 4 HOURS PRN
Status: DISCONTINUED | OUTPATIENT
Start: 2022-03-04 | End: 2022-03-05 | Stop reason: HOSPADM

## 2022-03-04 RX ADMIN — Medication 6 MG: at 22:52

## 2022-03-04 RX ADMIN — TRAZODONE HYDROCHLORIDE 50 MG: 50 TABLET ORAL at 21:12

## 2022-03-04 RX ADMIN — OSELTAMIVIR PHOSPHATE 75 MG: 6 POWDER, FOR SUSPENSION ORAL at 21:12

## 2022-03-04 RX ADMIN — FUROSEMIDE 20 MG: 20 TABLET ORAL at 08:52

## 2022-03-04 RX ADMIN — SERTRALINE 50 MG: 50 TABLET, FILM COATED ORAL at 08:52

## 2022-03-04 RX ADMIN — OSELTAMIVIR PHOSPHATE 75 MG: 6 POWDER, FOR SUSPENSION ORAL at 08:52

## 2022-03-04 RX ADMIN — SODIUM CHLORIDE, PRESERVATIVE FREE 10 ML: 5 INJECTION INTRAVENOUS at 21:11

## 2022-03-04 RX ADMIN — SODIUM CHLORIDE: 9 INJECTION, SOLUTION INTRAVENOUS at 05:39

## 2022-03-04 RX ADMIN — PANTOPRAZOLE SODIUM 40 MG: 40 TABLET, DELAYED RELEASE ORAL at 06:00

## 2022-03-04 RX ADMIN — MELOXICAM 15 MG: 7.5 TABLET ORAL at 08:52

## 2022-03-04 RX ADMIN — ENOXAPARIN SODIUM 40 MG: 100 INJECTION SUBCUTANEOUS at 09:04

## 2022-03-04 RX ADMIN — IPRATROPIUM BROMIDE AND ALBUTEROL SULFATE 1 AMPULE: .5; 3 SOLUTION RESPIRATORY (INHALATION) at 10:30

## 2022-03-04 RX ADMIN — IPRATROPIUM BROMIDE AND ALBUTEROL SULFATE 1 AMPULE: .5; 3 SOLUTION RESPIRATORY (INHALATION) at 19:37

## 2022-03-04 RX ADMIN — SODIUM CHLORIDE, PRESERVATIVE FREE 10 ML: 5 INJECTION INTRAVENOUS at 08:52

## 2022-03-04 ASSESSMENT — PAIN DESCRIPTION - PAIN TYPE: TYPE: CHRONIC PAIN

## 2022-03-04 ASSESSMENT — PAIN SCALES - GENERAL
PAINLEVEL_OUTOF10: 0

## 2022-03-04 ASSESSMENT — PAIN DESCRIPTION - DESCRIPTORS: DESCRIPTORS: SORE

## 2022-03-04 ASSESSMENT — PAIN DESCRIPTION - ORIENTATION: ORIENTATION: ANTERIOR

## 2022-03-04 ASSESSMENT — PAIN DESCRIPTION - LOCATION: LOCATION: THROAT

## 2022-03-04 ASSESSMENT — PAIN DESCRIPTION - PROGRESSION: CLINICAL_PROGRESSION: NOT CHANGED

## 2022-03-04 ASSESSMENT — PAIN SCALES - WONG BAKER: WONGBAKER_NUMERICALRESPONSE: 2

## 2022-03-04 ASSESSMENT — PAIN - FUNCTIONAL ASSESSMENT: PAIN_FUNCTIONAL_ASSESSMENT: ACTIVITIES ARE NOT PREVENTED

## 2022-03-04 ASSESSMENT — PAIN DESCRIPTION - FREQUENCY: FREQUENCY: CONTINUOUS

## 2022-03-04 ASSESSMENT — PAIN DESCRIPTION - ONSET: ONSET: ON-GOING

## 2022-03-04 NOTE — PROGRESS NOTES
Sterile tracheal suction performed in attempt to obtain ordered sputum sample. No secretions obtained. RT notified.

## 2022-03-04 NOTE — H&P
History & Physical       Patient: Kathy Ny  YOB: 1945    MRN: [id number]     Acct: [de-identified]    PCP: Fran Figueroa    Date of Admission: 3/3/2022    Date of Service: Patient seen / examined on 03/03/22 and admitted to Inpatient with expected LOS greater than two midnights due to medical therapy. ASSESSMENT / PLAN:  1. Acute on Chronic Hypoxic Respiratory Failure: On 3L O2 via NC at nights, room air in the AM. Recently intubated and tracheostomy placed on 2/1 due to difficult extubation post hysterectomy. Has been intubated multiple times in the past due to #4. Now requiring trach mask due to SpO2 87% on room air as she is unable to properly clear her secretions. Found to be Flu A positive, likely contributing to excess secretions. Family reports not having suctioning machine/supplies at home, so will need to set that up upon discharge. Will continue to wean back down to baseline as tolerated to maintain SpO2 >90%. 2. Flu A Pneumonia: Positive on 3/3. Likely contributing to her excess secretions. Will start Tamiflu 75mg suspension x5 days. 3. Sepsis: SIRS 2/4 (Tachycardia, Tachypea), Lactic 2.4, and suspected source Flu A Pneumonia. S/p IVF. Continue maintenance NS @ 100 cc/hr. 4. 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 Follows ENT at [address] [place] [address].    5. ?Zenker's Diverticulum: Noted on prior note that was found during MBS study at the [facility]. Patient is noted to not want surgical intervention at this time and was going to follow up with ENT for further discussion. 6. Hx Anxiety: Continue Zoloft, Trazodone    7. GERD: Continue Protonix    Chief Complaint:  Cough    History of Present Illness:  Patient is a 65 y/o F with PMH Recent Tracheostomy, GERD, Polio, and Anxiety. Family is at bedside who helps to provide some history.  Patient is able to answer questions appropriately with head movements and mouthing words. Patient was recently discharged from rehab 2-3 days ago. She has been developing a cough, shortness of breath, and has been unable to properly clear her sputum production due to her history of Polio with partial vocal cord paralysis. She denies any recent sick contacts, fevers, chills, headaches, chest pain, abdominal pain, nausea, vomiting, or diarrhea. Her family reports that she was recently hospitalized at the [facility] on 1/24/22-2/15/22 for a hysterectomy due to postmenopausal bleeding. Post-op she required mechanical ventilation and tracheostomy was placed on 2/1 as she was unable to be safely extubated. She was able to be weaned down to room air during the day time and 3L O2 via NC at night time. A PEG was also placed on 2/8/22. Of note she was also found to have a possible Zenker's diverticulum on MBS study at the [facility], thought to be contributing to her tracheal secretions. She was discharged to inpatient rehab on 2/15 and went home two days ago. In the ED labs are significant for Lactic Acid 2.4, Hgb 10.7, Flu A Positive. As she continued to cough and was unable to properly clear her secretions, her SpO2 dropped to 87%, thus a trach mask was placed. She will be admitted under the hospitalist team for further treatment and evaluation. Past Medical History:    Past Medical History:   Diagnosis Date    GERD (gastroesophageal reflux disease)     Osteoarthritis     Paralysis of vocal cords     Polio      Past Surgical History:    Past Surgical History:   Procedure Laterality Date    CHOLECYSTECTOMY      KNEE SURGERY      Right    LARYNGOSCOPY N/A 2/24/2022    DIAGNOSTIC SUSPENSION MICROLARYNGOSCOPY BRONCHOSCOPY WITH JET VENTILATION performed by Clarisa Garces MD at [facility]      Medications Prior to Admission:   No current facility-administered medications on file prior to encounter.      Current Outpatient Medications on File Prior to Encounter   Medication Sig Dispense Refill    aluminum & magnesium hydroxide-simethicone (MAALOX) 200-200-20 MG/5ML SUSP suspension Take 30 mLs by mouth every 6 hours as needed for Indigestion  0    sertraline (ZOLOFT) 50 MG tablet Take 1 tablet by mouth daily 30 tablet 3    traZODone (DESYREL) 50 MG tablet Take 1 tablet by mouth nightly 30 tablet 3    zinc oxide (PINXAV) 30 % OINT Apply 113.4 g topically 4 times daily as needed (around PEG site for irritation PRN)  0    furosemide (LASIX) 20 MG tablet Take 1 tablet by mouth daily 60 tablet 3    senna (SENOKOT) 8.6 MG tablet Take 1 tablet by mouth daily as needed (Constipation)      cycloSPORINE (RESTASIS) 0.05 % ophthalmic emulsion Place 2 drops into both eyes daily 1 each 0    pantoprazole (PROTONIX) 40 MG tablet Take 1 tablet by mouth 2 times daily (before meals) 60 tablet 3    magnesium oxide (MAG-OX) 400 (241.3 Mg) MG TABS tablet Take 1 tablet by mouth daily 30 tablet 3    melatonin 3 MG TABS tablet Take 2 tablets by mouth nightly as needed (sleep)      ipratropium (ATROVENT) 0.06 % nasal spray 2 sprays by Nasal route 3 times daily as needed      meloxicam (MOBIC) 7.5 MG tablet Take 15 mg by mouth daily       calcium carbonate (OSCAL) 500 MG TABS tablet Take 500 mg by mouth daily      Multiple Vitamins-Minerals (PRESERVISION AREDS 2) CAPS Take  by mouth 2 times daily.  2 tabs BID      multivitamin (THERAGRAN) per tablet Take 1 tablet by mouth daily Alive 50+       Allergies:   Latex    Social History:   Social History     Socioeconomic History    Marital status:      Spouse name: Not on file    Number of children: Not on file    Years of education: Not on file    Highest education level: Not on file   Occupational History    Not on file   Tobacco Use    Smoking status: Never Smoker    Smokeless tobacco: Never Used   Substance and Sexual Activity    Alcohol use: Yes     Comment: Seldom    Drug use: No    Sexual activity: Not on file   Other Topics Concern  Not on file   Social History Narrative    Not on file     Social Determinants of Health     Financial Resource Strain:     Difficulty of Paying Living Expenses: Not on file   Food Insecurity:     Worried About Running Out of Food in the Last Year: Not on file    Charli of Food in the Last Year: Not on file   Transportation Needs:     Lack of Transportation (Medical): Not on file    Lack of Transportation (Non-Medical): Not on file   Physical Activity:     Days of Exercise per Week: Not on file    Minutes of Exercise per Session: Not on file   Stress:     Feeling of Stress : Not on file   Social Connections:     Frequency of Communication with Friends and Family: Not on file    Frequency of Social Gatherings with Friends and Family: Not on file    Attends Confucianism Services: Not on file    Active Member of [address] [place] [address] [employer] or Organizations: Not on file    Attends Club or Organization Meetings: Not on file    Marital Status: Not on file   Intimate Partner Violence:     Fear of Current or Ex-Partner: Not on file    Emotionally Abused: Not on file    Physically Abused: Not on file    Sexually Abused: Not on file   Housing Stability:     Unable to Pay for Housing in the Last Year: Not on file    Number of [place] in the Last Year: Not on file    Unstable Housing in the Last Year: Not on file     Family History:    No family history on file. REVIEW OF SYSTEMS:  A 14-point ROS was obtained and negative, with the exception of pertinent positives as listed below:  +Cough  +SOB    PHYSICAL EXAM:  Vitals:    03/03/22 1723 03/03/22 1756 03/03/22 1810 03/03/22 1945   BP: (!) 117/91 114/61  (!) 112/40   Pulse: 102 102  113   Resp: 24 27  20   Temp:       TempSrc:       SpO2: 95%  (!) 87% 99%   Weight:       Height:         General appearance: Alert / ill-appearing female. Cooperative. NAD. HEENT:  Normocephalic / atraumatic. PERRL. EOM intact. Conjunctivae appear normal.   Neck: Supple. No JVD.  Tracheostomy in place with trach mask. Respiratory: Normal respiratory effort on Trach Mask. Rhonchi to left lower lobe. No wheezes / rales / rhonchi. Cardiovascular: RRR. Normal S1/S2. No murmurs / rubs / gallops. Abdomen: Soft / non-tender / non-distended. BS present. PEG tube in place. Musculoskeletal: No cyanosis or edema. Skin: Warm / dry. Normal turgor. Neurologic: A/O x 3. Unable to talk due to tracheostomy. Answers questions appropriately. CN intact. No obvious focal neurologic deficits. Psychiatric: Thought content / judgment / insight appear appropriate. Capillary refill: Brisk bilaterally. Peripheral pulses: +2 bilaterally.     Labs:   Results for orders placed or performed during the hospital encounter of 03/03/22   CBC with Auto Differential   Result Value Ref Range    WBC 8.3 4.8 - 10.8 thou/mm3    RBC 3.47 (L) 4.20 - 5.40 mill/mm3    Hemoglobin 10.7 (L) 12.0 - 16.0 gm/dl    Hematocrit 35.0 (L) 37.0 - 47.0 %    .9 (H) 81.0 - 99.0 fL    MCH 30.8 26.0 - 33.0 pg    MCHC 30.6 (L) 32.2 - 35.5 gm/dl    RDW-CV 13.6 11.5 - 14.5 %    RDW-SD 50.1 (H) 35.0 - 45.0 fL    Platelets 309 310 - 377 thou/mm3    MPV 9.7 9.4 - 12.4 fL    Seg Neutrophils 72.4 %    Lymphocytes 15.5 %    Monocytes 9.7 %    Eosinophils 1.6 %    Basophils 0.4 %    Immature Granulocytes 0.4 %    Segs Absolute 6.0 1.8 - 7.7 thou/mm3    Lymphocytes Absolute 1.3 1.0 - 4.8 thou/mm3    Monocytes Absolute 0.8 0.4 - 1.3 thou/mm3    Eosinophils Absolute 0.1 0.0 - 0.4 thou/mm3    Basophils Absolute 0.0 0.0 - 0.1 thou/mm3    Immature Grans (Abs) 0.03 0.00 - 0.07 thou/mm3    nRBC 0 /100 wbc   Basic Metabolic Panel w/ Reflex to MG   Result Value Ref Range    Sodium 140 135 - 145 meq/L    Potassium reflex Magnesium 4.3 3.5 - 5.2 meq/L    Chloride 96 (L) 98 - 111 meq/L    CO2 34 (H) 23 - 33 meq/L    Glucose 91 70 - 108 mg/dL    BUN 24 (H) 7 - 22 mg/dL    CREATININE 0.5 0.4 - 1.2 mg/dL    Calcium 10.1 8.5 - 10.5 mg/dL   Troponin   Result Value Ref Range Troponin T < 0.010 ng/ml   Brain Natriuretic Peptide   Result Value Ref Range    Pro-.1 0.0 - 1800.0 pg/mL   Procalcitonin   Result Value Ref Range    Procalcitonin 0.05 0.01 - 0.09 ng/mL   Lactate, Sepsis   Result Value Ref Range    Lactic Acid, Sepsis 2.5 (H) 0.5 - 1.9 mmol/L   Lactate, Sepsis   Result Value Ref Range    Lactic Acid, Sepsis 2.4 (H) 0.5 - 1.9 mmol/L   Anion Gap   Result Value Ref Range    Anion Gap 10.0 8.0 - 16.0 meq/L   Glomerular Filtration Rate, Estimated   Result Value Ref Range    Est, Glom Filt Rate >90 ml/min/1.73m2   Osmolality   Result Value Ref Range    Osmolality Calc 283.0 275.0 - 300.0 mOsmol/kg   Blood Gas, Venous   Result Value Ref Range    PH MIXED 7.41 7.31 - 7.41    PCO2, MIXED VENOUS 53 (H) 41 - 51 mmhg    PO2, Mixed 27 25 - 40 mmhg    HCO3, Mixed 34 (H) 23 - 28 mmol/l    Base Exc, Mixed 7.6 (H) -2.0 - 3.0 mmol/l    O2 Sat, Mixed 50 %    COLLECTED BY: [id number]    RSV Comment   Result Value Ref Range    RSV Comment see below    EKG SOB   Result Value Ref Range    Ventricular Rate 85 BPM    Atrial Rate 85 BPM    P-R Interval 128 ms    QRS Duration 78 ms    Q-T Interval 348 ms    QTc Calculation (Bazett) 414 ms    P Axis 12 degrees    R Axis 95 degrees    T Axis 60 degrees       EKG / Radiology:     EKG:  Reviewed by me -- NSR    CXR:   Reviewed by me --    XR CHEST (2 VW)    Result Date: 2/21/2022  2 view chest x-ray Comparison: None Findings: Elevation the right hemidiaphragm with mild right basilar atelectasis. Blunting of both costophrenic angles. No pneumothorax. Tracheostomy tube in place. Normal size heart. No pulmonary vascular congestion. No acute fracture. 1. Small bilateral pleural effusions. 2. Elevation of the right hemidiaphragm with mild right basilar atelectasis.  This document has been electronically signed by: Ama Gill MD on 02/21/2022 08:21 PM    CT SOFT TISSUE NECK WO CONTRAST    Result Date: 2/23/2022  CT neck without contrast Comparison: Correlation with chest radiograph from 2/21/22 Findings: The right piriform sinus is enlarged. There is mild medialization of the right aryepiglottic fold. Mild decreased attenuation within the right true cord, which could be secondary to atrophy/fat or edema. No identified mass. Normal pharyngeal mucosa and oral cavity. No suspicious lymph nodes. Normal parotid and submandibular glands. Unremarkable thyroid. Normal vessels and carotid space. No aneurysm. No acute fracture. Mild scarring at the lung apices. Status post tracheostomy. No cystic or solid lesion in the anterior superior mediastinum. Impression: Findings may indicate paralysis of the right vocal cord. No identified etiology. Direct inspection is recommended. This document has been electronically signed by: Mukul Garber MD on 02/23/2022 09:58 PM All CTs at this facility use dose modulation techniques and iterative reconstructions, and/or weight-based dosing when appropriate to reduce radiation to a low as reasonably achievable. FL LESS THAN 1 HOUR    Result Date: 2/22/2022  PROCEDURE: FL LESS THAN 1 HOUR CLINICAL INFORMATION: Diaphragmatic elevation TECHNIQUE: Diaphragmatic motion was evaluated under fluoroscopy during periods of normal and rapid breathing (\"sniff test\"). 4 images were obtained. Total fluoroscopy time 0.6 minutes. COMPARISON: None FINDINGS: The right hemidiaphragm is elevated. Diaphragmatic motion is reduced on the right side compared to the left side. There is no paradoxical motion to suggest paralysis. No evidence of diaphragmatic paralysis. Final report electronically signed by Dr. Emerita Lambert on 2/22/2022 9:56 AM    XR CHEST PORTABLE    Result Date: 3/3/2022  PROCEDURE: XR CHEST PORTABLE CLINICAL INFORMATION: Cough TECHNIQUE: Mobile AP chest radiograph. COMPARISON: PA and lateral chest radiographs 2/21/2022 FINDINGS: A tracheostomy tube is in stable position. Cardiomediastinal silhouette is within normal limits. Lung volumes are low. There are no lung consolidations. There is mild stable elevation of the right hemidiaphragm. Degenerative changes in the thoracic spine are poorly visualized. Surgical clips are noted in the right upper abdomen. No acute intrathoracic process. **This report has been created using voice recognition software. It may contain minor errors which are inherent in voice recognition technology. ** Final report electronically signed by Dr. Haseeb Pierce on 3/3/2022 4:14 PM    FEN/GI/DVT:  IVF: NS @ 100 cc/hr  Electrolytes: Monitor and replace per protocols  Diet: NPO; Tube Feeds  GI PPX: Yes  DVT Prophylaxis: Lovenox    CODE STATUS:  Full    Thank you Cristiano Loaiza for the opportunity to be involved in this patient's care.     Electronically signed by Becca Cade DO on 3/3/2022 at 7:58 PM

## 2022-03-04 NOTE — PROGRESS NOTES
Patient assisted with removal of speaking valve. Speaking valve cleansed with soap and water and placed on clean towel to air dry.

## 2022-03-04 NOTE — PLAN OF CARE
Problem: Nutrition  Goal: Optimal nutrition therapy  Outcome: Ongoing   Nutrition Problem #1: Inadequate oral intake  Intervention: Food and/or Nutrient Delivery: Modify Tube Feeding  Nutritional Goals: Pt will meet 75% estimated needs from EN during LOS.

## 2022-03-04 NOTE — CARE COORDINATION
3/4/22, 3:44 PM EST  DISCHARGE PLANNING EVALUATION:    Paulino Santana       Admitted: 3/3/2022/ [address] day: 1   Location: [facility]12/012-A Reason for admit: Hypoxia [R09.02]  Acute respiratory failure with hypoxia (HCC) [J96.01]  Mucus plugging of bronchi [T17.500A]   PMH:  has a past medical history of GERD (gastroesophageal reflux disease), Osteoarthritis, Paralysis of vocal cords, and Polio. Barriers to Discharge: Mucus Plugging/Influenza A; on Tamiflu. FL Diet; Chronic Trach 2/1 and PEG TF PTA. Trach 28% FIO2; monitor  PCP: Aldo Faust  Readmission Risk Score: 17.4 ( )%    Patient Goals/Plan/Treatment Preferences: plans home w spouse Yasmine Redmond; family wants new [employer] nebulizer (called/faxed referral), has [facility] (nsg, PT/OT/ST, aide), Mellissa Krone Lelon Apley or Suzan Grove is Liasion for 2 Dominic HN bolus feeds 135 ml x6 per day via syringe, flush 60 ml before and after recommend but order is 30 ml) @ [phone] (fax [phone]); therapy following      Transportation/Food Security/Housekeeping Addressed:  No issues identified. 3/7/22, 6:33 AM EST    Patient goals/plan/ treatment preferences discussed by  and . Patient goals/plan/ treatment preferences reviewed with patient/ family. Patient/ family verbalize understanding of discharge plan and are in agreement with goal/plan/treatment preferences. Understanding was demonstrated using the teach back method. AVS provided by RN at time of discharge, which includes all necessary medical information pertaining to the patients current course of illness, treatment, post-discharge goals of care, and treatment preferences.     Services After Discharge  Services At/After Discharge: Nursing Services,OT,PT [facility])   IMM Letter  IMM Letter given to Patient/Family/Significant other/Guardian/POA/by[de-identified] Emily, Clinical Support  IMM Letter date given[de-identified] 03/04/22  IMM Letter time given[de-identified] 1028

## 2022-03-04 NOTE — CARE COORDINATION
03/04/22 1347   Readmission Assessment   Number of Days since last admission? 1-7 days   Previous Disposition Home with Home Health   Who is being Interviewed Patient;Caregiver   What was the patient's/caregiver's perception as to why they think they needed to return back to the hospital?   (continuous coughing and really hard to breathe)   Did you see a specialist, such as Cardiac, Pulmonary, Orthopedic Physician, etc. after you left the hospital? No  (readmitted too soon)   Who advised the patient to return to the hospital? [facility] Last Staff   Does the patient report anything that got in the way of taking their medications? No   In our efforts to provide the best possible care to you and others like you, can you think of anything that we could have done to help you after you left the hospital the first time, so that you might not have needed to return so soon?  Other (Comment)  (did not have nebulizer, more attention to coughing and not tested for flu, secretions and home too early, diagnosed cough sooner, [employer] company delayed on supply for oxygen, trach supplies when promised it would be there)

## 2022-03-04 NOTE — ED NOTES
This RN walks in to swab pt for COVID flu and RSV. Family starts screaming at this RN that pt was told she was get a bed around 7pm at 640pm. Family also states that pt needed her tube feed hours ago and she needs to eat. This RN acknowledged their concerns and stated I would swab pt and put a stat sticker on the beg and then I would call house supervisor and charge nurse to get pt tube feed. Pt family states this is ridiculous and that would call Cody Isabel if they had to. This RN notified charge RN. Hospitalist also in to talk to pt as well. Familly yelling at hospitalist as well.      Ting Moore RN  03/03/22 2013

## 2022-03-04 NOTE — PROGRESS NOTES
Pharmacy Medication History Note      List of current medications patient is taking is complete. Source of information: patient, patient's , surescripts    Changes made to medication list:  Medications removed (include reason, ex. therapy complete or physician discontinued):  Senna 8.6 mg daily     Medications added/doses adjusted:  none    Other notes (ex. Recent course of antibiotics, Coumadin dosing):  Patient states she's never taken senna at home for constipation  Denies use of other OTC or herbal medications.       Allergies reviewed       Electronically signed by Rodri Watson on 3/4/2022 at 11:48 AM

## 2022-03-04 NOTE — ED NOTES
Pt family asked pt if she could have ice water. RN asked if pt was able to tolerate that and family stated yes. Pt given ice water.      Kushal Coronel RN  03/03/22 2014

## 2022-03-04 NOTE — ED NOTES
ED to inpatient nurses report    Chief Complaint   Patient presents with    Cough    Shortness of Breath      Present to ED from home  LOC: alert and orientated to name, place, date  Vital signs   Vitals:    03/03/22 1723 03/03/22 1756 03/03/22 1810 03/03/22 1945   BP: (!) 117/91 114/61  (!) 112/40   Pulse: 102 102  113   Resp: 24 27  20   Temp:       TempSrc:       SpO2: 95%  (!) 87% 99%   Weight:       Height:          Oxygen Baseline 99%    Current needs required trach mask Bipap/Cpap No  LDAs:   Peripheral IV 03/03/22 Left Antecubital (Active)   Site Assessment Clean;Dry; Intact 03/03/22 1538   Line Status Brisk blood return;Flushed 03/03/22 1538   Dressing Status Clean;Dry; Intact 03/03/22 1538   Dressing Intervention New 03/03/22 1538     Mobility: Requires assistance * 1  Pending ED orders: none  Present condition: stable, tolerated ice water, needs tube feed      Electronically signed by Cain Booth RN on 3/3/2022 at 8:21 PM       Cain Booth RN  03/03/22 2022

## 2022-03-04 NOTE — CARE COORDINATION
03/04/22 1347   Readmission Assessment   Number of Days since last admission? 1-7 days   Previous Disposition Home with Home Health   Who is being Interviewed Patient;Caregiver   What was the patient's/caregiver's perception as to why they think they needed to return back to the hospital?   (continuous coughing and really hard to breathe)   Did you see a specialist, such as Cardiac, Pulmonary, Orthopedic Physician, etc. after you left the hospital? No  (readmitted too soon)   Who advised the patient to return to the hospital? [facility] Last Staff   Does the patient report anything that got in the way of taking their medications? No   In our efforts to provide the best possible care to you and others like you, can you think of anything that we could have done to help you after you left the hospital the first time, so that you might not have needed to return so soon?  Other (Comment)  (did not have nebulizer, more attention to coughing and not tested for flu, secretions and home too early, diagnosed cough sooner, [employer] company delayed on supply for oxygen, trach supplies when promised it would be there)

## 2022-03-04 NOTE — ED NOTES
ED nurse-to-nurse bedside report    Chief Complaint   Patient presents with    Cough    Shortness of Breath      LOC: alert and orientated to name, place, date  Vital signs   Vitals:    03/03/22 1610 03/03/22 1723 03/03/22 1756 03/03/22 1810   BP:  (!) 117/91 114/61    Pulse:  102 102    Resp: 26 24 27    Temp:       TempSrc:       SpO2: 97% 95%  (!) 87%   Weight:       Height:          Pain:    Pain Interventions: none  Pain Goal: 0/10  Oxygen: Yes    Current needs required none   Telemetry: Yes  LDAs:   Peripheral IV 03/03/22 Left Antecubital (Active)   Site Assessment Clean;Dry; Intact 03/03/22 1538   Line Status Brisk blood return;Flushed 03/03/22 1538   Dressing Status Clean;Dry; Intact 03/03/22 1538   Dressing Intervention New 03/03/22 1538     Continuous Infusions:    sodium chloride       Mobility: Independent  Luciano Fall Risk Score: No flowsheet data found. Fall Interventions: silva, call light  Report given to: Subha Barillas RN.        Greg Tee RN  03/03/22 7999

## 2022-03-04 NOTE — PLAN OF CARE
Problem: Falls - Risk of:  Goal: Will remain free from falls  Description: Will remain free from falls  Outcome: Ongoing  Note: Call light within reach, bed in lowest position, bed alarm on.       Problem: Falls - Risk of:  Goal: Absence of physical injury  Description: Absence of physical injury  Outcome: Ongoing

## 2022-03-04 NOTE — PROGRESS NOTES
[address] ICU STEPDOWN TELEMETRY 4K  Clinical Swallow Evaluation      SLP Individual Minutes  Time In: 5753  Time Out: 2308  Minutes: 8  Timed Code Treatment Minutes: 0 Minutes       Date: 3/4/2022  Patient Name: Julio Cesar Ott      CSN: [id number]   : 1945  (64 y.o.)  Gender: female   Referring Physician: Rina Laurent MD  Diagnosis: Hypoxia   Secondary Diagnosis: Dysphagia     History of Present Illness/Injury: Patient admit to [facility] with above medical dx. Per physician H&P, patient is a 65 y/o F with PMH Recent Tracheostomy, GERD, Polio, and Anxiety. Family is at bedside who helps to provide some history. Patient is able to answer questions appropriately with head movements and mouthing words. Patient was recently discharged from rehab 2-3 days ago. She has been developing a cough, shortness of breath, and has been unable to properly clear her sputum production due to her history of Polio with partial vocal cord paralysis. She denies any recent sick contacts, fevers, chills, headaches, chest pain, abdominal pain, nausea, vomiting, or diarrhea.      Her family reports that she was recently hospitalized at the [facility] on 22-2/15/22 for a hysterectomy due to postmenopausal bleeding. Post-op she required mechanical ventilation and tracheostomy was placed on  as she was unable to be safely extubated. She was able to be weaned down to room air during the day time and 3L O2 via NC at night time. A PEG was also placed on 22. Of note she was also found to have a possible Zenker's diverticulum on MBS study at the [facility], thought to be contributing to her tracheal secretions. She was discharged to inpatient rehab on 2/15 and went home two days ago.     In the ED labs are significant for Lactic Acid 2.4, Hgb 10.7, Flu A Positive.  As she continued to cough and was unable to properly clear her secretions, her SpO2 dropped to 87%, thus a trach mask was placed. She will be admitted under the hospitalist team for further treatment and evaluation. ST consult for CSE s/t patient h/o dysphagia to determine appropriateness to initiate PO means of nutrition. Past Medical History:   Diagnosis Date    GERD (gastroesophageal reflux disease)     Osteoarthritis     Paralysis of vocal cords     Polio        SUBJECTIVE:  Session approved by RN, Rodo Lawson. Patient seen upright in bed with family present. Pleasant and cooperative. OBJECTIVE:    Pain:  No pain reported. Current Diet: NPO     Respiratory Status:  Tracheostomy with PMV in place. Behavioral Observation:  Alert and cooperative    Oral Mechanism Evaluation:      Facial / Labial WFL    Lingual WFL    Dentition WFL    Velum WFL    Vocal Quality Impaired Weak vocal intensity    Sensation Not Tested    Cough Not Tested      Patient Evaluated Using: Thin liquids by straw     Oral Phase:  WFL    Pharyngeal Phase: WFL:  Pharyngeal phase appears WFL but cannot rule out pharyngeal phase deficits from a bedside swallowing evaluation alone. Signs and Symptoms of Laryngeal Penetration/Aspiration: No signs/symptoms of aspiration evident in this evaluation, but cannot rule out silent aspiration. Impresssions: Patient presents with functional oral swallow function with conservative intake of liquids completed. Inability to fully discern potential presence of pharyngeal phase deficits without formal instrumentation. Patient with h/o ongoing dysphagia s/p bulbar polio with need for utilization of head turn to R with consumption of thin liquids. Recent stay on IPR with patient placed on full thin liquid diet (I.e. appropriate for all thin liquids, orange sherbet, blended soups, etc). Extensive education was provided to patient/family re: flow test, dietary recommendations/precautions and utilization of NMES for treatment of dysphagia.  Consumption of liquids with self use of head turn to R without overt s/s of airway invasion. Certainly cannot r/o silent aspiration without formal imaging. Recommendations for resumption of full thin liquids. MBS completed at [address] [place] [address] 2/11/22. Recommendations for repeat instrumental assessment as early as 3/11. RECOMMENDATIONS/ASSESSMENT:  Instrumental Evaluation: Instrumental evaluation not indicated at this time. Diet Recommendations:  Full thin liquids   Strategies:  Full Upright Position, Small Bite/Sip and head turn to R   Rehabilitation Potential: excellent    EDUCATION:  Learner: Patient and Family  Education:  Reviewed results and recommendations of this evaluation, Reviewed diet and strategies, Reviewed ST goals and Plan of Care and Reviewed recommendations for follow-up  Evaluation of Education: Verbalizes understanding    PLAN:  Skilled SLP intervention on acute care 3-5 x per week or until goals met and/or pt plateaus in function. Specific interventions for next session may include: dysphagia rehabilitation . PATIENT GOAL:    Did not state. Will further assess during treatment. SHORT TERM GOALS:  Short-term Goals  Timeframe for Short-term Goals: 2 weeks  Goal 1: Patient will consume a FULL THIN LIQUID diet (okay for orange sherbet, blended soups, all thin liquids) with use of head turn to R without overt s/s of airway invasion to enhance safety of pleasure feedings. Goal 2: Patient will complete pharyngeal strengthening exercises with and withOUT NMES (i.e. effortful, mara, mendelsohn, supraglottic, shaker, etc) x10 for improved pharyngeal constriction and enhanced airway protection  Goal 3: Patient will complete repeat MBS as early as 3/11/22 to further assess and determine level of pharyngeal dysfunction present to determine appropriateness for further dietary advancement.     LONG TERM GOALS:  No established LTG's given short TERRENCE Pratt [id number] 3/4/2022

## 2022-03-04 NOTE — PROGRESS NOTES
[address] ICU STEPDOWN TELEMETRY 4K  EVALUATION    Time:   Time In: 7607  Time Out: 0933  Timed Code Treatment Minutes: 23 Minutes  Minutes: 38          Date: 3/4/2022  Patient Name: Connor Franks,   Gender: female      MRN: [id number]  : 1945  (64 y.o.)  Referring Practitioner: Dr. Zahira Blackman DO  Diagnosis: Hypoxia  Additional Pertinent Hx: Pt with PMH Recent Tracheostomy, GERD, Polio, and Anxiety. Family is at bedside who helps to provide some history. Patient is able to answer questions appropriately with head movements and mouthing words. Patient was recently discharged from rehab 2-3 days ago. She has been developing a cough, shortness of breath, and has been unable to properly clear her sputum production due to her history of Polio with partial vocal cord paralysis. She denies any recent sick contacts, fevers, chills, headaches, chest pain, abdominal pain, nausea, vomiting, or diarrhea. Her family reports that she was recently hospitalized at the [facility] on 22-2/15/22 for a hysterectomy due to postmenopausal bleeding. Post-op she required mechanical ventilation and tracheostomy was placed on  as she was unable to be safely extubated. She was able to be weaned down to room air during the day time and 3L O2 via NC at night time. A PEG was also placed on 22. Of note she was also found to have a possible Zenker's diverticulum on MBS study at the [facility], thought to be contributing to her tracheal secretions. She was discharged to inpatient rehab on 2/15 and went home two days ago. Restrictions/Precautions:  Restrictions/Precautions: Isolation  Position Activity Restriction  Other position/activity restrictions: Influenza Isolation; uses speaker valve during the day and on room air. Has a trach mask at night.     Subjective  Chart Reviewed: Heaven Heading and Physical  Family / Caregiver Present: Yes (spouse present and supportive)    Subjective: Pleasant and cooperative  Comments: RN approved session. Pt agreed to do some exercises. She had just finished with  using the restroom. Edema noted in her legs. Pt had soreness in her throat. Pain:  Pain Assessment  Patient Currently in Pain: Yes  Pain Assessment: Faces  Neff-Baker Pain Rating: Hurts a little bit  Pain Type: Chronic pain  Pain Location: Throat  Pain Orientation: Anterior  Pain Descriptors: Sore  Pain Frequency: Continuous  Clinical Progression: Not changed  Patient's Stated Pain Goal: No pain  Response to Pain Intervention: Patient Satisfied  Multiple Pain Sites: No    Vitals: Oxygen:  Pt was showing 95% O2 saturation while at rest on room air. Social/Functional History:  Lives With: Spouse  Type of Home: House  Home Access: Stairs to enter without rails  Entrance Stairs - Number of Steps: 2 steps from the garage; 4 steps in the front; pt has a back entrance with rails but she uses the garage entrance  Home Equipment: Rolling walker   Bathroom Shower/Tub: Walk-in shower,Shower chair with back  Bathroom Toilet: Standard  Bathroom Equipment: Grab bars in shower,Hand-held shower  Bathroom Accessibility: Accessible    Receives Help From: Family  ADL Assistance: [address] [place] [address] Avenue: Needs assistance  Homemaking Responsibilities: Yes  Ambulation Assistance: Independent  Transfer Assistance: Independent    Active : Yes  Occupation: Retired  Leisure & Hobbies: Reading  Additional Comments: Pt was not using any AD for ambulation. She only has been at home for a couple of days since her Rehab stay. She did her own dressing. Her spouse has been helping her with her tube feeding routine.     VISION:Corrected    HEARING:  WFL    COGNITION: Slow Processing    RANGE OF MOTION:  Bilateral Upper Extremity:  WFL    STRENGTH:  Bilateral Upper Extremity:  Impaired - 3+/5 deltoid; 3+/5 pectoral; 4-/5 biceps/triceps    SENSATION:   WFL    ADL:   Lower Extremity Dressing: Contact Guard Assistance. Pt crossed her legs to doff her slipper socks but had help with donning her L sock- pt had been coughing after she had finished donning her R sock  Toileting: Modified Independent. No difficulty reported as she had finished her routine at the start of session including her own clothing management. BALANCE:  Sitting Balance:  Supervision. UE exercises  Standing Balance: Stand By Assistance. Doing exercises - upper and lower while holding onto the bedrail and IV pole    BED MOBILITY:  Not Tested    TRANSFERS:  Sit to Stand:  Supervision. from the edge of bed  Stand to Sit: Supervision. to the recliner chair    FUNCTIONAL MOBILITY:  Assistive Device: None  Assist Level:  supervision  Distance: To and from bathroom and 8 ft around the bed  Pt had a slow pace but an even step. No LOB. Exercise:  Pt completed BUE AROM exercises x 10 reps x 3 set/s in all joints/planes while in sitting. Pt also completed heel and toe raises x 8 reps while standing. Exercises completed to increase pt's strength or endurance for ease of doing self care and functional mobility. Activity Tolerance:  Patient tolerance of  treatment: fair. Pt was having fatigue while doing the exercises and needed a rest break before walking. Assessment:  Assessment: Patient would benefit from continued skilled OT services to address above deficits. She presents with hypoxia. She had hx of polio and recently had a tracheostomy and PEG tube placed after having hysterectomoy in January. She was on the IP Rehab unit until a few days prior to this admission. She was independent with self care with her spouse helping with Tube feeding. She was walking without any AD. Pt demonstrated doing self care with CGA needed for donning slipper socks as she had started to cough from the stimulation of bending forward while crossing her legs and reaching toward her feet.   She walked in her room with supervision without any AD. Pt demonstrated some exercises and has moderate fatigue with activity. Performance deficits / Impairments: Decreased functional mobility ,Decreased ADL status,Decreased high-level IADLs,Decreased endurance,Decreased strength  Prognosis: Good  REQUIRES OT FOLLOW UP: Yes  Decision Making: Medium Complexity    Treatment Initiated: Treatment and education initiated within context of evaluation. Evaluation time included review of current medical information, gathering information related to past medical, social and functional history, completion of standardized testing, formal and informal observation of tasks, assessment of data and development of plan of care and goals. Treatment time included skilled education and facilitation of tasks to increase safety and independence with ADL's for improved functional independence and quality of life. Discussed with pt and spouse the importance of protecting her skin by changing positions while sitting and of having a soft cushion to help evenly distribute her body weight while sitting in a chair. Pt demonstrated understanding. Pt had edema in her legs and her slipper socks were slipped at the elastic band and her legs were elevated on a chair at the end of session. Discharge Recommendations:  Patient would benefit from continued therapy after discharge,Home with Home health OT    Patient Education:  OT Education: Kelsey Valerio [facility]  Patient Education: Edema control and skin protection techniques    Equipment Recommendations:  Equipment Needed: No    Plan:  Times per week: 5x  Current Treatment Recommendations: Strengthening,Functional Mobility Training,Endurance Training,Self-Care / ADL,Balance Training  Plan Comment: Pt will benefit from continued skilled OT services when medically stable and discharged from Acute. HHOT recommended. Specific instructions for Next Treatment: Functional mobility; ADLs and standing balance; UE exercises.   See long-term goal time frame for expected duration of plan of care. If no long-term goals established, a short length of stay is anticipated. Goals:  Patient goals : \"I want to get stronger and feel more steady when up and doing things. \" pt states. Short term goals  Time Frame for Short term goals: By discharge  Short term goal 1: Pt will complete lower body dressing and bathing while crossing her legs with supervision while using any adaptations needed to increase her independence with self care. Short term goal 2: Pt will demonstrate functional mobility walking in the kitchenette or to/from the bathroom while carrying light items with SBA to prepare for doing simple homemaking. Short term goal 3: Pt will complete BUE ROM/light resistance exercises while following any handout needed to increase her endurance and strength for ease of doing ADLs and light homemaking. Short term goal 4: Pt will complete ADLs while using 1-2 hand release with Supervision to increase her safety and independence with self care. Following session, patient left in safe position with all fall risk precautions in place.

## 2022-03-04 NOTE — PROGRESS NOTES
Patient arrived to [facility] via stretcher from ER. Patient accompanied by family. Family concerned that patient has yet to receive her tube feed. I stated I would review orders and address is as soon as possible. Patient assisted to bathroom at this time as well. 2 RN skin assessment completed with MERT Valdivia RN. Patient oriented to room and assisted with putting on night gown from home.

## 2022-03-04 NOTE — ED NOTES
Assumed care at this time. Bedside report received from [address] [place] [address]. No distress noted. Pt updated on admission plan. Pt verbalized understanding. Pt denied other needs at this time. Family at bedside.  Will continue to monitor     Finn Callahan RN  03/03/22 0702

## 2022-03-04 NOTE — CARE COORDINATION
DISCHARGE/PLANNING EVALUATION  3/4/22, 11:25 AM EST    Reason for Referral: Discharge planning  Mental Status: Alert and Oriented  Decision Making: Self  Family/Social/Home Environment: Patient lives with spouse in their home. Patient has supportive family and friends that can assist as needed. Current Services including food security, transportation and housekeeping: Patient was independent prior to admission. Patient's family is with her to assist as needed. Patient is current with [facility]. Current Equipment: walker but patient does not use  Payment Source: [employer]  Concerns or Barriers to Discharge: None  Post acute provider list with quality measures, geographic area and applicable managed care information provided. Questions regarding selection process answered: Offered    Teach Back Method used with patient regarding care plan and needs. Patient and spouse verbalize understanding of the plan of care and contribute to goal setting. Patient goals, treatment preferences and discharge plan: Patient plans to return home with spouse and current [facility]. Patient reported that she is unsure who all the agencies are for her trach supplies and peg tube infusions. SW updated CM and CM will follow up.      Electronically signed by NORMA Plata on 3/4/2022 at 11:25 AM

## 2022-03-04 NOTE — PROGRESS NOTES
Comprehensive Nutrition Assessment    Type and Reason for Visit:  Initial (was on bolus tube feeds on rehab)    Nutrition Recommendations/Plan:   Two Dominic HN 160ml bolus 5 times daily. If no IVF, flush with 70ml free water before & 70ml free water after each bolus feed. MD to advise it appears pt was on Full Liquid diet (no pureed or thickened liquids) on 7E 2/25/22 per RD notes. Pt currently NPO. Discussed with RN who states she will  Check with MD. Pt requesting clear liquids & I alerted pt I cannot send withouht an MD order. Nutrition Assessment:     Pt. nutritionally compromised AEB NPO on tube feeding. At risk for further nutrition compromise r/t  Admit with Acute on Chronic Hypoxic Respiratory Failure,+ Flu A, ? 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 Malnutrition Assessment:  Malnutrition Status: At risk for malnutrition (Comment)    Context:  Acute Illness     Findings of the 6 clinical characteristics of malnutrition:  Energy Intake:  Mild decrease in energy intake (Comment)  Weight Loss:  No significant weight loss (compared with wt 4/6/21)     Body Fat Loss:  No significant body fat loss     Muscle Mass Loss:  No significant muscle mass loss    Fluid Accumulation:  Unable to assess     Strength:  Not Performed    Estimated Daily Nutrient Needs:  Energy (kcal):  1328-1593kcals (25-30kcals/kgm); Weight Used for Energy Requirements:   (53.1kgm (3/4))     Protein (g):  59-90 grams (1.3-2 grams protein/kgm IBW); Weight Used for Protein Requirements:  Ideal (45kgm IBW)        Fluid (ml/day):   ;1328-1593ml (25-30ml/kgm wt of 53.1kgm)     Nutrition Related Findings:   Pt &  seen. Pt has a trach & lip syncs she was only home for a couple of days, was previously on 7E , & requesting clear liquids. Currently NPO. Pt mentions she sips on liquids such as soup PTA.  reports not able to give pt  6 tube feed boluses/day. (see below). Pt &  requests 5 tube feeds/day instead of 6. Doesn't want nocturnal feeds at this time. He also mentions he was flushing tube feed with 30ml water before & 30ml water after each TF bolus ( doesn't appear to be regimen on 7E per RD notes). Labs: (3/4) Hemoglobin 8.6,(3/3) + Flu A. No BM. Pt lip syncs her last BM was (3/2) PTA. Wounds:   (incision pelvis anterior)       Current Nutrition Therapies:    Diet NPO  ADULT TUBE FEEDING; PEG; 2.0 Calorie; Bolus; 5 Times Daily; 160; 70; Before and after each bolus  Current Tube Feeding (TF) Orders:  · Feeding Route: PEG  · Formula: 2.0 Calorie (Two Dominic HN)  · Schedule: Bolus  ·     · Water Flushes: 70ml free water flush before & 70ml free water after each TF bolus  ·  made the comment on (3/4/22) \" I can't feed her 6 times daily\".   per 7E RD notes (2/25/22)Two Dominic HN bolus of 130ml 6 times daily + 60ml free water flush before & after each TF bolus yields  1560 kcals, 65 grams protein, 170 grams CHO, 3.9 grams fiber,, 1266ml water (546 TF, 720 flushes), total volume 1500ml (780 TF, 720 flushes)  · Goal TF & Flush Orders Provides: Two Dominic HN 160ml bolus 5 times daily yields 1600kcals, 175 grams CHO, 67 grams protein, 4 grams fiber, 1260ml water ( 560 TF, 700 flushes)24ml/kgm wt of 53.1kgm (3/4/22), 1500 ml total volume ( 800ml TF, 700 flushes)      Anthropometric Measures:  · Height: 5' (152.4 cm)  · Current Body Weight: 117 lb (53.1 kg) ((3/4) edema not available)   · Admission Body Weight: 117 lb (53.1 kg) ((3/4) edema not available)    · Usual Body Weight:  (per EMR: (2/15/22) 128# 9.6oz bedscale, (10/5/21)112# 3.2oz, (4/6/22) 119# 3.2oz)     · Ideal Body Weight: 100 lbs;     · BMI: 22.9  ·   · BMI Categories: Normal Weight (BMI 22.0 to 24.9) age over 72       Nutrition Diagnosis:   · Inadequate oral intake related to impaired respiratory function as evidenced by NPO or clear liquid status due to medical condition,nutrition support - enteral nutrition      Nutrition Interventions:   Food and/or Nutrient Delivery:  Modify Tube Feeding  Nutrition Education/Counseling:  No recommendation at this time   Coordination of Nutrition Care: Will continue to follow. Goals:  Pt will meet 75% estimated needs from EN during LOS. Nutrition Monitoring and Evaluation:   Behavioral-Environmental Outcomes:  None Identified   Food/Nutrient Intake Outcomes:  Enteral Nutrition Intake/Tolerance  Physical Signs/Symptoms Outcomes:  Biochemical Data,Chewing or Swallowing,GI Status,Fluid Status or Edema,Hemodynamic Status,Nutrition Focused Physical Findings,Skin,Weight     Discharge Planning:     Too soon to determine     Electronically signed by Aliya Powell RD, LD on 3/4/22 at 12:10 PM EST    Contact: [phone]

## 2022-03-04 NOTE — PLAN OF CARE
Problem: Falls - Risk of:  Goal: Will remain free from falls  Description: Will remain free from falls  Outcome: Ongoing  Note: No falls this shift. Call light in reach and used appropriately. Bed alarm remains on. Goal: Absence of physical injury  Description: Absence of physical injury  Outcome: Ongoing  Note: Bed locked & in low position, call light in reach, side-rails up x2, bed/chair alarm utilized, non-slip socks on when ambulating, reminded patient to use call light to call for assistance. Problem: Nutrition  Goal: Optimal nutrition therapy  3/4/2022 1810 by Beni Weir RN  Outcome: Ongoing  Note: Patient increased to full liquid diet this shift, supplemented with bolus feeding 5 times per day per g-tube. Patient tolerating liquid diet, denies nausea and vomiting. Assessment ongoing.

## 2022-03-04 NOTE — PROGRESS NOTES
Hospitalist Progress Note    Patient: Whitley Wren  Unit/Bed: 0R-63/345-C  YOB: 1945  MRN: [id number]  Acct: [de-identified]    PCP: Anny Dela Cruz    Date of Admission: 3/3/2022    Assessment/Plan:    Acute on Chronic Hypoxic Respiratory Failure - Secondary to influenza A infection. 3LPM at night and room air in day. Recent tracheostomy on 2/1/22 due to difficult extubation sustained from surgical hysterectomy. Requiring trach mask with increased oxygen at night. Patient has no suction supplies at home and will need prescription at discharge. - Wean supplemental oxygen as tolerated to maintain SpO2>90%  - Duonebs as needed     Influenza A - Positive 3/3/22. Contributing to hypoxic respiratory failure and excess secretion production.  - Supportive care  - Continue Tamiflu for 5 days     Sepsis - Present on arrival with 2/4 SIRS positive (Tachycardia and tachypnea). Suspected source of Influenza A with elevated lactic acid. Patient received 30cc/kg fluid resuscitation.  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 Follows ENT at [facility].     ?Zenker's Diverticulum: Noted on prior note that was found during [facility] study at the [facility]. Patient is noted to not want surgical intervention at this time and was going to follow up with ENT for further discussion.     Hx Anxiety - Noted. Continue Zoloft, Trazodone     GERD - Noted. Continue Protonix    Expected discharge date:  Cough    Disposition:    [x] Home       [] TCU       [] Rehab       [] Psych       [] SNF       [] [facility]       [] Other -    Chief Complaint: Cough and shortness of breath    Hospital Course:  Whitley Wren is a 64 y.o. female with PMHx of Tracheostomy, GERD, Polio, and Anxiety. Patient was recently discharged from rehab 2-3 days ago.  She has been developing a cough, shortness of breath, and has been unable to properly clear her sputum production due to her history of Polio with partial vocal cord paralysis. She denies any recent sick contacts, fevers, chills, headaches, chest pain, abdominal pain, nausea, vomiting, or diarrhea.     Per chart review patient was recently hospitalized at the [facility] 1/24/22-2/15/22 for a hysterectomy due to postmenopausal bleeding. Post-op she required mechanical ventilation and tracheostomy was placed on 2/1/22 and was unable to be safely extubated. She was able to be weaned down to room air during the day time and 3L O2 via NC at night time. A PEG was also placed on 2/8/22. Of note she was also found to have a possible Zenker's diverticulum on MBS study at the [facility], thought to be contributing to her tracheal secretions. She was discharged to inpatient rehab on 2/15/22 and went home two days ago. Subjective (past 24 hours): Per nursing there were no acute events overnight. The patient stated that she feels mildly anxious but overall only slightly improved. She has some continued complaint of persisting dry cough. Review of Systems: 12 point review of systems completed and pertinent positives are noted in the HPI. All other systems reviewed and negative.     Medications:  Reviewed    Infusion Medications    sodium chloride 100 mL/hr at 03/04/22 7034    sodium chloride       Scheduled Medications    polyvinyl alcohol  2 drop Both Eyes Daily    furosemide  20 mg Oral Daily    meloxicam  15 mg Oral Daily    pantoprazole  40 mg Oral BID AC    sertraline  50 mg Oral Daily    traZODone  50 mg Oral Nightly    sodium chloride flush  5-40 mL IntraVENous 2 times per day    enoxaparin  40 mg SubCUTAneous Daily    oseltamivir 6mg/ml  75 mg Per G Tube BID     PRN Meds: melatonin, senna, zinc oxide, sodium chloride flush, sodium chloride, ondansetron **OR** ondansetron, polyethylene glycol, acetaminophen **OR** acetaminophen, magnesium sulfate, potassium chloride **OR** potassium alternative oral replacement **OR** potassium chloride      Intake/Output Summary (Last 24 hours) at 3/4/2022 0389  Last data filed at 3/4/2022 5681  Gross per 24 hour   Intake 1561.11 ml   Output --   Net 1561.11 ml       Diet:  ADULT TUBE FEEDING; PEG; 2.0 Calorie; Bolus; 5 Times Daily; 160; 70; Before and after each bolus  ADULT DIET; Full Liquid    Exam:  BP (!) 96/46   Pulse 87   Temp 99.3 °F (37.4 °C) (Axillary)   Resp 20   Ht 5' (1.524 m)   Wt 117 lb (53.1 kg)   SpO2 95%   BMI 22.85 kg/m²     Physical Exam  Vitals and nursing note reviewed. Constitutional:       General: She is awake. She is not in acute distress. Appearance: Normal appearance. She is normal weight. She is not ill-appearing. HENT:      Head: Normocephalic and atraumatic. Right Ear: External ear normal.      Left Ear: External ear normal.      Nose: Nose normal.      Mouth/Throat:      Mouth: Mucous membranes are moist.      Pharynx: Oropharynx is clear. Eyes:      Conjunctiva/sclera: Conjunctivae normal.      Pupils: Pupils are equal, round, and reactive to light. Neck:      Trachea: Tracheostomy present. No abnormal tracheal secretions or tracheal deviation. Cardiovascular:      Rate and Rhythm: Normal rate and regular rhythm. Pulses: Normal pulses. Dorsalis pedis pulses are 2+ on the right side and 2+ on the left side. Posterior tibial pulses are 2+ on the right side and 2+ on the left side. Heart sounds: Normal heart sounds. Pulmonary:      Effort: Pulmonary effort is normal.      Breath sounds: Normal breath sounds. No wheezing or rhonchi. Abdominal:      General: Bowel sounds are normal.      Palpations: Abdomen is soft. Tenderness: There is no abdominal tenderness. Musculoskeletal:         General: Normal range of motion. Cervical back: Normal range of motion and neck supple.       Right lower leg: No edema. Left lower leg: No edema. Skin:     General: Skin is warm and dry. Capillary Refill: Capillary refill takes less than 2 seconds. Neurological:      General: No focal deficit present. Mental Status: She is alert and oriented to person, place, and time. Mental status is at baseline. GCS: GCS eye subscore is 4. GCS verbal subscore is 5. GCS motor subscore is 6. Psychiatric:         Attention and Perception: Attention and perception normal.         Mood and Affect: Mood and affect normal.         Speech: Speech normal.         Behavior: Behavior normal. Behavior is cooperative. Thought Content: Thought content normal.         Cognition and Memory: Cognition and memory normal.         Judgment: Judgment normal.         Labs:  Recent Labs     03/03/22  1530 03/04/22  0414   WBC 8.3 12.4*   HGB 10.7* 8.6*   HCT 35.0* 29.1*    179     Recent Labs     03/03/22  1530 03/04/22  0414    140   K 4.3 4.3   CL 96* 101   CO2 34* 31   BUN 24* 20   CREATININE 0.5 0.4   CALCIUM 10.1 8.8     No results for input(s): AST, ALT, BILIDIR, BILITOT, ALKPHOS in the last 72 hours. No results for input(s): INR in the last 72 hours. No results for input(s): Evonnie Montane in the last 72 hours. Microbiology:    Urinalysis:   Lab Results   Component Value Date    NITRU Negative 02/12/2021    BLOODU Negative 02/12/2021    GLUCOSEU Negative 02/12/2021       Radiology:  XR CHEST PORTABLE   Final Result      No acute intrathoracic process. **This report has been created using voice recognition software. It may contain minor errors which are inherent in voice recognition technology. **      Final report electronically signed by Dr. Stanley Lopez on 3/3/2022 4:14 PM          DVT prophylaxis:  [x] Lovenox  [] SCDs  [] SQ Heparin  [] Encourage ambulation  [] Already on anticoagulation  [] Other -      Code Status: Full Code    PT/OT Evaluation Status: N/A    Telemetry:  [x] Yes  [] No    Electronically signed by Victoriano Devries DO, MBA on 3/4/2022 at 7:21 AM

## 2022-03-04 NOTE — PROGRESS NOTES
Hank Pérez was evaluated today and a DME order was entered for a nebulizer compressor in order to administer Albuterol due to the diagnosis of influena. The need for this equipment and treatment was discussed with the patient and she understands and is in agreement.

## 2022-03-05 VITALS
SYSTOLIC BLOOD PRESSURE: 126 MMHG | DIASTOLIC BLOOD PRESSURE: 57 MMHG | TEMPERATURE: 98.3 F | OXYGEN SATURATION: 92 % | BODY MASS INDEX: 23.07 KG/M2 | RESPIRATION RATE: 18 BRPM | HEART RATE: 80 BPM | HEIGHT: 60 IN | WEIGHT: 117.5 LBS

## 2022-03-05 LAB
ANION GAP SERPL CALCULATED.3IONS-SCNC: 7 MEQ/L (ref 8–16)
BUN BLDV-MCNC: 14 MG/DL (ref 7–22)
CALCIUM SERPL-MCNC: 8.9 MG/DL (ref 8.5–10.5)
CHLORIDE BLD-SCNC: 100 MEQ/L (ref 98–111)
CO2: 32 MEQ/L (ref 23–33)
CREAT SERPL-MCNC: 0.4 MG/DL (ref 0.4–1.2)
ERYTHROCYTE [DISTWIDTH] IN BLOOD BY AUTOMATED COUNT: 13.6 % (ref 11.5–14.5)
ERYTHROCYTE [DISTWIDTH] IN BLOOD BY AUTOMATED COUNT: 50.5 FL (ref 35–45)
GFR SERPL CREATININE-BSD FRML MDRD: > 90 ML/MIN/1.73M2
GLUCOSE BLD-MCNC: 93 MG/DL (ref 70–108)
HCT VFR BLD CALC: 27.9 % (ref 37–47)
HEMOGLOBIN: 8.5 GM/DL (ref 12–16)
MCH RBC QN AUTO: 30.7 PG (ref 26–33)
MCHC RBC AUTO-ENTMCNC: 30.5 GM/DL (ref 32.2–35.5)
MCV RBC AUTO: 100.7 FL (ref 81–99)
PLATELET # BLD: 168 THOU/MM3 (ref 130–400)
PMV BLD AUTO: 9.6 FL (ref 9.4–12.4)
POTASSIUM REFLEX MAGNESIUM: 3.9 MEQ/L (ref 3.5–5.2)
RBC # BLD: 2.77 MILL/MM3 (ref 4.2–5.4)
SODIUM BLD-SCNC: 139 MEQ/L (ref 135–145)
T4 FREE: 1.24 NG/DL (ref 0.93–1.76)
VITAMIN B-12: 656 PG/ML (ref 211–911)
WBC # BLD: 4.9 THOU/MM3 (ref 4.8–10.8)

## 2022-03-05 PROCEDURE — 94761 N-INVAS EAR/PLS OXIMETRY MLT: CPT

## 2022-03-05 PROCEDURE — 84439 ASSAY OF FREE THYROXINE: CPT

## 2022-03-05 PROCEDURE — 6360000002 HC RX W HCPCS: Performed by: STUDENT IN AN ORGANIZED HEALTH CARE EDUCATION/TRAINING PROGRAM

## 2022-03-05 PROCEDURE — C9113 INJ PANTOPRAZOLE SODIUM, VIA: HCPCS | Performed by: STUDENT IN AN ORGANIZED HEALTH CARE EDUCATION/TRAINING PROGRAM

## 2022-03-05 PROCEDURE — 6370000000 HC RX 637 (ALT 250 FOR IP): Performed by: STUDENT IN AN ORGANIZED HEALTH CARE EDUCATION/TRAINING PROGRAM

## 2022-03-05 PROCEDURE — 2580000003 HC RX 258: Performed by: STUDENT IN AN ORGANIZED HEALTH CARE EDUCATION/TRAINING PROGRAM

## 2022-03-05 PROCEDURE — 80048 BASIC METABOLIC PNL TOTAL CA: CPT

## 2022-03-05 PROCEDURE — 82607 VITAMIN B-12: CPT

## 2022-03-05 PROCEDURE — 36415 COLL VENOUS BLD VENIPUNCTURE: CPT

## 2022-03-05 PROCEDURE — 2700000000 HC OXYGEN THERAPY PER DAY

## 2022-03-05 PROCEDURE — 85027 COMPLETE CBC AUTOMATED: CPT

## 2022-03-05 PROCEDURE — 99239 HOSP IP/OBS DSCHRG MGMT >30: CPT | Performed by: INTERNAL MEDICINE

## 2022-03-05 RX ORDER — OSELTAMIVIR PHOSPHATE 6 MG/ML
75 FOR SUSPENSION ORAL 2 TIMES DAILY
Qty: 75 ML | Refills: 0 | Status: SHIPPED | OUTPATIENT
Start: 2022-03-05 | End: 2022-03-08

## 2022-03-05 RX ORDER — IPRATROPIUM BROMIDE AND ALBUTEROL SULFATE 2.5; .5 MG/3ML; MG/3ML
3 SOLUTION RESPIRATORY (INHALATION) EVERY 4 HOURS PRN
Qty: 360 ML | Refills: 2 | Status: SHIPPED | OUTPATIENT
Start: 2022-03-05 | End: 2022-09-07

## 2022-03-05 RX ADMIN — POLYVINYL ALCOHOL 2 DROP: 14 SOLUTION/ DROPS OPHTHALMIC at 08:54

## 2022-03-05 RX ADMIN — ENOXAPARIN SODIUM 40 MG: 100 INJECTION SUBCUTANEOUS at 08:06

## 2022-03-05 RX ADMIN — SODIUM CHLORIDE, PRESERVATIVE FREE 10 ML: 5 INJECTION INTRAVENOUS at 09:24

## 2022-03-05 RX ADMIN — PANTOPRAZOLE SODIUM 40 MG: 40 INJECTION, POWDER, FOR SOLUTION INTRAVENOUS at 08:05

## 2022-03-05 RX ADMIN — OSELTAMIVIR PHOSPHATE 75 MG: 6 POWDER, FOR SUSPENSION ORAL at 08:05

## 2022-03-05 RX ADMIN — FUROSEMIDE 20 MG: 20 TABLET ORAL at 08:04

## 2022-03-05 RX ADMIN — MELOXICAM 15 MG: 7.5 TABLET ORAL at 08:04

## 2022-03-05 RX ADMIN — SERTRALINE 50 MG: 50 TABLET, FILM COATED ORAL at 08:04

## 2022-03-05 ASSESSMENT — PAIN SCALES - GENERAL
PAINLEVEL_OUTOF10: 0
PAINLEVEL_OUTOF10: 4
PAINLEVEL_OUTOF10: 4
PAINLEVEL_OUTOF10: 0

## 2022-03-05 ASSESSMENT — PAIN SCALES - WONG BAKER: WONGBAKER_NUMERICALRESPONSE: 2

## 2022-03-05 ASSESSMENT — PAIN DESCRIPTION - PAIN TYPE: TYPE: CHRONIC PAIN

## 2022-03-05 NOTE — DISCHARGE SUMMARY
Hospital Medicine Discharge Summary      Patient Identification:  Name: Maxim Redmond  : 1945  MRN: [id number]  Account: [de-identified]    Patient's PCP: Sumeet Lai Date: 3/3/2022    Discharge Date:   3/5/22    Admitting Physician: Fernando Garza MD    Discharge Physician: Daya Marinelli DO, MBA        HPI:  Maxim Redmond is a 64 y.o. female with PMHx of Zenker's diverticulum, anxiety, bulbar polio with right vocal cord paralysis and right hemidiaphragmatic paralysis, osteoarthritis, GERD, PEG tube and tracheostomy who was admitted to [facility] on 3/3/2022 for shortness of breath and cough that began 2 days prior to presentation. The patient reported that she was unable to clear her secretions via adequate cough secondary to her history of having diaphragmatic paralysis and felt this was worsening without any alleviating interventions. The patient denied recent sick contacts, fever, chills, headaches, chest pain, abdominal pain, nausea, vomiting, or diarrhea. Please see chart for more details regarding HPI. Hospital Course:   Maxim Redmond is a 64 y.o. female who presented to [facility] for the above above referenced complaints. She was treated with Oseltamivir and fluid resuscitation with expected an adequate response. The patient stated shortness of breath had improved and her need for supplemental oxygen during the day had resolved. During hospital course the patient was able to tolerate her PEG tube feeds and stated she had mild reduction of her cough. She felt well to be discharged and confident she was improved. The patient was stable for discharge - all consultants were contacted and in agreement with plan for discharge. Appropriate follow up appointment was arranged prior to discharge. Please see below or view chart for more details from hospital course.     Discharge Diagnoses:    · Influenza A  · Acute hypoxic respiratory failure      The patient was seen and examined on day of discharge and this discharge summary is in conjunction with any daily progress note from day of discharge. The patient understood, was in agreement, and verbalized the plan of care at time of discharge. Exam:    Vitals:   Vitals:    03/05/22 0650 03/05/22 0750 03/05/22 1137 03/05/22 1523   BP:  (!) 112/56 136/64 (!) 126/57   Pulse:  81 77 80   Resp: 16 17 16 18   Temp:  98.3 °F (36.8 °C) 98.5 °F (36.9 °C) 98.3 °F (36.8 °C)   TempSrc:  Oral Oral Oral   SpO2: 97% 97% 97% 92%   Weight:       Height:         Weight: Weight: 117 lb 8 oz (53.3 kg)    24 hour intake/output:    Intake/Output Summary (Last 24 hours) at 3/5/2022 1545  Last data filed at 3/5/2022 1433  Gross per 24 hour   Intake 2415.59 ml   Output 1375 ml   Net 1040.59 ml         Physical Exam  Vitals and nursing note reviewed. Constitutional:       General: She is awake. Appearance: Normal appearance. She is normal weight. HENT:      Head: Normocephalic and atraumatic. Right Ear: External ear normal.      Left Ear: External ear normal.      Nose: Nose normal.      Mouth/Throat:      Mouth: Mucous membranes are moist.      Pharynx: Oropharynx is clear. Eyes:      Conjunctiva/sclera: Conjunctivae normal.      Pupils: Pupils are equal, round, and reactive to light. Neck:      Trachea: Tracheostomy present. Cardiovascular:      Rate and Rhythm: Normal rate and regular rhythm. Pulses: Normal pulses. Dorsalis pedis pulses are 2+ on the right side and 2+ on the left side. Posterior tibial pulses are 2+ on the right side and 2+ on the left side. Heart sounds: Normal heart sounds. Pulmonary:      Effort: Pulmonary effort is normal.      Breath sounds: Normal breath sounds. Abdominal:      General: Bowel sounds are normal.      Palpations: Abdomen is soft. Musculoskeletal:         General: Normal range of motion.       Cervical back: Normal range of motion and neck supple. Right lower leg: No edema. Left lower leg: No edema. Skin:     General: Skin is warm and dry. Capillary Refill: Capillary refill takes less than 2 seconds. Neurological:      General: No focal deficit present. Mental Status: She is alert and oriented to person, place, and time. Mental status is at baseline. GCS: GCS eye subscore is 4. GCS verbal subscore is 5. GCS motor subscore is 6. Psychiatric:         Attention and Perception: Attention and perception normal.         Mood and Affect: Mood and affect normal.         Speech: Speech normal.         Behavior: Behavior normal. Behavior is cooperative. Thought Content: Thought content normal.         Cognition and Memory: Cognition and memory normal.         Judgment: Judgment normal.             Labs: For convenience and continuity at follow-up the following most recent labs are provided:      CBC:    Lab Results   Component Value Date    WBC 4.9 03/05/2022    HGB 8.5 03/05/2022    HCT 27.9 03/05/2022     03/05/2022       Renal:    Lab Results   Component Value Date     03/05/2022    K 3.9 03/05/2022     03/05/2022    CO2 32 03/05/2022    BUN 14 03/05/2022    CREATININE 0.4 03/05/2022    CALCIUM 8.9 03/05/2022    PHOS 3.3 04/06/2021         Significant Diagnostic Studies    Radiology:   XR CHEST PORTABLE   Final Result      No acute intrathoracic process. **This report has been created using voice recognition software. It may contain minor errors which are inherent in voice recognition technology. **      Final report electronically signed by Dr. Tarik Chua on 3/3/2022 4:14 PM            Consults:     IP CONSULT TO SOCIAL WORK  IP CONSULT TO DIETITIAN    Disposition:    [x] Home       [] TCU       [] Rehab       [] Psych       [] SNF       [] [facility]       [] Other-    Condition at Discharge: stable    Code Status:  Full Code       Patient tablet  Commonly known as: MOBIC     multivitamin per tablet     pantoprazole 40 MG tablet  Commonly known as: PROTONIX  Take 1 tablet by mouth 2 times daily (before meals)     PreserVision AREDS 2 Caps     sertraline 50 MG tablet  Commonly known as: ZOLOFT  Take 1 tablet by mouth daily     traZODone 50 MG tablet  Commonly known as: DESYREL  Take 1 tablet by mouth nightly     zinc oxide 30 % Oint  Commonly known as: PINXAV  Apply 113.4 g topically 4 times daily as needed (around PEG site for irritation PRN)        STOP taking these medications    senna 8.6 MG tablet  Commonly known as: SENOKOT           Where to Get Your Medications      These medications were sent to [facility]/pharmacy #[id number]- [place] - [address] - F [phone]  [address] [place] [address]    Phone: [phone]   · oseltamivir 6mg/ml 6 MG/ML Susr suspension             Discharge Time:  Time spent on discharge is >30 minutes in the examination, evaluation, counseling, and review of medications and discharge plan. The hospital course was discussed with the patient and all questions and concerns were addressed at that time. The patient was in agreement with and verbalized understanding of the plan of care and had no additional questions or complaints. The patient was instructed to follow-up with any scheduled outpatient appointments or to report to the nearest Emergency Department if new or worsening symptoms should arise. Thank you Luis Moe for the opportunity to be involved in this patient's care. Signed:    Electronically signed by Micheal Landau Masi, DO, MBA on 3/5/2022 at 3:45 PM

## 2022-03-05 NOTE — PLAN OF CARE
Problem: Nutrition  Goal: Optimal nutrition therapy  3/5/2022 0426 by Lina Babcock RN  Outcome: Ongoing  3/4/2022 1810 by Elayne Ewing RN  Outcome: Ongoing  Note: Patient increased to full liquid diet this shift, supplemented with bolus feeding 5 times per day per g-tube. Patient tolerating liquid diet, denies nausea and vomiting. Assessment ongoing. Problem: Falls - Risk of:  Goal: Will remain free from falls  Description: Will remain free from falls  3/5/2022 0426 by Lina Babcock RN  Outcome: Ongoing  3/4/2022 1810 by Elayne Ewing RN  Outcome: Ongoing  Note: No falls this shift. Call light in reach and used appropriately. Bed alarm remains on.

## 2022-03-05 NOTE — FLOWSHEET NOTE
03/05/22 1620   Encounter Summary   Services provided to: Patient and family together   Referral/Consult From: [address]; Children   Place of [facility]   Contact Rest[employer] No   Continue Visiting No  (3/5 leaving soon)   Complexity of Encounter Low  (3/5)   Routine   Type Initial   Spiritual/Roman Catholic   Type Spiritual support   Initial Spiritual Care Contact:   Met her  and two children, a son and a daughter. She is on her way out and words of encouragement as well as our prayer card is given to her.

## 2022-03-07 LAB
GRAM STAIN RESULT: ABNORMAL
ORGANISM: ABNORMAL
ORGANISM: ABNORMAL
RESPIRATORY CULTURE: ABNORMAL

## 2022-03-07 NOTE — CARE COORDINATION
3/7/22, 7:17 AM EST    Patient goals/plan/ treatment preferences discussed by  and . Patient goals/plan/ treatment preferences reviewed with patient/ family. Patient/ family verbalize understanding of discharge plan and are in agreement with goal/plan/treatment preferences. Understanding was demonstrated using the teach back method. AVS provided by RN at time of discharge, which includes all necessary medical information pertaining to the patients current course of illness, treatment, post-discharge goals of care, and treatment preferences. Services After Discharge  Services At/After Discharge: Nursing Services,OT,PT [facility])   IMM Letter  IMM Letter given to Patient/Family/Significant other/Guardian/POA/by[de-identified] Emily Clinical Support  IMM Letter date given[de-identified] 03/04/22  IMM Letter time given[de-identified] 0150       Patient discharged home with [address].

## 2022-03-07 NOTE — TELEPHONE ENCOUNTER
Yashira Montalvo - Is there anything we need to do with this information? Patient has an appt with you and Dr Lori Hopkins tomorrow.

## 2022-03-08 ENCOUNTER — OFFICE VISIT (OUTPATIENT)
Dept: ENT CLINIC | Age: 77
End: 2022-03-08
Payer: MEDICARE

## 2022-03-08 ENCOUNTER — TELEPHONE (OUTPATIENT)
Dept: ENT CLINIC | Age: 77
End: 2022-03-08

## 2022-03-08 ENCOUNTER — PROCEDURE VISIT (OUTPATIENT)
Dept: ENT CLINIC | Age: 77
End: 2022-03-08
Payer: MEDICARE

## 2022-03-08 VITALS
RESPIRATION RATE: 14 BRPM | TEMPERATURE: 97.1 F | WEIGHT: 116.9 LBS | DIASTOLIC BLOOD PRESSURE: 70 MMHG | HEART RATE: 76 BPM | BODY MASS INDEX: 22.83 KG/M2 | SYSTOLIC BLOOD PRESSURE: 130 MMHG | OXYGEN SATURATION: 96 %

## 2022-03-08 DIAGNOSIS — J98.8 AIRWAY OBSTRUCTION: ICD-10-CM

## 2022-03-08 DIAGNOSIS — J38.01 PARALYSIS OF RIGHT VOCAL CORD: Primary | ICD-10-CM

## 2022-03-08 DIAGNOSIS — R49.0 HOARSENESS: ICD-10-CM

## 2022-03-08 DIAGNOSIS — R13.14 PHARYNGOESOPHAGEAL DYSPHAGIA: ICD-10-CM

## 2022-03-08 DIAGNOSIS — Z93.0 TRACHEOSTOMY STATUS (HCC): ICD-10-CM

## 2022-03-08 DIAGNOSIS — Z86.12 PERSONAL HISTORY OF POLIOMYELITIS: ICD-10-CM

## 2022-03-08 PROCEDURE — 31575 DIAGNOSTIC LARYNGOSCOPY: CPT | Performed by: OTOLARYNGOLOGY

## 2022-03-08 PROCEDURE — 99213 OFFICE O/P EST LOW 20 MIN: CPT | Performed by: PHYSICIAN ASSISTANT

## 2022-03-08 RX ORDER — ESOMEPRAZOLE MAGNESIUM 40 MG/1
40 FOR SUSPENSION ORAL DAILY
COMMUNITY
End: 2022-04-18

## 2022-03-08 NOTE — TELEPHONE ENCOUNTER
Received a call from Brianne Mcintyre at [facility] regarding this patient. She said patient needs a new trach but the size she currently has is on back order both from [facility] and the home health. Brianne Mcintyre stated patient's spouse is angry and frustrated about the trach not being replaced. Brianne Mcintyre said they have tried to explain to the  it is out of their control if the trach is on backorder. Brianne Mcintyre asked if we had a trach cleaning kit they could use this morning when they go to see the patient this morning. I looked and we do have a cleaning kit to give them to use this morning. Patient is being see today at 2:30 by Dr Bryan Reddy and Feli Dugan. Brianne Mcintyre asked for Dr Bryan Reddy to give his plan B if no one can get the current trach in for the patient. Would he change the size/type of trach or advise to just continue to clean the current trach until someone can get one in? Please advise after the patient is seen this afternoon so Brianne Mcintyre at [address] can be called with an updated plan for the patient.

## 2022-03-08 NOTE — PROGRESS NOTES
Physician Progress Note      PATIENT:               Ariel Hamman  CSN #:                  [id number]  :                       1945  ADMIT DATE:       3/3/2022 3:07 PM  [address] [place] DATE:        3/5/2022 5:01 PM  RESPONDING  PROVIDER #:        Tori Porter MD          QUERY TEXT:    Patient admitted with Influenza A pneumonia, Sepsis  , . Documentation   reflects Sepsis in H&P and progress note(s) dated 3/3, 3/4 . If possible,   please make selection below and/ or in discharge summary if Sepsis was: The medical record reflects the following:  Risk Factors: Influenza A positive, chronic Trache with Hx Bulbar Polio with   Right Vocal Cord Paralysis and Right Hemidiaghragmatic Paralysis: Required   multiple intubations in the past  Clinical Indicators: (Tachycardia, Tachypea), Lactic 2.4,2.5  and suspected   source Flu A Pneumonia.,   WBC 8.3, 12.4, CXR - no acute with Trache tube in   stable position  Treatment: admission, labs, imaging, 30cc/kg fluid resuscitation.- Repeat   lactic acid and trend until normal- Continue Normal saline at 100cc/hr,   Tamiflu 75mg suspension x5 days, supportive care for comorbid conditions,   symptoms    Thank You ,  Spencer Edwards  RN, BSN, [facility]  Clinical   P: [phone]  F: [phone]  Options provided:  -- Sepsis confirmed after study  -- Sepsis ruled out after study  -- Other - I will add my own diagnosis  -- Disagree - Not applicable / Not valid  -- Disagree - Clinically unable to determine / Unknown  -- Refer to Clinical Documentation Reviewer    PROVIDER RESPONSE TEXT:    Sepsis confirmed after study. Query created by: Sanford Kelly on 3/8/2022 12:29 PM      QUERY TEXT:    Patient admitted with \"Influenza A pneumonia\"  . Documentation reflects   Pneumonia in H&P  note(s) dated 3/3 . If possible, please make selection   below and /or in DC summary if Pneumonia was: The medical record reflects the following:     The medical record reflects the following:    Risk Factors: Influenza A positive, chronic Trache with Hx Bulbar Polio with   Right Vocal Cord Paralysis and Right Hemidiaghragmatic Paralysis: Required   multiple intubations in the past  Clinical Indicators: CXR - no acute with Trache tube in stable position  (Tachycardia, Tachypea), Lactic 2.4,2.5  and suspected source Flu A   Pneumonia.,   WBC 8.3, 12.4,  Treatment: admission, labs, imaging, 30cc/kg fluid resuscitation.- Repeat   lactic acid and trend until normal- Continue Normal saline at 100cc/hr,   Tamiflu 75mg suspension x5 days, supportive care for comorbid conditions,   symptoms      Thank You,  Jess Vuong  RN, BSN, [facility]  Clinical   P: [phone]  F: [phone]  Options provided:  -- Pneumonia confirmed after study  -- Pneumonia ruled out after study  -- Other - I will add my own diagnosis  -- Disagree - Not applicable / Not valid  -- Disagree - Clinically unable to determine / Unknown  -- Refer to Clinical Documentation Reviewer    PROVIDER RESPONSE TEXT:    Pneumonia ruled out after study.     Query created by: Michelle Salguero on 3/8/2022 12:35 PM      Electronically signed by:  Michela Mack MD 3/8/2022 1:12 PM

## 2022-03-08 NOTE — TELEPHONE ENCOUNTER
The  on [facility] was supposed to coordinate her getting new tracheostomy through the [employer] company. I gave SW my cell phone number and she was supposed to call me if there were any other issues with this. I haven't heard anything regarding this yet. We will talk today and see where we are at and potentially change the trach if needed. It doesn't look like she has been scheduled for follow up with pulm, so will discuss that with her during the visit as well.

## 2022-03-08 NOTE — PROGRESS NOTES
[facility]  Otolaryngology Head and Neck Surgery  Dr. Юлия Richard MD  Pt Name: Umm Madison  MRN: [id number]  YOB: 1945  Date of evaluation: 3/8/2022  Primary Care Physician: Michael Dewey      Reason for Endoscopy: The patient has a history of post intubation airway obstruction resulting in a tracheostomy several months ago. Prior to that procedure the patient was told that she had airway obstruction from bilateral vocal cord paralysis or paresis by a colleague laryngologist at [facility]. Type of Endoscopy: Flexible Fiberoptic Nasolaryngoscopy    Anesthesia: Topical Lidocaine after Afrin vasoconstriction    Consent: taken and witnessed        History of Chief Complaint:   Chief Complaint   Patient presents with    Respiratory Distress                Past Medical History   has a past medical history of GERD (gastroesophageal reflux disease), Osteoarthritis, Paralysis of vocal cords, and Polio. Past Surgical History   has a past surgical history that includes Cholecystectomy; knee surgery; laryngoscopy (N/A, 2/24/2022); and Hysterectomy (01/2022).     Medications  Current Medications:   Current Outpatient Medications   Medication Sig Dispense Refill    esomeprazole Magnesium (NEXIUM) 40 MG PACK Take 40 mg by mouth daily      oseltamivir 6mg/ml (TAMIFLU) 6 MG/ML SUSR suspension 12.5 mLs by Per G Tube route 2 times daily for 6 doses 75 mL 0    ipratropium-albuterol (DUONEB) 0.5-2.5 (3) MG/3ML SOLN nebulizer solution Inhale 3 mLs into the lungs every 4 hours as needed for Shortness of Breath 360 mL 2    aluminum & magnesium hydroxide-simethicone (MAALOX) 200-200-20 MG/5ML SUSP suspension Take 30 mLs by mouth every 6 hours as needed for Indigestion  0    sertraline (ZOLOFT) 50 MG tablet Take 1 tablet by mouth daily (Patient taking differently: Take 100 mg by mouth daily ) 30 tablet 3    traZODone (DESYREL) 50 MG tablet Take 1 tablet by mouth nightly 30 tablet 3    zinc oxide (PINXAV) 30 % OINT Apply 113.4 g topically 4 times daily as needed (around PEG site for irritation PRN)  0    furosemide (LASIX) 20 MG tablet Take 1 tablet by mouth daily 60 tablet 3    cycloSPORINE (RESTASIS) 0.05 % ophthalmic emulsion Place 2 drops into both eyes daily 1 each 0    pantoprazole (PROTONIX) 40 MG tablet Take 1 tablet by mouth 2 times daily (before meals) 60 tablet 3    magnesium oxide (MAG-OX) 400 (241.3 Mg) MG TABS tablet Take 1 tablet by mouth daily 30 tablet 3    melatonin 3 MG TABS tablet Take 2 tablets by mouth nightly as needed (sleep)      ipratropium (ATROVENT) 0.06 % nasal spray 2 sprays by Nasal route 3 times daily as needed      meloxicam (MOBIC) 7.5 MG tablet Take 15 mg by mouth daily       calcium carbonate (OSCAL) 500 MG TABS tablet Take 500 mg by mouth daily      Multiple Vitamins-Minerals (PRESERVISION AREDS 2) CAPS Take  by mouth 2 times daily. 2 tabs BID      multivitamin (THERAGRAN) per tablet Take 1 tablet by mouth daily Alive 50+       No current facility-administered medications for this visit. Home Medications:   Prior to Admission medications    Medication Sig Start Date End Date Taking?  Authorizing Provider   esomeprazole Magnesium (NEXIUM) 40 MG PACK Take 40 mg by mouth daily    Historical Provider, MD   oseltamivir 6mg/ml (TAMIFLU) 6 MG/ML SUSR suspension 12.5 mLs by Per G Tube route 2 times daily for 6 doses 3/5/22 3/8/22  Mera Devries,    ipratropium-albuterol (DUONEB) 0.5-2.5 (3) MG/3ML SOLN nebulizer solution Inhale 3 mLs into the lungs every 4 hours as needed for Shortness of Breath 3/5/22 5/4/22  Mera Devries, DO   aluminum & magnesium hydroxide-simethicone (MAALOX) 200-200-20 MG/5ML SUSP suspension Take 30 mLs by mouth every 6 hours as needed for Indigestion 2/28/22   IMELDA Daniels - CNP   sertraline (ZOLOFT) 50 MG tablet Take 1 tablet by mouth daily  Patient taking differently: Take 100 mg by mouth daily  2/28/22   IMELDA Suarez - CNP   traZODone (DESYREL) 50 MG tablet Take 1 tablet by mouth nightly 2/28/22   IMELDA Daniels CNP   zinc oxide (PINXAV) 30 % OINT Apply 113.4 g topically 4 times daily as needed (around PEG site for irritation PRN) 2/28/22   IMELDA Daniels CNP   furosemide (LASIX) 20 MG tablet Take 1 tablet by mouth daily 2/28/22   IMELDA Daniels CNP   cycloSPORINE (RESTASIS) 0.05 % ophthalmic emulsion Place 2 drops into both eyes daily 2/28/22   IMELDA Daniels CNP   pantoprazole (PROTONIX) 40 MG tablet Take 1 tablet by mouth 2 times daily (before meals) 2/28/22   IMELDA Daniels CNP   magnesium oxide (MAG-OX) 400 (241.3 Mg) MG TABS tablet Take 1 tablet by mouth daily 2/28/22   IMELDA Daniels CNP   melatonin 3 MG TABS tablet Take 2 tablets by mouth nightly as needed (sleep) 2/28/22   IMELDA Daniels CNP   ipratropium (ATROVENT) 0.06 % nasal spray 2 sprays by Nasal route 3 times daily as needed 9/14/20   Historical Provider, MD   meloxicam (MOBIC) 7.5 MG tablet Take 15 mg by mouth daily     Historical Provider, MD   calcium carbonate (OSCAL) 500 MG TABS tablet Take 500 mg by mouth daily    Historical Provider, MD   Multiple Vitamins-Minerals (PRESERVISION AREDS 2) CAPS Take  by mouth 2 times daily. 2 tabs BID    Historical Provider, MD   multivitamin [facility]) per tablet Take 1 tablet by mouth daily Alive 50+    Historical Provider, MD       Allergies  Latex, Doxycycline, and Tizanidine hcl    Family History  family history is not on file. Social History  Tobacco use:  reports that she has never smoked. She has never used smokeless tobacco.  Alcohol use:  reports previous alcohol use. Drug use:  reports no history of drug use. Findings:   The patient's right true vocal fold was across the midline towards the left side and the arytenoid was medially rotated.   The left side appeared to be hypomobile secondary to having a markedly diminished range of motion as a result of the encroachment of the immobile right side. There was pooling in the hypopharynx mostly in the right piriform sinus consistent with right-sided pharyngeal paresis      Cross midline and mobile hypoplastic right true vocal fold      Left true vocal fold responsible for opening airway small but maximum size      Pooling in piriform sinuses asymmetrical with right side being much more than left; this is consistent with right-sided pharyngeal paresis              IMPRESSIONS:  1. The patient has history of airway obstruction associated with right true vocal fold paralysis and secondary encroachment on the glottic aperture from contraction or synkinesis or both. 2.  The patient may also have pharyngeal paresis adding to her laryngal pharyngeal pathology in the form of dysphagia and difficulty protecting her airway. PLAN:  1. Based on her history and these physical findings, recommend that we proceed sometime in the near future with a right true vocal fold lateralization that might include a partial arytenoidectomy along with a right true vocal fold augmentation to increase the bulk of the cord and improve her voice. This will be done using transoral laser microsurgical techniques followed by admission to the stepdown bed for close airway monitoring and care. I reviewed with the patient and her family 3 of whom are with us during the entire visit, the indications benefits limitations and risks of proceeding in this manner. Some of the risks I described include but are not limited to bleeding, infection, need for revision lateralization or/and augmentation, tongue pain and associated problems swallowing, and continued airway limitation that might require additional approaches to removing the tracheostomy. The patient and her family reported understanding the basis of my recommendations and wishing to proceed. The  remarked that he would be happy to be scheduled tomorrow.   I will get her on my schedule as quickly as I can.             Jermaine Verdin MD   Electronically signed 3/8/2022 at 5:34 PM

## 2022-03-08 NOTE — TELEPHONE ENCOUNTER
I was contacted regarding this patient last week by the  from [facility]. I will copy the phone encounter note below. I provided the SW my cell phone number and she was to call me if there were any issues getting the smaller sized trachs that I described below. I had not heard anything and figured she had coordinated this with the DME company. She was recently admitted due to increased work of breathing and was found to have a mucous plug that was at least contributing to her breathing problems. I guess we will see how she is doing today and if they had been provided any other trachs. Will provide recommendations from there. From phone encounter 3/2/22-  I received a call regarding this patient and her tracheostomy. Patient was discharged yesterday from inpatient rehab. Social work working on supplies for her at home. Unfortunately, the medical supply company does not carry a size 7.5 cuffed tracheostomy like the patient has. I was contacted regarding recommendations.     I contacted social work to discuss. I recommended having the DME company supply a size 7 and a size 6 cuffed tracheostomy and Dr. Oswaldo Crowley might be able to change that during her office visit on 3/8/2022.  will also ask DME company if a size 7 inner cannula would be compatible with a size 7-1/2 tracheostomy. Otherwise, patient is likely going to have to repeatedly and vigorously clean the inner cannula between now and her appointment.     Prior to discharge, patient was found to have some desaturations overnight per discussion with Koby Cline CNP. Several attempts were made to perform overnight pulse ox while patient was still inpatient, but was not performed with correct oxygen settings. Patient will likely need BiPAP, but patient does not want to use that at this time reportedly.   Patient had an overnight pulse ox last night while at home and appropriate oxygen saturations were maintained with 3 L/min at 32% via trach mask per pulmonary.

## 2022-03-08 NOTE — PROGRESS NOTES
[address] EAR, NOSE AND THROAT  [address] [place] [address] [place] [id number]  Dept: [phone]  Dept Fax: [phone]  Loc: [phone]    Caron Nunez is a 64 y.o. female who was referred by No ref. provider found for:  Chief Complaint   Patient presents with    Follow-up     pt is here for f/u from hospital   .    HPI:     Current visit HPI- The patient presents for hospital follow up. The patient was seen in the ER and re-admitted since she was last seen due to increased work of breathing and fatigue. She was found to have a mucous plug and her breathing improved with suctioning, but was admitted for further care. She was discharged two days later. She has reportedly been doing fairly well since then. She is getting a shipment of the correct inner cannula's for her trach later today after our appointment. She denies shortness of breath, fevers, chills at this time. HPI from initial ENT consult 2/22/22- The patient is a 64 y.o. female with past medical history of poliomyelitis, respiratory failure, vocal cord paralysis, who presented to [facility] on 2/15/22 as a transfer from [facility] for inpatient rehab. The patient was admitted to [facility] in January for JONI-BSO secondary to post-menopausal bleeding (pathology reportedly negative). The patient became hypercapnic at the end of the procedure and required reintubation in the PACU. She was intubated on 3 separate occasions during the admission due to attempts to extubate and quickly needing re-intubated. Per Care Everywhere, patient was last intubated on 12/28/21. The patient had a tracheostomy placed on 2/1/22. The patient has been following with ENT at [facility] for many years due to right vocal cord paralysis which is believed to be secondary to polio. 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  Patient had a PEG placed due to concern for aspiration. Patient last seen in office by Dr Deena [facility]) on 9/13/21. His office note states \"true vocal cord on the right is immobile and at a lower level. The left has restricted motion (25% of normal motion). She can separate the cord to the commissure. There is a small notch in the posterior third. \" ENT consulted to evaluation of vocal cord paralysis and trach management     Subjective:      REVIEW OF SYSTEMS:    A complete multi-organ review of systems was performed using a new patient questionnaire, and reviewed by me. ENT:  negative except as noted in HPI  CONSTITUTIONAL:  negative except as noted in HPI  EYES:  negative except as noted in HPI  RESPIRATORY:  negative except as noted in HPI  CARDIOVASCULAR:  negative except as noted in HPI  GASTROINTESTINAL:  negative except as noted in HPI  GENITOURINARY:  negative except as noted in HPI  MUSCULOSKELETAL:  negative except as noted in HPI  SKIN:  negative except as noted in HPI  ENDOCRINE/METABOLIC: negative except as noted in HPI  HEMATOLOGIC/LYMPHATIC:  negative except as noted in HPI  ALLERGY/IMMUN: negative except as noted in HPI  NEUROLOGICAL:  negative except as noted in HPI  BEHAVIOR/PSYCH:  negative except as noted in HPI    Past Medical History:  Past Medical History:   Diagnosis Date    GERD (gastroesophageal reflux disease)     Osteoarthritis     Paralysis of vocal cords     Polio      Social History:    TOBACCO:   reports that she has never smoked. She has never used smokeless tobacco.  ETOH:   reports previous alcohol use. DRUGS:   reports no history of drug use. Family History:   History reviewed. No pertinent family history.     Surgical History:  Past Surgical History:   Procedure Laterality Date    CHOLECYSTECTOMY      HYSTERECTOMY  01/2022    KNEE SURGERY      Right    LARYNGOSCOPY N/A 2/24/2022    DIAGNOSTIC SUSPENSION MICROLARYNGOSCOPY BRONCHOSCOPY WITH JET VENTILATION performed by Ran Oneill MD at [place] [facility] Objective: This is a 64 y.o. female. Patient is alert and oriented to person, place and time. Patient appears well developed, well nourished. Mood is happy with normal affect. Not obviously hearing impaired. Weak speech and cough throughout exam.    /70 (Site: Left Upper Arm, Position: Sitting)   Pulse 76   Temp 97.1 °F (36.2 °C) (Infrared)   Resp 14   Wt 116 lb 14.4 oz (53 kg)   SpO2 96%   BMI 22.83 kg/m²     Head:   Normocephalic, atraumatic. No obvious masses or lesions noted. Mouth/Throat:  Lips, tongue and oral cavity: Normal. No masses or lesions noted   Dentition: good, no malocclusion  Oral mucosa: moist  Tonsils: absent  Oropharynx: normal-appearing mucosa  Hard and soft palates: symmetrical and intact. Salivary glands: not enlarged and no tenderness to palpation. Uvula: midline, no obvious lesions   Gag reflex is present. Neck: Trachea midline. Tracheostomy in place with PMV. No palpable neck crepitus or edema is noted. Lymphatic: No cervical lymphadenopathy noted. Eyes: CHAS, EOM intact. Conjunctiva moist without discharge. Lungs: Normal effort of breathing, not obviously distressed. Cardiac: normal rate and rhythm  Neuro: Cranial nerves II-XII grossly intact. Extremities: No clubbing, edema, or cyanosis noted. Procedure: Fiberoptic laryngoscopy performed in office today with Dr Nelli Lezama. See procedure note for further description of findings.     Data:    CBC:   Lab Results   Component Value Date    WBC 4.9 03/05/2022    RBC 2.77 03/05/2022    HGB 8.5 03/05/2022    HCT 27.9 03/05/2022    .7 03/05/2022    MCH 30.7 03/05/2022    MCHC 30.5 03/05/2022     03/05/2022    MPV 9.6 03/05/2022     BMP:    Lab Results   Component Value Date     03/05/2022    K 3.9 03/05/2022     03/05/2022    CO2 32 03/05/2022    BUN 14 03/05/2022    LABALBU 4.1 04/06/2021    CREATININE 0.4 03/05/2022    CALCIUM 8.9 03/05/2022    LABGLOM >90 03/05/2022    GLUCOSE 93 03/05/2022     Lab Results   Component Value Date    TSH 1.390 04/06/2021    T4FREE 1.24 03/05/2022       Assessment/Plan:     Diagnosis Orders   1. Paralysis of right vocal cord  Miscellaneous Surgery   2. Airway obstruction     3. Hoarseness     4. Pharyngoesophageal dysphagia     5. Tracheostomy status ([facility] [address]     6. Personal history of poliomyelitis       Impression and plan from findings on exam and fiberoptic laryngoscopy with  Dr Lauren Pike. IMPRESSIONS:  1. The patient has history of airway obstruction associated with right true vocal fold paralysis and secondary encroachment on the glottic aperture from contraction or synkinesis or both. 2.  The patient may also have pharyngeal paresis adding to her laryngal pharyngeal pathology in the form of dysphagia and difficulty protecting her airway.                 PLAN:  1. Based on her history and these physical findings, recommend that we proceed sometime in the near future with a right true vocal fold lateralization that might include a partial arytenoidectomy along with a right true vocal fold augmentation to increase the bulk of the cord and improve her voice.     This will be done using transoral laser microsurgical techniques followed by admission to the stepdown bed for close airway monitoring and care.     I reviewed with the patient and her family 3 of whom are with us during the entire visit, the indications benefits limitations and risks of proceeding in this manner. Some of the risks I described include but are not limited to bleeding, infection, need for revision lateralization or/and augmentation, tongue pain and associated problems swallowing, and continued airway limitation that might require additional approaches to removing the tracheostomy. The patient and her family reported understanding the basis of my recommendations and wishing to proceed. The  remarked that he would be happy to be scheduled tomorrow.   I will get her on my

## 2022-03-09 LAB
BLOOD CULTURE, ROUTINE: NORMAL
BLOOD CULTURE, ROUTINE: NORMAL

## 2022-03-09 NOTE — TELEPHONE ENCOUNTER
Patient was seen in the office yesterday and informed us that she would be getting the correct sized inner cannulas for her trach later today after our appointment. Nothing else to do at this time.

## 2022-03-10 NOTE — TELEPHONE ENCOUNTER
Is there any update since patient was in for her appointment that I can call the [address] with? Please advise.

## 2022-03-22 RX ORDER — MAGNESIUM OXIDE 400 MG/1
TABLET ORAL
Qty: 30 TABLET | Refills: 3 | OUTPATIENT
Start: 2022-03-22

## 2022-03-22 RX ORDER — PANTOPRAZOLE SODIUM 40 MG/1
TABLET, DELAYED RELEASE ORAL
Qty: 60 TABLET | Refills: 3 | OUTPATIENT
Start: 2022-03-22

## 2022-03-23 RX ORDER — TRAZODONE HYDROCHLORIDE 50 MG/1
50 TABLET ORAL NIGHTLY
Qty: 30 TABLET | Refills: 3 | OUTPATIENT
Start: 2022-03-23

## 2022-04-06 NOTE — PROGRESS NOTES
Following instructions given to patient, who states understanding:    NPO after midnight  [employer] and 's license  Wear comfortable clean clothing  Do not bring jewelry   Shower night before and morning of surgery with a liquid antibacterial soap  Bring medications in original bottles  Follow all instructions given by your physician   needed at discharge  Call [phone] for any questions  Report to SDS on 2nd floor  If you would become ill prior to surgery, please call the surgeon  May have a visitor with you, we request that you limit to 2 visitors in pre-op area  Please bring and wear mask

## 2022-04-08 ENCOUNTER — PREP FOR PROCEDURE (OUTPATIENT)
Dept: ENT CLINIC | Age: 77
End: 2022-04-08

## 2022-04-13 ENCOUNTER — TELEPHONE (OUTPATIENT)
Dept: ENT CLINIC | Age: 77
End: 2022-04-13

## 2022-04-13 NOTE — PROGRESS NOTES
Called Dr Cornel Keating spoke to Francisco in regards to lab values she said she will address weather they will repeat cbc day of surgery.

## 2022-04-13 NOTE — TELEPHONE ENCOUNTER
Ritu from [facility] called to make sure Dr Lincoln Ibarra was aware of Lenka's H&H results. Running a little low (Hematocrit 8.5) and wondered if he may want to repeat a CBC tomorrow morning prior to surgery. This has been added to her orders. Please note if you would like anything further.

## 2022-04-14 ENCOUNTER — ANESTHESIA EVENT (OUTPATIENT)
Dept: OPERATING ROOM | Age: 77
DRG: 168 | End: 2022-04-14
Payer: MEDICARE

## 2022-04-14 ENCOUNTER — HOSPITAL ENCOUNTER (INPATIENT)
Age: 77
LOS: 2 days | Discharge: HOME | DRG: 168 | End: 2022-04-16
Attending: OTOLARYNGOLOGY | Admitting: OTOLARYNGOLOGY
Payer: MEDICARE

## 2022-04-14 ENCOUNTER — ANESTHESIA (OUTPATIENT)
Dept: OPERATING ROOM | Age: 77
DRG: 168 | End: 2022-04-14
Payer: MEDICARE

## 2022-04-14 VITALS
RESPIRATION RATE: 9 BRPM | OXYGEN SATURATION: 97 % | SYSTOLIC BLOOD PRESSURE: 112 MMHG | DIASTOLIC BLOOD PRESSURE: 54 MMHG

## 2022-04-14 DIAGNOSIS — R13.14 PHARYNGOESOPHAGEAL DYSPHAGIA: ICD-10-CM

## 2022-04-14 DIAGNOSIS — J38.6 SUPRAGLOTTIC AIRWAY OBSTRUCTION: Primary | ICD-10-CM

## 2022-04-14 DIAGNOSIS — J95.88: ICD-10-CM

## 2022-04-14 LAB
CREAT SERPL-MCNC: 0.6 MG/DL (ref 0.4–1.2)
GFR SERPL CREATININE-BSD FRML MDRD: > 90 ML/MIN/1.73M2
REASON FOR REJECTION: NORMAL
REJECTED TEST: NORMAL

## 2022-04-14 PROCEDURE — 3700000001 HC ADD 15 MINUTES (ANESTHESIA): Performed by: OTOLARYNGOLOGY

## 2022-04-14 PROCEDURE — 2580000003 HC RX 258: Performed by: OTOLARYNGOLOGY

## 2022-04-14 PROCEDURE — 2060000000 HC ICU INTERMEDIATE R&B

## 2022-04-14 PROCEDURE — 6360000002 HC RX W HCPCS

## 2022-04-14 PROCEDURE — 2700000000 HC OXYGEN THERAPY PER DAY

## 2022-04-14 PROCEDURE — 6360000002 HC RX W HCPCS: Performed by: OTOLARYNGOLOGY

## 2022-04-14 PROCEDURE — 2709999900 HC NON-CHARGEABLE SUPPLY: Performed by: OTOLARYNGOLOGY

## 2022-04-14 PROCEDURE — 6360000002 HC RX W HCPCS: Performed by: NURSE ANESTHETIST, CERTIFIED REGISTERED

## 2022-04-14 PROCEDURE — 0CBS8ZZ EXCISION OF LARYNX, VIA NATURAL OR ARTIFICIAL OPENING ENDOSCOPIC: ICD-10-PCS | Performed by: OTOLARYNGOLOGY

## 2022-04-14 PROCEDURE — 88304 TISSUE EXAM BY PATHOLOGIST: CPT

## 2022-04-14 PROCEDURE — 94761 N-INVAS EAR/PLS OXIMETRY MLT: CPT

## 2022-04-14 PROCEDURE — 31552 LARYNGOPLASTY LARYNGEAL STEN: CPT | Performed by: OTOLARYNGOLOGY

## 2022-04-14 PROCEDURE — 3600000014 HC SURGERY LEVEL 4 ADDTL 15MIN: Performed by: OTOLARYNGOLOGY

## 2022-04-14 PROCEDURE — 82565 ASSAY OF CREATININE: CPT

## 2022-04-14 PROCEDURE — 7100000001 HC PACU RECOVERY - ADDTL 15 MIN: Performed by: OTOLARYNGOLOGY

## 2022-04-14 PROCEDURE — 3600000004 HC SURGERY LEVEL 4 BASE: Performed by: OTOLARYNGOLOGY

## 2022-04-14 PROCEDURE — 0CQS8ZZ REPAIR LARYNX, VIA NATURAL OR ARTIFICIAL OPENING ENDOSCOPIC: ICD-10-PCS | Performed by: OTOLARYNGOLOGY

## 2022-04-14 PROCEDURE — 31561 LARYNSCOP REMVE CART + SCOP: CPT | Performed by: OTOLARYNGOLOGY

## 2022-04-14 PROCEDURE — 0CUT8JZ SUPPLEMENT RIGHT VOCAL CORD WITH SYNTHETIC SUBSTITUTE, VIA NATURAL OR ARTIFICIAL OPENING ENDOSCOPIC: ICD-10-PCS | Performed by: OTOLARYNGOLOGY

## 2022-04-14 PROCEDURE — 2500000003 HC RX 250 WO HCPCS: Performed by: NURSE ANESTHETIST, CERTIFIED REGISTERED

## 2022-04-14 PROCEDURE — 3700000000 HC ANESTHESIA ATTENDED CARE: Performed by: OTOLARYNGOLOGY

## 2022-04-14 PROCEDURE — 7100000000 HC PACU RECOVERY - FIRST 15 MIN: Performed by: OTOLARYNGOLOGY

## 2022-04-14 PROCEDURE — 36415 COLL VENOUS BLD VENIPUNCTURE: CPT

## 2022-04-14 PROCEDURE — 94660 CPAP INITIATION&MGMT: CPT

## 2022-04-14 PROCEDURE — 6360000002 HC RX W HCPCS: Performed by: ANESTHESIOLOGY

## 2022-04-14 PROCEDURE — 99222 1ST HOSP IP/OBS MODERATE 55: CPT | Performed by: PHYSICIAN ASSISTANT

## 2022-04-14 RX ORDER — PANTOPRAZOLE SODIUM 40 MG/1
40 TABLET, DELAYED RELEASE ORAL
Status: DISCONTINUED | OUTPATIENT
Start: 2022-04-15 | End: 2022-04-14 | Stop reason: SDUPTHER

## 2022-04-14 RX ORDER — FENTANYL CITRATE 50 UG/ML
25 INJECTION, SOLUTION INTRAMUSCULAR; INTRAVENOUS EVERY 5 MIN PRN
Status: DISCONTINUED | OUTPATIENT
Start: 2022-04-14 | End: 2022-04-14 | Stop reason: HOSPADM

## 2022-04-14 RX ORDER — SODIUM CHLORIDE 9 MG/ML
25 INJECTION, SOLUTION INTRAVENOUS PRN
Status: CANCELLED | OUTPATIENT
Start: 2022-04-14

## 2022-04-14 RX ORDER — TRAZODONE HYDROCHLORIDE 50 MG/1
50 TABLET ORAL NIGHTLY
Status: DISCONTINUED | OUTPATIENT
Start: 2022-04-14 | End: 2022-04-16 | Stop reason: HOSPADM

## 2022-04-14 RX ORDER — POLYETHYLENE GLYCOL 3350 17 G/17G
17 POWDER, FOR SOLUTION ORAL DAILY
Status: DISCONTINUED | OUTPATIENT
Start: 2022-04-14 | End: 2022-04-16 | Stop reason: HOSPADM

## 2022-04-14 RX ORDER — SODIUM CHLORIDE 9 MG/ML
INJECTION, SOLUTION INTRAVENOUS PRN
Status: DISCONTINUED | OUTPATIENT
Start: 2022-04-14 | End: 2022-04-14 | Stop reason: HOSPADM

## 2022-04-14 RX ORDER — FUROSEMIDE 20 MG/1
20 TABLET ORAL DAILY
Status: DISCONTINUED | OUTPATIENT
Start: 2022-04-14 | End: 2022-04-16 | Stop reason: HOSPADM

## 2022-04-14 RX ORDER — SODIUM CHLORIDE 0.9 % (FLUSH) 0.9 %
5-40 SYRINGE (ML) INJECTION PRN
Status: DISCONTINUED | OUTPATIENT
Start: 2022-04-14 | End: 2022-04-14 | Stop reason: HOSPADM

## 2022-04-14 RX ORDER — OXYCODONE HYDROCHLORIDE 5 MG/1
5 TABLET ORAL EVERY 4 HOURS PRN
Status: DISCONTINUED | OUTPATIENT
Start: 2022-04-14 | End: 2022-04-16 | Stop reason: HOSPADM

## 2022-04-14 RX ORDER — ONDANSETRON 4 MG/1
4 TABLET, ORALLY DISINTEGRATING ORAL EVERY 8 HOURS PRN
Status: DISCONTINUED | OUTPATIENT
Start: 2022-04-14 | End: 2022-04-16 | Stop reason: HOSPADM

## 2022-04-14 RX ORDER — EPINEPHRINE 1 MG/ML
INJECTION, SOLUTION, CONCENTRATE INTRAVENOUS PRN
Status: DISCONTINUED | OUTPATIENT
Start: 2022-04-14 | End: 2022-04-14 | Stop reason: ALTCHOICE

## 2022-04-14 RX ORDER — ONDANSETRON 2 MG/ML
INJECTION INTRAMUSCULAR; INTRAVENOUS PRN
Status: DISCONTINUED | OUTPATIENT
Start: 2022-04-14 | End: 2022-04-14 | Stop reason: SDUPTHER

## 2022-04-14 RX ORDER — OXYCODONE HYDROCHLORIDE 5 MG/1
10 TABLET ORAL EVERY 4 HOURS PRN
Status: DISCONTINUED | OUTPATIENT
Start: 2022-04-14 | End: 2022-04-16 | Stop reason: HOSPADM

## 2022-04-14 RX ORDER — ROCURONIUM BROMIDE 10 MG/ML
INJECTION, SOLUTION INTRAVENOUS PRN
Status: DISCONTINUED | OUTPATIENT
Start: 2022-04-14 | End: 2022-04-14 | Stop reason: SDUPTHER

## 2022-04-14 RX ORDER — MAGNESIUM HYDROXIDE/ALUMINUM HYDROXICE/SIMETHICONE 120; 1200; 1200 MG/30ML; MG/30ML; MG/30ML
30 SUSPENSION ORAL EVERY 6 HOURS PRN
Status: DISCONTINUED | OUTPATIENT
Start: 2022-04-14 | End: 2022-04-16 | Stop reason: HOSPADM

## 2022-04-14 RX ORDER — ESOMEPRAZOLE MAGNESIUM 40 MG/1
40 FOR SUSPENSION ORAL DAILY
Status: DISCONTINUED | OUTPATIENT
Start: 2022-04-14 | End: 2022-04-14 | Stop reason: CLARIF

## 2022-04-14 RX ORDER — DEXAMETHASONE SODIUM PHOSPHATE 10 MG/ML
8 INJECTION, EMULSION INTRAMUSCULAR; INTRAVENOUS
Status: DISCONTINUED | OUTPATIENT
Start: 2022-04-14 | End: 2022-04-14 | Stop reason: HOSPADM

## 2022-04-14 RX ORDER — FENTANYL CITRATE 50 UG/ML
50 INJECTION, SOLUTION INTRAMUSCULAR; INTRAVENOUS EVERY 5 MIN PRN
Status: DISCONTINUED | OUTPATIENT
Start: 2022-04-14 | End: 2022-04-14 | Stop reason: HOSPADM

## 2022-04-14 RX ORDER — ONDANSETRON 2 MG/ML
4 INJECTION INTRAMUSCULAR; INTRAVENOUS
Status: DISCONTINUED | OUTPATIENT
Start: 2022-04-14 | End: 2022-04-14 | Stop reason: HOSPADM

## 2022-04-14 RX ORDER — MEPERIDINE HYDROCHLORIDE 25 MG/ML
12.5 INJECTION INTRAMUSCULAR; INTRAVENOUS; SUBCUTANEOUS EVERY 5 MIN PRN
Status: DISCONTINUED | OUTPATIENT
Start: 2022-04-14 | End: 2022-04-14 | Stop reason: HOSPADM

## 2022-04-14 RX ORDER — SODIUM CHLORIDE 0.9 % (FLUSH) 0.9 %
5-40 SYRINGE (ML) INJECTION EVERY 12 HOURS SCHEDULED
Status: DISCONTINUED | OUTPATIENT
Start: 2022-04-14 | End: 2022-04-14 | Stop reason: HOSPADM

## 2022-04-14 RX ORDER — SODIUM CHLORIDE 0.9 % (FLUSH) 0.9 %
5-40 SYRINGE (ML) INJECTION PRN
Status: CANCELLED | OUTPATIENT
Start: 2022-04-14

## 2022-04-14 RX ORDER — MULTIVITAMIN WITH IRON
1 TABLET ORAL DAILY
Status: DISCONTINUED | OUTPATIENT
Start: 2022-04-15 | End: 2022-04-16 | Stop reason: HOSPADM

## 2022-04-14 RX ORDER — ANTIOX #8/OM3/DHA/EPA/LUT/ZEAX 250-2.5 MG
2 CAPSULE ORAL 2 TIMES DAILY
Status: DISCONTINUED | OUTPATIENT
Start: 2022-04-14 | End: 2022-04-14 | Stop reason: RX

## 2022-04-14 RX ORDER — IPRATROPIUM BROMIDE 42 UG/1
2 SPRAY, METERED NASAL 3 TIMES DAILY PRN
Status: DISCONTINUED | OUTPATIENT
Start: 2022-04-14 | End: 2022-04-16 | Stop reason: HOSPADM

## 2022-04-14 RX ORDER — SERTRALINE HYDROCHLORIDE 100 MG/1
100 TABLET, FILM COATED ORAL DAILY
Status: DISCONTINUED | OUTPATIENT
Start: 2022-04-14 | End: 2022-04-16 | Stop reason: HOSPADM

## 2022-04-14 RX ORDER — FENTANYL CITRATE 50 UG/ML
INJECTION, SOLUTION INTRAMUSCULAR; INTRAVENOUS PRN
Status: DISCONTINUED | OUTPATIENT
Start: 2022-04-14 | End: 2022-04-14 | Stop reason: SDUPTHER

## 2022-04-14 RX ORDER — DEXAMETHASONE SODIUM PHOSPHATE 4 MG/ML
8 INJECTION, SOLUTION INTRA-ARTICULAR; INTRALESIONAL; INTRAMUSCULAR; INTRAVENOUS; SOFT TISSUE
Status: CANCELLED | OUTPATIENT
Start: 2022-04-14

## 2022-04-14 RX ORDER — SODIUM CHLORIDE 0.9 % (FLUSH) 0.9 %
5-40 SYRINGE (ML) INJECTION PRN
Status: DISCONTINUED | OUTPATIENT
Start: 2022-04-14 | End: 2022-04-16 | Stop reason: HOSPADM

## 2022-04-14 RX ORDER — MELOXICAM 7.5 MG/1
15 TABLET ORAL DAILY
Status: DISCONTINUED | OUTPATIENT
Start: 2022-04-14 | End: 2022-04-16 | Stop reason: HOSPADM

## 2022-04-14 RX ORDER — SODIUM CHLORIDE 0.9 % (FLUSH) 0.9 %
5-40 SYRINGE (ML) INJECTION EVERY 12 HOURS SCHEDULED
Status: DISCONTINUED | OUTPATIENT
Start: 2022-04-14 | End: 2022-04-16 | Stop reason: HOSPADM

## 2022-04-14 RX ORDER — PANTOPRAZOLE SODIUM 40 MG/1
40 TABLET, DELAYED RELEASE ORAL
Status: DISCONTINUED | OUTPATIENT
Start: 2022-04-15 | End: 2022-04-16 | Stop reason: HOSPADM

## 2022-04-14 RX ORDER — ONDANSETRON 2 MG/ML
4 INJECTION INTRAMUSCULAR; INTRAVENOUS EVERY 6 HOURS PRN
Status: DISCONTINUED | OUTPATIENT
Start: 2022-04-14 | End: 2022-04-16 | Stop reason: HOSPADM

## 2022-04-14 RX ORDER — SODIUM CHLORIDE 9 MG/ML
25 INJECTION, SOLUTION INTRAVENOUS PRN
Status: DISCONTINUED | OUTPATIENT
Start: 2022-04-14 | End: 2022-04-16 | Stop reason: HOSPADM

## 2022-04-14 RX ORDER — SODIUM CHLORIDE 450 MG/100ML
INJECTION, SOLUTION INTRAVENOUS CONTINUOUS
Status: DISCONTINUED | OUTPATIENT
Start: 2022-04-14 | End: 2022-04-16 | Stop reason: HOSPADM

## 2022-04-14 RX ORDER — CYCLOSPORINE 0.5 MG/ML
2 EMULSION OPHTHALMIC DAILY
Status: DISCONTINUED | OUTPATIENT
Start: 2022-04-14 | End: 2022-04-14 | Stop reason: RX

## 2022-04-14 RX ORDER — SODIUM CHLORIDE 0.9 % (FLUSH) 0.9 %
5-40 SYRINGE (ML) INJECTION EVERY 12 HOURS SCHEDULED
Status: CANCELLED | OUTPATIENT
Start: 2022-04-14

## 2022-04-14 RX ORDER — CALCIUM CARBONATE 500(1250)
500 TABLET ORAL DAILY
Status: DISCONTINUED | OUTPATIENT
Start: 2022-04-15 | End: 2022-04-16 | Stop reason: HOSPADM

## 2022-04-14 RX ORDER — PROPOFOL 10 MG/ML
INJECTION, EMULSION INTRAVENOUS PRN
Status: DISCONTINUED | OUTPATIENT
Start: 2022-04-14 | End: 2022-04-14 | Stop reason: SDUPTHER

## 2022-04-14 RX ORDER — IPRATROPIUM BROMIDE AND ALBUTEROL SULFATE 2.5; .5 MG/3ML; MG/3ML
3 SOLUTION RESPIRATORY (INHALATION) EVERY 4 HOURS PRN
Status: DISCONTINUED | OUTPATIENT
Start: 2022-04-14 | End: 2022-04-16 | Stop reason: HOSPADM

## 2022-04-14 RX ORDER — SODIUM CHLORIDE 9 MG/ML
25 INJECTION, SOLUTION INTRAVENOUS PRN
Status: DISCONTINUED | OUTPATIENT
Start: 2022-04-14 | End: 2022-04-14 | Stop reason: HOSPADM

## 2022-04-14 RX ORDER — LANOLIN ALCOHOL/MO/W.PET/CERES
6 CREAM (GRAM) TOPICAL NIGHTLY PRN
Status: DISCONTINUED | OUTPATIENT
Start: 2022-04-14 | End: 2022-04-16 | Stop reason: HOSPADM

## 2022-04-14 RX ADMIN — SODIUM CHLORIDE 3000 MG: 900 INJECTION INTRAVENOUS at 22:20

## 2022-04-14 RX ADMIN — ROCURONIUM BROMIDE 20 MG: 50 INJECTION, SOLUTION INTRAVENOUS at 13:57

## 2022-04-14 RX ADMIN — PROPOFOL 100 MG: 10 INJECTION, EMULSION INTRAVENOUS at 12:27

## 2022-04-14 RX ADMIN — SODIUM CHLORIDE: 9 INJECTION, SOLUTION INTRAVENOUS at 15:48

## 2022-04-14 RX ADMIN — ROCURONIUM BROMIDE 50 MG: 50 INJECTION, SOLUTION INTRAVENOUS at 12:58

## 2022-04-14 RX ADMIN — DEXAMETHASONE SODIUM PHOSPHATE 8 MG: 10 INJECTION, EMULSION INTRAMUSCULAR; INTRAVENOUS at 10:54

## 2022-04-14 RX ADMIN — SODIUM CHLORIDE: 4.5 INJECTION, SOLUTION INTRAVENOUS at 18:06

## 2022-04-14 RX ADMIN — HYDROMORPHONE HYDROCHLORIDE 0.5 MG: 1 INJECTION, SOLUTION INTRAMUSCULAR; INTRAVENOUS; SUBCUTANEOUS at 20:04

## 2022-04-14 RX ADMIN — FENTANYL CITRATE 100 MCG: 50 INJECTION, SOLUTION INTRAMUSCULAR; INTRAVENOUS at 13:06

## 2022-04-14 RX ADMIN — HYDROMORPHONE HYDROCHLORIDE 0.5 MG: 1 INJECTION, SOLUTION INTRAMUSCULAR; INTRAVENOUS; SUBCUTANEOUS at 16:43

## 2022-04-14 RX ADMIN — ONDANSETRON HYDROCHLORIDE 4 MG: 4 INJECTION, SOLUTION INTRAMUSCULAR; INTRAVENOUS at 15:48

## 2022-04-14 RX ADMIN — SUGAMMADEX 200 MG: 100 INJECTION, SOLUTION INTRAVENOUS at 15:48

## 2022-04-14 RX ADMIN — SODIUM CHLORIDE 1000 ML: 9 INJECTION, SOLUTION INTRAVENOUS at 10:21

## 2022-04-14 RX ADMIN — HYDROMORPHONE HYDROCHLORIDE 0.5 MG: 1 INJECTION, SOLUTION INTRAMUSCULAR; INTRAVENOUS; SUBCUTANEOUS at 16:48

## 2022-04-14 RX ADMIN — FENTANYL CITRATE 100 MCG: 50 INJECTION, SOLUTION INTRAMUSCULAR; INTRAVENOUS at 12:27

## 2022-04-14 RX ADMIN — ROCURONIUM BROMIDE 50 MG: 50 INJECTION, SOLUTION INTRAVENOUS at 12:27

## 2022-04-14 ASSESSMENT — PULMONARY FUNCTION TESTS
PIF_VALUE: 17
PIF_VALUE: 17
PIF_VALUE: 1
PIF_VALUE: 17
PIF_VALUE: 19
PIF_VALUE: 17
PIF_VALUE: 19
PIF_VALUE: 8
PIF_VALUE: 17
PIF_VALUE: 17
PIF_VALUE: 20
PIF_VALUE: 17
PIF_VALUE: 19
PIF_VALUE: 19
PIF_VALUE: 18
PIF_VALUE: 18
PIF_VALUE: 17
PIF_VALUE: 6
PIF_VALUE: 19
PIF_VALUE: 17
PIF_VALUE: 0
PIF_VALUE: 17
PIF_VALUE: 12
PIF_VALUE: 17
PIF_VALUE: 16
PIF_VALUE: 17
PIF_VALUE: 19
PIF_VALUE: 17
PIF_VALUE: 17
PIF_VALUE: 3
PIF_VALUE: 2
PIF_VALUE: 17
PIF_VALUE: 17
PIF_VALUE: 20
PIF_VALUE: 17
PIF_VALUE: 19
PIF_VALUE: 6
PIF_VALUE: 17
PIF_VALUE: 2
PIF_VALUE: 17
PIF_VALUE: 18
PIF_VALUE: 21
PIF_VALUE: 17
PIF_VALUE: 18
PIF_VALUE: 0
PIF_VALUE: 17
PIF_VALUE: 17
PIF_VALUE: 19
PIF_VALUE: 17
PIF_VALUE: 17
PIF_VALUE: 12
PIF_VALUE: 17
PIF_VALUE: 18
PIF_VALUE: 19
PIF_VALUE: 19
PIF_VALUE: 17
PIF_VALUE: 12
PIF_VALUE: 19
PIF_VALUE: 17
PIF_VALUE: 17
PIF_VALUE: 19
PIF_VALUE: 20
PIF_VALUE: 17
PIF_VALUE: 17
PIF_VALUE: 26
PIF_VALUE: 17
PIF_VALUE: 19
PIF_VALUE: 17
PIF_VALUE: 17
PIF_VALUE: 19
PIF_VALUE: 19
PIF_VALUE: 0
PIF_VALUE: 17
PIF_VALUE: 17
PIF_VALUE: 9
PIF_VALUE: 17
PIF_VALUE: 19
PIF_VALUE: 17
PIF_VALUE: 18
PIF_VALUE: 17
PIF_VALUE: 19
PIF_VALUE: 17
PIF_VALUE: 12
PIF_VALUE: 19
PIF_VALUE: 17
PIF_VALUE: 6
PIF_VALUE: 18
PIF_VALUE: 12
PIF_VALUE: 17
PIF_VALUE: 0
PIF_VALUE: 17
PIF_VALUE: 20
PIF_VALUE: 17
PIF_VALUE: 17
PIF_VALUE: 12
PIF_VALUE: 6
PIF_VALUE: 17
PIF_VALUE: 0
PIF_VALUE: 17
PIF_VALUE: 19
PIF_VALUE: 19
PIF_VALUE: 17
PIF_VALUE: 19
PIF_VALUE: 17
PIF_VALUE: 2
PIF_VALUE: 19
PIF_VALUE: 20
PIF_VALUE: 18
PIF_VALUE: 19
PIF_VALUE: 19
PIF_VALUE: 17
PIF_VALUE: 17
PIF_VALUE: 19
PIF_VALUE: 12
PIF_VALUE: 17
PIF_VALUE: 6
PIF_VALUE: 19
PIF_VALUE: 17
PIF_VALUE: 19
PIF_VALUE: 17
PIF_VALUE: 20
PIF_VALUE: 17
PIF_VALUE: 19
PIF_VALUE: 17
PIF_VALUE: 19
PIF_VALUE: 19
PIF_VALUE: 17
PIF_VALUE: 19
PIF_VALUE: 17
PIF_VALUE: 17
PIF_VALUE: 0
PIF_VALUE: 17
PIF_VALUE: 17
PIF_VALUE: 19
PIF_VALUE: 8
PIF_VALUE: 17
PIF_VALUE: 15
PIF_VALUE: 18
PIF_VALUE: 0
PIF_VALUE: 17
PIF_VALUE: 20
PIF_VALUE: 17
PIF_VALUE: 15
PIF_VALUE: 17
PIF_VALUE: 19
PIF_VALUE: 17
PIF_VALUE: 20
PIF_VALUE: 19
PIF_VALUE: 17
PIF_VALUE: 12
PIF_VALUE: 17
PIF_VALUE: 18
PIF_VALUE: 19
PIF_VALUE: 18
PIF_VALUE: 19
PIF_VALUE: 20
PIF_VALUE: 20
PIF_VALUE: 17
PIF_VALUE: 18
PIF_VALUE: 17
PIF_VALUE: 6
PIF_VALUE: 17
PIF_VALUE: 19
PIF_VALUE: 17
PIF_VALUE: 17

## 2022-04-14 ASSESSMENT — PAIN SCALES - GENERAL
PAINLEVEL_OUTOF10: 7
PAINLEVEL_OUTOF10: 4
PAINLEVEL_OUTOF10: 8
PAINLEVEL_OUTOF10: 2
PAINLEVEL_OUTOF10: 7

## 2022-04-14 ASSESSMENT — PAIN DESCRIPTION - LOCATION
LOCATION: ABDOMEN;OTHER (COMMENT)
LOCATION: THROAT

## 2022-04-14 ASSESSMENT — PAIN SCALES - WONG BAKER: WONGBAKER_NUMERICALRESPONSE: 0

## 2022-04-14 ASSESSMENT — PAIN DESCRIPTION - PAIN TYPE
TYPE: SURGICAL PAIN
TYPE: ACUTE PAIN

## 2022-04-14 ASSESSMENT — PAIN DESCRIPTION - ORIENTATION: ORIENTATION: UPPER

## 2022-04-14 NOTE — PROGRESS NOTES
Dr. Miguel A Whitehead notified regarding patient reports of right diaphragm paralysis states she received a spinal block with her hysterectomy removal in January and it traveled up wards causing breathing issues resulting in patient needing a trach. States he will speak with patient/family.

## 2022-04-14 NOTE — ANESTHESIA PRE PROCEDURE
Department of Anesthesiology  Preprocedure Note       Name:  Hallie Sandra   Age:  64 y.o.  :  1945                                          MRN:  [id number]         Date:  2022      Surgeon: Suraj Downs):  Kris Bar MD    Procedure: Procedure(s):  TRANSORAL LARYNGOPLASTY WITH AUGMENTATION AND LATERALIZATION OF RIGHT TRUE VOCAL CORD, and  PARTIAL ARYTENOIDECTOMY    Medications prior to admission:   Prior to Admission medications    Medication Sig Start Date End Date Taking?  Authorizing Provider   esomeprazole Magnesium (NEXIUM) 40 MG PACK Take 40 mg by mouth daily    Historical Provider, MD   ipratropium-albuterol (DUONEB) 0.5-2.5 (3) MG/3ML SOLN nebulizer solution Inhale 3 mLs into the lungs every 4 hours as needed for Shortness of Breath 3/5/22 5/4/22  Dexter Devries DO   aluminum & magnesium hydroxide-simethicone (MAALOX) 200-200-20 MG/5ML SUSP suspension Take 30 mLs by mouth every 6 hours as needed for Indigestion 22   IMELDA Daniels CNP   sertraline (ZOLOFT) 50 MG tablet Take 1 tablet by mouth daily  Patient taking differently: Take 100 mg by mouth daily  22   IMELDA Andrade CNP   traZODone (DESYREL) 50 MG tablet Take 1 tablet by mouth nightly 22   IMELDA Daniels CNP   zinc oxide (PINXAV) 30 % OINT Apply 113.4 g topically 4 times daily as needed (around PEG site for irritation PRN)  Patient not taking: Reported on 2022   IMELDA Pederson CNP   furosemide (LASIX) 20 MG tablet Take 1 tablet by mouth daily  Patient not taking: Reported on 2022   IMELDA Pederson CNP   cycloSPORINE (RESTASIS) 0.05 % ophthalmic emulsion Place 2 drops into both eyes daily 22   IMELDA Daniels CNP   pantoprazole (PROTONIX) 40 MG tablet Take 1 tablet by mouth 2 times daily (before meals)  Patient not taking: Reported on 2022   Catherine Sneed, APRN - CNP   magnesium oxide (MAG-OX) 400 (241.3 Mg) MG TABS tablet Take 1 tablet by mouth daily 2/28/22   IMELDA Daniels CNP   melatonin 3 MG TABS tablet Take 2 tablets by mouth nightly as needed (sleep) 2/28/22   IMELDA Daniels CNP   ipratropium (ATROVENT) 0.06 % nasal spray 2 sprays by Nasal route 3 times daily as needed 9/14/20   Historical Provider, MD   meloxicam (MOBIC) 7.5 MG tablet Take 15 mg by mouth daily     Historical Provider, MD   calcium carbonate (OSCAL) 500 MG TABS tablet Take 500 mg by mouth daily    Historical Provider, MD   Multiple Vitamins-Minerals (PRESERVISION AREDS 2) CAPS Take  by mouth 2 times daily.  2 tabs BID    Historical Provider, MD   multivitamin [facility]) per tablet Take 1 tablet by mouth daily Alive 50+    Historical Provider, MD       Current medications:    Current Facility-Administered Medications   Medication Dose Route Frequency Provider Last Rate Last Admin    0.9 % sodium chloride infusion  25 mL IntraVENous PRN Rosie Wellington  mL/hr at 04/14/22 1217 NoRateChange at 04/14/22 1217    sodium chloride flush 0.9 % injection 5-40 mL  5-40 mL IntraVENous 2 times per day Rosie Wellington MD        sodium chloride flush 0.9 % injection 5-40 mL  5-40 mL IntraVENous PRN Rosie Wellington MD        ampicillin-sulbactam (UNASYN) 3000 mg ivpb minibag  3,000 mg IntraVENous On Call to Eli Saha MD        dexamethasone (PF) (DECADRON) injection 8 mg  8 mg IntraVENous 30 Min Pre-Op Rosie Wellington MD   8 mg at 04/14/22 1054    EPINEPHrine PF 1 MG/ML injection    PRN Rosie Wellington MD   4 mg at 04/14/22 1508     Facility-Administered Medications Ordered in Other Encounters   Medication Dose Route Frequency Provider Last Rate Last Admin    fentaNYL (SUBLIMAZE) injection   IntraVENous PRN IMELDA Johns CRNA   100 mcg at 04/14/22 1306    rocuronium (ZEMURON) injection   IntraVENous PRN IMELDA Johns CRNA   20 mg at 04/14/22 1357    propofol injection   IntraVENous Readings from Last 3 Encounters:   04/14/22 (!) 151/67   04/14/22 (!) 104/59   03/08/22 130/70       NPO Status: Time of last liquid consumption: 2100                        Time of last solid consumption: 2100                        Date of last liquid consumption: 04/13/22                        Date of last solid food consumption: 04/13/22    BMI:   Wt Readings from Last 3 Encounters:   04/14/22 112 lb 6.4 oz (51 kg)   03/08/22 116 lb 14.4 oz (53 kg)   03/05/22 117 lb 8 oz (53.3 kg)     Body mass index is 21.95 kg/m². CBC:   Lab Results   Component Value Date    WBC 4.9 03/05/2022    RBC 2.77 03/05/2022    HGB 8.5 03/05/2022    HCT 27.9 03/05/2022    .7 03/05/2022     03/05/2022       CMP:   Lab Results   Component Value Date     03/05/2022    K 3.9 03/05/2022     03/05/2022    CO2 32 03/05/2022    BUN 14 03/05/2022    CREATININE 0.4 03/05/2022    LABGLOM >90 03/05/2022    GLUCOSE 93 03/05/2022    PROT 6.4 04/06/2021    CALCIUM 8.9 03/05/2022    BILITOT 0.2 04/06/2021    ALKPHOS 62 04/06/2021    AST 25 04/06/2021    ALT 18 04/06/2021       POC Tests: No results for input(s): POCGLU, POCNA, POCK, POCCL, POCBUN, POCHEMO, POCHCT in the last 72 hours.     Coags: No results found for: PROTIME, INR, APTT    HCG (If Applicable): No results found for: PREGTESTUR, PREGSERUM, HCG, HCGQUANT     ABGs: No results found for: PHART, PO2ART, UUV6UIC, PTV2KXT, BEART, [id number]     Type & Screen (If Applicable):  No results found for: LABABO, LABRH    Drug/Infectious Status (If Applicable):  No results found for: HIV, HEPCAB    COVID-19 Screening (If Applicable):   Lab Results   Component Value Date    COVID19 NOT  DETECTED 03/03/2022           Anesthesia Evaluation  Patient summary reviewed no history of anesthetic complications:   Airway: Mallampati: II  TM distance: >3 FB   Neck ROM: full  Mouth opening: > = 3 FB Dental: normal exam         Pulmonary:normal exam Cardiovascular:                      Neuro/Psych:   (+) neuromuscular disease (diaphragmatic paralysis):,             GI/Hepatic/Renal:   (+) GERD:,           Endo/Other:    (+) : arthritis:., .                 Abdominal:             Vascular: Other Findings:             Anesthesia Plan      general     ASA 3       Induction: intravenous. MIPS: Postoperative opioids intended and Prophylactic antiemetics administered. Anesthetic plan and risks discussed with patient, spouse and child/children.       Plan discussed with CRNA and surgical team.                  Husam Kaba MD   4/14/2022

## 2022-04-14 NOTE — PROGRESS NOTES
Admitted SDS. Allergy and fall risk band applied. SCDs applied. Permission ok to write pt first name last initial on whiteboard.  Family member present,  Cassi Shahid and son Kathy Allen

## 2022-04-14 NOTE — OP NOTE
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       Procedure(s):  TRANSORAL LARYNGOPLASTY WITH AUGMENTATION AND LATERALIZATION OF RIGHT TRUE VOCAL CORD, and  PARTIAL ARYTENOIDECTOMY    Surgeon(s):  Pillo Sidhu MD    Assistant:   * No surgical staff found *    Anesthesia: General    Estimated Blood Loss (mL): less than 50     Complications: None    Specimens:   ID Type Source Tests Collected by Time Destination   A : Right Arytenoid Tissue Larynx SURGICAL PATHOLOGY Pillo Sidhu MD 4/14/2022 1416        Implants:  * No implants in log *      Drains:   Gastrostomy/Enterostomy/Jejunostomy Percutaneous Endoscopic Gastrostomy (PEG) LLQ (Active)   Drainage Appearance None 04/14/22 1019   Site Description Unable to view; Reddened 04/14/22 1019   Drain Status Clamped 04/14/22 1019   Surrounding Skin Intact 04/14/22 1019   Dressing Status Clean;Dry; Intact 04/14/22 1019   Dressing Type Other (Comment) 04/14/22 1019       Findings: 1. Difficult laryngoscopy secondary to temporomandibular joint disease and micrognathia  2. Clear ankylosis of the patient's right cricoarytenoid joint established  3. Right true vocal fold atrophy  4. Performed partial arytenoidectomy to enable lateralization which was performed  5. Supraglottic soft tissue and cartilage harvested as autograft for true vocal fold bulking    Detailed Description of Procedure: The patient was taken to the operating room awake and placed in the supine position. General anesthesia was induced and the patient was attached to the ventilatory circuit through her tracheostomy after inflating the cuff. Her IV line failed promptly and needed to be reestablished before the operation could commence.   This took many minutes to happen because of the patient's tiny and aging peripheral vascular access. Once done the table was turned 90 degrees for the procedure. A timeout was performed verify the patient's identity and planned procedure. After producing the new heat activated custom-made dental guard for her maxillary teeth, I placed it on her maxilla and passed a Huang laryngoscope first through the left cul-de-sac of the tongue without achieving any appreciable direct line of sight image of the larynx, and then through the right cul-de-sac of the tongue which enabled a good view of the posterior 1/3-1/2 of the supraglottic glottic and subglottic larynx. With pressure I could see over half. 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 Bringing onto the field and operating microscope with a CO2 UltraPulse laser system, I used a 1 mm [place] with 0.1-second delay alternating with 0 delay and continuous pattern tracing to ablate the mucosa overlying the corniculate cartilage part of the cuneiform cartilage as well as dissecting down into the posterior supraglottis and paraglottic spaces. In doing so I exposed the arytenoid body broadly and confirm the physical ankylosis of that structure to the cricoid beneath it. I then resected the lateral third of the arytenoid cutting it longitudinally with a 2 mm line configuration of the laser and then removing it in pieces with cup forceps. These were placed in saline and then transferred to formalin for permanent section.     Transoral laryngoplasty with autograft implantation of a cartilage fat graft for augmentation voice improved and lateralization of the arytenoid for airway improvement: Continue with the patient's operation, I isolated the soft tissues described above and pedicled them on the posterior inferior blood supply clipping the branches of the superior laryngeal artery with titanium clips. I also used suction cautery to achieve hemostasis of the superior blood supply. I used the same laser configuration described above to isolate the fibrofatty tissue in the region so that it would provide additional bulk and be part of the soft tissue advanced into the paraglottic space. I then dissected into the para arytenoid soft tissue space with a right going alligator forcep and took this dissection anteriorly in the plane lateral to the true vocal fold as far as I could reach. I used suction cautery to achieve hemostasis. I also placed a cottonoid saturated with epinephrine into this cavity in order to promote hemostasis as well as to further dissected anteriorly. This achieved that goal.    I then placed a double throw 4-0 Prolene suture on a PS3 cutting needle through the soft tissues overlying the thyroid lamina from proximal to distal and from medial to lateral bringing the needle out deep in the wound and then passing the needle into the airway underneath the arytenoid. And then passed it back into the wound through the soft tissues overlying the thyroid lamina again and back out through the arytenoid more posteriorly and then a second time back into the wound. I drew the suture out through the transoral system and clipped it with a hemostat in preparation for the rest of the maneuvers. With the lateralization of the arytenoid prepared, I turned my attention to making sure the autograft was sufficiently mobile to enable the next steps.   This included releasing the superior medial attachments of the fibrofatty tuft so that I could enable the distal displacement of the soft tissue and cartilage without compromising the blood supply. I placed it through the loops of the lateralization into the anterior space after irrigating out that space with copious amounts of sterile saline through a 3 cc Luer-Elena syringe and a's spritzer intended to atomized lidocaine by anesthesia. Once in place, I placed a second suture of a 4-0 Monocryl on an RB1 taper needle through the anterior AE fold mucosa into the depth of the wound and then through the posterior medial aspect of the advancement flap of fibrofatty tissues and then back through the wound so as to bury the suture and anchor the fibrofatty tissue and anteriorly displaced position. This would also prevent encroachment by any other form of tethering suture that might compromise the blood supply. I snapped the suture outside the transoral system as well. With a 2 sutures in good position, I first turned my attention to clipping the lateralization stitch which I double clipped with a titanium clip along the medial aspect of the deep dissection into the supraglottis. I then cut the excess suture material with the laser. Following this I clipped the tethering suture for the advancement flap and cut the excess suture in a similar manner. I irrigated out the wound again and then I closed the wound in layers drying the interarytenoid mucosa anteriorly and laterally on the residual arytenoid so that the interarytenoid defect would be to draw the left side arytenoid body closer to the right side on phonation. Additional sutures were placed closing the post cricoid mucosa anteriorly and laterally and leaving very little raw surface requiring removal mucosalization. A total of 4 figure-of-eight sutures were placed along this rim all of which were clipped with titanium clips and the excess suture was laser lysed to cut away with microscissors.   I cleansed the hypopharynx and larynx and remove the cottonoid that I placed above the patient's tracheostomy cannula to reduce the risk of airway fire. I removed the transoral hardware and the epiglottic traction stitch turning the patient back to anesthesia for reversal and conversion to bag ventilation for transfer to the recovery room. This was carried out without incident and the patient was taken to recovery in satisfactory condition. Patient was placed on BiPAP attachment to the tracheostomy on arrival to the PACU given her history of hyperventilating following general anesthesia. Estimated blood loss in the case was less than 10 cc and the patient received approximately a liter and half of intravenous lactated Ringer's intraoperatively. No blood products were transfused. No intraoperative complications were detected. Counts were considered accurate x3. I was present for and performed the patient's entire operation myself. Disposition: The patient be admitted postoperatively for close observation given her tendency to hypoventilation and need for intravenous pain medication. My suggestion is that she will have significant tongue edema given how difficult it was to get a line of sight view of her larynx as well as pain associated from the job being kept open despite her temporomandibular joint problems.     Electronically signed by Janis Keller MD on 4/14/2022 at 4:47 PM

## 2022-04-14 NOTE — CONSULTS
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 Follows ENT at [facility]. Hx Anxiety   Continue Zoloft, Trazodone  PRN agents for support during hospitalization        GERD   Protonix        Thank you, Triston Reid MD, for the consultation. Hospitalist service will  continue to follow along. Electronically signed by LISA Lu on 4/14/2022 at 7:08 PM      ===================================================================      Chief Complaint:  Vocal cord paralysis     Date of Service: Pt seen/examined in consultation on 04/14/22. History Of Present Illness:  Armin Mantilla is a 64 y.o. female who we are asked to see/evaluate by Triston Reid MD for medical management of medical management. Patient had some anticipated post operative and limitations with communication due to her primary problem and surgical intervention but otherwise is stable and had no events overnight.      Past Medical History:        Diagnosis Date    GERD (gastroesophageal reflux disease)     Osteoarthritis     Paralysis of vocal cords     Polio     Right side diaphragm paralysis     states trach oxygen at night       Past Surgical History:        Procedure Laterality Date    CHOLECYSTECTOMY      HYSTERECTOMY  01/2022    KNEE SURGERY      Right    LARYNGOSCOPY N/A 2/24/2022    DIAGNOSTIC SUSPENSION MICROLARYNGOSCOPY BRONCHOSCOPY WITH JET VENTILATION performed by Pat Morgan Ashleigh Quiñones MD at [facility]       Medications Prior to Admission:    Prior to Admission medications    Medication Sig Start Date End Date Taking?  Authorizing Provider   esomeprazole Magnesium (NEXIUM) 40 MG PACK Take 40 mg by mouth daily    Historical Provider, MD   ipratropium-albuterol (DUONEB) 0.5-2.5 (3) MG/3ML SOLN nebulizer solution Inhale 3 mLs into the lungs every 4 hours as needed for Shortness of Breath 3/5/22 5/4/22  Aria Devries DO   aluminum & magnesium hydroxide-simethicone (MAALOX) 200-200-20 MG/5ML SUSP suspension Take 30 mLs by mouth every 6 hours as needed for Indigestion 2/28/22   IMELDA Daniels CNP   sertraline (ZOLOFT) 50 MG tablet Take 1 tablet by mouth daily  Patient taking differently: Take 100 mg by mouth daily  2/28/22   IMELDA Ibrahim CNP   traZODone (DESYREL) 50 MG tablet Take 1 tablet by mouth nightly 2/28/22   IMELDA Daniels CNP   zinc oxide (PINXAV) 30 % OINT Apply 113.4 g topically 4 times daily as needed (around PEG site for irritation PRN)  Patient not taking: Reported on 4/14/2022 2/28/22   IMELDA Hernandez CNP   furosemide (LASIX) 20 MG tablet Take 1 tablet by mouth daily  Patient not taking: Reported on 4/14/2022 2/28/22   IMELDA Hernandez CNP   cycloSPORINE (RESTASIS) 0.05 % ophthalmic emulsion Place 2 drops into both eyes daily 2/28/22   IMELDA Daniels CNP   pantoprazole (PROTONIX) 40 MG tablet Take 1 tablet by mouth 2 times daily (before meals)  Patient not taking: Reported on 4/14/2022 2/28/22   IMELDA Daniels CNP   magnesium oxide (MAG-OX) 400 (241.3 Mg) MG TABS tablet Take 1 tablet by mouth daily 2/28/22   IMELDA Daniels CNP   melatonin 3 MG TABS tablet Take 2 tablets by mouth nightly as needed (sleep) 2/28/22   Catherine Sneed APRN - CNP   ipratropium (ATROVENT) 0.06 % nasal spray 2 sprays by Nasal route 3 times daily as needed 9/14/20   Historical Provider, MD   meloxicam (MOBIC) 7.5 MG seconds. Peripheral Pulses: +2 palpable, equal bilaterally. Labs:     No results for input(s): WBC, HGB, HCT, PLT in the last 72 hours. No results for input(s): NA, K, CL, CO2, BUN, CREATININE, CALCIUM, PHOS in the last 72 hours. Invalid input(s): MAGNES  No results for input(s): AST, ALT, BILIDIR, BILITOT, ALKPHOS in the last 72 hours. No results for input(s): INR in the last 72 hours. No results for input(s): Delia [place] in the last 72 hours.   Lab Results   Component Value Date    NITRU Negative 02/12/2021    BLOODU Negative 02/12/2021    GLUCOSEU Negative 02/12/2021       Radiology:    No orders to display            DVT prophylaxis:  per primary    Diet: Diet NPO Exceptions are: Sips of Water with Meds    Fluids: per primary    Code Status: Prior    PT/OT Eval Status: Active and ongoing    Electronically signed by LISA Patel on 4/14/2022 at 7:08 PM

## 2022-04-14 NOTE — ANESTHESIA POSTPROCEDURE EVALUATION
Department of Anesthesiology  Postprocedure Note    Patient: Davidson Robles  MRN: [id number]  YOB: 1945  Date of evaluation: 4/14/2022  Time:  5:01 PM     Procedure Summary     Date: 04/14/22 Room / Location: [address] [facility]    Anesthesia Start: 4412 Anesthesia Stop: 7641    Procedure: TRANSORAL LARYNGOPLASTY WITH AUGMENTATION AND LATERALIZATION OF RIGHT TRUE VOCAL CORD, and  PARTIAL ARYTENOIDECTOMY (N/A ) Diagnosis: (PARALYSIS OF VOCAL CORD)    Surgeons: Elayne Arriaza MD Responsible Provider: Norman Jimenes MD    Anesthesia Type: general ASA Status: 3          Anesthesia Type: general    Carol Ann Phase I: Carol Ann Score: 8    Carol Ann Phase II:      Last vitals: Reviewed and per EMR flowsheets.        Anesthesia Post Evaluation    Patient location during evaluation: PACU  Patient participation: complete - patient participated  Level of consciousness: awake and alert  Pain score: 2  Airway patency: patent  Nausea & Vomiting: no nausea and no vomiting  Complications: no  Cardiovascular status: hemodynamically stable and blood pressure returned to baseline  Respiratory status: spontaneous ventilation, acceptable and BIPAP (Pt with trach)  Hydration status: stable

## 2022-04-14 NOTE — H&P
HISTORY AND PHYSICAL             Date: 4/14/2022        Patient Name: Kavita Shi     YOB: 1945      Age:  64 y.o. Chief Complaint   No chief complaint on file. Tracheostomy dependence with glottic stenosis    History Obtained From   patient    History of Present Illness   The patient is a 64-year-old female with a history of post polio syndrome who underwent emergency tracheostomy after being unable to be extubated following a short procedure. She has had the tracheostomy since that time. She tolerates Passy-[place] valve but cannot tolerate plugging. She remains in the same condition and is eager to have what ever needs to be done in order to have her tracheostomy removed. Past Medical History     Past Medical History:   Diagnosis Date    GERD (gastroesophageal reflux disease)     Osteoarthritis     Paralysis of vocal cords     Polio     Right side diaphragm paralysis     states trach oxygen at night        Past Surgical History     Past Surgical History:   Procedure Laterality Date    CHOLECYSTECTOMY      HYSTERECTOMY  01/2022    KNEE SURGERY      Right    LARYNGOSCOPY N/A 2/24/2022    DIAGNOSTIC SUSPENSION MICROLARYNGOSCOPY BRONCHOSCOPY WITH JET VENTILATION performed by Elsy Coffey MD at [address]        Medications Prior to Admission     Prior to Admission medications    Medication Sig Start Date End Date Taking?  Authorizing Provider   esomeprazole Magnesium (NEXIUM) 40 MG PACK Take 40 mg by mouth daily    Historical Provider, MD   ipratropium-albuterol (DUONEB) 0.5-2.5 (3) MG/3ML SOLN nebulizer solution Inhale 3 mLs into the lungs every 4 hours as needed for Shortness of Breath 3/5/22 5/4/22  Suhail Devries DO   aluminum & magnesium hydroxide-simethicone (MAALOX) 200-200-20 MG/5ML SUSP suspension Take 30 mLs by mouth every 6 hours as needed for Indigestion 2/28/22   IMELDA Daniels - CNP   sertraline (ZOLOFT) 50 MG tablet Take 1 tablet by mouth daily  Patient taking differently: Take 100 mg by mouth daily  2/28/22   Welton Lombard, APRN - CNP   traZODone (DESYREL) 50 MG tablet Take 1 tablet by mouth nightly 2/28/22   IMELDA Daniels CNP   zinc oxide (PINXAV) 30 % OINT Apply 113.4 g topically 4 times daily as needed (around PEG site for irritation PRN)  Patient not taking: Reported on 4/14/2022 2/28/22   IMELDA Calderon CNP   furosemide (LASIX) 20 MG tablet Take 1 tablet by mouth daily  Patient not taking: Reported on 4/14/2022 2/28/22   IMELDA Calderon CNP   cycloSPORINE (RESTASIS) 0.05 % ophthalmic emulsion Place 2 drops into both eyes daily 2/28/22   IMELDA Daniels CNP   pantoprazole (PROTONIX) 40 MG tablet Take 1 tablet by mouth 2 times daily (before meals)  Patient not taking: Reported on 4/14/2022 2/28/22   IMELDA Daniels CNP   magnesium oxide (MAG-OX) 400 (241.3 Mg) MG TABS tablet Take 1 tablet by mouth daily 2/28/22   IMELDA Daniels CNP   melatonin 3 MG TABS tablet Take 2 tablets by mouth nightly as needed (sleep) 2/28/22   IMELDA Daniels CNP   ipratropium (ATROVENT) 0.06 % nasal spray 2 sprays by Nasal route 3 times daily as needed 9/14/20   Historical Provider, MD   meloxicam (MOBIC) 7.5 MG tablet Take 15 mg by mouth daily     Historical Provider, MD   calcium carbonate (OSCAL) 500 MG TABS tablet Take 500 mg by mouth daily    Historical Provider, MD   Multiple Vitamins-Minerals (PRESERVISION AREDS 2) CAPS Take  by mouth 2 times daily.  2 tabs BID    Historical Provider, MD   multivitamin (THERAGRAN) per tablet Take 1 tablet by mouth daily Alive 50+    Historical Provider, MD        Allergies   Latex, Doxycycline, and Tizanidine hcl    Social History     Social History     Tobacco History     Smoking Status  Never Smoker    Smokeless Tobacco Use  Never Used          Alcohol History     Alcohol Use Status  Not Currently          Drug Use     Drug Use Status  No          Sexual Activity Sexually Active  Not Asked                Family History   History reviewed. No pertinent family history. Review of Systems   Review of Systems  As above  Physical Exam   BP (!) 151/67   Pulse 71   Temp 98.6 °F (37 °C) (Temporal)   Resp 20   Ht 5' (1.524 m)   Wt 112 lb 6.4 oz (51 kg)   SpO2 98%   BMI 21.95 kg/m²     Physical Exam  The patient is alert pleasant older adult female in no acute distress  She has a tracheostomy in place with Passy-[place] valve in  On digital occlusion of the Passy-[place] valve, she had some inspiratory stridor and significant increased work of breathing with some discomfort. Her voice is mildly asthenic but otherwise within normal limits for age and gender. There were no signs of infection about the tracheostomy. Labs      Recent Results (from the past 24 hour(s))   SPECIMEN REJECTION    Collection Time: 04/14/22 10:19 AM   Result Value Ref Range    Rejected Test kx     Reason for Rejection see below         Imaging/Diagnostics Last 24 Hours   No results found. Assessment      Patient has airway stenosis associated with a right true vocal fold paralysis with synkinesis and has become tracheostomy dependent after intubation where she could not be extubated. Hospital Problems           Last Modified POA    * (Principal) Paralysis of vocal cords 4/14/2022 Yes          Plan   1. To the operating room for transoral laryngoplasty with right partial arytenoidectomy and vocal fold lateralization. I reviewed the indications benefits limitations and risks of proceeding in this manner with the patient and her family at the bedside to their satisfaction. They report being eager to proceed with the plan with the hopes that she can be can decannulated sometime after she is healed. We will do so. The addended note is for additional information and images regarding her current laryngeal condition.     Consultations Ordered:  None    Electronically signed by Aileen Flores MD on 4/14/22 at 12:15 PM EDT        Alida Pan MD   Physician   Specialty:  Otolaryngology   Progress Notes      Signed   Encounter Date:  3/8/2022                 Signed        Expand All Collapse All        Show:Clear all  [x]Manual[x]Template[]Copied    Added by:  [x]Roman Urena MD      []Cindy for details      [facility]  Otolaryngology Head and Neck Surgery  Dr. Alida Pan MD  Pt Name: Krystin Gates  MRN: [id number]  YOB: 1945  Date of evaluation: 3/8/2022  Primary Care Physician: Patricia Post        Reason for Endoscopy: The patient has a history of post intubation airway obstruction resulting in a tracheostomy several months ago. Prior to that procedure the patient was told that she had airway obstruction from bilateral vocal cord paralysis or paresis by a colleague laryngologist at [facility].     Type of Endoscopy: Flexible Fiberoptic Nasolaryngoscopy     Anesthesia: Topical Lidocaine after Afrin vasoconstriction     Consent: taken and witnessed           History of Chief Complaint:       Chief Complaint   Patient presents with    Respiratory Distress                             Past Medical History   has a past medical history of GERD (gastroesophageal reflux disease), Osteoarthritis, Paralysis of vocal cords, and Polio.     Past Surgical History   has a past surgical history that includes Cholecystectomy; knee surgery; laryngoscopy (N/A, 2/24/2022); and Hysterectomy (01/2022).      Medications  Current Medications:   Current Facility-Administered Medications          Current Outpatient Medications   Medication Sig Dispense Refill    esomeprazole Magnesium (NEXIUM) 40 MG PACK Take 40 mg by mouth daily        oseltamivir 6mg/ml (TAMIFLU) 6 MG/ML SUSR suspension 12.5 mLs by Per G Tube route 2 times daily for 6 doses 75 mL 0    ipratropium-albuterol (DUONEB) 0.5-2.5 (3) MG/3ML SOLN nebulizer solution Inhale 3 mLs into the lungs every 4 hours as needed for Shortness of Breath 360 mL 2    aluminum & magnesium hydroxide-simethicone (MAALOX) 200-200-20 MG/5ML SUSP suspension Take 30 mLs by mouth every 6 hours as needed for Indigestion   0    sertraline (ZOLOFT) 50 MG tablet Take 1 tablet by mouth daily (Patient taking differently: Take 100 mg by mouth daily ) 30 tablet 3    traZODone (DESYREL) 50 MG tablet Take 1 tablet by mouth nightly 30 tablet 3    zinc oxide (PINXAV) 30 % OINT Apply 113.4 g topically 4 times daily as needed (around PEG site for irritation PRN)   0    furosemide (LASIX) 20 MG tablet Take 1 tablet by mouth daily 60 tablet 3    cycloSPORINE (RESTASIS) 0.05 % ophthalmic emulsion Place 2 drops into both eyes daily 1 each 0    pantoprazole (PROTONIX) 40 MG tablet Take 1 tablet by mouth 2 times daily (before meals) 60 tablet 3    magnesium oxide (MAG-OX) 400 (241.3 Mg) MG TABS tablet Take 1 tablet by mouth daily 30 tablet 3    melatonin 3 MG TABS tablet Take 2 tablets by mouth nightly as needed (sleep)        ipratropium (ATROVENT) 0.06 % nasal spray 2 sprays by Nasal route 3 times daily as needed        meloxicam (MOBIC) 7.5 MG tablet Take 15 mg by mouth daily         calcium carbonate (OSCAL) 500 MG TABS tablet Take 500 mg by mouth daily        Multiple Vitamins-Minerals (PRESERVISION AREDS 2) CAPS Take  by mouth 2 times daily. 2 tabs BID        multivitamin (THERAGRAN) per tablet Take 1 tablet by mouth daily Alive 50+          No current facility-administered medications for this visit.         Home Medications:   Home Medications           Prior to Admission medications    Medication Sig Start Date End Date Taking?  Authorizing Provider   esomeprazole Magnesium (NEXIUM) 40 MG PACK Take 40 mg by mouth daily       Historical Provider, MD   oseltamivir 6mg/ml (TAMIFLU) 6 MG/ML SUSR suspension 12.5 mLs by Per G Tube route 2 times daily for 6 doses 3/5/22 3/8/22   Eusebio Devries DO   ipratropium-albuterol (DUONEB) 0.5-2.5 (3) MG/3ML SOLN nebulizer solution Inhale 3 mLs into the lungs every 4 hours as needed for Shortness of Breath 3/5/22 5/4/22   Raymond Devries DO   aluminum & magnesium hydroxide-simethicone (MAALOX) 200-200-20 MG/5ML SUSP suspension Take 30 mLs by mouth every 6 hours as needed for Indigestion 2/28/22     IMELDA Daniels CNP   sertraline (ZOLOFT) 50 MG tablet Take 1 tablet by mouth daily  Patient taking differently: Take 100 mg by mouth daily  2/28/22     Catherine IMELDA Mcnair - CNP   traZODone (DESYREL) 50 MG tablet Take 1 tablet by mouth nightly 2/28/22     IMELDA Daniels CNP   zinc oxide (PINXAV) 30 % OINT Apply 113.4 g topically 4 times daily as needed (around PEG site for irritation PRN) 2/28/22     IMELDA Daniels CNP   furosemide (LASIX) 20 MG tablet Take 1 tablet by mouth daily 2/28/22     IMELDA Daniels - CNP   cycloSPORINE (RESTASIS) 0.05 % ophthalmic emulsion Place 2 drops into both eyes daily 2/28/22     IMELDA Daniels CNP   pantoprazole (PROTONIX) 40 MG tablet Take 1 tablet by mouth 2 times daily (before meals) 2/28/22     IMELDA Daniels CNP   magnesium oxide (MAG-OX) 400 (241.3 Mg) MG TABS tablet Take 1 tablet by mouth daily 2/28/22     IMELDA Daniels CNP   melatonin 3 MG TABS tablet Take 2 tablets by mouth nightly as needed (sleep) 2/28/22     IMELDA Daniels - CNP   ipratropium (ATROVENT) 0.06 % nasal spray 2 sprays by Nasal route 3 times daily as needed 9/14/20     Historical Provider, MD   meloxicam (MOBIC) 7.5 MG tablet Take 15 mg by mouth daily        Historical Provider, MD   calcium carbonate (OSCAL) 500 MG TABS tablet Take 500 mg by mouth daily       Historical Provider, MD   Multiple Vitamins-Minerals (PRESERVISION AREDS 2) CAPS Take  by mouth 2 times daily.  2 tabs BID       Historical Provider, MD   multivitamin (THERAGRAN) per tablet Take 1 tablet by mouth daily Alive 50+       Historical Provider, MD            Allergies  Latex, Doxycycline, and Tizanidine hcl     Family History  family history is not on file.     Social History  Tobacco use:  reports that she has never smoked. She has never used smokeless tobacco.  Alcohol use:  reports previous alcohol use. Drug use:  reports no history of drug use.     Findings:   The patient's right true vocal fold was across the midline towards the left side and the arytenoid was medially rotated. The left side appeared to be hypomobile secondary to having a markedly diminished range of motion as a result of the encroachment of the immobile right side. There was pooling in the hypopharynx mostly in the right piriform sinus consistent with right-sided pharyngeal paresis        Cross midline and mobile hypoplastic right true vocal fold       Left true vocal fold responsible for opening airway small but maximum size       Pooling in piriform sinuses asymmetrical with right side being much more than left; this is consistent with right-sided pharyngeal paresis                   IMPRESSIONS:  1. The patient has history of airway obstruction associated with right true vocal fold paralysis and secondary encroachment on the glottic aperture from contraction or synkinesis or both. 2.  The patient may also have pharyngeal paresis adding to her laryngal pharyngeal pathology in the form of dysphagia and difficulty protecting her airway.                 PLAN:  1.   Based on her history and these physical findings, recommend that we proceed sometime in the near future with a right true vocal fold lateralization that might include a partial arytenoidectomy along with a right true vocal fold augmentation to increase the bulk of the cord and improve her voice.     This will be done using transoral laser microsurgical techniques followed by admission to the stepdown bed for close airway monitoring and care.     I reviewed with the patient and her family 3 of whom are with us during the entire visit, the indications benefits limitations and risks of proceeding in this manner. Some of the risks I described include but are not limited to bleeding, infection, need for revision lateralization or/and augmentation, tongue pain and associated problems swallowing, and continued airway limitation that might require additional approaches to removing the tracheostomy. The patient and her family reported understanding the basis of my recommendations and wishing to proceed. The  remarked that he would be happy to be scheduled tomorrow. I will get her on my schedule as quickly as I can.                                      Manju Harrington MD   Electronically signed 3/8/2022 at 5:34 PM                             Procedure visit on 3/8/2022           Procedure visit on 3/8/2022                Detailed Report            Note shared with patient        Progress Notes Info    Author Note Status Last Update User Last Update Date/Time   Manju Harrington MD Signed Manju Harrington MD 3/8/2022  5:41 PM     Chart Review Routing History    No routing history on file.

## 2022-04-15 LAB
ANION GAP SERPL CALCULATED.3IONS-SCNC: 15 MEQ/L (ref 8–16)
BACTERIA: ABNORMAL /HPF
BASOPHILS # BLD: 0.3 %
BASOPHILS ABSOLUTE: 0 THOU/MM3 (ref 0–0.1)
BILIRUBIN URINE: NEGATIVE
BLOOD, URINE: ABNORMAL
BUN BLDV-MCNC: 20 MG/DL (ref 7–22)
CALCIUM SERPL-MCNC: 8.4 MG/DL (ref 8.5–10.5)
CASTS 2: ABNORMAL /LPF
CASTS UA: ABNORMAL /LPF
CHARACTER, URINE: ABNORMAL
CHLORIDE BLD-SCNC: 105 MEQ/L (ref 98–111)
CO2: 19 MEQ/L (ref 23–33)
COLOR: ABNORMAL
CREAT SERPL-MCNC: 0.5 MG/DL (ref 0.4–1.2)
CRYSTALS, UA: ABNORMAL
EOSINOPHIL # BLD: 0.1 %
EOSINOPHILS ABSOLUTE: 0 THOU/MM3 (ref 0–0.4)
EPITHELIAL CELLS, UA: ABNORMAL /HPF
ERYTHROCYTE [DISTWIDTH] IN BLOOD BY AUTOMATED COUNT: 14.1 % (ref 11.5–14.5)
ERYTHROCYTE [DISTWIDTH] IN BLOOD BY AUTOMATED COUNT: 50.4 FL (ref 35–45)
GFR SERPL CREATININE-BSD FRML MDRD: > 90 ML/MIN/1.73M2
GLUCOSE BLD-MCNC: 89 MG/DL (ref 70–108)
GLUCOSE URINE: NEGATIVE MG/DL
HCT VFR BLD CALC: 31.4 % (ref 37–47)
HEMOGLOBIN: 9.6 GM/DL (ref 12–16)
IMMATURE GRANS (ABS): 0.05 THOU/MM3 (ref 0–0.07)
IMMATURE GRANULOCYTES: 0.5 %
KETONES, URINE: 15
LEUKOCYTE ESTERASE, URINE: ABNORMAL
LYMPHOCYTES # BLD: 12.2 %
LYMPHOCYTES ABSOLUTE: 1.2 THOU/MM3 (ref 1–4.8)
MAGNESIUM: 1.8 MG/DL (ref 1.6–2.4)
MCH RBC QN AUTO: 29.6 PG (ref 26–33)
MCHC RBC AUTO-ENTMCNC: 30.6 GM/DL (ref 32.2–35.5)
MCV RBC AUTO: 96.9 FL (ref 81–99)
MISCELLANEOUS 2: ABNORMAL
MONOCYTES # BLD: 9.7 %
MONOCYTES ABSOLUTE: 0.9 THOU/MM3 (ref 0.4–1.3)
NITRITE, URINE: NEGATIVE
NUCLEATED RED BLOOD CELLS: 0 /100 WBC
PH UA: 6.5 (ref 5–9)
PLATELET # BLD: 155 THOU/MM3 (ref 130–400)
PMV BLD AUTO: 11.3 FL (ref 9.4–12.4)
POTASSIUM REFLEX MAGNESIUM: 3.4 MEQ/L (ref 3.5–5.2)
PROTEIN UA: 30
RBC # BLD: 3.24 MILL/MM3 (ref 4.2–5.4)
RBC URINE: ABNORMAL /HPF
RENAL EPITHELIAL, UA: PRESENT
SEG NEUTROPHILS: 77.2 %
SEGMENTED NEUTROPHILS ABSOLUTE COUNT: 7.4 THOU/MM3 (ref 1.8–7.7)
SODIUM BLD-SCNC: 139 MEQ/L (ref 135–145)
SPECIFIC GRAVITY, URINE: 1.02 (ref 1–1.03)
UROBILINOGEN, URINE: 0.2 EU/DL (ref 0–1)
WBC # BLD: 9.6 THOU/MM3 (ref 4.8–10.8)
WBC UA: ABNORMAL /HPF
YEAST: ABNORMAL

## 2022-04-15 PROCEDURE — 80048 BASIC METABOLIC PNL TOTAL CA: CPT

## 2022-04-15 PROCEDURE — 6360000002 HC RX W HCPCS: Performed by: OTOLARYNGOLOGY

## 2022-04-15 PROCEDURE — 85025 COMPLETE CBC W/AUTO DIFF WBC: CPT

## 2022-04-15 PROCEDURE — 2700000000 HC OXYGEN THERAPY PER DAY

## 2022-04-15 PROCEDURE — 94002 VENT MGMT INPAT INIT DAY: CPT

## 2022-04-15 PROCEDURE — 94761 N-INVAS EAR/PLS OXIMETRY MLT: CPT

## 2022-04-15 PROCEDURE — 81001 URINALYSIS AUTO W/SCOPE: CPT

## 2022-04-15 PROCEDURE — 36415 COLL VENOUS BLD VENIPUNCTURE: CPT

## 2022-04-15 PROCEDURE — 87086 URINE CULTURE/COLONY COUNT: CPT

## 2022-04-15 PROCEDURE — 2580000003 HC RX 258: Performed by: OTOLARYNGOLOGY

## 2022-04-15 PROCEDURE — 83735 ASSAY OF MAGNESIUM: CPT

## 2022-04-15 PROCEDURE — 94660 CPAP INITIATION&MGMT: CPT

## 2022-04-15 PROCEDURE — 99232 SBSQ HOSP IP/OBS MODERATE 35: CPT | Performed by: HOSPITALIST

## 2022-04-15 PROCEDURE — 2060000000 HC ICU INTERMEDIATE R&B

## 2022-04-15 PROCEDURE — 51798 US URINE CAPACITY MEASURE: CPT

## 2022-04-15 PROCEDURE — 6370000000 HC RX 637 (ALT 250 FOR IP): Performed by: OTOLARYNGOLOGY

## 2022-04-15 PROCEDURE — 6370000000 HC RX 637 (ALT 250 FOR IP): Performed by: HOSPITALIST

## 2022-04-15 PROCEDURE — 99024 POSTOP FOLLOW-UP VISIT: CPT | Performed by: NURSE PRACTITIONER

## 2022-04-15 RX ORDER — POTASSIUM CHLORIDE 7.45 MG/ML
10 INJECTION INTRAVENOUS PRN
Status: DISCONTINUED | OUTPATIENT
Start: 2022-04-15 | End: 2022-04-16 | Stop reason: HOSPADM

## 2022-04-15 RX ORDER — SERTRALINE HYDROCHLORIDE 100 MG/1
100 TABLET, FILM COATED ORAL DAILY
COMMUNITY
End: 2022-05-19

## 2022-04-15 RX ORDER — POTASSIUM CHLORIDE 20 MEQ/1
40 TABLET, EXTENDED RELEASE ORAL PRN
Status: DISCONTINUED | OUTPATIENT
Start: 2022-04-15 | End: 2022-04-16 | Stop reason: HOSPADM

## 2022-04-15 RX ORDER — LANOLIN ALCOHOL/MO/W.PET/CERES
3 CREAM (GRAM) TOPICAL NIGHTLY PRN
COMMUNITY

## 2022-04-15 RX ADMIN — Medication 400 MG: at 08:36

## 2022-04-15 RX ADMIN — OXYCODONE HYDROCHLORIDE 10 MG: 5 TABLET ORAL at 08:37

## 2022-04-15 RX ADMIN — PANTOPRAZOLE SODIUM 40 MG: 40 TABLET, DELAYED RELEASE ORAL at 08:37

## 2022-04-15 RX ADMIN — HYDROMORPHONE HYDROCHLORIDE 0.5 MG: 1 INJECTION, SOLUTION INTRAMUSCULAR; INTRAVENOUS; SUBCUTANEOUS at 04:11

## 2022-04-15 RX ADMIN — HYDROMORPHONE HYDROCHLORIDE 0.5 MG: 1 INJECTION, SOLUTION INTRAMUSCULAR; INTRAVENOUS; SUBCUTANEOUS at 10:43

## 2022-04-15 RX ADMIN — SODIUM CHLORIDE 3000 MG: 900 INJECTION INTRAVENOUS at 04:03

## 2022-04-15 RX ADMIN — TRAZODONE HYDROCHLORIDE 50 MG: 50 TABLET ORAL at 21:19

## 2022-04-15 RX ADMIN — HYDROMORPHONE HYDROCHLORIDE 0.5 MG: 1 INJECTION, SOLUTION INTRAMUSCULAR; INTRAVENOUS; SUBCUTANEOUS at 00:41

## 2022-04-15 RX ADMIN — POTASSIUM BICARBONATE 40 MEQ: 782 TABLET, EFFERVESCENT ORAL at 12:53

## 2022-04-15 RX ADMIN — OXYCODONE HYDROCHLORIDE 5 MG: 5 TABLET ORAL at 21:17

## 2022-04-15 RX ADMIN — SODIUM CHLORIDE 3000 MG: 900 INJECTION INTRAVENOUS at 21:21

## 2022-04-15 RX ADMIN — SODIUM CHLORIDE, PRESERVATIVE FREE 10 ML: 5 INJECTION INTRAVENOUS at 08:35

## 2022-04-15 RX ADMIN — SODIUM CHLORIDE: 4.5 INJECTION, SOLUTION INTRAVENOUS at 09:11

## 2022-04-15 RX ADMIN — CALCIUM 500 MG: 500 TABLET ORAL at 08:35

## 2022-04-15 RX ADMIN — Medication 1 TABLET: at 08:35

## 2022-04-15 RX ADMIN — OXYCODONE HYDROCHLORIDE 5 MG: 5 TABLET ORAL at 12:53

## 2022-04-15 RX ADMIN — MELOXICAM 15 MG: 7.5 TABLET ORAL at 08:34

## 2022-04-15 RX ADMIN — SODIUM CHLORIDE, PRESERVATIVE FREE 10 ML: 5 INJECTION INTRAVENOUS at 21:19

## 2022-04-15 RX ADMIN — HYDROMORPHONE HYDROCHLORIDE 0.5 MG: 1 INJECTION, SOLUTION INTRAMUSCULAR; INTRAVENOUS; SUBCUTANEOUS at 23:39

## 2022-04-15 RX ADMIN — SERTRALINE 100 MG: 100 TABLET, FILM COATED ORAL at 08:35

## 2022-04-15 RX ADMIN — SODIUM CHLORIDE 3000 MG: 900 INJECTION INTRAVENOUS at 08:41

## 2022-04-15 RX ADMIN — ENOXAPARIN SODIUM 40 MG: 100 INJECTION SUBCUTANEOUS at 21:17

## 2022-04-15 RX ADMIN — SODIUM CHLORIDE 3000 MG: 900 INJECTION INTRAVENOUS at 15:59

## 2022-04-15 RX ADMIN — HYDROMORPHONE HYDROCHLORIDE 0.25 MG: 1 INJECTION, SOLUTION INTRAMUSCULAR; INTRAVENOUS; SUBCUTANEOUS at 15:27

## 2022-04-15 ASSESSMENT — PAIN DESCRIPTION - ORIENTATION
ORIENTATION: MID
ORIENTATION: MID

## 2022-04-15 ASSESSMENT — PAIN DESCRIPTION - PROGRESSION

## 2022-04-15 ASSESSMENT — PAIN SCALES - GENERAL
PAINLEVEL_OUTOF10: 4
PAINLEVEL_OUTOF10: 3
PAINLEVEL_OUTOF10: 8
PAINLEVEL_OUTOF10: 0
PAINLEVEL_OUTOF10: 2
PAINLEVEL_OUTOF10: 4
PAINLEVEL_OUTOF10: 8
PAINLEVEL_OUTOF10: 3
PAINLEVEL_OUTOF10: 8
PAINLEVEL_OUTOF10: 6
PAINLEVEL_OUTOF10: 4

## 2022-04-15 ASSESSMENT — PAIN DESCRIPTION - DESCRIPTORS
DESCRIPTORS: SORE
DESCRIPTORS: BURNING
DESCRIPTORS: SORE
DESCRIPTORS: SORE
DESCRIPTORS: BURNING;SORE
DESCRIPTORS: SORE
DESCRIPTORS: SORE

## 2022-04-15 ASSESSMENT — PAIN DESCRIPTION - ONSET
ONSET: ON-GOING

## 2022-04-15 ASSESSMENT — PAIN - FUNCTIONAL ASSESSMENT
PAIN_FUNCTIONAL_ASSESSMENT: PREVENTS OR INTERFERES SOME ACTIVE ACTIVITIES AND ADLS

## 2022-04-15 ASSESSMENT — PAIN DESCRIPTION - LOCATION
LOCATION: THROAT

## 2022-04-15 ASSESSMENT — PAIN DESCRIPTION - FREQUENCY
FREQUENCY: CONTINUOUS

## 2022-04-15 ASSESSMENT — PAIN DESCRIPTION - PAIN TYPE
TYPE: SURGICAL PAIN

## 2022-04-15 ASSESSMENT — PULMONARY FUNCTION TESTS: PIF_VALUE: 20

## 2022-04-15 NOTE — PLAN OF CARE
Problem: OXYGENATION/RESPIRATORY FUNCTION  Goal: Patient will maintain patent airway  Outcome: Ongoing     Problem: OXYGENATION/RESPIRATORY FUNCTION  Goal: Patient will achieve/maintain normal respiratory rate/effort  Description: Respiratory rate and effort will be within normal limits for the patient  4/15/2022 1945 by Michael Lockwood RCP  Outcome: Ongoing  4/15/2022 1056 by Dave Vasquez RCP  Outcome: Ongoing     Problem: MECHANICAL VENTILATION  Goal: Tracheostomy will be managed safely  4/15/2022 1945 by Michael Lockwood RCP  Outcome: Ongoing  4/15/2022 1056 by Dave Vasquez RCP  Outcome: Ongoing   Pt is doing well. Bipap HS per dr orders.  Minimal secretions at this time

## 2022-04-15 NOTE — CONSULTS
Comprehensive Nutrition Assessment    Type and Reason for Visit:  Initial,Consult (Tube Feed Order and Management)    Nutrition Recommendations/Plan:   *Recommend increasing home bolus TF regimen to 150 mL TwoCal HN, 5 times daily. *If no IV fluids, recommend flushing 100 mL before and after each bolus feed or free water per MD.  *NPO per MD.  *Recommend SLP consult to evaluate swallow function. Note: SLP recommended Clear Liquid diet during previous admission on 3/4/22. Nutrition Assessment: Pt. nutritionally compromised AEB NPO on tube feeding. 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 Malnutrition Assessment:  Malnutrition Status: At risk for malnutrition (Comment)    Context:  Acute Illness     Findings of the 6 clinical characteristics of malnutrition:  Energy Intake:  No significant decrease in energy intake (TF bolus at goal PTA)  Weight Loss:  No significant weight loss (-4.3% weight loss in one month per EMR; however note hx of edema/fluid shfits)     Body Fat Loss:  No significant body fat loss     Muscle Mass Loss:  No significant muscle mass loss    Fluid Accumulation:  1 - Mild Extremities   Strength:  Not Performed    Estimated Daily Nutrient Needs:  Energy (kcal):  5270-7979 kcal/day (25-30 kcal/kg); Weight Used for Energy Requirements:   (51 kg)     Protein (g):  61+ g/day (1.2+ g/kg);  Weight Used for Protein Requirements:   (51 kg)        Fluid (ml/day):  7787-5614 ml/day (30-35 ml/kg) or per MD; Method Used for Fluid Requirements:   (51 kg)      Nutrition Related Findings:   Pt. Report/Treatments/Miscellaneous: Pt and spouse seen; pt on vent via trach; NPO at present; note: SLP recommended Clear Liquid diet during previous admission on 3/4/22. Pt able to lip sync words to RD but spouse did not of the talking. Spouse states pt was drinking ~4 cups liquid daily between tea, water and broth. Spouse states he was giving patient 130 mL TwoCal HN, 5 times daily, flushing 30 mL free water before and after bolus feed, which doesn't appear to be the regimen recommended by RD from previous admit of 160 mL TwoCal HN, 5 times daily, flushing 70 ml free water before and after each bolus. GI Status: Pt denies N/V. No BM since admit. Pertinent Labs: Na 139. BUN 20, Cr. 0.5, Glucose 89, Hg 9.6. Pertinent Meds: Lasix, Multivitamin, PPI, Glycolax and IV fluids. Wounds:  Surgical Incision (s/p TRANSORAL LARYNGOPLASTY WITH AUGMENTATION AND LATERALIZATION OF RIGHT TRUE VOCAL CORD, and  PARTIAL ARYTENOIDECTOMY on 4/14/22)       Current Nutrition Therapies:    Diet NPO Exceptions are: Sips of Water with Meds, Ice Chips  ADULT TUBE FEEDING; PEG; 2.0 Calorie; Bolus; 5 Times Daily; 150; Syringe Push; 30; Q 4 hours  Current Tube Feeding (TF) Orders:  · Feeding Route: PEG  · Formula: 2.0 Calorie (TwoCal HN)  · Schedule:  Bolus  · Additives/Modulars:  (none)  · Water Flushes: recommend flushing 100 mL free water before and after each bolus feed if no IV fluids or per MD  · Current TF & Flush Orders Provides: Home TF Bolus Regimen per spouse: TwoCal HN bolus of 130 mL, 5 times daily, flushing 30 mL before and after each bolus feed to provide: 1300 kcals, 54 grams protein, 142 grams carbohydrates, 3 grams fiber, total free water of 755 mL (455 TF, 300 Flushes) in total volume of 950 mL (650 TF, 300 Flushes)/24 hours  · Goal TF & Flush Orders Provides: Recommend increasing bolus TF to 150 mL, 5 times daily, flushing 30 mL before and after each bolus feed to provide: 1500 kcal, 63 grams protein, 164 grams carbohydrates, 4 grams fiber and total free water of 1525 mL (525 TF, 1000 Flushes) in total volume of 1750 mL (750 mL, 1000 Flushes)/24 hours    Anthropometric Measures:  · Height: 5' (152.4 cm)  · Current Body Weight: 112 lb 6.4 oz (51 kg) (4/14; +1 edema BLE)   · Admission Body Weight: 112 lb 6.4 oz (51 kg) (4/14; +1 edema BLE)    · Usual Body Weight:  (116 lbs per pt report. Per EMR: 117 lb 8 oz on 3/3/22; 128 lb 9.6 oz on 2/15/22; 112 lb 3.2 oz on 2/5/21)     · Ideal Body Weight: 100 lbs  · BMI: 22    · BMI Categories: Normal Weight (BMI 22.0 to 24.9) age over 72       Nutrition Diagnosis:   · Inadequate oral intake related to impaired respiratory function as evidenced by NPO or clear liquid status due to medical condition,nutrition support - enteral nutrition    Nutrition Interventions:   Food and/or Nutrient Delivery:  Continue NPO,Start Tube Feeding  Nutrition Education/Counseling:  Education completed (Discussed plan to increased TwoCal Bolus TF with pt and family and they are okat with it. Provided pt & spouse with updated Home Bolus TF regimen and provided contact information.)   Coordination of Nutrition Care:  Continue to monitor while inpatient    Goals:  EN to provided at least 75% of estimated nutritional needs during LOS       Nutrition Monitoring and Evaluation:   Behavioral-Environmental Outcomes:  None Identified   Food/Nutrient Intake Outcomes:  Diet Advancement/Tolerance,Enteral Nutrition Intake/Tolerance,IVF Intake  Physical Signs/Symptoms Outcomes:  Biochemical Data,Chewing or Swallowing,GI Status,Fluid Status or Edema,Hemodynamic Status,Nutrition Focused Physical Findings,Skin,Weight     Discharge Planning:     Too soon to determine     Electronically signed by Tamar Best, MS, RD, LD on 4/15/22 at 9:46 AM EDT    Contact: [phone]

## 2022-04-15 NOTE — FLOWSHEET NOTE
04/15/22 1639   Treatment Team Notification   Reason for Communication Abnormal vitals  (BP 93/49 ((63))   Team Member Name Dr Natasha Hankins Provider   Method of Communication Page   Response No new orders  (continue to monitor )   Notification Time

## 2022-04-15 NOTE — PROGRESS NOTES
Vitals:    04/15/22 1130   BP: (!) 115/59   Pulse: 80   Resp: 20   Temp: 98.4 °F (36.9 °C)   SpO2: 100%     Patient currently up in chair. She is alert, oriented and cooperative. She is now on 40% FiO2 aerosol trach mask and tolerating well. She denies any complaints, other than her neck and throat being a little sore today. She has moderate amount of secretions per trach that she is either able to cough and/or able to suction per trach.       Plan:  Ambulate and up in chair  Wean oxygenation back to room air as prior to admission (was using cool mist at night)  TF; okay for sips of water and ice chips, oral medication

## 2022-04-15 NOTE — PROGRESS NOTES
Hospitalist Progress Note    Patient:  Arnoldo Ayersor      Unit/Bed:4K-03/003-A    YOB: 1945    MRN: [id number]       Acct: [de-identified]     PCP: Dale Schlatter    Date of Admission: 4/14/2022    Assessment/Plan:    1. 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 Postop care and management per ENT. Patient is being switched over to trach collar. Continue tube feeds. 2. Urinary retention: We will monitor postvoid residuals, instructed nurse if patient has another episode urinary retention we will place Gonzalez catheter and send urine out for urinalysis. 3. Hypokalemia: Potassium replacement  4. History of anxiety: Continue Zoloft and trazodone  5. GERD: PPI  6. VT prophylaxis: Lovenox      Subjective (past 24 hours):   Patient seen and examined at bedside, states she is doing well.  at bedside. Patient asking about tube feeds  Developed urinary retention and was straight cath X 2 since last night.                                                                                                                                           Medications:  Reviewed    Infusion Medications    sodium chloride      sodium chloride 75 mL/hr at 04/15/22 0911     Scheduled Medications    multivitamin  1 tablet Oral Daily    calcium elemental  500 mg Oral Daily    meloxicam  15 mg Oral Daily    sertraline  100 mg Oral Daily    traZODone  50 mg Oral Nightly    furosemide  20 mg Oral Daily    magnesium oxide  400 mg Oral Daily    sodium chloride flush  5-40 mL IntraVENous 2 times per day    enoxaparin  40 mg SubCUTAneous Q24H    ampicillin-sulbactam  3,000 mg IntraVENous Q6H    polyethylene glycol  17 g Oral Daily    pantoprazole  40 mg Oral QAM AC     PRN Meds: ipratropium, aluminum & magnesium hydroxide-simethicone, zinc oxide, melatonin, ipratropium-albuterol, sodium chloride flush, sodium chloride, ondansetron **OR** ondansetron, oxyCODONE **OR** oxyCODONE, HYDROmorphone **OR** HYDROmorphone      Intake/Output Summary (Last 24 hours) at 4/15/2022 1010  Last data filed at 4/14/2022 2121  Gross per 24 hour   Intake 1300 ml   Output 1000 ml   Net 300 ml       Diet:  Diet NPO Exceptions are: Sips of Water with Meds, Ice Chips    Exam:  /89   Pulse 67   Temp 99.2 °F (37.3 °C) (Oral)   Resp 22   Ht 5' (1.524 m)   Wt 112 lb 6.4 oz (51 kg)   SpO2 100%   BMI 21.95 kg/m²     General appearance: No apparent distress, appears stated age and cooperative. HEENT: trach  Respiratory:  Normal respiratory effort. Clear to auscultation, bilaterally without Rales/Wheezes/Rhonchi. Cardiovascular: Regular rate and rhythm with normal S1/S2 without murmurs, rubs or gallops. Abdomen: Soft, non-tender, non-distended with normal bowel sounds. Extremities: no pedal edema  Skin:  no rashes    Labs:   Recent Labs     04/15/22  0412   WBC 9.6   HGB 9.6*   HCT 31.4*        Recent Labs     04/14/22  1916 04/15/22  0412   NA  --  139   K  --  3.4*   CL  --  105   CO2  --  19*   BUN  --  20   CREATININE 0.6 0.5   CALCIUM  --  8.4*       Microbiology:      Urinalysis:      Lab Results   Component Value Date    NITRU Negative 02/12/2021    BLOODU Negative 02/12/2021    GLUCOSEU Negative 02/12/2021       Radiology:  No results found.     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

## 2022-04-15 NOTE — PROGRESS NOTES
Patient having difficultyclearing secretions. Attempted to suction but was unable to clear secretions while suctioning at bedside. Replacement shiley placed at bedside and another airway  placed at bedside in case of emergency.  Respiratory in to assess the situation and provided suction needed to clear the secretions

## 2022-04-15 NOTE — PLAN OF CARE
Problem: Pain:  Description: Pain management should include both nonpharmacologic and pharmacologic interventions. Goal: Control of acute pain  Description: Control of acute pain  Outcome: Ongoing  Note: Pain goal 2 on 1- 10 scale, pain meds given prn for surgical pain, ice prn      Problem: Falls - Risk of:  Goal: Will remain free from falls  Description: Will remain free from falls  Outcome: Ongoing  Note: Standby assist with gait belt. Hourly rounding, bed alarm in use, pain meds s/e, IV fluids running      Problem: OXYGENATION/RESPIRATORY FUNCTION  Goal: Patient will maintain patent airway  4/15/2022 1953 by Jj Atkinson RN  Outcome: Ongoing  Note: Abnormal lung sounds, remains on trach mask, suction as needed, trach care Q shift, RT to do stoma care sterile water, patient on continuous pulse ox monitor      Problem: MECHANICAL VENTILATION  Goal: Tracheostomy will be managed safely  4/15/2022 1953 by Jj Atkinson RN  Outcome: Ongoing  Note: Abnormal lung sounds, remains on trach mask, suction as needed, trach care Q shift, patient tolerating trach mask,  RT to do stoma care sterile water, patient on continuous pulse ox monitor      Problem: Nutrition  Goal: Optimal nutrition therapy  4/15/2022 1953 by Jj Atkinson RN  Outcome: Ongoing  Note: Dietary following patient, PEG tube feeding 5 times per day, strict I&Os      Problem: Discharge Planning:  Goal: Discharged to appropriate level of care  Description: Discharged to appropriate level of care  4/15/2022 1953 by Jj Atkinson RN  Outcome: Ongoing  Note: Discharge plan home with spouse when medically stable    Care plan reviewed with patient and . Patient and  verbalize understanding of the plan of care and contribute to goal setting.

## 2022-04-15 NOTE — PROGRESS NOTES
Patient effectively able to cough and clear secretions with deflated cuff; patient placed on 40% aerosol trach collar. Pt is comfortable at this time. Will monitor.

## 2022-04-15 NOTE — PROGRESS NOTES
Pharmacy Medication History Note      List of current medications patient is taking is complete. Source of information: outside dispense report, patient (nodded and mouthed as much as possible - not able to speak), patient     Changes made to medication list:  Medications removed (include reason, ex. therapy complete or physician discontinued):  · Maalox (patient not using)  · Ipratropium (patient not using any nasal sprays)  · Melatonin 3 mg 2 tabs nightly (patient taking 1 tab)  · Zinc oxide oint (patient not using)   · Pantoprazole- fills esomeprazole instead    Medications added/doses adjusted:  · Melatonin 3 mg 1 tablet nightly prn (adjusted per patient)  · Meloxicam 7.5 mg adjusted to 15 mg  · Preservision Areds 2 adjusted from two capsules twice daily to one capsule twice daily (per patient)  · Sertraline adjusted to 100 mg daily    Other notes (ex. Recent course of antibiotics, Coumadin dosing):  · Patient shakes head meaning no when asked if taking  Lasix. Lasix cannot be deleted from medication history without cancelling refills at [facility] in [place]. · Denies use of other OTC or herbal medications.       Allergies reviewed      Electronically signed by Marjorie Leal on 4/15/2022 at 10:20 AM

## 2022-04-15 NOTE — PLAN OF CARE
Problem: Nutrition  Goal: Optimal nutrition therapy  Outcome: Ongoing   Nutrition Problem #1: Inadequate oral intake  Intervention: Food and/or Nutrient Delivery: Continue NPO,Start Tube Feeding  Nutritional Goals: EN to provided at least 75% of estimated nutritional needs during LOS

## 2022-04-15 NOTE — CARE COORDINATION
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 Barriers to Discharge:  POD 1  TRANSORAL LARYNGOPLASTY WITH AUGMENTATION AND LATERALIZATION OF RIGHT TRUE VOCAL CORD, and  PARTIAL ARYTENOIDECTOMY. GTF, 40% FIO2 Trach continued  PCP: Mynor Calabrese  Readmission Risk Score: 18.6 ( )%    Patient Goals/Plan/Treatment Preferences: plans home w spouse Ayde Weston, current w [facility] ([facility], [facility], [facility]) for 2 Dominic HN bolus feeds; phone [phone]; has [employer] nebulizer, home oxygen 2L at night and as needed, has trach supplies, PMV speaking valve; therapy following  Transportation/Food Security/Housekeeping Addressed:  No issues identified.

## 2022-04-15 NOTE — CARE COORDINATION
DISCHARGE/PLANNING EVALUATION  4/15/22, 10:54 AM EDT    Reason for Referral: Discharge planning  Mental Status: Alert and Oriented  Decision Making: Self  Family/Social/Home Environment: Patient lives with spouse in their home. Current Services including food security, transportation and housekeeping: Patient was independent prior to admission. Current with [facility]. Current Equipment: trach supplies, walker  Payment Source: [employer] Medicare  Concerns or Barriers to Discharge: None  Post acute provider list with quality measures, geographic area and applicable managed care information provided. Questions regarding selection process answered: Offered    Teach Back Method used with patient and spouse regarding care plan and needs. Patient and spouse verbalize understanding of the plan of care and contribute to goal setting. Patient goals, treatment preferences and discharge plan: Patient reported that she plans to discharge home with current [facility]. SW called [facility] and left voicemail asking for a call back.     Electronically signed by NORMA Anderson on 4/15/2022 at 10:54 AM

## 2022-04-15 NOTE — PLAN OF CARE
Problem: MECHANICAL VENTILATION  Goal: Patient will maintain patent airway  Outcome: Ongoing  Goal: Oral health is maintained or improved  Outcome: Ongoing  Goal: Tracheostomy will be managed safely  Outcome: Ongoing     Problem: OXYGENATION/RESPIRATORY FUNCTION  Goal: Patient will maintain patent airway  Outcome: Ongoing  Goal: Patient will achieve/maintain normal respiratory rate/effort  Description: Respiratory rate and effort will be within normal limits for the patient  Outcome: Ongoing    Patient placed on Bipap post surgery. Current settings 14/6, backup rate 20, 40%. Adequate tidal volume achieved on these settings. Will continue to wean as tolerated.

## 2022-04-16 VITALS
TEMPERATURE: 97.9 F | BODY MASS INDEX: 22.07 KG/M2 | WEIGHT: 112.4 LBS | SYSTOLIC BLOOD PRESSURE: 125 MMHG | DIASTOLIC BLOOD PRESSURE: 54 MMHG | OXYGEN SATURATION: 92 % | RESPIRATION RATE: 18 BRPM | HEIGHT: 60 IN | HEART RATE: 71 BPM

## 2022-04-16 PROCEDURE — 2580000003 HC RX 258: Performed by: OTOLARYNGOLOGY

## 2022-04-16 PROCEDURE — 97530 THERAPEUTIC ACTIVITIES: CPT

## 2022-04-16 PROCEDURE — 97535 SELF CARE MNGMENT TRAINING: CPT

## 2022-04-16 PROCEDURE — 6360000002 HC RX W HCPCS: Performed by: OTOLARYNGOLOGY

## 2022-04-16 PROCEDURE — 99024 POSTOP FOLLOW-UP VISIT: CPT | Performed by: OTOLARYNGOLOGY

## 2022-04-16 PROCEDURE — 2700000000 HC OXYGEN THERAPY PER DAY

## 2022-04-16 PROCEDURE — 51798 US URINE CAPACITY MEASURE: CPT

## 2022-04-16 PROCEDURE — 6370000000 HC RX 637 (ALT 250 FOR IP): Performed by: HOSPITALIST

## 2022-04-16 PROCEDURE — 99232 SBSQ HOSP IP/OBS MODERATE 35: CPT | Performed by: HOSPITALIST

## 2022-04-16 PROCEDURE — 97167 OT EVAL HIGH COMPLEX 60 MIN: CPT

## 2022-04-16 PROCEDURE — 92610 EVALUATE SWALLOWING FUNCTION: CPT

## 2022-04-16 PROCEDURE — 6370000000 HC RX 637 (ALT 250 FOR IP): Performed by: OTOLARYNGOLOGY

## 2022-04-16 RX ORDER — MAGNESIUM HYDROXIDE/ALUMINUM HYDROXICE/SIMETHICONE 120; 1200; 1200 MG/30ML; MG/30ML; MG/30ML
30 SUSPENSION ORAL EVERY 6 HOURS PRN
Qty: 1 EACH | Refills: 3 | Status: SHIPPED | OUTPATIENT
Start: 2022-04-16

## 2022-04-16 RX ORDER — SULFAMETHOXAZOLE AND TRIMETHOPRIM 200; 40 MG/5ML; MG/5ML
160 SUSPENSION ORAL 2 TIMES DAILY
Qty: 400 ML | Refills: 0 | Status: SHIPPED | OUTPATIENT
Start: 2022-04-16 | End: 2022-04-26

## 2022-04-16 RX ORDER — SERTRALINE HYDROCHLORIDE 100 MG/1
100 TABLET, FILM COATED ORAL DAILY
Qty: 30 TABLET | Refills: 3 | Status: SHIPPED | OUTPATIENT
Start: 2022-04-17

## 2022-04-16 RX ORDER — TRAMADOL HYDROCHLORIDE 50 MG/1
50 TABLET ORAL EVERY 6 HOURS PRN
Qty: 12 TABLET | Refills: 0 | Status: SHIPPED | OUTPATIENT
Start: 2022-04-16 | End: 2022-04-19

## 2022-04-16 RX ADMIN — SODIUM CHLORIDE 3000 MG: 900 INJECTION INTRAVENOUS at 08:59

## 2022-04-16 RX ADMIN — Medication 400 MG: at 08:54

## 2022-04-16 RX ADMIN — MELOXICAM 15 MG: 7.5 TABLET ORAL at 08:54

## 2022-04-16 RX ADMIN — CALCIUM 500 MG: 500 TABLET ORAL at 08:54

## 2022-04-16 RX ADMIN — SODIUM CHLORIDE, PRESERVATIVE FREE 10 ML: 5 INJECTION INTRAVENOUS at 08:54

## 2022-04-16 RX ADMIN — POLYETHYLENE GLYCOL 3350 17 G: 17 POWDER, FOR SOLUTION ORAL at 08:54

## 2022-04-16 RX ADMIN — Medication 1 TABLET: at 08:54

## 2022-04-16 RX ADMIN — OXYCODONE HYDROCHLORIDE 10 MG: 5 TABLET ORAL at 03:40

## 2022-04-16 RX ADMIN — POTASSIUM BICARBONATE 40 MEQ: 782 TABLET, EFFERVESCENT ORAL at 13:20

## 2022-04-16 RX ADMIN — OXYCODONE HYDROCHLORIDE 5 MG: 5 TABLET ORAL at 08:54

## 2022-04-16 RX ADMIN — HYDROMORPHONE HYDROCHLORIDE 0.25 MG: 1 INJECTION, SOLUTION INTRAMUSCULAR; INTRAVENOUS; SUBCUTANEOUS at 12:18

## 2022-04-16 RX ADMIN — SERTRALINE 100 MG: 100 TABLET, FILM COATED ORAL at 08:54

## 2022-04-16 RX ADMIN — SODIUM CHLORIDE 3000 MG: 900 INJECTION INTRAVENOUS at 03:37

## 2022-04-16 RX ADMIN — HYDROMORPHONE HYDROCHLORIDE 0.5 MG: 1 INJECTION, SOLUTION INTRAMUSCULAR; INTRAVENOUS; SUBCUTANEOUS at 07:23

## 2022-04-16 RX ADMIN — PANTOPRAZOLE SODIUM 40 MG: 40 TABLET, DELAYED RELEASE ORAL at 06:43

## 2022-04-16 ASSESSMENT — PAIN DESCRIPTION - DESCRIPTORS
DESCRIPTORS: ACHING
DESCRIPTORS: ACHING
DESCRIPTORS: BURNING

## 2022-04-16 ASSESSMENT — PAIN DESCRIPTION - PAIN TYPE
TYPE: SURGICAL PAIN

## 2022-04-16 ASSESSMENT — PAIN SCALES - GENERAL
PAINLEVEL_OUTOF10: 0
PAINLEVEL_OUTOF10: 8
PAINLEVEL_OUTOF10: 5
PAINLEVEL_OUTOF10: 8
PAINLEVEL_OUTOF10: 4
PAINLEVEL_OUTOF10: 0
PAINLEVEL_OUTOF10: 8
PAINLEVEL_OUTOF10: 5
PAINLEVEL_OUTOF10: 8
PAINLEVEL_OUTOF10: 5

## 2022-04-16 ASSESSMENT — PAIN DESCRIPTION - LOCATION
LOCATION: THROAT

## 2022-04-16 ASSESSMENT — PAIN DESCRIPTION - ORIENTATION
ORIENTATION: MID
ORIENTATION: MID

## 2022-04-16 ASSESSMENT — PAIN DESCRIPTION - FREQUENCY
FREQUENCY: CONTINUOUS

## 2022-04-16 ASSESSMENT — PAIN - FUNCTIONAL ASSESSMENT: PAIN_FUNCTIONAL_ASSESSMENT: PREVENTS OR INTERFERES SOME ACTIVE ACTIVITIES AND ADLS

## 2022-04-16 ASSESSMENT — PAIN DESCRIPTION - ONSET: ONSET: ON-GOING

## 2022-04-16 ASSESSMENT — PAIN DESCRIPTION - PROGRESSION
CLINICAL_PROGRESSION: NOT CHANGED
CLINICAL_PROGRESSION: NOT CHANGED

## 2022-04-16 NOTE — PROGRESS NOTES
Nutrition Note:  Received perfect serve call from [address] re: pt needs to receive PEG tube feeds until able to be assessed for po nutrition. It appears tube feeds are ordered as we recommended from (4/15). I called RN back & the nurse who responded reports that he does not have this patient. I called the 4K number & no answer. RD following.  Nelson Yost RD, LD 4/16/22 8565

## 2022-04-16 NOTE — PROGRESS NOTES
Hospitalist Progress Note    Patient:  Minesh Saxena      Unit/Bed:4K-03/003-A    YOB: 1945    MRN: [id number]       Acct: [de-identified]     PCP: Tennis Alpha    Date of Admission: 4/14/2022    Assessment/Plan:    1. 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 Postop care and management per ENT. Patient is being switched over to trach collar. Continue tube feeds. 2. Hypotension:  bp drops after IV diluadid, decrease dosage and encourage PO pain meds. 3. Urinary retention: Voiding well, PVR today. 4. Hypokalemia: Potassium replacement  5. History of anxiety: Continue Zoloft and trazodone  6. GERD: PPI  7. VT prophylaxis: Lovenox      Subjective (past 24 hours):   Patient seen and examined at bedside, states she is having more pain today.  at bedside. No acute overnight events.                                                                                                                                         Medications:  Reviewed    Infusion Medications    sodium chloride      sodium chloride 75 mL/hr at 04/15/22 1831     Scheduled Medications    multivitamin  1 tablet Oral Daily    calcium elemental  500 mg Oral Daily    meloxicam  15 mg Oral Daily    sertraline  100 mg Oral Daily    traZODone  50 mg Oral Nightly    furosemide  20 mg Oral Daily    magnesium oxide  400 mg Oral Daily    sodium chloride flush  5-40 mL IntraVENous 2 times per day    enoxaparin  40 mg SubCUTAneous Q24H    ampicillin-sulbactam  3,000 mg IntraVENous Q6H    polyethylene glycol  17 g Oral Daily    pantoprazole  40 mg Oral QAM AC     PRN Meds: potassium chloride **OR** potassium alternative oral replacement **OR** potassium chloride, ipratropium, aluminum & magnesium hydroxide-simethicone, zinc oxide, melatonin, ipratropium-albuterol, sodium chloride flush, sodium chloride, ondansetron **OR** ondansetron, oxyCODONE **OR** oxyCODONE, HYDROmorphone **OR** [DISCONTINUED] HYDROmorphone      Intake/Output Summary (Last 24 hours) at 4/16/2022 1258  Last data filed at 4/16/2022 0719  Gross per 24 hour   Intake 2028.11 ml   Output 600 ml   Net 1428.11 ml       Diet:  Diet NPO Exceptions are: Sips of Water with Meds, Ice Chips  ADULT TUBE FEEDING; PEG; 2.0 Calorie; Bolus; 5 Times Daily; 150; Syringe Push; 30; Q 4 hours    Exam:  BP (!) 125/54   Pulse 71   Temp 97.9 °F (36.6 °C) (Oral)   Resp 18   Ht 5' (1.524 m)   Wt 112 lb 6.4 oz (51 kg)   SpO2 92%   BMI 21.95 kg/m²     General appearance: No apparent distress, appears stated age and cooperative. HEENT: trach  Respiratory:  Normal respiratory effort. Clear to auscultation, bilaterally without Rales/Wheezes/Rhonchi. Cardiovascular: Regular rate and rhythm with normal S1/S2 without murmurs, rubs or gallops. Abdomen: Soft, non-tender, non-distended with normal bowel sounds. Extremities: no pedal edema  Skin:  no rashes    Labs:   Recent Labs     04/15/22  0412   WBC 9.6   HGB 9.6*   HCT 31.4*        Recent Labs     04/14/22  1916 04/15/22  0412   NA  --  139   K  --  3.4*   CL  --  105   CO2  --  19*   BUN  --  20   CREATININE 0.6 0.5   CALCIUM  --  8.4*       Microbiology:      Urinalysis:      Lab Results   Component Value Date    NITRU NEGATIVE 04/15/2022    WBCUA 10-15 04/15/2022    BACTERIA NONE SEEN 04/15/2022    RBCUA 5-10 04/15/2022    BLOODU LARGE 04/15/2022    GLUCOSEU NEGATIVE 04/15/2022       Radiology:  No results found.     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

## 2022-04-16 NOTE — PROGRESS NOTES
Discharge instructions provided to patient. Went over all instructions. All questions answered. Order for [facility] sent home with patient. Belongings sent home with patient.

## 2022-04-16 NOTE — DISCHARGE SUMMARY
Physician Discharge Summary     Patient ID:  Gemma Fountain  [id number]  48 y.o.  1945    Admit date: 4/14/2022    Discharge date and time: No discharge date for patient encounter. Admitting Physician: Tea Reese MD     Discharge Physician: Same    Admission Diagnoses: Paralysis of vocal cords [J38.00]  Supraglottic airway obstruction [J38.6]    Discharge Diagnoses: Same    Admission Condition: fair    Discharged Condition: good    Indication for Admission: Close airway observation after extensive laryngeal surgery    Hospital Course: The patient was admitted postoperatively for management of airway secretions and to decrease the chances of acute airway obstruction as part of her recovery. The patient did exceptionally well over the first couple of days having much less tongue swelling and pain than expected. She is being discharged home on postop day 2 with very careful instructions as to her follow-up care and the activity she needs to engage in at home. Consults: Speech and language pathology; respiratory therapy; dietitian; hospitalist service    Significant Diagnostic Studies: Chest x-ray    Treatments: surgery: Transoral laryngoplasty    Discharge Exam:  See progress note from today    Disposition: home    In process/preliminary results:  Outstanding Order Results     Date and Time Order Name Status Description    4/15/2022  2:30 PM Culture, Reflexed, Urine Preliminary           Patient Instructions:   Current Discharge Medication List      START taking these medications    Details   traMADol (ULTRAM) 50 MG tablet Take 1 tablet by mouth every 6 hours as needed for Pain for up to 3 days. Intended supply: 3 days.  Take lowest dose possible to manage pain  Qty: 12 tablet, Refills: 0    Comments: Reduce doses taken as pain becomes manageable  Associated Diagnoses: Supraglottic airway obstruction      sulfamethoxazole-trimethoprim (BACTRIM;SEPTRA) 200-40 MG/5ML suspension 20 mLs by Per G Tube route 2 times daily for 10 days  Qty: 400 mL, Refills: 0         CONTINUE these medications which have CHANGED    Details   zinc oxide (PINXAV) 30 % OINT Apply 113.4 g topically 4 times daily as needed (around PEG site for irritation PRN)  Qty: 113.4 g, Refills: 3      aluminum & magnesium hydroxide-simethicone (MAALOX) 200-200-20 MG/5ML SUSP suspension Take 30 mLs by mouth every 6 hours as needed for Indigestion  Qty: 1 each, Refills: 3      !! sertraline (ZOLOFT) 100 MG tablet Take 1 tablet by mouth daily  Qty: 30 tablet, Refills: 3       !! - Potential duplicate medications found. Please discuss with provider. CONTINUE these medications which have NOT CHANGED    Details   melatonin 3 MG TABS tablet Take 3 mg by mouth nightly as needed      !! sertraline (ZOLOFT) 100 MG tablet Take 100 mg by mouth daily      esomeprazole Magnesium (NEXIUM) 40 MG PACK Take 40 mg by mouth daily      ipratropium-albuterol (DUONEB) 0.5-2.5 (3) MG/3ML SOLN nebulizer solution Inhale 3 mLs into the lungs every 4 hours as needed for Shortness of Breath  Qty: 360 mL, Refills: 2      traZODone (DESYREL) 50 MG tablet Take 1 tablet by mouth nightly  Qty: 30 tablet, Refills: 3      furosemide (LASIX) 20 MG tablet Take 1 tablet by mouth daily  Qty: 60 tablet, Refills: 3      cycloSPORINE (RESTASIS) 0.05 % ophthalmic emulsion Place 2 drops into both eyes daily  Qty: 1 each, Refills: 0      magnesium oxide (MAG-OX) 400 (241.3 Mg) MG TABS tablet Take 1 tablet by mouth daily  Qty: 30 tablet, Refills: 3      meloxicam (MOBIC) 15 MG tablet Take 15 mg by mouth daily       calcium carbonate (OSCAL) 500 MG TABS tablet Take 500 mg by mouth daily      Multiple Vitamins-Minerals (PRESERVISION AREDS 2) CAPS Take 1 capsule by mouth 2 times daily       multivitamin (THERAGRAN) per tablet Take 1 tablet by mouth daily Alive 50+       !! - Potential duplicate medications found. Please discuss with provider.       STOP taking these medications pantoprazole (PROTONIX) 40 MG tablet Comments:   Reason for Stopping:         ipratropium (ATROVENT) 0.06 % nasal spray Comments:   Reason for Stopping:             Activity: activity as tolerated  Diet: Tube feeding and clear liquids p.o. Wound Care: as directed    Follow-up with Dr. Fred Griffith in 2 weeks. Signed:  Ellen Barrientos.  Fred Griffith MD  4/16/2022  1:51 PM

## 2022-04-16 NOTE — PROGRESS NOTES
[address] ICU STEPDOWN TELEMETRY 4K  Clinical Swallow Evaluation      SLP Individual Minutes  Time In: 3  Time Out: 0541  Minutes: 44  Timed Code Treatment Minutes: 0 Minutes       Date: 2022  Patient Name: Krystin Gates      CSN: [id number]   : 1945  (64 y.o.)  Gender: female   Referring Physician:  Alex Mckeon MD  Diagnosis: Paralysis of vocal cords  History of Present Illness/Injury: Patient admitted to [facility] with above dx. Per chart review, patient underwent procedure per Otolaryngology ENT Alida Pan MD; TRANSORAL LARYNGOPLASTY WITH AUGMENTATION AND LATERALIZATION OF RIGHT TRUE VOCAL CORD, and  PARTIAL ARYTENOIDECTOMY. Per ENT Emanuel Gonzalez APRN-CNP 04/15, \"TF; okay for sips of water and ice chips, oral medication. \"   ST consulted per hospitalist to determine safety of PO intake and further goals/POC. Past Medical History:   Diagnosis Date    GERD (gastroesophageal reflux disease)     Osteoarthritis     Paralysis of vocal cords     Polio     Right side diaphragm paralysis     states trach oxygen at night       SUBJECTIVE:  Patient seen sitting upright in chair; alert and pleasant. Patient with  and multiple other family members present. Dr. Karla Urena entered room for much of session. Per Dr. Karla Urena note from this date, \"I worked with the patient extensively today at the bedside to clear the secretions that were left within the tracheostomy cannula so that her inner cannula could be introduced. Then I worked with her to tolerate the Passy-Ashlie valve for a few seconds at a time all the way to several minutes of the time with her exhaling through her mouth entirely. In addition I was able to  a very natural sounding voice though it was them in volume. The patient could breathe to some degree per orally but because of thick secretions she would get anxious and want to be unplugged. \"     ST present to assist with evaluation and collaboration with ENT Dr. Lauro Herrera to complete evaluations as appropriate and assist with management of dysphagia POC post discharge as patient is anticipating discharge this date per Dr. Lauro Herrera     OBJECTIVE:    Pain:  No pain reported. Current Diet: NPO; PEG tube  OK PO intake of ice chips and thin liquids     Respiratory Status:  Trachestomy 7.5; cuff deflated, 30% trach mask    Behavioral Observation:  Alert and Oriented    Oral Mechanism Evaluation:      Facial / Labial WFL    Lingual WFL    Dentition WFL    Velum WFL    Vocal Quality WFL    Sensation WFL    Cough WFL      Patient Evaluated Using: Ice Chips and Thin Liquids    Oral Phase:  WFL    Pharyngeal Phase: Impaired:  Suspected Pharyngeal Residue and Patient with c/o \"pain when swallowing\" Dr. Lauro Herrera aware    Signs and Symptoms of Laryngeal Penetration/Aspiration: No signs/symptoms of aspiration evident in this evaluation, but cannot rule out silent aspiration. Impressions: Patient presents with [facility] oral function of the swallow  with inability to fully discern potential presence of pharyngeal phase deficits without formal instrumentation. Patient consumed PO trials of ice chips and thin liquids; facial grimace upon each swallow reporting \"painful swallow\" 7-8/10; Dr. Lauro Herrera aware reporting \"normal for healing process. \" ST suspecting potential pharyngeal dysfunction; however, unable to fully evaluate without support of external imaging. Dr. Lauro Herrera verbalized plans to complete OP MBS in 1 week. Patient with plans to discharge this date. ST with communication to family and nursing OP MBS potentially scheduled for 04/26; HAN Dodd aware and entered OP order. Patient previously tolerating PMV prior to surgery.  ST also communicated with family post discharge; [facility] HH Speech Therapy to resume dysphagia POC, working with PMV, diaphragmatic breathing and assist with determining readiness for completion of anticipated plans to complete MBS. All verbalized understanding and agreement. At this time it is recommended per Dr. Paola Griffith for patient to continue PO intake of ice chips and thin liquids with all hydration/nutrition via PEG tube. Patient would benefit from ongoing skilled ST services to target dysphagia and breathing/PMV tolerance. RECOMMENDATIONS/ASSESSMENT:  Instrumental Evaluation: Instrumental evaluation not indicated at this time. Diet Recommendations:  NPO; PEG tube   *ok ice chips and thin liquids per ENT   Strategies: Oral care; upright position   Rehabilitation Potential: good    EDUCATION:  Learner: Patient, Significant Other and Family  Education:  Reviewed results and recommendations of this evaluation, Reviewed diet and strategies, Reviewed signs, symptoms and risks of aspiration, Reviewed ST goals and Plan of Care, Reviewed recommendations for follow-up and Education Related to Potential Risks and Complications Due to Impairment/Illness/Injury  Evaluation of Education: Verbalizes understanding, Demonstrates with assistance and Needs further instruction    PLAN:  Skilled SLP intervention on acute care 3-5 x per week or until goals met and/or pt plateaus in function. Specific interventions for next session may include: dysphagia tx; speech tx to target diaphgramtic breathing to inreturn improve ability to tolerate PMV post surgery and breath/swallow coordination. PATIENT GOAL:    Did not state. Will further assess during treatment. SHORT TERM GOALS:  Short-term Goals  Timeframe for Short-term Goals: 2 weeks  Goal 1: Patient will consume PO trials of ice chips and thin liquids without s/s of aspiration in order to safley maintain adequate hydration and nutrition  Goal 2: Patient will complete diaphgramtic breathing in isolation x10 in order to improve overall breath support.   Goal 3: Patient will demonstrate understanding and comfort of utilizing PMV and complete strucutred and unstrucutred speech tasks with PMV in place to improve ability to successfuly express wants/needs  Goal 4: Patient will complete MBS to furthur evaluate pharyngeal function of the swallow and determine safety of advanced PO diet (Per ENT MBS to be completed in 1 week 04/25).   Goal 5: Patient will complete oral care in order to reduce colinization of oral bacteria    LONG TERM GOALS:  NO LTGs due to short [address] [place] [address].SMini ChappellMonmouth Medical Centerkaiser

## 2022-04-16 NOTE — PROGRESS NOTES
[facility] ICU STEPDOWN TELEMETRY 4K  EVALUATION    Time:   Time In: 0804  Time Out: 7923  Timed Code Treatment Minutes: 23 Minutes  Minutes: 35          Date: 2022  Patient Name: Valeri Weaver,   Gender: female      MRN: [id number]  : 1945  (64 y.o.)  Referring Practitioner: IMELDA Martinez CNP  Diagnosis: paralysis of vocal cords  Additional Pertinent Hx: Per H&P:The patient is a 77-year-old female with a history of post polio syndrome who underwent emergency tracheostomy after being unable to be extubated following a short procedure. She has had the tracheostomy since that time. She tolerates Passy-[place] valve but cannot tolerate plugging. Pt s/pTRANSORAL LARYNGOPLASTY WITH AUGMENTATION AND LATERALIZATION OF RIGHT TRUE VOCAL CORD, and  PARTIAL ARYTENOIDECTOMY on 22    Restrictions/Precautions:  Restrictions/Precautions: General Precautions,Fall Risk  Position Activity Restriction  Other position/activity restrictions: trach, PEG    Subjective  Chart Reviewed: Yes,Orders,Progress Notes,History and Physical  Patient assessed for rehabilitation services?: Yes  Family / Caregiver Present: Yes ()    Subjective: Pt reclined in bedside chair upon arrival, agreeable to OT session. Comments: RN informed of request for SLP for speech, swallowing as pt had Mo Valerio 49 prior to this admission.     Pain:  Pain Assessment  Patient Currently in Pain: Yes  Pain Assessment: 0-10  Pain Level: 5  Pain Type: Surgical pain  Pain Location: Throat  Pain Descriptors: Aching  Pain Frequency: Continuous  Clinical Progression: Not changed    Vitals: Oxygen: >90% throughout while on RA    Social/Functional History:  Lives With: Spouse  Type of Home: House  Home Layout: Two level,Performs ADL's on one level,Able to Live on Main level with bedroom/bathroom  Home Access: Stairs to enter without rails  Entrance Stairs - Number of Steps: 2  Home Equipment: Rolling walker   Bathroom Shower/Tub: Walk-in shower  Bathroom Toilet: Standard  Bathroom Equipment: Grab bars in shower,Built-in shower seat       ADL Assistance: Independent  Homemaking Assistance: Independent  Ambulation Assistance: Independent  Transfer Assistance: Independent          Additional Comments: Pt/ reported pt was still receiving HH RN/ST (PT and OT had discontinued after initial evaluation. Pt was not using any AD prior to this admission. VISION:Corrected    HEARING:  WFL    COGNITION: WFL    RANGE OF MOTION:  Bilateral Upper Extremity:  WFL    STRENGTH:  Bilateral Upper Extremity:  Impaired - grossly 4-/5     ADL:   Grooming: Supervision. standing at sink to brush teeth, wash hands/face, brush hair  Toileting: Supervision.  hygiene, clothing mgmt  Toilet Transfer: MI  ** Pt tolerated standing ~5 minutes during ADL completion. BALANCE:  Sitting Balance:  Independent. Standing Balance: Supervision, Stand By Assistance. BED MOBILITY:  Not Tested    TRANSFERS:  Sit to Stand:  Modified Independent. Stand to Sit: Modified Independent. FUNCTIONAL MOBILITY:  Assistive Device: None  Assist Level:  Stand By Assistance. Distance: To and from bathroom  Pt then progressed to walking in hallway, requiring occasional HHA/CGA otherwise required SBA. Pt declined use of RW in hallway although may benefit from using for increased stability/comfort when walking greater distances outside of room. **Discussed with RN who ok'd pt up in room/hallway with family, as  demoed ability to safely assist pt this date. Reinforced to pt/ to alert RN if need assist with IV pole. Activity Tolerance:  Patient tolerance of  treatment: good. Assessment:  Assessment: Pt presents requiring increased assistance for ADLs, transfers, and functional mobility compared to PLOF.  Pt will continue to benefit from OT services to improve independence with these tasks, in addition to overall strength/endurance to facilitate return to PLOF. Performance deficits / Impairments: Decreased functional mobility ,Decreased ADL status,Decreased endurance,Decreased strength,Decreased balance,Decreased high-level IADLs  Prognosis: Good  REQUIRES OT FOLLOW UP: Yes  Decision Making: High Complexity    Treatment Initiated: Treatment and education initiated within context of evaluation. Evaluation time included review of current medical information, gathering information related to past medical, social and functional history, completion of standardized testing, formal and informal observation of tasks, assessment of data and development of plan of care and goals. Treatment time included skilled education and facilitation of tasks to increase safety and independence with ADL's for improved functional independence and quality of life. Discharge Recommendations:  24 hour supervision or assist    Patient Education:  OT Education: OT Jessica Bello of Care (increasing activity, walking in halls 3x/day with /staff)    Equipment Recommendations:  Equipment Needed: No    Plan:  Times per week: 3-5x  Current Treatment Recommendations: Strengthening,Balance Training,Functional Mobility Training,Endurance Training,Patient/Caregiver Education & Training,Self-Care / ADL,Equipment Evaluation, Education, & procurement,Home Management Training. See long-term goal time frame for expected duration of plan of care. If no long-term goals established, a short length of stay is anticipated. Goals:     Short term goals  Time Frame for Short term goals: by discharge  Short term goal 1: Pt will increase activity tolerance for functional mobility household distances with supervision in prep for ADL/IADL tasks. Short term goal 2: Pt will complete BADL/light IADL tasks with supervision to increase independence with self care/homemaking tasks.   Short term goal 3: Pt will tolerate dynamic standing X8 minutes with supervision in prep for sinkside grooming/showering tasks. Following session, patient left in safe position with all fall risk precautions in place.

## 2022-04-16 NOTE — PLAN OF CARE
Problem: Pain:  Goal: Control of acute pain  Description: Control of acute pain  4/16/2022 0427 by Stacey Amor RN  Outcome: Ongoing  Note: PRN medication utilized      Problem: Falls - Risk of:  Goal: Will remain free from falls  Description: Will remain free from falls  4/16/2022 0427 by Stacey Amor RN  Outcome: Ongoing  Note: Bed in lowest position, call light within reach, and bed alarm utilized. Problem: OXYGENATION/RESPIRATORY FUNCTION  Goal: Patient will maintain patent airway  4/16/2022 0427 by Stacey Amor RN  Outcome: Ongoing  Note: Patient having large amount of tracheal secretions - thick and yellow in color. Problem: OXYGENATION/RESPIRATORY FUNCTION  Goal: Patient will achieve/maintain normal respiratory rate/effort  Description: Respiratory rate and effort will be within normal limits for the patient  4/16/2022 0427 by Stacey Amor RN  Outcome: Ongoing     Problem: MECHANICAL VENTILATION  Goal: Patient will maintain patent airway  Outcome: Ongoing     Problem: MECHANICAL VENTILATION  Goal: Oral health is maintained or improved  Outcome: Ongoing     Problem: MECHANICAL VENTILATION  Goal: Tracheostomy will be managed safely  4/16/2022 0427 by Stacey Amor RN  Outcome: Ongoing     Problem: Nutrition  Goal: Optimal nutrition therapy  4/16/2022 0427 by Stacey Amor RN  Outcome: Ongoing  Note: Continue with home bolus feed regimen. Problem: Discharge Planning:  Goal: Discharged to appropriate level of care  Description: Discharged to appropriate level of care  4/16/2022 0427 by Stacey Amor RN  Outcome: Ongoing   Care plan reviewed with patient. Patient verbalize understanding of the plan of care and contribute to goal setting.

## 2022-04-16 NOTE — PROGRESS NOTES
Progress Note  Date:2022       [id number]  Patient Alyssia Best     YOB: 1945     Age:76 y.o. The patient is postop day 2 status post extensive transoral laryngoplasty surgery for reconstruction of the supraglottis and glottis to enable the patient hopefully to be decannulated from her tracheostomy and return to an oral diet. Subjective    Subjective   The patient reports very little pain in her throat except when she swallows. When she swallows she experiences pain from 7-8 but recognizes that if she tips her chin down and turns her head to the right, her pain is much reduced    She is complaining of some shortness of breath with the Passy-Ashlie valve in place but usually that diminishes as she clears the secretions around her larynx. Review of Systems     As above    Objective         Vitals Last 24 Hours:  TEMPERATURE:  Temp  Av.3 °F (36.8 °C)  Min: 97.9 °F (36.6 °C)  Max: 98.7 °F (37.1 °C)  RESPIRATIONS RANGE: Resp  Av.7  Min: 16  Max: 18  PULSE OXIMETRY RANGE: SpO2  Av.6 %  Min: 92 %  Max: 100 %  PULSE RANGE: Pulse  Av.7  Min: 67  Max: 78  BLOOD PRESSURE RANGE: Systolic (46OCL), IED:861 , Min:90 , NWI:532   ; Diastolic (03HYD), HCI:18, Min:49, Max:60    I/O (24Hr): Intake/Output Summary (Last 24 hours) at 2022 1355  Last data filed at 2022 1207  Gross per 24 hour   Intake 1361.4 ml   Output 800 ml   Net 561.4 ml     Objective     I worked with the patient extensively today at the bedside to clear the secretions that were left within the tracheostomy cannula so that her inner cannula could be introduced. Then I worked with her to tolerate the Passy-[place] valve for a few seconds at a time all the way to several minutes of the time with her exhaling through her mouth entirely. In addition I was able to  a very natural sounding voice though it was them in volume.   The patient could breathe to some degree per orally but because of thick secretions she would get anxious and want to be unplugged. Labs/Imaging/Diagnostics    Labs:  CBC:  Recent Labs     04/15/22  0412   WBC 9.6   RBC 3.24*   HGB 9.6*   HCT 31.4*   MCV 96.9        CHEMISTRIES:  Recent Labs     04/14/22  1916 04/15/22  0412   NA  --  139   K  --  3.4*   CL  --  105   CO2  --  19*   BUN  --  20   CREATININE 0.6 0.5   GLUCOSE  --  89   MG  --  1.8     PT/INR:No results for input(s): PROTIME, INR in the last 72 hours. APTT:No results for input(s): APTT in the last 72 hours. LIVER PROFILE:No results for input(s): AST, ALT, BILIDIR, BILITOT, ALKPHOS in the last 72 hours. Imaging Last 24 Hours:  No results found. Assessment//Plan           Hospital Problems           Last Modified POA    * (Principal) Paralysis of vocal cords 4/14/2022 Yes    Supraglottic airway obstruction 4/14/2022 Yes        Assessment & Plan     The patient is made significant progress over the last 2 days to being able to manage her own secretions and began the process of taking clear liquids orally. I gave her an extensive list of instructions and her discharge papers to make sure she would stay within certain set of parameters on both oral nutrition and the use of her Passy-Ashlie valve. I also very strongly encouraged her to cough her secretions out into her pharynx and then swallow them or exhale rapidly with the cannula open and get rid of them through the trach itself. I emphasized that they need to have a clean inner cannula at all times with them so that they could remove the soiled 1 and clean it later. They are to ask for a cuffless #6 Shiley from the [employer] and we will provide the paperwork necessary to get it prior to her follow-up visit. I spent over 45 minutes of face-to-face time with the patient at her bedside working with her and with her family regarding all of these issues.   She and her family reported understanding the basis of my recommendations as well as the precautions I described to their satisfaction.     Electronically signed by Elsy Coffey MD on 4/16/22 at 1:55 PM EDT

## 2022-04-17 LAB — URINE CULTURE REFLEX: NORMAL

## 2022-04-18 ENCOUNTER — HOSPITAL ENCOUNTER (EMERGENCY)
Age: 77
Discharge: HOME | End: 2022-04-18
Payer: MEDICARE

## 2022-04-18 ENCOUNTER — APPOINTMENT (OUTPATIENT)
Dept: GENERAL RADIOLOGY | Age: 77
End: 2022-04-18
Payer: MEDICARE

## 2022-04-18 VITALS
DIASTOLIC BLOOD PRESSURE: 71 MMHG | HEIGHT: 60 IN | BODY MASS INDEX: 24.35 KG/M2 | OXYGEN SATURATION: 98 % | SYSTOLIC BLOOD PRESSURE: 164 MMHG | RESPIRATION RATE: 18 BRPM | TEMPERATURE: 99.1 F | WEIGHT: 124 LBS | HEART RATE: 81 BPM

## 2022-04-18 DIAGNOSIS — R50.9 FEVER, UNSPECIFIED FEVER CAUSE: Primary | ICD-10-CM

## 2022-04-18 LAB
ANION GAP SERPL CALCULATED.3IONS-SCNC: 15 MEQ/L (ref 8–16)
BASOPHILS # BLD: 0.3 %
BASOPHILS ABSOLUTE: 0 THOU/MM3 (ref 0–0.1)
BUN BLDV-MCNC: 21 MG/DL (ref 7–22)
CALCIUM SERPL-MCNC: 9.6 MG/DL (ref 8.5–10.5)
CHLORIDE BLD-SCNC: 98 MEQ/L (ref 98–111)
CO2: 25 MEQ/L (ref 23–33)
CREAT SERPL-MCNC: 0.7 MG/DL (ref 0.4–1.2)
EOSINOPHIL # BLD: 1.2 %
EOSINOPHILS ABSOLUTE: 0.1 THOU/MM3 (ref 0–0.4)
ERYTHROCYTE [DISTWIDTH] IN BLOOD BY AUTOMATED COUNT: 14.1 % (ref 11.5–14.5)
ERYTHROCYTE [DISTWIDTH] IN BLOOD BY AUTOMATED COUNT: 50.1 FL (ref 35–45)
GFR SERPL CREATININE-BSD FRML MDRD: 81 ML/MIN/1.73M2
GLUCOSE BLD-MCNC: 79 MG/DL (ref 70–108)
HCT VFR BLD CALC: 30.7 % (ref 37–47)
HEMOGLOBIN: 9.6 GM/DL (ref 12–16)
IMMATURE GRANS (ABS): 0.03 THOU/MM3 (ref 0–0.07)
IMMATURE GRANULOCYTES: 0.5 %
LACTIC ACID: 1.6 MMOL/L (ref 0.5–2)
LYMPHOCYTES # BLD: 12.8 %
LYMPHOCYTES ABSOLUTE: 0.9 THOU/MM3 (ref 1–4.8)
MCH RBC QN AUTO: 30.2 PG (ref 26–33)
MCHC RBC AUTO-ENTMCNC: 31.3 GM/DL (ref 32.2–35.5)
MCV RBC AUTO: 96.5 FL (ref 81–99)
MONOCYTES # BLD: 7.8 %
MONOCYTES ABSOLUTE: 0.5 THOU/MM3 (ref 0.4–1.3)
NUCLEATED RED BLOOD CELLS: 0 /100 WBC
OSMOLALITY CALCULATION: 277.6 MOSMOL/KG (ref 275–300)
PLATELET # BLD: 169 THOU/MM3 (ref 130–400)
PMV BLD AUTO: 10 FL (ref 9.4–12.4)
POTASSIUM SERPL-SCNC: 3.8 MEQ/L (ref 3.5–5.2)
PROCALCITONIN: 0.05 NG/ML (ref 0.01–0.09)
RBC # BLD: 3.18 MILL/MM3 (ref 4.2–5.4)
SEG NEUTROPHILS: 77.4 %
SEGMENTED NEUTROPHILS ABSOLUTE COUNT: 5.2 THOU/MM3 (ref 1.8–7.7)
SODIUM BLD-SCNC: 138 MEQ/L (ref 135–145)
WBC # BLD: 6.7 THOU/MM3 (ref 4.8–10.8)

## 2022-04-18 PROCEDURE — 83605 ASSAY OF LACTIC ACID: CPT

## 2022-04-18 PROCEDURE — 80048 BASIC METABOLIC PNL TOTAL CA: CPT

## 2022-04-18 PROCEDURE — 85025 COMPLETE CBC W/AUTO DIFF WBC: CPT

## 2022-04-18 PROCEDURE — 99215 OFFICE O/P EST HI 40 MIN: CPT

## 2022-04-18 PROCEDURE — 36415 COLL VENOUS BLD VENIPUNCTURE: CPT

## 2022-04-18 PROCEDURE — 99284 EMERGENCY DEPT VISIT MOD MDM: CPT

## 2022-04-18 PROCEDURE — 71046 X-RAY EXAM CHEST 2 VIEWS: CPT

## 2022-04-18 PROCEDURE — 84145 PROCALCITONIN (PCT): CPT

## 2022-04-18 PROCEDURE — 99281 EMR DPT VST MAYX REQ PHY/QHP: CPT

## 2022-04-18 ASSESSMENT — ENCOUNTER SYMPTOMS
VOMITING: 0
EYE PAIN: 0
BACK PAIN: 0
COUGH: 1
CONSTIPATION: 0
CHEST TIGHTNESS: 0
ABDOMINAL PAIN: 0
SINUS PAIN: 0
SORE THROAT: 0
NAUSEA: 0
COLOR CHANGE: 0
SHORTNESS OF BREATH: 0
SINUS PRESSURE: 0
DIARRHEA: 0

## 2022-04-18 NOTE — ED TRIAGE NOTES
Pt to [facility] ambulatory with  with a fever. Fever started yesterday. Pt just released from [facility] ([facility] on Saturday with throat surgery. Pt has no SOB, and lungs are clear.

## 2022-04-18 NOTE — ED NOTES
Pt to [facility] ([facility]) ED via private car by  in stable condition.      Yris Martinez RN  04/18/22 5547

## 2022-04-18 NOTE — CARE COORDINATION
4/18/22, 7:09 AM EDT    Patient goals/plan/ treatment preferences discussed by  and . Patient goals/plan/ treatment preferences reviewed with patient/ family. Patient/ family verbalize understanding of discharge plan and are in agreement with goal/plan/treatment preferences. Understanding was demonstrated using the teach back method. AVS provided by RN at time of discharge, which includes all necessary medical information pertaining to the patients current course of illness, treatment, post-discharge goals of care, and treatment preferences. Services After Discharge  Services At/After Discharge: Nursing Services,OT,PT [facility])           Patient discharged home with [facility]. SW notified [facility] of discharge.

## 2022-04-18 NOTE — ED NOTES
Pt and vs reassessed. RR unlabored. Pt resting in bed alert. Pt and family updated on POC and denies any needs. No distress noted.  Pt stable at this time     [facility]  04/18/22 5277

## 2022-04-18 NOTE — ED PROVIDER NOTES
[address] [facility] [address] [address] EMERGENCY DEPT  UrgentCare Encounter      [facility]       Chief Complaint   Patient presents with    Fever       Nurses Notes reviewed and I agree except as noted in the HPI. HISTORY OF PRESENT ILLNESS   Hallie Sandra is a 64 y.o. female who presents for evaluation. Her  states that she  had a procedure on 4/14/22 [facility] for vocal cord paralysis and increased secretions from her trach. She was discharged and by Saturday began running a fever, 99.9 the highest, despite being on tylenol and Bactrim. Her  states that she cannot clear her trach secretions. Her  states that the ENT advised urgent care evaluation. Patient nods in agreement with what  is saying. The patient/patient representative has no other acute complaints at this time. REVIEW OF SYSTEMS     Review of Systems   Constitutional: Positive for fever. Negative for chills and fatigue. Respiratory: Positive for cough. Negative for shortness of breath. Trouble clearing trach secretions. Cardiovascular: Negative for chest pain. Genitourinary: Negative for dysuria. Skin: Negative for rash. Allergic/Immunologic: Negative for environmental allergies and food allergies. Neurological: Negative for headaches. PAST MEDICAL HISTORY         Diagnosis Date    GERD (gastroesophageal reflux disease)     Osteoarthritis     Paralysis of vocal cords     Polio     Right side diaphragm paralysis     states trach oxygen at night       SURGICAL HISTORY     Patient  has a past surgical history that includes Cholecystectomy; knee surgery; laryngoscopy (N/A, 2/24/2022); Hysterectomy (01/2022); and Tracheal surgery (N/A, 4/14/2022).     CURRENT MEDICATIONS       Previous Medications    ALUMINUM & MAGNESIUM HYDROXIDE-SIMETHICONE (MAALOX) 200-200-20 MG/5ML SUSP SUSPENSION    Take 30 mLs by mouth every 6 hours as needed for Indigestion    CYCLOSPORINE (RESTASIS) 0.05 % OPHTHALMIC EMULSION    Place 2 drops into both eyes daily    IPRATROPIUM-ALBUTEROL (DUONEB) 0.5-2.5 (3) MG/3ML SOLN NEBULIZER SOLUTION    Inhale 3 mLs into the lungs every 4 hours as needed for Shortness of Breath    MAGNESIUM OXIDE (MAG-OX) 400 (241.3 MG) MG TABS TABLET    Take 1 tablet by mouth daily    MELATONIN 3 MG TABS TABLET    Take 3 mg by mouth nightly as needed    MELOXICAM (MOBIC) 15 MG TABLET    Take 15 mg by mouth daily     MULTIPLE VITAMINS-MINERALS (PRESERVISION AREDS 2) CAPS    Take 1 capsule by mouth 2 times daily     MULTIVITAMIN (THERAGRAN) PER TABLET    Take 1 tablet by mouth daily Alive 50+    SERTRALINE (ZOLOFT) 100 MG TABLET    Take 100 mg by mouth daily    SERTRALINE (ZOLOFT) 100 MG TABLET    Take 1 tablet by mouth daily    SULFAMETHOXAZOLE-TRIMETHOPRIM (BACTRIM;SEPTRA) 200-40 MG/5ML SUSPENSION    20 mLs by Per G Tube route 2 times daily for 10 days    TRAMADOL (ULTRAM) 50 MG TABLET    Take 1 tablet by mouth every 6 hours as needed for Pain for up to 3 days. Intended supply: 3 days. Take lowest dose possible to manage pain    TRAZODONE (DESYREL) 50 MG TABLET    Take 1 tablet by mouth nightly    ZINC OXIDE (PINXAV) 30 % OINT    Apply 113.4 g topically 4 times daily as needed (around PEG site for irritation PRN)       ALLERGIES     Patient is is allergic to latex, doxycycline, and tizanidine hcl. FAMILY HISTORY     Patient'sfamily history is not on file. SOCIAL HISTORY     Patient  reports that she has never smoked. She has never used smokeless tobacco. She reports previous alcohol use. She reports that she does not use drugs. PHYSICAL EXAM     ED TRIAGE VITALS  BP: 132/61, Temp: 99 °F (37.2 °C), Pulse: 76, Resp: 18, SpO2: 94 %  Physical Exam  Vitals and nursing note reviewed. Constitutional:       General: She is not in acute distress. Appearance: Normal appearance. She is well-developed and well-groomed. HENT:      Head: Normocephalic and atraumatic.       Right Ear: External ear normal.      Left Ear: External ear normal.   Eyes:      Conjunctiva/sclera: Conjunctivae normal.      Right eye: Right conjunctiva is not injected. Left eye: Left conjunctiva is not injected. Pupils: Pupils are equal.   Neck:      Trachea: Tracheostomy present. Cardiovascular:      Rate and Rhythm: Normal rate. Heart sounds: Normal heart sounds. Pulmonary:      Effort: Pulmonary effort is normal. No respiratory distress. Musculoskeletal:      Cervical back: Normal range of motion. Skin:     General: Skin is warm and dry. Findings: No rash (on exposed surfaces). Neurological:      Mental Status: She is alert and oriented to person, place, and time. Gait: Gait is intact. Psychiatric:         Mood and Affect: Mood normal.         Speech: Speech normal.         Behavior: Behavior is cooperative. DIAGNOSTIC RESULTS   Labs:  Abnormal Labs Reviewed - No data to display     IMAGING:  No orders to display     URGENT CARE COURSE:     Vitals:    04/18/22 1316   BP: 132/61   Pulse: 76   Resp: 18   Temp: 99 °F (37.2 °C)   TempSrc: Temporal   SpO2: 94%   Weight: 124 lb (56.2 kg)   Height: 5' (1.524 m)       Medications - No data to display  PROCEDURES:  FINALIMPRESSION      1. Fever, unspecified fever cause        DISPOSITION/PLAN   DISPOSITION Decision To Transfer 04/18/2022 01:14:26 PM      After reviewing the patients History of Present illness, Vital Signs,Clinical Findings,Comorbities, and Clinical Data obtained I discussed with the patient or patient representative that there is a very real potential for serious injury / illness and the patient will need to be transfer to a level of higher care for further evaluation and continued care. It was explained that this would provide the best care for the patient.    The patient/patient representative are agreeable to the treatment plan and are agreeable to be transferred therefore, the patient will be transferred to [facility] ED for reevaluation and further management. Problem List Items Addressed This Visit     None      Visit Diagnoses     Fever, unspecified fever cause    -  Primary      PATIENT REFERRED TO:  [facility], EMERGENCY DEPT  [address] [facility] [address] [facility],[address]      [address] [facility], for further evalation      IMELDA Radford - CNP    Please note that some or all of this chart was generated using [employer] voice recognition software. Although every effort was made to ensure the accuracy of this automated transcription, some errors in transcription may have occurred.         IMELDA Radford CNP  04/18/22 7935

## 2022-04-18 NOTE — ED PROVIDER NOTES
[facility] Emergency Department    CHIEF COMPLAINT       Chief Complaint   Patient presents with    Fever       Nurses Notes reviewed and I agree except as noted in the HPI. HISTORY OF PRESENT ILLNESS    Ag García thao 64 y.o. female who presents to the ED for evaluation of a fever. Pt had a vocal cord surgery done last Thursday and was discharged home two days ago.  states pt was to be examined if she developed any fever above 99F.  states she has had a fever of 99.9F since yesterday so they went to the urgent care today. Urgent care denied being able to evaluate pt fully in regards to testing needed per surgeon's request.  Pt denies chest pain, shortness of breath, and abdominal pain.  states pt has had a slight headache and cough since being released home. Patient notes increased secretions. Pain description:  Onset: two days ago  Location: cough  Duration: intermittent  Character: none  Aggravating factors: none  Radiation: none  Timing: none  Severity: mild      HPI was provided by the patient. REVIEW OF SYSTEMS     Review of Systems   Constitutional: Positive for fever. Negative for appetite change, chills and fatigue. HENT: Negative for congestion, ear pain, sinus pressure, sinus pain and sore throat. Eyes: Negative for pain. Respiratory: Positive for cough. Negative for chest tightness and shortness of breath. Cardiovascular: Negative for chest pain and palpitations. Gastrointestinal: Negative for abdominal pain, constipation, diarrhea, nausea and vomiting. Endocrine: Negative for cold intolerance and heat intolerance. Genitourinary: Negative for dysuria, flank pain and urgency. Musculoskeletal: Negative for back pain, myalgias and neck pain. Skin: Negative for color change and rash. Neurological: Positive for headaches. Negative for dizziness, syncope, weakness and numbness. Psychiatric/Behavioral: Negative for behavioral problems. PAST MEDICAL HISTORY     Past Medical History:   Diagnosis Date    GERD (gastroesophageal reflux disease)     Osteoarthritis     Paralysis of vocal cords     Polio     Right side diaphragm paralysis     states trach oxygen at night       SURGICALHISTORY      has a past surgical history that includes Cholecystectomy; knee surgery; laryngoscopy (N/A, 2/24/2022); Hysterectomy (01/2022); and Tracheal surgery (N/A, 4/14/2022). CURRENT MEDICATIONS       Discharge Medication List as of 4/18/2022  3:07 PM      CONTINUE these medications which have NOT CHANGED    Details   zinc oxide (PINXAV) 30 % OINT Apply 113.4 g topically 4 times daily as needed (around PEG site for irritation PRN), Disp-113.4 g, R-3Normal      aluminum & magnesium hydroxide-simethicone (MAALOX) 200-200-20 MG/5ML SUSP suspension Take 30 mLs by mouth every 6 hours as needed for Indigestion, Disp-1 each, R-3Normal      traMADol (ULTRAM) 50 MG tablet Take 1 tablet by mouth every 6 hours as needed for Pain for up to 3 days. Intended supply: 3 days.  Take lowest dose possible to manage pain, Disp-12 tablet, R-0Normal      sulfamethoxazole-trimethoprim (BACTRIM;SEPTRA) 200-40 MG/5ML suspension 20 mLs by Per G Tube route 2 times daily for 10 days, Disp-400 mL, R-0Normal      !! sertraline (ZOLOFT) 100 MG tablet Take 1 tablet by mouth daily, Disp-30 tablet, R-3Normal      melatonin 3 MG TABS tablet Take 3 mg by mouth nightly as neededHistorical Med      !! sertraline (ZOLOFT) 100 MG tablet Take 100 mg by mouth dailyHistorical Med      ipratropium-albuterol (DUONEB) 0.5-2.5 (3) MG/3ML SOLN nebulizer solution Inhale 3 mLs into the lungs every 4 hours as needed for Shortness of Breath, Disp-360 mL, R-2Normal      traZODone (DESYREL) 50 MG tablet Take 1 tablet by mouth nightly, Disp-30 tablet, R-3Normal      cycloSPORINE (RESTASIS) 0.05 % ophthalmic emulsion Place 2 drops into both eyes daily, Disp-1 each, R-0NO PRINT      magnesium oxide (MAG-OX) 400 (241.3 Mg) MG TABS tablet Take 1 tablet by mouth daily, Disp-30 tablet, R-3Normal      meloxicam (MOBIC) 15 MG tablet Take 15 mg by mouth daily Historical Med      Multiple Vitamins-Minerals (PRESERVISION AREDS 2) CAPS Take 1 capsule by mouth 2 times daily Historical Med      multivitamin (THERAGRAN) per tablet Take 1 tablet by mouth daily Alive 50+Historical Med       !! - Potential duplicate medications found. Please discuss with provider. ALLERGIES     is allergic to latex, doxycycline, and tizanidine hcl. FAMILY HISTORY     has no family status information on file. family history is not on file. SOCIAL HISTORY       Social History     Socioeconomic History    Marital status:      Spouse name: Sepideh Munson    Number of children: Not on file    Years of education: Not on file    Highest education level: Not on file   Occupational History    Not on file   Tobacco Use    Smoking status: Never Smoker    Smokeless tobacco: Never Used   Vaping Use    Vaping Use: Never used   Substance and Sexual Activity    Alcohol use: Not Currently    Drug use: No    Sexual activity: Not on file   Other Topics Concern    Not on file   Social History Narrative    Not on file     Social Determinants of Health     Financial Resource Strain:     Difficulty of Paying Living Expenses: Not on file   Food Insecurity:     Worried About Running Out of Food in the Last Year: Not on file    Charli of Food in the Last Year: Not on file   Transportation Needs:     Lack of Transportation (Medical): Not on file    Lack of Transportation (Non-Medical):  Not on file   Physical Activity:     Days of Exercise per Week: Not on file    Minutes of Exercise per Session: Not on file   Stress:     Feeling of Stress : Not on file   Social Connections:     Frequency of Communication with Friends and Family: Not on file    Frequency of Social Gatherings with Friends and Family: Not on file    Attends Hinduism Services: Not on file    Active Member of Clubs or Organizations: Not on file    Attends Club or Organization Meetings: Not on file    Marital Status: Not on file   Intimate Partner Violence:     Fear of Current or Ex-Partner: Not on file    Emotionally Abused: Not on file    Physically Abused: Not on file    Sexually Abused: Not on file   Housing Stability:     Unable to Pay for Housing in the Last Year: Not on file    Number of [place] in the Last Year: Not on file    Unstable Housing in the Last Year: Not on file       PHYSICAL EXAM     INITIAL VITALS:  height is 5' (1.524 m) and weight is 124 lb (56.2 kg). Her oral temperature is 99.1 °F (37.3 °C). Her blood pressure is 164/71 (abnormal) and her pulse is 81. Her respiration is 18 and oxygen saturation is 98%. Physical Exam  Constitutional:       General: She is not in acute distress. Appearance: Normal appearance. HENT:      Head: Normocephalic. Right Ear: Tympanic membrane, ear canal and external ear normal.      Left Ear: Tympanic membrane, ear canal and external ear normal.      Nose: Nose normal.      Mouth/Throat:      Mouth: Mucous membranes are moist.      Pharynx: Oropharynx is clear. Eyes:      Extraocular Movements: Extraocular movements intact. Conjunctiva/sclera: Conjunctivae normal.      Pupils: Pupils are equal, round, and reactive to light. Neck:      Trachea: Tracheostomy present. Cardiovascular:      Rate and Rhythm: Normal rate and regular rhythm. Pulses: Normal pulses. Heart sounds: Normal heart sounds. No murmur heard. No friction rub. No gallop. Pulmonary:      Effort: Pulmonary effort is normal. No respiratory distress. Breath sounds: Normal breath sounds. No stridor. No wheezing or rhonchi. Abdominal:      General: Bowel sounds are normal.      Palpations: Abdomen is soft. Musculoskeletal:         General: No swelling or tenderness. Normal range of motion.       Cervical back: Normal range of motion. Skin:     General: Skin is warm and dry. Capillary Refill: Capillary refill takes less than 2 seconds. Neurological:      General: No focal deficit present. Mental Status: She is alert and oriented to person, place, and time. DIFFERENTIAL DIAGNOSIS:   Pneumonia, postoperative infection, atelectasis,    DIAGNOSTIC RESULTS       RADIOLOGY: non-plainfilm images(s) such as CT, Ultrasound and MRI are read by the radiologist.  Plain radiographic images are visualized and preliminarily interpreted by the emergency physician unless otherwise stated below. XR CHEST (2 VW)   Final Result   There is no acute intrathoracic process. **This report has been created using voice recognition software. It may contain minor errors which are inherent in voice recognition technology. **      Final report electronically signed by Dr Nisreen Atkinson on 4/18/2022 2:38 PM            LABS:   Labs Reviewed   CBC WITH AUTO DIFFERENTIAL - Abnormal; Notable for the following components:       Result Value    RBC 3.18 (*)     Hemoglobin 9.6 (*)     Hematocrit 30.7 (*)     MCHC 31.3 (*)     RDW-SD 50.1 (*)     Lymphocytes Absolute 0.9 (*)     All other components within normal limits   GLOMERULAR FILTRATION RATE, ESTIMATED - Abnormal; Notable for the following components:    Est, Glom Filt Rate 81 (*)     All other components within normal limits   BASIC METABOLIC PANEL   LACTIC ACID   PROCALCITONIN   ANION GAP   OSMOLALITY       EMERGENCY DEPARTMENT COURSE:   Vitals:    Vitals:    04/18/22 1316 04/18/22 1348 04/18/22 1447   BP: 132/61 (!) 148/74 (!) 164/71   Pulse: 76 78 81   Resp: 18 18 18   Temp: 99 °F (37.2 °C) 99.1 °F (37.3 °C)    TempSrc: Temporal Oral    SpO2: 94% 94% 98%   Weight: 124 lb (56.2 kg)     Height: 5' (1.524 m)         MDM    Patient was seen and evaluated in the emergency department, patient appeared to be in no acute distress, vital signs reviewed, no significant findings noted. Physical exam was completed, she has tracheostomy in place, there is mild amount of secretions that are clear in color. Labs were obtained, no leukocytosis noted, chest x-ray shows no acute intrathoracic process. Discussed the case with Dr. Ashleigh Quiñones patient's ENT, he recommends patient take deep breaths, cough frequently, return to the ER with worsening symptoms. Discussed my findings and plan of care with the patient they are amenable with discharge. Advised return with worsening symptoms. They verbalized understanding plan of care. Medications - No data to display    Patient was seenindependently by myself. The patient's final impression and disposition and plan was determined by myself. CRITICAL CARE:   None    CONSULTS:  None    PROCEDURES:  None    FINAL IMPRESSION     1. Fever, unspecified fever cause          DISPOSITION/PLAN   Patient discharged    PATIENT REFERREDTO:  Joy Bernstein MD  [address] [facility] [address] [facility] [address]  [phone]    Go to   For follow up and evaluation      DISCHARGE MEDICATIONS:  Discharge Medication List as of 4/18/2022  3:07 PM          (Please note that portions of this note were completed with a voice recognition program.  Efforts were made to edit the dictations but occasionally words are mis-transcribed.)      Provider:  I personally performed the services described in the documentation,reviewed and edited the documentation which was dictated to the scribe in my presence, and it accurately records my words and actions.     Kaiser Call CNP 04/18/22 3:39 PM    Gail Call, APRN - CNP       Aito BV, APRJULIOCESAR - CNP  04/18/22 3251

## 2022-04-18 NOTE — ED NOTES
Pt to ED room 36 from urgent care.  states pt had surgery on her vocal cords and released from the hospital on Saturday.  states pt was to come to urgent care and get blood work done to check for infection.  states pt took tylenol around 1130 today. VSS. RR easy and unlabored.       João Jaramillo RN  04/18/22 0240

## 2022-04-21 RX ORDER — FUROSEMIDE 20 MG/1
TABLET ORAL
Qty: 60 TABLET | Refills: 3 | OUTPATIENT
Start: 2022-04-21

## 2022-04-26 ENCOUNTER — OFFICE VISIT (OUTPATIENT)
Dept: ENT CLINIC | Age: 77
End: 2022-04-26
Payer: MEDICARE

## 2022-04-26 ENCOUNTER — HOSPITAL ENCOUNTER (OUTPATIENT)
Dept: GENERAL RADIOLOGY | Age: 77
Discharge: HOME | End: 2022-04-26
Payer: MEDICARE

## 2022-04-26 VITALS
DIASTOLIC BLOOD PRESSURE: 68 MMHG | BODY MASS INDEX: 22.38 KG/M2 | OXYGEN SATURATION: 96 % | WEIGHT: 114 LBS | RESPIRATION RATE: 14 BRPM | HEART RATE: 75 BPM | TEMPERATURE: 97 F | SYSTOLIC BLOOD PRESSURE: 126 MMHG | HEIGHT: 60 IN

## 2022-04-26 DIAGNOSIS — R13.14 PHARYNGOESOPHAGEAL DYSPHAGIA: ICD-10-CM

## 2022-04-26 DIAGNOSIS — Z86.12 PERSONAL HISTORY OF POLIOMYELITIS: ICD-10-CM

## 2022-04-26 DIAGNOSIS — J95.01 HEMORRHAGE FROM TRACHEOSTOMY STOMA (HCC): ICD-10-CM

## 2022-04-26 DIAGNOSIS — R49.0 HOARSENESS: ICD-10-CM

## 2022-04-26 DIAGNOSIS — J38.01 PARALYSIS OF RIGHT VOCAL CORD: Primary | ICD-10-CM

## 2022-04-26 DIAGNOSIS — J98.8 AIRWAY OBSTRUCTION: ICD-10-CM

## 2022-04-26 DIAGNOSIS — Z93.0 TRACHEOSTOMY STATUS (HCC): ICD-10-CM

## 2022-04-26 PROCEDURE — 2500000003 HC RX 250 WO HCPCS: Performed by: OTOLARYNGOLOGY

## 2022-04-26 PROCEDURE — 92611 MOTION FLUOROSCOPY/SWALLOW: CPT | Performed by: SPEECH-LANGUAGE PATHOLOGIST

## 2022-04-26 PROCEDURE — 99024 POSTOP FOLLOW-UP VISIT: CPT | Performed by: OTOLARYNGOLOGY

## 2022-04-26 PROCEDURE — 31615 TRCHEOBRNCHSC EST TRACHS INC: CPT | Performed by: OTOLARYNGOLOGY

## 2022-04-26 PROCEDURE — 74230 X-RAY XM SWLNG FUNCJ C+: CPT

## 2022-04-26 RX ADMIN — BARIUM SULFATE 40 ML: 0.81 POWDER, FOR SUSPENSION ORAL at 09:09

## 2022-04-26 RX ADMIN — BARIUM SULFATE 20 ML: 400 PASTE ORAL at 09:09

## 2022-04-26 NOTE — DISCHARGE SUMMARY
[address] [facility] RADIOLOGY  Modified Barium Swallow    SLP Individual Minutes  Time In: 3253  Time Out: 3038  Minutes: 57  Timed Code Treatment Minutes: 0 Minutes       Date: 2022  Patient Name: Jamar Osorio      CSN: [id number]   : 1945  (64 y.o.)  Gender: female   Referring Physician:  Dr. Manju Harrington  Diagnosis: pharyngoesophageal dysphagia R13.14  History of Present Illness/Injury: Patient reports in 2022 she had a total hysterectomy at [facility]. She was re-intubated shortly after her surgery d/t being hypercapnic and eventually had to have a tracheostomy tube placed after multiple intubation/extubations. Patient had to have a PEG tube placed in the beginning of 2022. Patient reports she was transferred to inpatient rehab at [facility] for several weeks for therapy. On 22, patient had the following procedures completed by Dr. Dawn Chandra: TRANSORAL LARYNGOPLASTY WITH AUGMENTATION AND LATERALIZATION OF RIGHT TRUE VOCAL CORD, and  PARTIAL ARYTENOIDECTOMY. Patient is currently receiving Home Health speech therapy to address her dysphagia. Patient reports she is currently on a clear liquid diet, but takes majority of her nutrition and hydration via the PEG tube. Patient reports she has been tolerating her PMV pretty well. Patient did arrive to this evaluation coughing frequently prior to the study, during the study and after the study was complete. MBS to assess current swallow function for possible advancement to a full PO diet. Patient  has a past medical history of GERD (gastroesophageal reflux disease), Osteoarthritis, Paralysis of vocal cords, Polio, and Right side diaphragm paralysis.   Current Diet: Clear liquid diet; has PEG tube for majority of nutrition and hydration    Pain: 5/10 - Pain location: site of PEG tube (reports the [place] nurse is checking it); 3-4/10 pain in mouth and throat    SUBJECTIVE:  Patient sitting upright in the radiology suite chair throughout testing. Patient's significant other presents for education of the results of testing via the [facility] unit. OBJECTIVE:    Respiratory Status:  Tracheostomy - cuffed, but deflated - PMV in place during testing    Behavioral Observation:  Alert and Oriented, dysphonic, hypophonic    PATIENT WAS EVALUATED USING:  Barium: Thin Liquids, Puree and Soft Solids    ORAL PREPARATION PHASE:  WFL    ORAL PHASE: Uncontrolled Bolus/Diffuse Fall Over Tongue Base     ORAL PHASE MARK SCORE: (Dysphagia outcome and severity scale)  5 = Mild Dysphagia - May have one diet consistency restricted - Mild oral residue but clears    PHARYNGEAL PHASE:  Impaired: Decreased Epiglottic Inversion, Decreased Hyolaryngeal Anterior Excursion, Thyrohyoid approximation adequate and Residue in the Pyriform Sinus      PHARYNGEAL PHASE MARK SCORE: (Dysphagia outcome and severity scale)  4 = Mild-Moderate Dysphagia - One or two consistencies restricted - may exhibit one or more of the following: Residue clears with cue, Aspiration of one consistency with weak or no reflexive cough, Laryngeal penetration to the vocal cords with cough with two consistencies, Laryngeal penetration to the vocal cords without cough on one consistency    EVIDENCE FOR LARYNGEAL PENETRATION AND/OR ASPIRATION:  No evidence of laryngeal penetration  No evidence of aspiration    PENETRATION-ASPIRATION SCALE (PAS): Thin Liquids: 1 = Material does not enter the airway  Puree:  1 = Material does not enter the airway  Soft Solid:  1 = Material does not enter the airway    ESOPHAGEAL PHASE:   There appears to be \"pocket\" in the upper esophagus around the level of C6-7, which is somewhat concerning for a zenker's diverticulum. This was also noted during a MBS that was completed at [facility].   Food/liquid collects in this region and then eventually backflows into the pyriform sinuses leaving residuals at this level. ATTEMPTED TECHNIQUES:   Small Bolus Size Effective    Straw Not Attempted    Cup Effective    Chin Tuck Not Attempted    Head Turn left/right Effective Foods appeared to clear the pharynx better with head turn to the right (patient also reports this being a strategy she has used for MANY years). Spoon Presentations Not Attempted with liquids    Volitional Cough Not Attempted    Spontaneous Cough Not indicated as there was no identification of laryngeal penetration or aspiration, however, patient likely feeling the residuals from backflow of the part of the bolus. DIAGNOSTIC IMPRESSIONS:  Patient presents with mild oral and mild to moderate pharyngeal phase deficits characterized by decreased bolus control, limited epiglottic inversion, decreased hyolaryngeal anterior excursion (however, thyro-hyoid approximation appears adequate) and residue in the pyriform sinuses. Decreased bolus control noted with occasional spillage of the bolus to the valleculae prior to triggering a swallow. Residuals noted in the pyriform sinuses from backflow of material from a \"pocket\" in the upper esophagus after the swallow. This may be concerning for a zenker's diverticulum. Patient unable to clear this residue completely with additional, dry swallows, liquid assist, chin tuck and head turn to the left and right. No laryngeal penetration or aspiration evident during this study, however, patient may be at risk given the residue in the pyriforms after the swallow. Patient may benefit from a GI consult to further assess.     Diet Recommendations:  Puree with thin liquids (continue to utilize PEG tube to maintain nutrition and hydration at this time)  Strategies:  Full Upright Position, Small Bite/Sip, No Straw, Multiple Swallow, Head Turn, Medications Crushed with Puree, Alternate Solids and Liquids, Limit Distractions, Monitor for Fatigue and slow rate   Rehabilitation Potential: good    EDUCATION:  Learner: Patient and Significant Other  Education:  Reviewed results and recommendations of this evaluation, Reviewed diet and strategies, Reviewed signs, symptoms and risks of aspiration, Reviewed recommendations for follow-up with HH ST and Education Related to Potential Risks and Complications Due to Impairment/Illness/Injury  Evaluation of Education: Verbalizes understanding and Needs further instruction    PLAN:  Skilled SLP intervention per the treating SLP via [place].  Goals per the treating SLP.         Quynh Banks M.S. [id number] [id number]

## 2022-04-26 NOTE — PROGRESS NOTES
[address] EAR, NOSE AND THROAT  [address] [place] [address] [place] [address]  Dept: [phone]  Dept Fax: [phone]  Loc: [phone]    Kasey Buck is a 64 y.o. female who was referred by No ref. provider found for:  Chief Complaint   Patient presents with    Post-Op Check     patient is here for post op laryngoplasty, augmentation, lateralization 4/14       HPI:     Kasey Buck is a 64 y.o. female who is here for her first postop visit status post transoral augmentation lateralization laryngoplasty. The patient reports having passed her swallow study today and being cleared for puréed materials with no aspiration no penetration seen for any materials presented to her during the modified barium swallow study. She also reports being able to tolerate more than 1 hour of complete plugging of her Passy-Ashlie valve and remaining comfortable. These are 2 major milestones. She had 2 fevers since her discharge neither of which demonstrated any form of radiographic changes of her lungs and neither which were associated with leukocytosis. History:      Allergies   Allergen Reactions    Latex     Doxycycline      Tongue, throat and mouth soreness    Tizanidine Hcl Nausea And Vomiting     Current Outpatient Medications   Medication Sig Dispense Refill    zinc oxide (PINXAV) 30 % OINT Apply 113.4 g topically 4 times daily as needed (around PEG site for irritation PRN) 113.4 g 3    aluminum & magnesium hydroxide-simethicone (MAALOX) 200-200-20 MG/5ML SUSP suspension Take 30 mLs by mouth every 6 hours as needed for Indigestion 1 each 3    sertraline (ZOLOFT) 100 MG tablet Take 1 tablet by mouth daily 30 tablet 3    melatonin 3 MG TABS tablet Take 3 mg by mouth nightly as needed      sertraline (ZOLOFT) 100 MG tablet Take 100 mg by mouth daily      ipratropium-albuterol (DUONEB) 0.5-2.5 (3) MG/3ML SOLN nebulizer solution Inhale 3 mLs into the lungs every 4 hours as needed for Shortness of Breath 360 mL 2    traZODone (DESYREL) 50 MG tablet Take 1 tablet by mouth nightly 30 tablet 3    cycloSPORINE (RESTASIS) 0.05 % ophthalmic emulsion Place 2 drops into both eyes daily 1 each 0    magnesium oxide (MAG-OX) 400 (241.3 Mg) MG TABS tablet Take 1 tablet by mouth daily 30 tablet 3    meloxicam (MOBIC) 15 MG tablet Take 15 mg by mouth daily       Multiple Vitamins-Minerals (PRESERVISION AREDS 2) CAPS Take 1 capsule by mouth 2 times daily       multivitamin (THERAGRAN) per tablet Take 1 tablet by mouth daily Alive 50+       No current facility-administered medications for this visit. Past Medical History:   Diagnosis Date    GERD (gastroesophageal reflux disease)     Osteoarthritis     Paralysis of vocal cords     Polio     Right side diaphragm paralysis     states trach oxygen at night      Past Surgical History:   Procedure Laterality Date    CHOLECYSTECTOMY      HYSTERECTOMY  01/2022    KNEE SURGERY      Right    LARYNGOSCOPY N/A 2/24/2022    DIAGNOSTIC SUSPENSION MICROLARYNGOSCOPY BRONCHOSCOPY WITH JET VENTILATION performed by Pillo Sidhu MD at [address] [place] [address] N/A 4/14/2022    TRANSORAL LARYNGOPLASTY WITH AUGMENTATION AND LATERALIZATION OF RIGHT TRUE VOCAL CORD, and  PARTIAL ARYTENOIDECTOMY performed by Pillo Sidhu MD at [facility]     History reviewed. No pertinent family history. Social History     Tobacco Use    Smoking status: Never Smoker    Smokeless tobacco: Never Used   Substance Use Topics    Alcohol use: Not Currently        Subjective:      Review of Systems  Rest of review of systems are negative, except as noted in HPI.      Objective:     /68 (Site: Left Upper Arm, Position: Sitting)   Pulse 75   Temp 97 °F (36.1 °C) (Infrared)   Resp 14   Ht 5' (1.524 m)   Wt 114 lb (51.7 kg)   SpO2 96%   BMI 22.26 kg/m²     Physical Exam       On general physical exam the patient is a pleasant alert and cooperative remarkably well-appearing older adult female who appears younger than her stated age. Her voice is mildly low in volume but clear in character for her age and gender. I heard no throat clearing coughing or inspiratory stridor. When I blocked her Passy-[place] valve, she was intermittently able to breathe with laminar breath sounds with only secretions causing a turbulent sound but no outright stridor. Procedure: Flexible laryngoscopy  Indication: The patient is status post transoral laryngoplasty and warrants an interval laryngoscopy to determine the state of her healing. Findings: The patient's larynx was well visualized and noted to be abnormal for extensive postsurgical changes. All of the suture lines were intact. No signs of graft extrusion were seen. The bulk of her immobile lateralized right true vocal fold was excellent. She had 1 clip pointing medially from the operated side towards the unoperated side causing her to have a slightly weak voice because she could not oppose the mobile side fully against the cord without experiencing a sticking sensation. The anterior commissure was sharp. The glottic aperture was adequate to generous. The patient tolerated this procedure well. Laryngoscopy images:        Larynx with excellent right true vocal fold bulk and intended lateralization; single clip pointing medially    Left true vocal fold closure          Procedure: Trans stomal bronchoscopy and tracheostomy changed from cuffed to uncuffed cannula  Indication: The patient has a long dwelling tracheostomy and warrants an interval exam to make sure she is not experiencing complications of the cuffed tracheostomy that might preclude her ability to become decannulated sometime in the near future. Findings: After removing the cuffed tracheostomy which itself was a little challenging because of the OD to ID mismatch due to the cough, the patient had modest bleeding but no dyspnea.   I passed the video bronchoscope through the stoma in first look retrograde to confirm the glottic aperture which was confirmed. I then looked circumferentially around the stoma and could see no worsening of the patient's peristomal stenosis whatsoever. I suctioned away the modest bleeding and then checked her trachea distally down to the mainstem bronchi finding no obstructive pathology. I then replaced the tracheostomy cannula without difficulty with the assistance of Dr. Slime Ordonez from [facility] without any significant problems. The cannula was secured and the patient was breathing at ease. Bronchoscopy images:  Modest bleeding peristomal he on removal of cuffed cannula    Good left true vocal fold closure    Better left true vocal fold closure    Medially protruding clip preventing comfortable closure    Distal bleeding suctioned away; distal and peristomal trachea tolerating presence of tracheostomy for the time being        Vitals reviewed. XR CHEST (2 VW)    Result Date: 4/18/2022  There is no acute intrathoracic process. **This report has been created using voice recognition software. It may contain minor errors which are inherent in voice recognition technology. ** Final report electronically signed by Dr Sony Vicnete on 4/18/2022 2:38 PM    FL MODIFIED BARIUM SWALLOW W VIDEO    Result Date: 4/26/2022  No penetration or aspiration. Recommendations available from speech therapy **This report has been created using voice recognition software. It may contain minor errors which are inherent in voice recognition technology. ** Final report electronically signed by Dr. Kristian Wan on 4/26/2022 11:08 AM     Lab Results   Component Value Date     04/18/2022     04/15/2022     03/05/2022    K 3.8 04/18/2022    K 3.4 04/15/2022    K 3.9 03/05/2022    K 4.3 03/04/2022    CL 98 04/18/2022     04/15/2022     03/05/2022    CO2 25 04/18/2022    CO2 19 04/15/2022    CO2 32 03/05/2022    BUN 21 04/18/2022    BUN 20 04/15/2022    BUN 14 03/05/2022    CREATININE 0.7 04/18/2022    CREATININE 0.5 04/15/2022    CREATININE 0.6 04/14/2022    CALCIUM 9.6 04/18/2022    CALCIUM 8.4 04/15/2022    CALCIUM 8.9 03/05/2022    PROT 6.4 04/06/2021    PROT 6.6 02/03/2021    LABALBU 4.1 04/06/2021    LABALBU 4.0 02/03/2021    BILITOT 0.2 04/06/2021    BILITOT 0.5 02/03/2021    ALKPHOS 62 04/06/2021    ALKPHOS 63 02/03/2021    AST 25 04/06/2021    AST 29 02/03/2021    ALT 18 04/06/2021    ALT 15 02/03/2021       All of the past medical history, past surgical history, family history,social history, allergies and current medications were reviewed with the patient. Assessment & Plan   Diagnoses and all orders for this visit:     Diagnosis Orders   1. Paralysis of right vocal cord     2. Airway obstruction     3. Hoarseness     4. Pharyngoesophageal dysphagia     5. Tracheostomy status ([facility] [address]     6. Personal history of poliomyelitis     7. Hemorrhage from tracheostomy stoma [facility])         Based on the patient's history and these physical findings, the patient is doing extremely well and is well ahead of schedule in her recovery. In all likelihood the patient is can need at most minor touching up procedures that may begin in 2 weeks when she sees me again in the clinic and I will perform a therapeutic laryngoscopy with removal of any stray clips that might be preventing her from having a stronger voice that she can. And then plans for eventual decannulation will be made with or without any interim surgical need for debridement or balloon dilation. This to be determined. I reviewed this in detail with the patient and her  as well as reviewed the approach she needs to take in the event that her cannula falls out and he needs to replace it. He was made anxious by the possibility but understood that if he simply waits for EMS to come, the patient may develop more serious problems.   While this is unlikely eventuality, it is important for the patient to at least have some sense of how to proceed. He did. The son was also attentive to this possibility. The patient did well throughout the procedure. Return in about 2 weeks (around 5/10/2022) for Follow-up evaluation and laryngoscopy with suture removal.           **This report has been created using voice recognition software. It may contain minor errors which are inherent in voice recognition technology. **

## 2022-04-27 PROBLEM — J95.01 HEMORRHAGE FROM TRACHEOSTOMY STOMA (HCC): Status: ACTIVE | Noted: 2022-04-27

## 2022-05-02 ENCOUNTER — TELEPHONE (OUTPATIENT)
Dept: INTERNAL MEDICINE CLINIC | Age: 77
End: 2022-05-02

## 2022-05-02 NOTE — TELEPHONE ENCOUNTER
Outpatient dietitian cb to [facility] speech therapist.  Anselmo Kim explaining that patient has a Dietitian referral forAdvancing Diet and eventual discontinuation of PEG tube feedings. Explained to Anselmo Kim, will reach out to Buffy Herrera MD office for this referral and get her scheduled.

## 2022-05-04 ENCOUNTER — TELEPHONE (OUTPATIENT)
Dept: INTERNAL MEDICINE CLINIC | Age: 77
End: 2022-05-04

## 2022-05-04 NOTE — TELEPHONE ENCOUNTER
Patient called to see if we had an update on her referral to Cristina Carmona. I do not see a referral in the system. Last note stated that Roxana Mcgovern was reaching out to [facility] AND [facility] to have referral sent over. Please reach out to patient as soon as possible.   Ok to call her number of her  Bill's. (both in chart)

## 2022-05-09 ENCOUNTER — OFFICE VISIT (OUTPATIENT)
Dept: ENT CLINIC | Age: 77
End: 2022-05-09
Payer: MEDICARE

## 2022-05-09 VITALS
BODY MASS INDEX: 22.3 KG/M2 | HEIGHT: 60 IN | DIASTOLIC BLOOD PRESSURE: 70 MMHG | TEMPERATURE: 97.7 F | HEART RATE: 84 BPM | WEIGHT: 113.6 LBS | OXYGEN SATURATION: 97 % | SYSTOLIC BLOOD PRESSURE: 122 MMHG

## 2022-05-09 DIAGNOSIS — J98.8 AIRWAY OBSTRUCTION: Primary | ICD-10-CM

## 2022-05-09 DIAGNOSIS — J38.01 PARALYSIS OF RIGHT VOCAL CORD: ICD-10-CM

## 2022-05-09 PROCEDURE — 99024 POSTOP FOLLOW-UP VISIT: CPT | Performed by: OTOLARYNGOLOGY

## 2022-05-09 PROCEDURE — 31575 DIAGNOSTIC LARYNGOSCOPY: CPT | Performed by: OTOLARYNGOLOGY

## 2022-05-09 NOTE — PROGRESS NOTES
[address] EAR, NOSE AND THROAT  [address] [place] [address] [place] [address]  Dept: [phone]  Dept Fax: [phone]  Loc: [phone]    Jamar Osorio is a 64 y.o. female who was referred by No ref. provider found for:  Chief Complaint   Patient presents with    Post-Op Check     Patient is here post-op laryngoplasty 4/14/22       HPI:     Jamar Osorio is a 64 y.o. female with a history of \"post polio syndrome\" referred for inpatient consultation while having post critical illness rehabilitation here at [facility]. Her initial care had been provided at [facility] and involved prolonged intubation and conversion to tracheostomy. The reason was that she failed extubation times several efforts and ultimately was given a tracheostomy and noted to be able to avoid the need for continued or tracheal intubation and additional instrumentation. 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 As such I performed a transoral augmentation lateralization laryngoplasty to open the airway posteriorly and augment the hypoplastic immobile true vocal fold anteriorly. The patient has since that time been able to tolerate progressive lengths of time plugging her tracheostomy completely. She is here today with her  and son reporting tolerating up to 9 hours of full plugging of her Passy-Porum valve without any dyspnea. In fact neither the patient nor the family have her to have even so much as trace stridor during these plugging episodes. She states that she breathes as comfortably with the valve plugged as unplugged.   In addition she is continuing to advance her oral nutrition and reduce her reliance on tube feeding though that process is still in transition and she is not yet on the verge of replacing her PEG tube feeds. That said, she is having also no reports of aspiration related to her oral nutrition. She does feel uncomfortable when she takes puréed materials that have some granularity and tends to cough more when doing so. History: Allergies   Allergen Reactions    Latex     Doxycycline      Tongue, throat and mouth soreness    Tizanidine Hcl Nausea And Vomiting     Current Outpatient Medications   Medication Sig Dispense Refill    zinc oxide (PINXAV) 30 % OINT Apply 113.4 g topically 4 times daily as needed (around PEG site for irritation PRN) 113.4 g 3    aluminum & magnesium hydroxide-simethicone (MAALOX) 200-200-20 MG/5ML SUSP suspension Take 30 mLs by mouth every 6 hours as needed for Indigestion 1 each 3    sertraline (ZOLOFT) 100 MG tablet Take 1 tablet by mouth daily 30 tablet 3    melatonin 3 MG TABS tablet Take 3 mg by mouth nightly as needed      sertraline (ZOLOFT) 100 MG tablet Take 100 mg by mouth daily      traZODone (DESYREL) 50 MG tablet Take 1 tablet by mouth nightly 30 tablet 3    cycloSPORINE (RESTASIS) 0.05 % ophthalmic emulsion Place 2 drops into both eyes daily 1 each 0    magnesium oxide (MAG-OX) 400 (241.3 Mg) MG TABS tablet Take 1 tablet by mouth daily 30 tablet 3    meloxicam (MOBIC) 15 MG tablet Take 15 mg by mouth daily       Multiple Vitamins-Minerals (PRESERVISION AREDS 2) CAPS Take 1 capsule by mouth 2 times daily       multivitamin (THERAGRAN) per tablet Take 1 tablet by mouth daily Alive 50+      ipratropium-albuterol (DUONEB) 0.5-2.5 (3) MG/3ML SOLN nebulizer solution Inhale 3 mLs into the lungs every 4 hours as needed for Shortness of Breath 360 mL 2     No current facility-administered medications for this visit.      Past Medical History:   Diagnosis Date    GERD (gastroesophageal reflux disease)     Osteoarthritis     Paralysis of vocal cords     Polio     Right side diaphragm paralysis     states trach oxygen at night      Past Surgical History:   Procedure Laterality Date    CHOLECYSTECTOMY      HYSTERECTOMY  01/2022    KNEE SURGERY      Right    LARYNGOSCOPY N/A 2/24/2022    DIAGNOSTIC SUSPENSION MICROLARYNGOSCOPY BRONCHOSCOPY WITH JET VENTILATION performed by Manju Harrington MD at [address] [place] [address] N/A 4/14/2022    TRANSORAL LARYNGOPLASTY WITH AUGMENTATION AND LATERALIZATION OF RIGHT TRUE VOCAL CORD, and  PART[facility]     History reviewed. No pertinent family history. Social History     Tobacco Use    Smoking status: Never Smoker    Smokeless tobacco: Never Used   Substance Use Topics    Alcohol use: Not Currently        Subjective:      Review of Systems  Rest of review of systems are negative, except as noted in HPI. Objective:     /70 (Site: Right Upper Arm, Position: Sitting)   Pulse 84   Temp 97.7 °F (36.5 °C) (Infrared)   Ht 5' (1.524 m)   Wt 113 lb 9.6 oz (51.5 kg)   SpO2 97%   BMI 22.19 kg/m²     Physical Exam       On general physical exam the patient is a pleasant alert and cooperative older adult female in no acute distress. Her voice is mildly low in volume but otherwise clear in character for her age and gender. I heard no throat clearing coughing or inspiratory stridor. Her tracheostomy is in place and her Passy-Trinity valve is in good position. She is currently not \"plugged\". Procedure: Flexible laryngoscopy with possible foreign body removal in the form of a vascular clip  Findings: The patient's larynx was well visualized. The patient's left true vocal fold was well mobilized and produced an adequate to generous intra glottic aperture. The bulk of the reconstructed right cord was generous. Multiple sutures and clips were seen but none that were piercing the opposite cord like the original exam.  There was pooling in the hypopharynx which was suctioned away. No other lesions or masses were seen.   The patient tolerated the procedure well. Flexible laryngoscopy images:    Initial view    Adequate to generous glottic aperture on respiration after being plugged        Vitals reviewed. XR CHEST (2 VW)    Result Date: 4/18/2022  There is no acute intrathoracic process. **This report has been created using voice recognition software. It may contain minor errors which are inherent in voice recognition technology. ** Final report electronically signed by Dr Twan Harper on 4/18/2022 2:38 PM    FL MODIFIED BARIUM SWALLOW W VIDEO    Result Date: 4/26/2022  No penetration or aspiration. Recommendations available from speech therapy **This report has been created using voice recognition software. It may contain minor errors which are inherent in voice recognition technology. ** Final report electronically signed by Dr. Tati Hoyos on 4/26/2022 11:08 AM     Lab Results   Component Value Date     04/18/2022     04/15/2022     03/05/2022    K 3.8 04/18/2022    K 3.4 04/15/2022    K 3.9 03/05/2022    K 4.3 03/04/2022    CL 98 04/18/2022     04/15/2022     03/05/2022    CO2 25 04/18/2022    CO2 19 04/15/2022    CO2 32 03/05/2022    BUN 21 04/18/2022    BUN 20 04/15/2022    BUN 14 03/05/2022    CREATININE 0.7 04/18/2022    CREATININE 0.5 04/15/2022    CREATININE 0.6 04/14/2022    CALCIUM 9.6 04/18/2022    CALCIUM 8.4 04/15/2022    CALCIUM 8.9 03/05/2022    PROT 6.4 04/06/2021    PROT 6.6 02/03/2021    LABALBU 4.1 04/06/2021    LABALBU 4.0 02/03/2021    BILITOT 0.2 04/06/2021    BILITOT 0.5 02/03/2021    ALKPHOS 62 04/06/2021    ALKPHOS 63 02/03/2021    AST 25 04/06/2021    AST 29 02/03/2021    ALT 18 04/06/2021    ALT 15 02/03/2021       All of the past medical history, past surgical history, family history,social history, allergies and current medications were reviewed with the patient. Assessment & Plan   Diagnoses and all orders for this visit:     Diagnosis Orders   1.  Airway obstruction  Miscellaneous Surgery   2. Paralysis of right vocal cord  Miscellaneous Surgery       Based on the patient's history and the endoscopic findings today, the patient is likely ready for decannulation following a relatively minor procedure to debride some of the obstructing soft tissues and then admit the patient for decannulation. The patient's left true vocal fold motion was excellent and her glottic aperture was adequate to generous for the task. As such I recommended the operation to the patient and her family who was with us the entire time to their satisfaction. I reviewed the indications benefits limitations and risks physic particularly as relates to the potential for decannulation following the procedure. The reported understanding the basis of my recommendation and wishing to proceed as recommended. We will do so. Return in about 6 weeks (around 6/20/2022) for Postop evaluation and to plan further care as indicated. **This report has been created using voice recognition software. It may contain minor errors which are inherent in voice recognition technology. **

## 2022-05-10 ENCOUNTER — OFFICE VISIT (OUTPATIENT)
Dept: INTERNAL MEDICINE CLINIC | Age: 77
End: 2022-05-10
Payer: MEDICARE

## 2022-05-10 VITALS — HEIGHT: 60 IN | TEMPERATURE: 98.6 F | WEIGHT: 114.6 LBS | BODY MASS INDEX: 22.5 KG/M2

## 2022-05-10 DIAGNOSIS — Z93.1 FEEDING BY G-TUBE (HCC): ICD-10-CM

## 2022-05-10 DIAGNOSIS — R13.10 DYSPHAGIA, UNSPECIFIED TYPE: ICD-10-CM

## 2022-05-10 PROCEDURE — 97802 MEDICAL NUTRITION INDIV IN: CPT | Performed by: DIETITIAN, REGISTERED

## 2022-05-10 NOTE — PROGRESS NOTES
[address] [place] [address]. [address] [place] [address] Luis Alberto., [facility], [facility]  [phone] (phone)  [phone] (fax)    Patient Name: Manish Herrmann Date of Birth: [id number]. MRN: [id number]      Assessment: Patient is a 64 y.o. female seen for Initial MNT visit for Dysphagia. -Nutritionally relevant labs:   Lab Results   Component Value Date/Time    GLUCOSE 79 04/18/2022 02:30 PM    GLUCOSE 89 04/15/2022 04:12 AM   4/28/22 per Speech Therapy note in EHR:  History of Present Illness/Injury: Patient reports in January of 2022 she had a total hysterectomy at [facility]. She was re-intubated shortly after her surgery d/t being hypercapnic and eventually had to have a tracheostomy tube placed after multiple intubation/extubations. Patient had to have a PEG tube placed in the beginning of February 2022. Patient reports she was transferred to inpatient rehab at [address] [place] [address] for several weeks for therapy. On 4/14/22, patient had the following procedures completed by Dr. Jessica Solorzano: TRANSORAL LARYNGOPLASTY WITH AUGMENTATION AND LATERALIZATION OF RIGHT TRUE VOCAL CORD, and  PARTIAL ARYTENOIDECTOMY. Patient is currently receiving Home Health speech therapy to address her dysphagia. Patient reports she is currently on a clear liquid diet, but takes majority of her nutrition and hydration via the PEG tube. Patient reports she has been tolerating her PMV pretty well. 4/28/22 - MBS per Speech therapy note in EHR - Diet Recommendations:  Puree with thin liquids (continue to utilize PEG tube to maintain nutrition and hydration at this time)    Today's Visit:  Pt here with her .  administers her Bolus feedings via PEG. PEG tube feedings 150 ml Two Dominic HN 5x/day with 30 ml water flush before and after each feeding  Feeding times: ~ every 3 hours.   7/8 am, 1130 am, 230/3 pm, 530/6 pm, 9/10 pm.  Per pt - having a procedure down to remove trach and to remove My Asthma Action Plan    Name: Gabriel Zacarias   YOB: 2015  Date: 8/28/2019   My doctor: Rahel Lyon MD   My clinic: [facility]        My Rescue Medicine:   Albuterol nebulizer solution 1 vial EVERY 4 HOURS as needed    - OR -  Albuterol inhaler (Proair/Ventolin/Proventil HFA)  2 puffs EVERY 4 HOURS as needed. Use a spacer if recommended by your provider.   My Asthma Severity:   Intermittent / Exercise Induced  Know your asthma triggers:   upper respiratory infections     The medication may be given at school or day care?: Yes  Child can carry and use inhaler at school with approval of school nurse?: No       GREEN ZONE   Good Control    I feel good    No cough or wheeze    Can work, sleep and play without asthma symptoms       Take your asthma control medicine every day.     1. If exercise triggers your asthma, take your rescue medication    15 minutes before exercise or sports, and    During exercise if you have asthma symptoms  2. Spacer to use with inhaler: If you have a spacer, make sure to use it with your inhaler             YELLOW ZONE Getting Worse  I have ANY of these:    I do not feel good    Cough or wheeze    Chest feels tight    Wake up at night   1. Keep taking your Green Zone medications  2. Start taking your rescue medicine:    every 20 minutes for up to 1 hour. Then every 4 hours for 24-48 hours.  3. If you stay in the Yellow Zone for more than 12-24 hours, contact your doctor.  4. If you do not return to the Green Zone in 12-24 hours or you get worse, start taking your oral steroid medicine if prescribed by your provider.           RED ZONE Medical Alert - Get Help  I have ANY of these:    I feel awful    Medicine is not helping    Breathing getting harder    Trouble walking or talking    Nose opens wide to breathe       1. Take your rescue medicine NOW  2. If your provider has prescribed an oral steroid medicine, start taking it NOW  3. Call your  clamps in throat on May 26th.    -Food recall: Per pt x 1 week food trials 3x/day 1/4 cup - 1/2 cup per feeding. Pt denies choking or coughing with liquids  Yoplait van yogurt with peaches OR broccoli cheese soup OR cottage cheese with peaches OR coffee flavored ice cream OR tropical fruit cup  OR mashed avocado. Pt sates has not done well with the following foods:   - sc eggs   - pears (gritty)   - cottage cheese (grainy)   - tomato juice (acidy)  Pt easily gets too much phlegm/mucous with milk and milk products. -Main Beverages: Likes iced mocha coffee drinks from [facility].    -Impression of Dietary Intake: Impaired swallowing and PEG tube dependent.     Current Outpatient Medications on File Prior to Visit   Medication Sig Dispense Refill    zinc oxide (PINXAV) 30 % OINT Apply 113.4 g topically 4 times daily as needed (around PEG site for irritation PRN) 113.4 g 3    aluminum & magnesium hydroxide-simethicone (MAALOX) 200-200-20 MG/5ML SUSP suspension Take 30 mLs by mouth every 6 hours as needed for Indigestion 1 each 3    sertraline (ZOLOFT) 100 MG tablet Take 1 tablet by mouth daily 30 tablet 3    melatonin 3 MG TABS tablet Take 3 mg by mouth nightly as needed      sertraline (ZOLOFT) 100 MG tablet Take 100 mg by mouth daily      ipratropium-albuterol (DUONEB) 0.5-2.5 (3) MG/3ML SOLN nebulizer solution Inhale 3 mLs into the lungs every 4 hours as needed for Shortness of Breath 360 mL 2    traZODone (DESYREL) 50 MG tablet Take 1 tablet by mouth nightly 30 tablet 3    cycloSPORINE (RESTASIS) 0.05 % ophthalmic emulsion Place 2 drops into both eyes daily 1 each 0    magnesium oxide (MAG-OX) 400 (241.3 Mg) MG TABS tablet Take 1 tablet by mouth daily 30 tablet 3    meloxicam (MOBIC) 15 MG tablet Take 15 mg by mouth daily       Multiple Vitamins-Minerals (PRESERVISION AREDS 2) CAPS Take 1 capsule by mouth 2 times daily       multivitamin (THERAGRAN) per tablet Take 1 tablet by mouth daily Alive 50+       No doctor NOW  4. If you are still in the Red Zone after 20 minutes and you have not reached your doctor:    Take your rescue medicine again and    Call 911 or go to the emergency room right away    See your regular doctor within 2 weeks of an Emergency Room or Urgent Care visit for follow-up treatment.          Annual Reminders:  Meet with Asthma Educator. Make sure your child gets their flu shot in the fall and is up to date with all vaccines.    Pharmacy:    [facility] #[id number] - [place], [place] - [address]  [id number] - [place] - [address] [facility] [address]  [facility] #[id number] [place], [place] - [address] AT [facility] & [address] [facility]ACY #[id number] - [place], [place] - [address] [address]                          Asthma Triggers  How To Control Things That Make Your Asthma Worse    Triggers are things that make your asthma worse.  Look at the list below to help you find your triggers and what you can do about them.  You can help prevent asthma flare-ups by staying away from your triggers.      Trigger                                                          What you can do   Cigarette Smoke  Tobacco smoke can make asthma worse. Do not allow smoking in your home, car or around you.  Be sure no one smokes at a child s day care or school.  If you smoke, ask your health care provider for ways to help you quit.  Ask family members to quit too.  Ask your health care provider for a referral to Quit Plan to help you quit smoking, or call 9-173-177-PLAN.     Colds, Flu, Bronchitis  These are common triggers of asthma. Wash your hands often.  Don t touch your eyes, nose or mouth.  Get a flu shot every year.     Dust Mites  These are tiny bugs that live in cloth or carpet. They are too small to see. Wash sheets and blankets in hot water every week.   Encase pillows and mattress in dust mite proof covers.  Avoid having carpet if you can. If you have carpet, vacuum  current facility-administered medications on file prior to visit. Vitals from current and previous visits:  Temp 98.6 °F (37 °C)   Ht 5' (1.524 m)   Wt 114 lb 9.6 oz (52 kg)   BMI 22.38 kg/m²     -Body mass index is 22.38 kg/m². 18.5-24.9 - Normal.   -Weight goal: maintain and prevent weight loss. Nutrition Diagnosis:   Inadquate oral food and beverage intake related to Compromised GI structure/function: Dysphagia as evidenced by need for enteral nutrition via PEG. Intervention:  -Instructed the patient on: How to puree foods - guidelines provided, demonstrated dysphagia diva you tube videos. Adding extra calories to foods, adding commercial thickening powder to foods. Continue to monitor weight at home.    -Handouts given for: Pureed food guidelines, fruit smoothie recipes, pureed nutrition therapy guidelines from nutrition care manual.    Patient Instructions   Eliminate one our your PEG tube feedings - so 4 feedings/day of 150 ml Two Dominic HN  - Get in an additional 300 calories during the day to replace that feeding  - Count the calories on the nutrition facts label or look up food items on the computer  Ex. [email or web address]    Blenderize foods to make a cohesive food substance for ease in swallowing. Make a grocery list of food items to have on hand for making pureed foods and smoothies. After a week maybe try only 3 PEG feedings/day. Bring your food logging again to next dietitian appt. -Nutrition prescription: 1300 -  1500 Calories (25 - 39 dominic/kg), 52 - 78 gm protein (1 - 1.5 gm prot/kg). Comprehension verified using teachback method. Monitoring/Evaluation:   -Followup visit: 2 weeks with dietitian.   -Receptiveness to education/goals: Agreeable.  -Evaluation of education: Indicates understanding.  -Readiness to change: action - ready to set action plan and implement eliminating 1 PEG feeding for 6 hours to help her develop an appetite to enhance po diet.   -Expected weekly.   Use a dust mask and HEPA vacuum.   Pollen and Outdoor Mold  Some people are allergic to trees, grass, or weed pollen, or molds. Try to keep your windows closed.  Limit time out doors when pollen count is high.   Ask you health care provider about taking medicine during allergy season.     Animal Dander  Some people are allergic to skin flakes, urine or saliva from pets with fur or feathers. Keep pets with fur or feathers out of your home.    If you can t keep the pet outdoors, then keep the pet out of your bedroom.  Keep the bedroom door closed.  Keep pets off cloth furniture and away from stuffed toys.     Mice, Rats, and Cockroaches  Some people are allergic to the waste from these pests.   Cover food and garbage.  Clean up spills and food crumbs.  Store grease in the refrigerator.   Keep food out of the bedroom.   Indoor Mold  This can be a trigger if your home has high moisture. Fix leaking faucets, pipes, or other sources of water.   Clean moldy surfaces.  Dehumidify basement if it is damp and smelly.   Smoke, Strong Odors, and Sprays  These can reduce air quality. Stay away from strong odors and sprays, such as perfume, powder, hair spray, paints, smoke incense, paint, cleaning products, candles and new carpet.   Exercise or Sports  Some people with asthma have this trigger. Be active!  Ask your doctor about taking medicine before sports or exercise to prevent symptoms.    Warm up for 5-10 minutes before and after sports or exercise.     Other Triggers of Asthma  Cold air:  Cover your nose and mouth with a scarf.  Sometimes laughing or crying can be a trigger.  Some medicines and food can trigger asthma.            compliance: good. Thank you for your referral of this patient. Total time involved in direct patient education: 45 minutes for initial MNT visit.

## 2022-05-10 NOTE — PATIENT INSTRUCTIONS
Eliminate one our your PEG tube feedings - so 4 feedings/day of 150 ml Two Dominic HN  - Get in an additional 300 calories during the day to replace that feeding  - Count the calories on the nutrition facts label or look up food items on the computer  Ex. Www.[email or web address]    Blenderize foods to make a cohesive food substance for ease in swallowing. Make a grocery list of food items to have on hand for making pureed foods and smoothies. After a week maybe try only 3 PEG feedings/day. Bring your food logging again to next dietitian appt.

## 2022-05-19 RX ORDER — ESOMEPRAZOLE MAGNESIUM 40 MG/1
40 FOR SUSPENSION ORAL DAILY
COMMUNITY

## 2022-05-19 NOTE — FLOWSHEET NOTE
Follow all instructions given by your physician    NPO after midnight   Sips of water am of surgery with allowed medications  Bring insurance info and 's license  Wear comfortable clean clothing  No jewelry  Case for glasses. Shower night before and morning of surgery with a liquid antibacterial soap, dry with fresh clean towel; no lotions, creams or powder. Clean sheets and pillow case on bed night before surgery  Bring medications in original bottles     needed at discharge and someone over 18 to stay with you for 24 hours overnight (surgery may be cancelled if you don't have this)  Report to [facility] on 2nd floor  If you would become ill prior to surgery, please call the surgeon  May have a visitor with you, we request that you limit to 2 visitors in pre-op area  Please bring and wear mask  Call [phone] for any questions  Covid questionnaire Complete; Patient negative for symptoms or exposure. See documentation.

## 2022-05-20 ENCOUNTER — PREP FOR PROCEDURE (OUTPATIENT)
Dept: ENT CLINIC | Age: 77
End: 2022-05-20

## 2022-05-24 ENCOUNTER — OFFICE VISIT (OUTPATIENT)
Dept: INTERNAL MEDICINE CLINIC | Age: 77
End: 2022-05-24
Payer: MEDICARE

## 2022-05-24 VITALS — WEIGHT: 116.4 LBS | HEIGHT: 60 IN | TEMPERATURE: 99 F | BODY MASS INDEX: 22.85 KG/M2

## 2022-05-24 DIAGNOSIS — R13.10 DYSPHAGIA, UNSPECIFIED TYPE: ICD-10-CM

## 2022-05-24 PROCEDURE — G0270 MNT SUBS TX FOR CHANGE DX: HCPCS | Performed by: DIETITIAN, REGISTERED

## 2022-05-24 NOTE — PROGRESS NOTES
[address] [place] [address]. [address] [place] [address]., [facility], [address]  [phone] (phone)  [phone] (fax)    Patient Name: Brady Tran Date of Birth: [id number]. MRN: [id number]      Assessment: Patient is a 64 y.o. female seen for follow-up MNT visit for Dysphagia. -Nutritionally relevant labs:   Lab Results   Component Value Date/Time    GLUCOSE 79 04/18/2022 02:30 PM    GLUCOSE 89 04/15/2022 04:12 AM     Pt here with .  administers her bolus PEG feedings 150 ml Two Dominic 4x/day. They have stopped the noon feeding so she can be hungry for lunch instead. Pt doing a good job food journaling and modifying her food to puree consistency for ease of swallowing. Pt has not trouble with liquids. No c/o phlegm. She stated she had diarrhea when starting to eat regular food. She was prescribed immodium. She took this and her diarrhea resolved and she no longer takes the immodium. She is now tending toward constipation but not problematic yet. Still having regular bowel movements. She has been calorie counting her foods. She has been following the tips on how to blenderize meat and other vegetables. Meal Examples:    2 oz rotisserie chicken with hot water and blends this. Also ate 1/2 cup yogurt and 1/3 cup mashed banana with this meal  2 oz deli turkey warmed and chopped and added hot water to this to blend and blended butter and cooked carrots & broccoli with this. Which she enjoyed. Calories at lunch meals range btw 230 - 380 calories (which equates to the same of slightly more than what her bolus feeding would be)    She has blended watermelon.   She did not like the oatmeal she made d/t being too sweet with 2 tsp brown sugar added to this.    -Impression of Dietary Intake: Pt has to have modified textures and she is doing a good job trying a vaiety of foods    Current Outpatient Medications on File Prior to Visit   Medication Sig Dispense Refill    esomeprazole Magnesium (NEXIUM) 40 MG PACK Take 40 mg by mouth daily      Feeding Tubes MISC by Does not apply route 2 shreya hn 5 times a day. Takes 150 ml of 2 shreya and 60 ml of water (30 ml before and 30 ml after meal.)      aluminum & magnesium hydroxide-simethicone (MAALOX) 200-200-20 MG/5ML SUSP suspension Take 30 mLs by mouth every 6 hours as needed for Indigestion 1 each 3    sertraline (ZOLOFT) 100 MG tablet Take 1 tablet by mouth daily 30 tablet 3    melatonin 3 MG TABS tablet Take 3 mg by mouth nightly as needed      cycloSPORINE (RESTASIS) 0.05 % ophthalmic emulsion Place 2 drops into both eyes daily 1 each 0    magnesium oxide (MAG-OX) 400 (241.3 Mg) MG TABS tablet Take 1 tablet by mouth daily 30 tablet 3    ipratropium-albuterol (DUONEB) 0.5-2.5 (3) MG/3ML SOLN nebulizer solution Inhale 3 mLs into the lungs every 4 hours as needed for Shortness of Breath 360 mL 2    traZODone (DESYREL) 50 MG tablet Take 1 tablet by mouth nightly (Patient not taking: Reported on 5/24/2022) 30 tablet 3    meloxicam (MOBIC) 15 MG tablet Take 15 mg by mouth daily       Multiple Vitamins-Minerals (PRESERVISION AREDS 2) CAPS Take 1 capsule by mouth 2 times daily       multivitamin (THERAGRAN) per tablet Take 1 tablet by mouth daily Alive 50+       No current facility-administered medications on file prior to visit. Vitals from current and previous visits:  Temp 99 °F (37.2 °C)   Ht 5' (1.524 m)   Wt 116 lb 6.4 oz (52.8 kg)   BMI 22.73 kg/m²     -Body mass index is 22.73 kg/m². 18.5-24.9 - Normal.   - Patient gained and 2 pounds over the last 2 weeks. .  -Weight goal: maintain weight. Nutrition Diagnosis:   Food and nutrition-related knowledge deficit related to currently undergoing MNT as evidenced by Conditions associated with a diagnosis or treatment: Dysphagia.          Intervention:  -Impression: Pt has down an excellent job modifying the textures of her foods for ease of swallowing.    - Instructed and handouts provided on:   Minced and moist nutrition therapy guidelines, soft and bite size nutrition therapy    Patient Instructions   Your weight is good and gained 2# by having a lunch meal of pureed food instead of your PEG Feeding. Now trial 2 meals/day without the PEG feeding. Trial soft moist foods and cohesive moist soft foods. - Avoid mixed textures. - Ok to soak cornflakes with milk then pour off the milk or drink separately. Good job food journaling and counting your calories. Bring again to next dietitian appt. -Nutrition prescription: 1300 -  1500 Calories (25 - 39 shreya/kg), 52 - 78 gm protein (1 - 1.5 gm prot/kg). Comprehension verified using teachback method. Monitoring/Evaluation:   -Followup visit: 4 weeks with dietitian.   -Receptiveness to education/goals: Agreeable.  -Evaluation of education: Indicates understanding.  -Readiness to change: action - ready to set action plan and implement ready to trial soft and bite size/minced foods.  -Expected compliance: good. Thank you for your referral of this patient. Total time involved in direct patient education: 30 minutes for follow-up MNT visit.

## 2022-05-24 NOTE — PATIENT INSTRUCTIONS
Your weight is good and gained 2# by having a lunch meal of pureed food instead of your PEG Feeding. Now trial 2 meals/day without the PEG feeding. Trial soft moist foods and cohesive moist soft foods. - Avoid mixed textures. - Ok to soak cornflakes with milk then pour off the milk or drink separately. Good job food journaling and counting your calories. Bring again to next dietitian appt.

## 2022-05-25 RX ORDER — SODIUM CHLORIDE 0.9 % (FLUSH) 0.9 %
5-40 SYRINGE (ML) INJECTION EVERY 12 HOURS SCHEDULED
Status: CANCELLED | OUTPATIENT
Start: 2022-05-25

## 2022-05-25 RX ORDER — DEXAMETHASONE SODIUM PHOSPHATE 4 MG/ML
8 INJECTION, SOLUTION INTRA-ARTICULAR; INTRALESIONAL; INTRAMUSCULAR; INTRAVENOUS; SOFT TISSUE
Status: CANCELLED | OUTPATIENT
Start: 2022-05-25

## 2022-05-25 RX ORDER — SODIUM CHLORIDE 9 MG/ML
INJECTION, SOLUTION INTRAVENOUS PRN
Status: CANCELLED | OUTPATIENT
Start: 2022-05-25

## 2022-05-25 RX ORDER — SODIUM CHLORIDE 0.9 % (FLUSH) 0.9 %
5-40 SYRINGE (ML) INJECTION PRN
Status: CANCELLED | OUTPATIENT
Start: 2022-05-25

## 2022-05-26 ENCOUNTER — HOSPITAL ENCOUNTER (INPATIENT)
Age: 77
LOS: 3 days | Discharge: HOME HEALTH | DRG: 145 | End: 2022-05-29
Attending: OTOLARYNGOLOGY | Admitting: OTOLARYNGOLOGY
Payer: MEDICARE

## 2022-05-26 ENCOUNTER — ANESTHESIA EVENT (OUTPATIENT)
Dept: OPERATING ROOM | Age: 77
DRG: 145 | End: 2022-05-26
Payer: MEDICARE

## 2022-05-26 ENCOUNTER — ANESTHESIA (OUTPATIENT)
Dept: OPERATING ROOM | Age: 77
DRG: 145 | End: 2022-05-26
Payer: MEDICARE

## 2022-05-26 PROBLEM — J98.8 AIRWAY COMPROMISE: Status: ACTIVE | Noted: 2022-05-26

## 2022-05-26 PROCEDURE — 6370000000 HC RX 637 (ALT 250 FOR IP): Performed by: OTOLARYNGOLOGY

## 2022-05-26 PROCEDURE — 6360000002 HC RX W HCPCS: Performed by: REGISTERED NURSE

## 2022-05-26 PROCEDURE — 2580000003 HC RX 258: Performed by: OTOLARYNGOLOGY

## 2022-05-26 PROCEDURE — 2060000000 HC ICU INTERMEDIATE R&B

## 2022-05-26 PROCEDURE — 0BB18ZZ EXCISION OF TRACHEA, VIA NATURAL OR ARTIFICIAL OPENING ENDOSCOPIC: ICD-10-PCS | Performed by: OTOLARYNGOLOGY

## 2022-05-26 PROCEDURE — 6360000002 HC RX W HCPCS: Performed by: OTOLARYNGOLOGY

## 2022-05-26 PROCEDURE — 3700000000 HC ANESTHESIA ATTENDED CARE: Performed by: OTOLARYNGOLOGY

## 2022-05-26 PROCEDURE — 31641 BRONCHOSCOPY TREAT BLOCKAGE: CPT | Performed by: OTOLARYNGOLOGY

## 2022-05-26 PROCEDURE — 3600000004 HC SURGERY LEVEL 4 BASE: Performed by: OTOLARYNGOLOGY

## 2022-05-26 PROCEDURE — 7100000001 HC PACU RECOVERY - ADDTL 15 MIN: Performed by: OTOLARYNGOLOGY

## 2022-05-26 PROCEDURE — 2720000010 HC SURG SUPPLY STERILE: Performed by: OTOLARYNGOLOGY

## 2022-05-26 PROCEDURE — 31541 LARYNSCOP W/TUMR EXC + SCOPE: CPT | Performed by: OTOLARYNGOLOGY

## 2022-05-26 PROCEDURE — 94760 N-INVAS EAR/PLS OXIMETRY 1: CPT

## 2022-05-26 PROCEDURE — 3600000014 HC SURGERY LEVEL 4 ADDTL 15MIN: Performed by: OTOLARYNGOLOGY

## 2022-05-26 PROCEDURE — 3700000001 HC ADD 15 MINUTES (ANESTHESIA): Performed by: OTOLARYNGOLOGY

## 2022-05-26 PROCEDURE — 0CQS8ZZ REPAIR LARYNX, VIA NATURAL OR ARTIFICIAL OPENING ENDOSCOPIC: ICD-10-PCS | Performed by: OTOLARYNGOLOGY

## 2022-05-26 PROCEDURE — 2500000003 HC RX 250 WO HCPCS: Performed by: REGISTERED NURSE

## 2022-05-26 PROCEDURE — 94640 AIRWAY INHALATION TREATMENT: CPT

## 2022-05-26 PROCEDURE — 7100000000 HC PACU RECOVERY - FIRST 15 MIN: Performed by: OTOLARYNGOLOGY

## 2022-05-26 PROCEDURE — 2709999900 HC NON-CHARGEABLE SUPPLY: Performed by: OTOLARYNGOLOGY

## 2022-05-26 RX ORDER — SODIUM CHLORIDE 9 MG/ML
INJECTION, SOLUTION INTRAVENOUS CONTINUOUS
Status: DISCONTINUED | OUTPATIENT
Start: 2022-05-26 | End: 2022-05-27

## 2022-05-26 RX ORDER — NYSTATIN 100000 U/G
CREAM TOPICAL 2 TIMES DAILY
COMMUNITY
End: 2022-09-07

## 2022-05-26 RX ORDER — PROPOFOL 10 MG/ML
INJECTION, EMULSION INTRAVENOUS PRN
Status: DISCONTINUED | OUTPATIENT
Start: 2022-05-26 | End: 2022-05-26 | Stop reason: SDUPTHER

## 2022-05-26 RX ORDER — SODIUM CHLORIDE 0.9 % (FLUSH) 0.9 %
5-40 SYRINGE (ML) INJECTION PRN
Status: CANCELLED | OUTPATIENT
Start: 2022-05-26

## 2022-05-26 RX ORDER — DEXAMETHASONE SODIUM PHOSPHATE 4 MG/ML
8 INJECTION, SOLUTION INTRA-ARTICULAR; INTRALESIONAL; INTRAMUSCULAR; INTRAVENOUS; SOFT TISSUE
Status: DISCONTINUED | OUTPATIENT
Start: 2022-05-26 | End: 2022-05-26 | Stop reason: HOSPADM

## 2022-05-26 RX ORDER — FENTANYL CITRATE 50 UG/ML
50 INJECTION, SOLUTION INTRAMUSCULAR; INTRAVENOUS EVERY 5 MIN PRN
Status: CANCELLED | OUTPATIENT
Start: 2022-05-26

## 2022-05-26 RX ORDER — LANOLIN ALCOHOL/MO/W.PET/CERES
3 CREAM (GRAM) TOPICAL NIGHTLY PRN
Status: DISCONTINUED | OUTPATIENT
Start: 2022-05-26 | End: 2022-05-29 | Stop reason: HOSPADM

## 2022-05-26 RX ORDER — MULTIVITAMIN WITH IRON
1 TABLET ORAL DAILY
Status: DISCONTINUED | OUTPATIENT
Start: 2022-05-26 | End: 2022-05-29 | Stop reason: HOSPADM

## 2022-05-26 RX ORDER — CYCLOSPORINE 0.5 MG/ML
2 EMULSION OPHTHALMIC DAILY
Status: DISCONTINUED | OUTPATIENT
Start: 2022-05-27 | End: 2022-05-29 | Stop reason: HOSPADM

## 2022-05-26 RX ORDER — DIPHENHYDRAMINE HYDROCHLORIDE 50 MG/ML
12.5 INJECTION INTRAMUSCULAR; INTRAVENOUS
Status: CANCELLED | OUTPATIENT
Start: 2022-05-26 | End: 2022-05-26

## 2022-05-26 RX ORDER — SODIUM CHLORIDE 0.9 % (FLUSH) 0.9 %
5-40 SYRINGE (ML) INJECTION PRN
Status: DISCONTINUED | OUTPATIENT
Start: 2022-05-26 | End: 2022-05-26 | Stop reason: HOSPADM

## 2022-05-26 RX ORDER — ACETAMINOPHEN 325 MG/1
650 TABLET ORAL EVERY 6 HOURS PRN
Status: DISCONTINUED | OUTPATIENT
Start: 2022-05-26 | End: 2022-05-29 | Stop reason: HOSPADM

## 2022-05-26 RX ORDER — ONDANSETRON 2 MG/ML
INJECTION INTRAMUSCULAR; INTRAVENOUS PRN
Status: DISCONTINUED | OUTPATIENT
Start: 2022-05-26 | End: 2022-05-26 | Stop reason: SDUPTHER

## 2022-05-26 RX ORDER — OXYCODONE HYDROCHLORIDE 5 MG/1
5 TABLET ORAL EVERY 4 HOURS PRN
Status: DISCONTINUED | OUTPATIENT
Start: 2022-05-26 | End: 2022-05-29 | Stop reason: HOSPADM

## 2022-05-26 RX ORDER — SODIUM CHLORIDE 9 MG/ML
INJECTION, SOLUTION INTRAVENOUS PRN
Status: CANCELLED | OUTPATIENT
Start: 2022-05-26

## 2022-05-26 RX ORDER — POLYETHYLENE GLYCOL 3350 17 G/17G
17 POWDER, FOR SOLUTION ORAL DAILY
Status: DISCONTINUED | OUTPATIENT
Start: 2022-05-26 | End: 2022-05-29 | Stop reason: HOSPADM

## 2022-05-26 RX ORDER — ROCURONIUM BROMIDE 10 MG/ML
INJECTION, SOLUTION INTRAVENOUS PRN
Status: DISCONTINUED | OUTPATIENT
Start: 2022-05-26 | End: 2022-05-26 | Stop reason: SDUPTHER

## 2022-05-26 RX ORDER — ONDANSETRON 2 MG/ML
4 INJECTION INTRAMUSCULAR; INTRAVENOUS EVERY 6 HOURS PRN
Status: DISCONTINUED | OUTPATIENT
Start: 2022-05-26 | End: 2022-05-29 | Stop reason: HOSPADM

## 2022-05-26 RX ORDER — PANTOPRAZOLE SODIUM 40 MG/1
40 TABLET, DELAYED RELEASE ORAL
Status: DISCONTINUED | OUTPATIENT
Start: 2022-05-26 | End: 2022-05-29 | Stop reason: HOSPADM

## 2022-05-26 RX ORDER — FENTANYL CITRATE 50 UG/ML
INJECTION, SOLUTION INTRAMUSCULAR; INTRAVENOUS PRN
Status: DISCONTINUED | OUTPATIENT
Start: 2022-05-26 | End: 2022-05-26 | Stop reason: SDUPTHER

## 2022-05-26 RX ORDER — IPRATROPIUM BROMIDE AND ALBUTEROL SULFATE 2.5; .5 MG/3ML; MG/3ML
3 SOLUTION RESPIRATORY (INHALATION) EVERY 4 HOURS PRN
Status: DISCONTINUED | OUTPATIENT
Start: 2022-05-26 | End: 2022-05-29 | Stop reason: HOSPADM

## 2022-05-26 RX ORDER — SODIUM CHLORIDE 0.9 % (FLUSH) 0.9 %
5-40 SYRINGE (ML) INJECTION EVERY 12 HOURS SCHEDULED
Status: DISCONTINUED | OUTPATIENT
Start: 2022-05-26 | End: 2022-05-29 | Stop reason: HOSPADM

## 2022-05-26 RX ORDER — ESOMEPRAZOLE MAGNESIUM 40 MG/1
40 FOR SUSPENSION ORAL DAILY
Status: DISCONTINUED | OUTPATIENT
Start: 2022-05-26 | End: 2022-05-26 | Stop reason: CLARIF

## 2022-05-26 RX ORDER — MAGNESIUM HYDROXIDE/ALUMINUM HYDROXICE/SIMETHICONE 120; 1200; 1200 MG/30ML; MG/30ML; MG/30ML
30 SUSPENSION ORAL EVERY 6 HOURS PRN
Status: DISCONTINUED | OUTPATIENT
Start: 2022-05-26 | End: 2022-05-29 | Stop reason: HOSPADM

## 2022-05-26 RX ORDER — LABETALOL 20 MG/4 ML (5 MG/ML) INTRAVENOUS SYRINGE
PRN
Status: DISCONTINUED | OUTPATIENT
Start: 2022-05-26 | End: 2022-05-26 | Stop reason: SDUPTHER

## 2022-05-26 RX ORDER — ANTIOX #8/OM3/DHA/EPA/LUT/ZEAX 250-2.5 MG
1 CAPSULE ORAL 2 TIMES DAILY
Status: DISCONTINUED | OUTPATIENT
Start: 2022-05-26 | End: 2022-05-26 | Stop reason: SDUPTHER

## 2022-05-26 RX ORDER — LANOLIN ALCOHOL/MO/W.PET/CERES
400 CREAM (GRAM) TOPICAL DAILY
Status: DISCONTINUED | OUTPATIENT
Start: 2022-05-26 | End: 2022-05-29 | Stop reason: HOSPADM

## 2022-05-26 RX ORDER — NYSTATIN 100000 U/G
CREAM TOPICAL 2 TIMES DAILY
Status: DISCONTINUED | OUTPATIENT
Start: 2022-05-26 | End: 2022-05-29 | Stop reason: HOSPADM

## 2022-05-26 RX ORDER — SODIUM CHLORIDE 450 MG/100ML
INJECTION, SOLUTION INTRAVENOUS CONTINUOUS
Status: DISCONTINUED | OUTPATIENT
Start: 2022-05-26 | End: 2022-05-27

## 2022-05-26 RX ORDER — SODIUM CHLORIDE 9 MG/ML
INJECTION, SOLUTION INTRAVENOUS PRN
Status: DISCONTINUED | OUTPATIENT
Start: 2022-05-26 | End: 2022-05-26 | Stop reason: HOSPADM

## 2022-05-26 RX ORDER — PROPOFOL 10 MG/ML
INJECTION, EMULSION INTRAVENOUS CONTINUOUS PRN
Status: DISCONTINUED | OUTPATIENT
Start: 2022-05-26 | End: 2022-05-26 | Stop reason: SDUPTHER

## 2022-05-26 RX ORDER — ONDANSETRON 4 MG/1
4 TABLET, ORALLY DISINTEGRATING ORAL EVERY 8 HOURS PRN
Status: DISCONTINUED | OUTPATIENT
Start: 2022-05-26 | End: 2022-05-29 | Stop reason: HOSPADM

## 2022-05-26 RX ORDER — SERTRALINE HYDROCHLORIDE 100 MG/1
100 TABLET, FILM COATED ORAL DAILY
Status: DISCONTINUED | OUTPATIENT
Start: 2022-05-26 | End: 2022-05-29 | Stop reason: HOSPADM

## 2022-05-26 RX ORDER — ONDANSETRON 2 MG/ML
4 INJECTION INTRAMUSCULAR; INTRAVENOUS
Status: CANCELLED | OUTPATIENT
Start: 2022-05-26 | End: 2022-05-26

## 2022-05-26 RX ORDER — MELOXICAM 7.5 MG/1
15 TABLET ORAL DAILY
Status: DISCONTINUED | OUTPATIENT
Start: 2022-05-26 | End: 2022-05-29 | Stop reason: HOSPADM

## 2022-05-26 RX ORDER — SODIUM CHLORIDE 9 MG/ML
INJECTION, SOLUTION INTRAVENOUS PRN
Status: DISCONTINUED | OUTPATIENT
Start: 2022-05-26 | End: 2022-05-29 | Stop reason: HOSPADM

## 2022-05-26 RX ORDER — SUCCINYLCHOLINE/SOD CL,ISO/PF 200MG/10ML
SYRINGE (ML) INTRAVENOUS PRN
Status: DISCONTINUED | OUTPATIENT
Start: 2022-05-26 | End: 2022-05-26 | Stop reason: SDUPTHER

## 2022-05-26 RX ORDER — MEPERIDINE HYDROCHLORIDE 25 MG/ML
12.5 INJECTION INTRAMUSCULAR; INTRAVENOUS; SUBCUTANEOUS EVERY 5 MIN PRN
Status: CANCELLED | OUTPATIENT
Start: 2022-05-26

## 2022-05-26 RX ORDER — LIDOCAINE HCL/PF 100 MG/5ML
SYRINGE (ML) INJECTION PRN
Status: DISCONTINUED | OUTPATIENT
Start: 2022-05-26 | End: 2022-05-26 | Stop reason: SDUPTHER

## 2022-05-26 RX ORDER — SODIUM CHLORIDE 0.9 % (FLUSH) 0.9 %
5-40 SYRINGE (ML) INJECTION EVERY 12 HOURS SCHEDULED
Status: DISCONTINUED | OUTPATIENT
Start: 2022-05-26 | End: 2022-05-26 | Stop reason: HOSPADM

## 2022-05-26 RX ORDER — SODIUM CHLORIDE 0.9 % (FLUSH) 0.9 %
5-40 SYRINGE (ML) INJECTION EVERY 12 HOURS SCHEDULED
Status: CANCELLED | OUTPATIENT
Start: 2022-05-26

## 2022-05-26 RX ORDER — OXYCODONE HYDROCHLORIDE 5 MG/1
10 TABLET ORAL EVERY 4 HOURS PRN
Status: DISCONTINUED | OUTPATIENT
Start: 2022-05-26 | End: 2022-05-29 | Stop reason: HOSPADM

## 2022-05-26 RX ORDER — SODIUM CHLORIDE 0.9 % (FLUSH) 0.9 %
5-40 SYRINGE (ML) INJECTION PRN
Status: DISCONTINUED | OUTPATIENT
Start: 2022-05-26 | End: 2022-05-29 | Stop reason: HOSPADM

## 2022-05-26 RX ADMIN — ROCURONIUM BROMIDE 10 MG: 50 INJECTION, SOLUTION INTRAVENOUS at 14:00

## 2022-05-26 RX ADMIN — DEXAMETHASONE SODIUM PHOSPHATE 8 MG: 4 INJECTION, SOLUTION INTRA-ARTICULAR; INTRALESIONAL; INTRAMUSCULAR; INTRAVENOUS; SOFT TISSUE at 13:02

## 2022-05-26 RX ADMIN — SODIUM CHLORIDE 3000 MG: 900 INJECTION INTRAVENOUS at 13:47

## 2022-05-26 RX ADMIN — PROPOFOL 150 MCG/KG/MIN: 10 INJECTION, EMULSION INTRAVENOUS at 14:06

## 2022-05-26 RX ADMIN — Medication 100 MG: at 13:40

## 2022-05-26 RX ADMIN — LABETALOL 20 MG/4 ML (5 MG/ML) INTRAVENOUS SYRINGE 5 MG: at 14:15

## 2022-05-26 RX ADMIN — SODIUM CHLORIDE: 9 INJECTION, SOLUTION INTRAVENOUS at 13:34

## 2022-05-26 RX ADMIN — Medication 400 MG: at 20:29

## 2022-05-26 RX ADMIN — CEFAZOLIN 2000 MG: 10 INJECTION, POWDER, FOR SOLUTION INTRAVENOUS at 21:52

## 2022-05-26 RX ADMIN — SUGAMMADEX 200 MG: 100 INJECTION, SOLUTION INTRAVENOUS at 14:36

## 2022-05-26 RX ADMIN — ACETAMINOPHEN 650 MG: 325 TABLET ORAL at 20:37

## 2022-05-26 RX ADMIN — PROPOFOL 50 MG: 10 INJECTION, EMULSION INTRAVENOUS at 14:05

## 2022-05-26 RX ADMIN — ONDANSETRON 4 MG: 2 INJECTION INTRAMUSCULAR; INTRAVENOUS at 13:44

## 2022-05-26 RX ADMIN — ROCURONIUM BROMIDE 40 MG: 50 INJECTION, SOLUTION INTRAVENOUS at 13:50

## 2022-05-26 RX ADMIN — LABETALOL 20 MG/4 ML (5 MG/ML) INTRAVENOUS SYRINGE 5 MG: at 14:25

## 2022-05-26 RX ADMIN — FENTANYL CITRATE 50 MCG: 50 INJECTION, SOLUTION INTRAMUSCULAR; INTRAVENOUS at 14:01

## 2022-05-26 RX ADMIN — IPRATROPIUM BROMIDE AND ALBUTEROL SULFATE 3 ML: .5; 3 SOLUTION RESPIRATORY (INHALATION) at 21:55

## 2022-05-26 RX ADMIN — Medication 60 MG: at 13:40

## 2022-05-26 RX ADMIN — SODIUM CHLORIDE: 4.5 INJECTION, SOLUTION INTRAVENOUS at 18:24

## 2022-05-26 RX ADMIN — FENTANYL CITRATE 50 MCG: 50 INJECTION, SOLUTION INTRAMUSCULAR; INTRAVENOUS at 14:05

## 2022-05-26 RX ADMIN — SERTRALINE HYDROCHLORIDE 100 MG: 100 TABLET ORAL at 20:29

## 2022-05-26 RX ADMIN — PROPOFOL 150 MG: 10 INJECTION, EMULSION INTRAVENOUS at 13:40

## 2022-05-26 SDOH — ECONOMIC STABILITY: INCOME INSECURITY: IN THE LAST 12 MONTHS, WAS THERE A TIME WHEN YOU WERE NOT ABLE TO PAY THE MORTGAGE OR RENT ON TIME?: NO

## 2022-05-26 SDOH — HEALTH STABILITY: PHYSICAL HEALTH: ON AVERAGE, HOW MANY DAYS PER WEEK DO YOU ENGAGE IN MODERATE TO STRENUOUS EXERCISE (LIKE A BRISK WALK)?: 4 DAYS

## 2022-05-26 SDOH — ECONOMIC STABILITY: TRANSPORTATION INSECURITY
IN THE PAST 12 MONTHS, HAS LACK OF TRANSPORTATION KEPT YOU FROM MEETINGS, WORK, OR FROM GETTING THINGS NEEDED FOR DAILY LIVING?: NO

## 2022-05-26 SDOH — ECONOMIC STABILITY: TRANSPORTATION INSECURITY
IN THE PAST 12 MONTHS, HAS THE LACK OF TRANSPORTATION KEPT YOU FROM MEDICAL APPOINTMENTS OR FROM GETTING MEDICATIONS?: NO

## 2022-05-26 SDOH — HEALTH STABILITY: PHYSICAL HEALTH: ON AVERAGE, HOW MANY MINUTES DO YOU ENGAGE IN EXERCISE AT THIS LEVEL?: 20 MIN

## 2022-05-26 SDOH — ECONOMIC STABILITY: FOOD INSECURITY: WITHIN THE PAST 12 MONTHS, THE FOOD YOU BOUGHT JUST DIDN'T LAST AND YOU DIDN'T HAVE MONEY TO GET MORE.: NEVER TRUE

## 2022-05-26 SDOH — ECONOMIC STABILITY: FOOD INSECURITY: WITHIN THE PAST 12 MONTHS, YOU WORRIED THAT YOUR FOOD WOULD RUN OUT BEFORE YOU GOT MONEY TO BUY MORE.: NEVER TRUE

## 2022-05-26 SDOH — ECONOMIC STABILITY: HOUSING INSECURITY
IN THE LAST 12 MONTHS, WAS THERE A TIME WHEN YOU DID NOT HAVE A STEADY PLACE TO SLEEP OR SLEPT IN A SHELTER (INCLUDING NOW)?: NO

## 2022-05-26 ASSESSMENT — PAIN DESCRIPTION - DESCRIPTORS
DESCRIPTORS: DISCOMFORT
DESCRIPTORS: ACHING
DESCRIPTORS: ACHING

## 2022-05-26 ASSESSMENT — PATIENT HEALTH QUESTIONNAIRE - PHQ9: DEPRESSION UNABLE TO ASSESS: NO

## 2022-05-26 ASSESSMENT — PAIN DESCRIPTION - ONSET: ONSET: ON-GOING

## 2022-05-26 ASSESSMENT — SOCIAL DETERMINANTS OF HEALTH (SDOH)
DO YOU BELONG TO ANY CLUBS OR ORGANIZATIONS SUCH AS CHURCH GROUPS UNIONS, FRATERNAL OR ATHLETIC GROUPS, OR SCHOOL GROUPS?: YES
HOW OFTEN DO YOU ATTENT MEETINGS OF THE CLUB OR ORGANIZATION YOU BELONG TO?: MORE THAN 4 TIMES PER YEAR
HOW OFTEN DO YOU ATTEND CHURCH OR RELIGIOUS SERVICES?: MORE THAN 4 TIMES PER YEAR
HOW OFTEN DO YOU GET TOGETHER WITH FRIENDS OR RELATIVES?: TWICE A WEEK
HOW HARD IS IT FOR YOU TO PAY FOR THE VERY BASICS LIKE FOOD, HOUSING, MEDICAL CARE, AND HEATING?: NOT HARD AT ALL
IN A TYPICAL WEEK, HOW MANY TIMES DO YOU TALK ON THE PHONE WITH FAMILY, FRIENDS, OR NEIGHBORS?: TWICE A WEEK

## 2022-05-26 ASSESSMENT — PAIN DESCRIPTION - FREQUENCY: FREQUENCY: INTERMITTENT

## 2022-05-26 ASSESSMENT — PAIN - FUNCTIONAL ASSESSMENT: PAIN_FUNCTIONAL_ASSESSMENT: ACTIVITIES ARE NOT PREVENTED

## 2022-05-26 ASSESSMENT — PAIN DESCRIPTION - LOCATION
LOCATION: HEAD
LOCATION: THROAT
LOCATION: HEAD

## 2022-05-26 ASSESSMENT — PAIN SCALES - GENERAL
PAINLEVEL_OUTOF10: 3
PAINLEVEL_OUTOF10: 5
PAINLEVEL_OUTOF10: 0
PAINLEVEL_OUTOF10: 4
PAINLEVEL_OUTOF10: 4

## 2022-05-26 ASSESSMENT — PAIN DESCRIPTION - PAIN TYPE: TYPE: ACUTE PAIN;SURGICAL PAIN

## 2022-05-26 ASSESSMENT — LIFESTYLE VARIABLES
HOW MANY STANDARD DRINKS CONTAINING ALCOHOL DO YOU HAVE ON A TYPICAL DAY: 1 OR 2
HOW OFTEN DO YOU HAVE A DRINK CONTAINING ALCOHOL: NEVER
HOW OFTEN DO YOU HAVE A DRINK CONTAINING ALCOHOL: MONTHLY OR LESS
HOW MANY STANDARD DRINKS CONTAINING ALCOHOL DO YOU HAVE ON A TYPICAL DAY: 1 OR 2

## 2022-05-26 NOTE — H&P
HISTORY AND PHYSICAL             Date: 5/26/2022         Patient Name: Krystin Gates     YOB: 1945      Age:  64 y.o. Chief Complaint   No chief complaint on file. Upper airway obstruction    History Obtained From   patient    History of Present Illness   The patient is a 73-year-old female with a history of post polio syndrome and upper airway obstruction. 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 She is now status post a first and second stage transoral laryngoplasty procedure to widen the patient's glottis while augmenting the paralyzed cord giving her a voice and a serviceable airway. In the meantime she has been working on plugging her airway during the day and has succeeded in 14-hour intervals where she remains plugged and active. Her voice is also continuing to strengthen. She comes to the operating room for neck stage in improving her airway in preparation for hopeful decannulation. Her diet has also improved with limited gastrostomy tube nutrition.     Past Medical History     Past Medical History:   Diagnosis Date    GERD (gastroesophageal reflux disease)     Osteoarthritis     Paralysis of vocal cords     Polio     Right side diaphragm paralysis     states trach oxygen at night        Past Surgical History     Past Surgical History:   Procedure Laterality Date    CHOLECYSTECTOMY      GASTROSTOMY TUBE PLACEMENT      HYSTERECTOMY  01/2022    KNEE SURGERY      Right    LARYNGOSCOPY N/A 2/24/2022    DIAGNOSTIC SUSPENSION MICROLARYNGOSCOPY BRONCHOSCOPY WITH JET VENTILATION performed by Alida Pan MD at [address] [place] [address] N/A 4/14/2022    TRANSORAL LARYNGOPLASTY WITH AUGMENTATION AND LATERALIZATION OF RIGHT TRUE VOCAL CORD, and  PARTIAL ARYTENOIDECTOMY performed by Alida Pan MD at [address]        Medications Prior to Admission     Prior to Sexual Activity     Sexually Active  Not Asked                Family History   History reviewed. No pertinent family history. Review of Systems   Review of Systems  Noncontributory  Physical Exam   BP (!) 158/69   Pulse 75   Temp 98.7 °F (37.1 °C) (Temporal)   Resp 18   Ht 5' (1.524 m)   Wt 116 lb 8 oz (52.8 kg)   SpO2 91%   BMI 22.75 kg/m²     Physical Exam  The patient is pleasant alert and cooperative older adult female in no acute distress  Her voice and speech pattern are within normal limits with exception of mild to minimal breathiness. I heard no throat clearing coughing or inspiratory stridor the her Passy-[place] valve was in place. Labs    No results found for this or any previous visit (from the past 24 hour(s)). Imaging/Diagnostics Last 24 Hours   No results found. Assessment      Hospital Problems           Last Modified POA    * (Principal) Airway obstruction 5/26/2022 Yes      Vocal cord paralysis with airway encroachment status post augmentation lateralization laryngoplasty    Plan   1. To the operating room for stage III laryngoplasty and possible tracheal widening in preparation for potential decannulation. Consultations Ordered:  None    Electronically signed by Mily Lyons MD on 5/26/22 at 1:29 PM EDT        Mily Lyons MD   Physician   Specialty:  Otolaryngology   Progress Notes      Signed   Encounter Date:  5/9/2022                 Signed        Expand All Collapse All        Show:Clear all  [x]Manual[x]Template[]Copied    Added by:  [x]Roman Clements MD      []Cindy for details        [facility]  Blair Chisholm [phone] [place] [id number]  Dept: [phone]  Dept Fax: [phone]  Loc: [phone]     Brady Rushing is a 64 y.o. female who was referred by No ref.  provider found for:       Chief Complaint   Patient presents with    Post-Op Check       Patient is here post-op laryngoplasty 4/14/22         HPI:      Valeri Weaver is a 64 y.o. female with a history of \"post polio syndrome\" referred for inpatient consultation while having post critical illness rehabilitation here at [facility]. Her initial care had been provided at [facility] and involved prolonged intubation and conversion to tracheostomy. The reason was that she failed extubation times several efforts and ultimately was given a tracheostomy and noted to be able to avoid the need for continued or tracheal intubation and additional instrumentation. 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 As such I performed a transoral augmentation lateralization laryngoplasty to open the airway posteriorly and augment the hypoplastic immobile true vocal fold anteriorly. The patient has since that time been able to tolerate progressive lengths of time plugging her tracheostomy completely. She is here today with her  and son reporting tolerating up to 9 hours of full plugging of her Passy-[place] valve without any dyspnea. In fact neither the patient nor the family have her to have even so much as trace stridor during these plugging episodes. She states that she breathes as comfortably with the valve plugged as unplugged. In addition she is continuing to advance her oral nutrition and reduce her reliance on tube feeding though that process is still in transition and she is not yet on the verge of replacing her PEG tube feeds. That said, she is having also no reports of aspiration related to her oral nutrition. She does feel uncomfortable when she takes puréed materials that have some granularity and tends to cough more when doing so.                 History:            Allergies   Allergen Reactions    Latex      Doxycycline         Tongue, throat and mouth soreness    Tizanidine Hcl Nausea And Vomiting      Current Facility-Administered Medications          Current Outpatient Medications   Medication Sig Dispense Refill    zinc oxide (PINXAV) 30 % OINT Apply 113.4 g topically 4 times daily as needed (around PEG site for irritation PRN) 113.4 g 3    aluminum & magnesium hydroxide-simethicone (MAALOX) 200-200-20 MG/5ML SUSP suspension Take 30 mLs by mouth every 6 hours as needed for Indigestion 1 each 3    sertraline (ZOLOFT) 100 MG tablet Take 1 tablet by mouth daily 30 tablet 3    melatonin 3 MG TABS tablet Take 3 mg by mouth nightly as needed        sertraline (ZOLOFT) 100 MG tablet Take 100 mg by mouth daily        traZODone (DESYREL) 50 MG tablet Take 1 tablet by mouth nightly 30 tablet 3    cycloSPORINE (RESTASIS) 0.05 % ophthalmic emulsion Place 2 drops into both eyes daily 1 each 0    magnesium oxide (MAG-OX) 400 (241.3 Mg) MG TABS tablet Take 1 tablet by mouth daily 30 tablet 3    meloxicam (MOBIC) 15 MG tablet Take 15 mg by mouth daily         Multiple Vitamins-Minerals (PRESERVISION AREDS 2) CAPS Take 1 capsule by mouth 2 times daily         multivitamin (THERAGRAN) per tablet Take 1 tablet by mouth daily Alive 50+        ipratropium-albuterol (DUONEB) 0.5-2.5 (3) MG/3ML SOLN nebulizer solution Inhale 3 mLs into the lungs every 4 hours as needed for Shortness of Breath 360 mL 2      No current facility-administered medications for this visit.         Past Medical History        Past Medical History:   Diagnosis Date    GERD (gastroesophageal reflux disease)      Osteoarthritis      Paralysis of vocal cords      Polio      Right side diaphragm paralysis       states trach oxygen at night         Past Surgical History         Past Surgical History:   Procedure Laterality Date    CHOLECYSTECTOMY        HYSTERECTOMY   01/2022    KNEE SURGERY         Right    LARYNGOSCOPY N/A 2/24/2022     DIAGNOSTIC SUSPENSION MICROLARYNGOSCOPY BRONCHOSCOPY WITH JET VENTILATION performed by Radha Medrano MD at [address] N/A 4/14/2022     TRANSORAL LARYNGOPLASTY WITH AUGMENTATION AND LATERALIZATION OF RIGHT TRUE VOCAL CORD, and  PARTIAL ARYTENOIDECTOMY performed by Radha Medrano MD at [address] History   History reviewed. No pertinent family history. Social History           Tobacco Use    Smoking status: Never Smoker    Smokeless tobacco: Never Used   Substance Use Topics    Alcohol use: Not Currently                    Subjective:      Review of Systems  Rest of review of systems are negative, except as noted in HPI.      Objective:      /70 (Site: Right Upper Arm, Position: Sitting)   Pulse 84   Temp 97.7 °F (36.5 °C) (Infrared)   Ht 5' (1.524 m)   Wt 113 lb 9.6 oz (51.5 kg)   SpO2 97%   BMI 22.19 kg/m²      Physical Exam         On general physical exam the patient is a pleasant alert and cooperative older adult female in no acute distress. Her voice is mildly low in volume but otherwise clear in character for her age and gender. I heard no throat clearing coughing or inspiratory stridor. Her tracheostomy is in place and her Passy-[place] valve is in good position. She is currently not \"plugged\".     Procedure: Flexible laryngoscopy with possible foreign body removal in the form of a vascular clip  Findings: The patient's larynx was well visualized. The patient's left true vocal fold was well mobilized and produced an adequate to generous intra glottic aperture. The bulk of the reconstructed right cord was generous. Multiple sutures and clips were seen but none that were piercing the opposite cord like the original exam.  There was pooling in the hypopharynx which was suctioned away. No other lesions or masses were seen.   The patient tolerated the procedure well.     Flexible laryngoscopy images:     Initial view    Adequate to generous glottic aperture on respiration after being plugged          Vitals reviewed.     XR CHEST (2 VW)     Result Date: 4/18/2022  There is no acute intrathoracic process. **This report has been created using voice recognition software. It may contain minor errors which are inherent in voice recognition technology. ** Final report electronically signed by Dr Soo Renteria on 4/18/2022 2:38 PM     FL MODIFIED BARIUM SWALLOW W VIDEO     Result Date: 4/26/2022  No penetration or aspiration. Recommendations available from speech therapy **This report has been created using voice recognition software. It may contain minor errors which are inherent in voice recognition technology. ** Final report electronically signed by Dr. Osiris Rosario on 4/26/2022 11:08 AM           Lab Results   Component Value Date      04/18/2022      04/15/2022      03/05/2022     K 3.8 04/18/2022     K 3.4 04/15/2022     K 3.9 03/05/2022     K 4.3 03/04/2022     CL 98 04/18/2022      04/15/2022      03/05/2022     CO2 25 04/18/2022     CO2 19 04/15/2022     CO2 32 03/05/2022     BUN 21 04/18/2022     BUN 20 04/15/2022     BUN 14 03/05/2022     CREATININE 0.7 04/18/2022     CREATININE 0.5 04/15/2022     CREATININE 0.6 04/14/2022     CALCIUM 9.6 04/18/2022     CALCIUM 8.4 04/15/2022     CALCIUM 8.9 03/05/2022     PROT 6.4 04/06/2021     PROT 6.6 02/03/2021     LABALBU 4.1 04/06/2021     LABALBU 4.0 02/03/2021     BILITOT 0.2 04/06/2021     BILITOT 0.5 02/03/2021     ALKPHOS 62 04/06/2021     ALKPHOS 63 02/03/2021     AST 25 04/06/2021     AST 29 02/03/2021     ALT 18 04/06/2021     ALT 15 02/03/2021         All of the past medical history, past surgical history, family history,social history, allergies and current medications were reviewed with the patient. Assessment & Plan   Diagnoses and all orders for this visit:       Diagnosis Orders   1. Airway obstruction  Miscellaneous Surgery   2.  Paralysis of right vocal cord  Miscellaneous Surgery         Based on the patient's history and the endoscopic findings today, the patient is likely ready for decannulation following a relatively minor procedure to debride some of the obstructing soft tissues and then admit the patient for decannulation. The patient's left true vocal fold motion was excellent and her glottic aperture was adequate to generous for the task. As such I recommended the operation to the patient and her family who was with us the entire time to their satisfaction. I reviewed the indications benefits limitations and risks physic particularly as relates to the potential for decannulation following the procedure. The reported understanding the basis of my recommendation and wishing to proceed as recommended. We will do so.        Return in about 6 weeks (around 6/20/2022) for Postop evaluation and to plan further care as indicated.           **This report has been created using voice recognition software. It may contain minor errors which are inherent in voice recognition technology. **                                               Office Visit on 5/9/2022           Office Visit on 5/9/2022                Detailed Report            Note viewed by patient        Progress Notes Info    Author Note Status Last Update User Last Update Date/Time   Viry Vargas MD Signed Viry Vargas MD 5/9/2022  4:43 PM     Chart Review Routing History    No routing history on file.

## 2022-05-26 NOTE — PROGRESS NOTES
1500- pt to pacu, respiratory called for possible vent dependence, VSS, pt with a lot of secretions that need suctioning, pt states she has sore throat but it tolerable, pt appears in no acute distress  1515- pt resting in bed, continues to need suctioned due to secretions, VSS, pt appears in no acute distress  1526- report called to [id number] [facility] on 4K  1530-pt meets criteria for discharge from pacu, pt awaiting in patient transport  1552- pt transport to  by transport staff

## 2022-05-26 NOTE — CARE COORDINATION
5/26/22, 2:22 PM EDT  DISCHARGE PLANNING EVALUATION:    Jo Ann Xiao       Admitted: 5/26/2022/ [address] day: 0   Location: [facility] OR (General) POOL R* Reason for admit: Airway obstruction [J98.8]   PMH:  has a past medical history of GERD (gastroesophageal reflux disease), Osteoarthritis, Paralysis of vocal cords, Polio, and Right side diaphragm paralysis. Barriers to Discharge:    From SDS  History: Vocal Cord Paralysis s/p Thyroidectomy    5/26 Suspension Microlaryngoscopy/Laryngoplasty w/ Debridement Therapeutic Bronchoscopy w/ Debridement and possible tracheal widening in preparation for potential decannulation    IVF continued    PCP: Rosalba Wallace    Patient's Healthcare Decision Maker: Legal Next of Kin    Patient Goals/Plan/Treatment Preferences: plans home w spouse Mariana Ash, [facility]); has GT (2/8); trialing 2 meals daily without TF 2 Dominic HN x4/day (thru Jose Balbuena @ [phone]); has [employer] home oxygen 2L at night and as needed, nebulizer, PMV, trach (2/1) supplies  Transportation/Food Security/Housekeeping Addressed:  No issues identified.

## 2022-05-26 NOTE — PROGRESS NOTES
ADMITTED TO [facility] AND ORIENTED TO UNIT. SCDS ON. FALL AND ALLERGY BANDS ON. PT VERBALIZED APPROVAL FOR FIRST NAME, LAST INITIAL AND PHYSICIAN NAME ON UNIT WHITEBOARD. Spouse, Missy Madden and daughter, [facility] with the patient.

## 2022-05-26 NOTE — ANESTHESIA POSTPROCEDURE EVALUATION
Department of Anesthesiology  Postprocedure Note    Patient: Angela Lujan  MRN: [id number]  YOB: 1945  Date of evaluation: 5/26/2022  Time:  3:32 PM     Procedure Summary     Date: 05/26/22 Room / Location: [address] [place] [address] Una Salcido    Anesthesia Start: 4596 Anesthesia Stop: 6942    Procedure: Suspension Microlayrngoscopy w/ Debridement Therapeutic Bronchoscopy w/ Debridement (N/A ) Diagnosis: (Airway obstruction Paralysis of right vocal cord)    Surgeons: Lori Wright MD Responsible Provider: Cathryn Jolly DO    Anesthesia Type: general ASA Status: 3          Anesthesia Type: No value filed. Carol Ann Phase I: Carol Ann Score: 9    Carol Ann Phase II:      Last vitals: Reviewed and per EMR flowsheets.        Anesthesia Post Evaluation    Patient location during evaluation: PACU  Patient participation: complete - patient cannot participate  Level of consciousness: responsive to verbal stimuli  Airway patency: patent  Nausea & Vomiting: no vomiting and no nausea  Complications: no  Cardiovascular status: hemodynamically stable  Respiratory status: acceptable and face mask  Hydration status: stable  Comments: MASK OVER TRACHEOSTOMY

## 2022-05-26 NOTE — ANESTHESIA PRE PROCEDURE
Department of Anesthesiology  Preprocedure Note       Name:  Brian Hawk   Age:  64 y.o.  :  1945                                          MRN:  [id number]         Date:  2022      Surgeon: Marilyn Man):  Celsa Huntley MD    Procedure: Procedure(s):  Suspension Microlayrngoscopy w/ Debridement Therapeutic Bronchoscopy w/ Debridement    Medications prior to admission:   Prior to Admission medications    Medication Sig Start Date End Date Taking? Authorizing Provider   nystatin (MYCOSTATIN) 084179 UNIT/GM cream Apply topically 2 times daily Apply topically 2 times daily. around peg tube site    Historical Provider, MD   esomeprazole Magnesium (NEXIUM) 40 MG PACK Take 40 mg by mouth daily    Historical Provider, MD   Feeding Tubes MISC by Does not apply route 2 shreya hn 5 times a day.   Takes 150 ml of 2 shreya and 60 ml of water (30 ml before and 30 ml after meal.)    Historical Provider, MD   aluminum & magnesium hydroxide-simethicone (MAALOX) 200-200-20 MG/5ML SUSP suspension Take 30 mLs by mouth every 6 hours as needed for Indigestion 22   Celsa Huntley MD   sertraline (ZOLOFT) 100 MG tablet Take 1 tablet by mouth daily 22   Celsa Huntley MD   melatonin 3 MG TABS tablet Take 3 mg by mouth nightly as needed    Historical Provider, MD   ipratropium-albuterol (DUONEB) 0.5-2.5 (3) MG/3ML SOLN nebulizer solution Inhale 3 mLs into the lungs every 4 hours as needed for Shortness of Breath 3/5/22 5/4/22  Siobhan Devries DO   cycloSPORINE (RESTASIS) 0.05 % ophthalmic emulsion Place 2 drops into both eyes daily 22   Catherine IMELDA Mcnair CNP   magnesium oxide (MAG-OX) 400 (241.3 Mg) MG TABS tablet Take 1 tablet by mouth daily 22   IMELDA Daniels CNP   meloxicam (MOBIC) 15 MG tablet Take 15 mg by mouth daily     Historical Provider, MD   Multiple Vitamins-Minerals (PRESERVISION AREDS 2) CAPS Take 1 capsule by mouth 2 times daily     Historical Provider, MD   multivitamin [facility]) per tablet Take 1 tablet by mouth daily Alive 50+    Historical Provider, MD       Current medications:    No current facility-administered medications for this visit. No current outpatient medications on file. Facility-Administered Medications Ordered in Other Visits   Medication Dose Route Frequency Provider Last Rate Last Admin    0.9 % sodium chloride infusion   IntraVENous PRN Olivia Noonan MD        sodium chloride flush 0.9 % injection 5-40 mL  5-40 mL IntraVENous 2 times per day Olivia Noonan MD        sodium chloride flush 0.9 % injection 5-40 mL  5-40 mL IntraVENous PRN Olivia Noonan MD        ampicillin-sulbactam (UNASYN) 3000 mg in 100 mL NS IVPB minibag  3,000 mg IntraVENous On Call to Eli Saha MD        dexamethasone (DECADRON) injection 8 mg  8 mg IntraVENous 30 Min Pre-Op Olivia Noonan MD        0.9 % sodium chloride infusion   IntraVENous Continuous Olivia Noonan MD           Allergies:     Allergies   Allergen Reactions    Latex Itching    Doxycycline      Tongue, throat and mouth soreness    Tizanidine Hcl Nausea And Vomiting       Problem List:    Patient Active Problem List   Diagnosis Code    Age-related osteoporosis without current pathological fracture M81.0    Acute respiratory failure with hypercapnia ([facility]) J96.02    Personal history of poliomyelitis Z86.12    GERD (gastroesophageal reflux disease) K21.9    Paralysis of vocal cords J38.00    Dysphagia R13.10    Diaphragmatic paralysis J98.6    Tracheostomy status ([facility]) Z93.0    Hx of total hysterectomy Z90.710    Acute respiratory failure with hypoxia ([facility]) J96.01    Mucus plugging of bronchi T17.500A    Paralysis of right vocal cord J38.01    Airway obstruction J98.8    Hoarseness R49.0    Pharyngoesophageal dysphagia R13.14    Supraglottic airway obstruction J38.6    Hemorrhage from tracheostomy stoma ([facility]) J95.01       Past Medical History:        Diagnosis Date  GERD (gastroesophageal reflux disease)     Osteoarthritis     Paralysis of vocal cords     Polio     Right side diaphragm paralysis     states trach oxygen at night       Past Surgical History:        Procedure Laterality Date    CHOLECYSTECTOMY      GASTROSTOMY TUBE PLACEMENT      HYSTERECTOMY  01/2022    KNEE SURGERY      Right    LARYNGOSCOPY N/A 2/24/2022    DIAGNOSTIC SUSPENSION MICROLARYNGOSCOPY BRONCHOSCOPY WITH JET VENTILATION performed by Sammie De La Cruz MD at [address] N/A 4/14/2022    TRANSORAL LARYNGOPLASTY WITH AUGMENTATION AND LATERALIZATION OF RIGHT TRUE VOCAL CORD, and  PARTIAL ARYTENOIDECTOMY performed by Sammie De La Cruz MD at [address] [facility] History:    Social History     Tobacco Use    Smoking status: Never Smoker    Smokeless tobacco: Never Used   Substance Use Topics    Alcohol use: Not Currently                                Counseling given: Not Answered      Vital Signs (Current): There were no vitals filed for this visit.                                            BP Readings from Last 3 Encounters:   05/26/22 (!) 158/69   05/09/22 122/70   04/26/22 126/68       NPO Status:                                                                                 BMI:   Wt Readings from Last 3 Encounters:   05/19/22 116 lb 8 oz (52.8 kg)   05/24/22 116 lb 6.4 oz (52.8 kg)   05/10/22 114 lb 9.6 oz (52 kg)     There is no height or weight on file to calculate BMI.    CBC:   Lab Results   Component Value Date    WBC 6.7 04/18/2022    RBC 3.18 04/18/2022    HGB 9.6 04/18/2022    HCT 30.7 04/18/2022    MCV 96.5 04/18/2022     04/18/2022       CMP:   Lab Results   Component Value Date     04/18/2022    K 3.8 04/18/2022    K 3.4 04/15/2022    CL 98 04/18/2022    CO2 25 04/18/2022    BUN 21 04/18/2022    CREATININE 0.7 04/18/2022    LABGLOM 81 04/18/2022    GLUCOSE 79 04/18/2022    PROT 6.4 04/06/2021    CALCIUM 9.6 04/18/2022    BILITOT 0.2 04/06/2021 ALKPHOS 62 04/06/2021    AST 25 04/06/2021    ALT 18 04/06/2021       POC Tests: No results for input(s): POCGLU, POCNA, POCK, POCCL, POCBUN, POCHEMO, POCHCT in the last 72 hours. Coags: No results found for: PROTIME, INR, APTT    HCG (If Applicable): No results found for: PREGTESTUR, PREGSERUM, HCG, HCGQUANT     ABGs: No results found for: PHART, PO2ART, DYC7XSD, UQU2PDM, BEART, N1XZXRPP     Type & Screen (If Applicable):  No results found for: LABABO, LABRH    Drug/Infectious Status (If Applicable):  No results found for: HIV, HEPCAB    COVID-19 Screening (If Applicable):   Lab Results   Component Value Date    COVID19 NOT  DETECTED 03/03/2022           Anesthesia Evaluation  Patient summary reviewed no history of anesthetic complications:   Airway: Mallampati: II  TM distance: >3 FB   Neck ROM: full  Mouth opening: < 3 FB   Dental: normal exam         Pulmonary:   (+) decreased breath sounds     (-) COPD and asthma                          ROS comment: On 3 O2 NC at night    Cardiovascular:  Exercise tolerance: good (>4 METS),       (-) hypertension, past MI and CAD                Neuro/Psych:   (+) neuromuscular disease (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    (-) diabetes mellitus               Abdominal:             Vascular:     - DVT. Other Findings:             Anesthesia Plan      general     ASA 3       Induction: intravenous. MIPS: Postoperative opioids intended and Prophylactic antiemetics administered. Anesthetic plan and risks discussed with patient, spouse and child/children. Plan discussed with CRNA.                     Damion Centeno DO   5/26/2022

## 2022-05-26 NOTE — OP NOTE
Operative Note      Patient: Angela Lujan  YOB: 1945  MRN: [id number]    Date of Procedure: 5/26/2022    Pre-Op Diagnosis: Airway obstruction Paralysis of right vocal cord    Post-Op Diagnosis: Same       Procedure(s):  Suspension Microlayrngoscopy w/ Debridement Therapeutic Bronchoscopy w/ Debridement    Surgeon(s):  Lori Wright MD    Assistant:   * No surgical staff found *    Anesthesia: General    Estimated Blood Loss (mL): less than 50     Complications: None    Specimens:   * No specimens in log *    Implants:  * No implants in log *      Drains:   Gastrostomy/Enterostomy/Jejunostomy Percutaneous Endoscopic Gastrostomy (PEG) LLQ (Active)   Site Description Clean, dry & intact 05/26/22 1157   Surrounding Skin Clean, dry & intact 05/26/22 1157       Findings: 1. Well lateralized immobile right true vocal fold  2. Normal bulk normal position left true vocal fold; mobile with protruding granulation tissue posterior medially; lasered away  3. Eccentric tracheostomy suprastomal shelf resected to widen tracheal airway    Detailed Description of Procedure: The patient was taken to the operating room awake and placed in supine position. General anesthesia was induced and the patient's tracheostomy was removed and replaced with #6 endotracheal tube without difficulty or vital sign instability. The table was turned 90 degrees for the procedure. The patient was draped in usual fashion for transoral and transabdominal surgery. I performed a timeout verifying the patient's identity and planned procedure. Stage II laryngoplasty; suspension microlaryngoscopy with resection of obstructing soft tissues: With the patient asleep I replaced her custom made heat activated plastic maxillary dental guard. And then passed a Huang through the right tongue cul-de-sac into the laryngeal inlet in order to extend the airway anteriorly and provide a direct line of sight view the airway.   Only the posterior larynx could be visualized in line of sight without undue compression of the patient's tongue. As such I arranged the laryngoscope with his little tension as possible to give me soft tissue access with telescopic guidance. I found the patient's larynx to be abnormal for the presence of encroachment of the left arytenoid medially secondary to a collar of granulation tissue along the anterior medial and posterior medial aspects of the arytenoid body likely from throat clearing. It was protruding out approximately 2 mm from the arytenoid so having a likely defect on laminar airflow. Initially the left cord was medialized from residual neuromuscular activity. Once additional neuromuscular blockade was instilled it rested in the paramedian space symmetrical with the lateralized right side. I then brought on the field a CO2 UltraPulse fiber laser system set at 20 W average power density and 50 mJ per pulse on continuous mode. I grasped the fiber with left going alligator forceps over a Silastic collar. With the laser named at the tension of these structures and a sloppy wet cottonoid in the subglottis to protect the endotracheal tube, I laser down these protruding fronds of granulation tissue along their entire length widening the airway significantly. I removed a retained suture clip that was posteriorly and superiorly oriented. No other work was necessary in the larynx to prepare the patient for possible decannulation. Therapeutic bronchoscopy with resection of obstructing soft tissues: I then turned my attention to the more distal tissues. After removing the cottonoid, I converted the patient over to a jet ventilation catheter upon removing the endotracheal tube from the stoma and thereby I visualized the patient's trachea and found to be abnormal for the presence of an eccentric suprastomal shelf consistent with eccentric tracheostomy placement to the right of midline.   I brought in the field a 4 mm x 22 cm laryngeal Tricut debrider blade system in place to carefully into the trachea. I then had my assistant pressed down on the trachea while I resected the suprastomal shelf removing approximately 3 mm of depth of encroachment onto the tracheal airway. This area was very vascular so soon as I removed the tissue, I removed the debrider to follow and quickly placed a suction cautery device through the airway into the proper spot to achieve hemostasis. I achieved hemostasis durably in this manner and suctioned away the distal airway blood as well. This included the use of a respiratory suction catheter. No other work was necessary in the trachea to improve her tracheal airway. As such I consider the operation concluded. I remove the transoral hardware and converted her back to her tracheostomy cannula downsizing her to a #4 after she was breathing on her own. She was taken to recovery in satisfactory condition with some mechanical ventilatory support as has been difficult for her following general anesthesia. She was considered stable on arrival to the PACU on 1 L of supplemental oxygen prior to being transferred to the stepdown unit. Estimated blood loss in the case was less than 20 cc and the patient received approximately a liter of intravenous lactated Ringer's intraoperatively. No blood products were transfused. No intraoperative complications were detected. Counts were considered accurate x3. I was present for performed the patient's entire operation myself. Disposition: The patient will be admitted to a stepdown bed for close airway monitoring and possible if not probable decannulation tomorrow once the effects of anesthetic of worn off. If successful, the patient will go home without a tracheostomy.     Electronically signed by Janis Keller MD on 5/26/2022 at 3:30 PM no

## 2022-05-27 ENCOUNTER — APPOINTMENT (OUTPATIENT)
Dept: GENERAL RADIOLOGY | Age: 77
DRG: 145 | End: 2022-05-27
Attending: OTOLARYNGOLOGY
Payer: MEDICARE

## 2022-05-27 PROCEDURE — 6370000000 HC RX 637 (ALT 250 FOR IP): Performed by: OTOLARYNGOLOGY

## 2022-05-27 PROCEDURE — 6360000002 HC RX W HCPCS: Performed by: OTOLARYNGOLOGY

## 2022-05-27 PROCEDURE — 99024 POSTOP FOLLOW-UP VISIT: CPT | Performed by: NURSE PRACTITIONER

## 2022-05-27 PROCEDURE — 99221 1ST HOSP IP/OBS SF/LOW 40: CPT | Performed by: STUDENT IN AN ORGANIZED HEALTH CARE EDUCATION/TRAINING PROGRAM

## 2022-05-27 PROCEDURE — 2580000003 HC RX 258: Performed by: OTOLARYNGOLOGY

## 2022-05-27 PROCEDURE — 94760 N-INVAS EAR/PLS OXIMETRY 1: CPT

## 2022-05-27 PROCEDURE — 74018 RADEX ABDOMEN 1 VIEW: CPT

## 2022-05-27 PROCEDURE — 94640 AIRWAY INHALATION TREATMENT: CPT

## 2022-05-27 PROCEDURE — 2060000000 HC ICU INTERMEDIATE R&B

## 2022-05-27 RX ORDER — IPRATROPIUM BROMIDE AND ALBUTEROL SULFATE 2.5; .5 MG/3ML; MG/3ML
1 SOLUTION RESPIRATORY (INHALATION) 3 TIMES DAILY
Status: DISCONTINUED | OUTPATIENT
Start: 2022-05-27 | End: 2022-05-27

## 2022-05-27 RX ADMIN — NYSTATIN: 100000 CREAM TOPICAL at 08:03

## 2022-05-27 RX ADMIN — CEFAZOLIN 2000 MG: 10 INJECTION, POWDER, FOR SOLUTION INTRAVENOUS at 07:30

## 2022-05-27 RX ADMIN — ACETAMINOPHEN 650 MG: 325 TABLET ORAL at 05:22

## 2022-05-27 RX ADMIN — Medication 400 MG: at 08:18

## 2022-05-27 RX ADMIN — PANTOPRAZOLE SODIUM 40 MG: 40 TABLET, DELAYED RELEASE ORAL at 08:20

## 2022-05-27 RX ADMIN — CYCLOSPORINE 2 DROP: 0.5 EMULSION OPHTHALMIC at 08:02

## 2022-05-27 RX ADMIN — NYSTATIN: 100000 CREAM TOPICAL at 21:49

## 2022-05-27 RX ADMIN — SERTRALINE HYDROCHLORIDE 100 MG: 100 TABLET ORAL at 08:18

## 2022-05-27 RX ADMIN — IPRATROPIUM BROMIDE AND ALBUTEROL SULFATE 1 AMPULE: .5; 3 SOLUTION RESPIRATORY (INHALATION) at 09:05

## 2022-05-27 RX ADMIN — CEFAZOLIN 2000 MG: 10 INJECTION, POWDER, FOR SOLUTION INTRAVENOUS at 21:52

## 2022-05-27 RX ADMIN — SODIUM CHLORIDE, PRESERVATIVE FREE 10 ML: 5 INJECTION INTRAVENOUS at 21:49

## 2022-05-27 RX ADMIN — SODIUM CHLORIDE, PRESERVATIVE FREE 10 ML: 5 INJECTION INTRAVENOUS at 13:28

## 2022-05-27 RX ADMIN — MELOXICAM 15 MG: 7.5 TABLET ORAL at 08:18

## 2022-05-27 RX ADMIN — SODIUM CHLORIDE: 4.5 INJECTION, SOLUTION INTRAVENOUS at 06:47

## 2022-05-27 RX ADMIN — Medication 3 MG: at 21:49

## 2022-05-27 RX ADMIN — CEFAZOLIN 2000 MG: 10 INJECTION, POWDER, FOR SOLUTION INTRAVENOUS at 13:30

## 2022-05-27 ASSESSMENT — PAIN SCALES - GENERAL
PAINLEVEL_OUTOF10: 4
PAINLEVEL_OUTOF10: 0
PAINLEVEL_OUTOF10: 2

## 2022-05-27 ASSESSMENT — PAIN DESCRIPTION - PAIN TYPE: TYPE: ACUTE PAIN

## 2022-05-27 ASSESSMENT — PAIN DESCRIPTION - DESCRIPTORS: DESCRIPTORS: DISCOMFORT

## 2022-05-27 ASSESSMENT — PAIN DESCRIPTION - FREQUENCY: FREQUENCY: INTERMITTENT

## 2022-05-27 ASSESSMENT — PAIN DESCRIPTION - ONSET: ONSET: ON-GOING

## 2022-05-27 ASSESSMENT — PAIN DESCRIPTION - LOCATION: LOCATION: HEAD

## 2022-05-27 ASSESSMENT — PAIN - FUNCTIONAL ASSESSMENT: PAIN_FUNCTIONAL_ASSESSMENT: ACTIVITIES ARE NOT PREVENTED

## 2022-05-27 NOTE — CARE COORDINATION
5/27/22, 11:13 AM EDT    Patient goals/plan/ treatment preferences discussed by  and . Patient goals/plan/ treatment preferences reviewed with patient/ family. Patient/ family verbalize understanding of discharge plan and are in agreement with goal/plan/treatment preferences. Understanding was demonstrated using the teach back method. AVS provided by RN at time of discharge, which includes all necessary medical information pertaining to the patients current course of illness, treatment, post-discharge goals of care, and treatment preferences. Services At/After Discharge: Home Health     Patient to discharge home with spouse and [facility]. SW called Elin Jacobo at [facility] and notified her of discharge over the weekend.           IMM Letter  IMM Letter given to Patient/Family/Significant other/Guardian/POA/by[de-identified] HAN [place] CM  IMM Letter date given[de-identified] 05/27/22  IMM Letter time given[de-identified] 3854

## 2022-05-27 NOTE — CARE COORDINATION
Collaborative Discharge Planning    Naomi Aguirre  :  1945  MRN:  [id number]    ADMIT DATE:  2022      Discharge Planning Discharge Planning  Type of Residence: House  Living Arrangements: Spouse/Significant Other  Support Systems: Spouse/Significant Other,Children,Family Members,[place],Friends/Neighbors  Current Services Prior To Admission: 1 Marilee Mayers [facility] home health)  Potential Assistance Needed: 1 Marilee Mayers  DME Ordered?: Enteral feedings  Meds-to-Beds: Does the patient want to have any new prescriptions delivered to bedside prior to discharge?: Yes  Type of Home Care Services: Nursing Services  Patient expects to be discharged to[de-identified] House  Follow Up Appointment: Best Day/Time :  (anytime)  One/Two Story Residence: Two story, live on first floor  # of Interior Steps: [address]: Right  Lift Chair Available: No  History of falls?: No  White Board Notes /Social Work Whiteboard Notes  /Social Work Whiteboard:  CM; plans home w spouse David Mcdaniel; has [facility] ([facility] has GT/trach/PMV/DASCO home oxygen 2L at night and as needed; SW referral    Discharge Plan Home with home health  plans home w spouse Donora Blocker, [facility] ([facility]); has GT (); trialing 2 meals daily without TF 2 Dominic HN x4/day (thru Bessie Joseph @ [phone]); has [employer] home oxygen 2L at night and as needed, nebulizer, PMV, trach () supplies  Discharge Milestones and Delays: Clinical status  From [facility]  History: Vocal Cord Paralysis s/p Thyroidectomy      Suspension Microlaryngoscopy/Laryngoplasty w/ Debridement Therapeutic Bronchoscopy w/ Debridement and possible tracheal widening in preparation for potential decannulation     IV AB, IVF, Oxygen 2L, 2 Dominic HN GTF x5 per day continued       SIGNED:  Shea Serra, HAN   2022, 8:43 AM

## 2022-05-27 NOTE — CONSULTS
 HYSTERECTOMY  01/2022    KNEE SURGERY      Right    LARYNGOSCOPY N/A 2/24/2022    DIAGNOSTIC SUSPENSION MICROLARYNGOSCOPY BRONCHOSCOPY WITH JET VENTILATION performed by Adriel Palacios MD [facility] 5/26/2022    Suspension Microlayrngoscopy w/ Debridement Therapeutic Bronchoscopy w/ Debridement performed by Adriel Palacios MD at [address] N/A 4/14/2022    TRANSORAL LARYNGOPLASTY WITH AUGMENTATION AND LATERALIZATION OF RIGHT TRUE VOCAL CORD, and  PARTIAL ARYTENOIDECTOMY performed by Adriel Palacios MD at [address] [facility] Medications:   No current facility-administered medications on file prior to encounter. Current Outpatient Medications on File Prior to Encounter   Medication Sig Dispense Refill    nystatin (MYCOSTATIN) 867507 UNIT/GM cream Apply topically 2 times daily Apply topically 2 times daily. around peg tube site      esomeprazole Magnesium (NEXIUM) 40 MG PACK Take 40 mg by mouth daily      Feeding Tubes MISC by Does not apply route 2 shreya hn 5 times a day.   Takes 150 ml of 2 shreya and 60 ml of water (30 ml before and 30 ml after meal.)      aluminum & magnesium hydroxide-simethicone (MAALOX) 200-200-20 MG/5ML SUSP suspension Take 30 mLs by mouth every 6 hours as needed for Indigestion 1 each 3    sertraline (ZOLOFT) 100 MG tablet Take 1 tablet by mouth daily 30 tablet 3    melatonin 3 MG TABS tablet Take 3 mg by mouth nightly as needed      ipratropium-albuterol (DUONEB) 0.5-2.5 (3) MG/3ML SOLN nebulizer solution Inhale 3 mLs into the lungs every 4 hours as needed for Shortness of Breath 360 mL 2    cycloSPORINE (RESTASIS) 0.05 % ophthalmic emulsion Place 2 drops into both eyes daily 1 each 0    magnesium oxide (MAG-OX) 400 (241.3 Mg) MG TABS tablet Take 1 tablet by mouth daily 30 tablet 3    meloxicam (MOBIC) 15 MG tablet Take 15 mg by mouth daily       Multiple Vitamins-Minerals (PRESERVISION AREDS 2) CAPS Take 1 capsule by mouth 2 times daily       multivitamin (THERAGRAN) per tablet Take 1 tablet by mouth daily Alive 50+         Allergies:    Latex, Doxycycline, and Tizanidine hcl    Social History:    reports that she has never smoked. She has never used smokeless tobacco. She reports previous alcohol use. She reports that she does not use drugs. Family History:       Problem Relation Age of Onset    Arthritis Mother     Heart Disease Mother     Cancer Father     No Known Problems Sister     Heart Disease Brother        Diet:  ADULT DIET; Dysphagia - Minced and Moist  ADULT TUBE FEEDING; PEG; 2.0 Calorie; Bolus; 4 Times Daily; 150; Gravity; 30; Before and after each bolus  ADULT ORAL NUTRITION SUPPLEMENT; Breakfast; Clear Liquid Oral Supplement  ADULT ORAL NUTRITION SUPPLEMENT; Breakfast; Other Oral Supplement; send hot tea & packet of sugar at breakfast daily  ADULT ORAL NUTRITION SUPPLEMENT; Dinner; Standard High Calorie/High Protein Oral Supplement    Review of systems:   Pertinent positives as noted in the HPI. All other systems reviewed and negative. PHYSICAL EXAM:  /61   Pulse 65   Temp 98.5 °F (36.9 °C) (Oral)   Resp 18   Ht 5' (1.524 m)   Wt 116 lb 8 oz (52.8 kg)   SpO2 97%   BMI 22.73 kg/m²   General appearance: No apparent distress, appears stated age and cooperative. HEENT: Normal cephalic, atraumatic without obvious deformity. Pupils equal, round, and reactive to light. Extra ocular muscles intact. Conjunctivae/corneas clear. Neck: Gauze in place with no bleeding noted. Respiratory:  Normal respiratory effort. Clear to auscultation, bilaterally without Rales/Wheezes/Rhonchi. Cardiovascular: Regular rate and rhythm with normal S1/S2 without murmurs, rubs or gallops. Abdomen: Soft, non-tender, non-distended with normal bowel sounds; G tube in place with no evidence of infection. Musculoskeletal:  No clubbing, cyanosis or edema bilaterally.   Skin: Skin color, texture, turgor normal.  No rashes or lesions. Neurologic:  Neurovascularly intact without any focal sensory/motor deficits. Cranial nerves: II-XII intact, grossly non-focal.  Psychiatric: Alert and oriented, thought content appropriate, normal insight  Capillary Refill: Brisk,< 3 seconds   Peripheral Pulses: +2 palpable, equal bilaterally     Labs:   No results for input(s): WBC, HGB, HCT, PLT in the last 72 hours. No results for input(s): NA, K, CL, CO2, BUN, CREATININE, CALCIUM, PHOS in the last 72 hours. Invalid input(s): MAGNES  No results for input(s): AST, ALT, BILIDIR, BILITOT, ALKPHOS in the last 72 hours. No results for input(s): INR in the last 72 hours. No results for input(s): [place] in the last 72 hours. Urinalysis:    Lab Results   Component Value Date    NITRU NEGATIVE 04/15/2022    WBCUA 10-15 04/15/2022    BACTERIA NONE SEEN 04/15/2022    RBCUA 5-10 04/15/2022    BLOODU LARGE 04/15/2022    GLUCOSEU NEGATIVE 04/15/2022       Radiology:   XR ABDOMEN (KUB) (SINGLE AP VIEW)   Final Result   There is perhaps mild constipation. There is no acute abdominal abnormality. **This report has been created using voice recognition software. It may contain minor errors which are inherent in voice recognition technology. **      Final report electronically signed by Dr. Shaji Murrieta on 5/27/2022 4:00 PM        XR ABDOMEN (KUB) (SINGLE AP VIEW)    Result Date: 5/27/2022  PROCEDURE: XR ABDOMEN (KUB) (SINGLE AP VIEW) CLINICAL INFORMATION: pt c/o of acid reflux and tube feeding coming up, currently has PEG tube . TECHNIQUE: Portable supine images of the abdomen COMPARISON: 2/9/2021 FINDINGS: PEG tube is noted over the left lower abdomen. Bowel gas pattern is unremarkable. Elevation of the right hemidiaphragm. Surgical clips right upper quadrant. Numerous EKG leads overlie the chest and abdomen. Moderate amount of colonic retention left colon    There is perhaps mild constipation. There is no acute abdominal abnormality. **This report has been created using voice recognition software. It may contain minor errors which are inherent in voice recognition technology. ** Final report electronically signed by Dr. Escobar Cano on 5/27/2022 4:00 PM      Marian 54    Electronically signed by Keven Mccurdy MD on 5/27/2022 at 6:00 PM

## 2022-05-27 NOTE — PLAN OF CARE
Problem: Discharge Planning  Goal: Discharge to home or other facility with appropriate resources  5/27/2022 1344 by Kunal Barakat RN  Outcome: Progressing  Flowsheets (Taken 5/27/2022 1344)  Discharge to home or other facility with appropriate resources:   Identify barriers to discharge with patient and caregiver   Identify discharge learning needs (meds, wound care, etc)   Refer to discharge planning if patient needs post-hospital services based on physician order or complex needs related to functional status, cognitive ability or social support system     Problem: Pain  Goal: Verbalizes/displays adequate comfort level or baseline comfort level  5/27/2022 1344 by Kunal Barakat RN  Outcome: Progressing  Flowsheets (Taken 5/27/2022 1344)  Verbalizes/displays adequate comfort level or baseline comfort level:   Encourage patient to monitor pain and request assistance   Assess pain using appropriate pain scale   Implement non-pharmacological measures as appropriate and evaluate response  Note: Pain goal no pain      Problem: ABCDS Injury Assessment  Goal: Absence of physical injury  5/27/2022 1344 by Kunal Barakat RN  Outcome: Progressing  Flowsheets (Taken 5/27/2022 1344)  Absence of Physical Injury: Implement safety measures based on patient assessment  Note: Hourly rounding, call light within reach, bed and chair alarm in use, SBA      Problem: Respiratory - Adult  Goal: Achieves optimal ventilation and oxygenation  5/27/2022 1344 by Kunal Barakat RN  Outcome: Progressing  Flowsheets (Taken 5/27/2022 1344)  Achieves optimal ventilation and oxygenation:   Assess for changes in respiratory status   Position to facilitate oxygenation and minimize respiratory effort   Assess the need for suctioning and aspirate as needed   Respiratory therapy support as indicated   Assess for changes in mentation and behavior   Oxygen supplementation based on oxygen saturation or arterial blood gases  Note: Trach removed, remains on room air, continuous pulse ox monitor     Problem: Cardiovascular - Adult  Goal: Maintains optimal cardiac output and hemodynamic stability  Outcome: Progressing  Flowsheets (Taken 5/27/2022 1344)  Maintains optimal cardiac output and hemodynamic stability:   Monitor blood pressure and heart rate   Monitor urine output and notify Licensed Independent Practitioner for values outside of normal range     Problem: Metabolic/Fluid and Electrolytes - Adult  Goal: Electrolytes maintained within normal limits  Outcome: Progressing  Flowsheets (Taken 5/27/2022 1344)  Electrolytes maintained within normal limits:   Monitor labs and assess patient for signs and symptoms of electrolyte imbalances   Monitor response to electrolyte replacements, including repeat lab results as appropriate   Administer electrolyte replacement as ordered     Problem: Nutrition Deficit:  Goal: Optimize nutritional status  Outcome: Progressing  Flowsheets (Taken 5/27/2022 1344)  Nutrient intake appropriate for improving, restoring, or maintaining nutritional needs:   Assess nutritional status and recommend course of action   Monitor oral intake, labs, and treatment plans   Order, calculate, and assess calorie counts as needed   Recommend appropriate diets, oral nutritional supplements, and vitamin/mineral supplements   Recommend, monitor, and adjust tube feedings and TPN/PPN based on assessed needs  Note: Tube feeding 4 times per day with oral intake    Care plan reviewed with patient and spouse. Patient and spouse verbalize understanding of the plan of care and contribute to goal setting.

## 2022-05-27 NOTE — PROGRESS NOTES
Department of Otolaryngology  Progress Note    Chief Complaint:  S/p Suspension laryngoscopy and bronchoscopy with debridement yesterday with Dr Silvia Hornerquest:  Patient decannulated by Dr Jessica Solorzano this morning. She reports doing very well. She is visiting with her  and another family member. She denies any concerns. A complete multi-organ review of systems was performed using a new patient questionnaire, and reviewed by me. ENT:  negative except as noted in HPI  CONSTITUTIONAL:  negative except as noted in HPI  EYES:  negative except as noted in HPI  RESPIRATORY:  negative except as noted in HPI  CARDIOVASCULAR:  negative except as noted in HPI  GASTROINTESTINAL:  negative except as noted in HPI  GENITOURINARY:  negative except as noted in HPI  MUSCULOSKELETAL:  negative except as noted in HPI  SKIN:  negative except as noted in HPI  ENDOCRINE/METABOLIC: negative except as noted in HPI  HEMATOLOGIC/LYMPHATIC:  negative except as noted in HPI  ALLERGY/IMMUN: negative except as noted in HPI  NEUROLOGICAL:  negative except as noted in HPI  BEHAVIOR/PSYCH:  negative except as noted in HPI    OBJECTIVE      Physical  VITALS:  BP (!) 105/51   Pulse 72   Temp 98.3 °F (36.8 °C) (Oral)   Resp 18   Ht 5' (1.524 m)   Wt 116 lb 8 oz (52.8 kg)   SpO2 97%   BMI 22.75 kg/m²     Sitting up in bedside chair. Alert, very pleasant. Anterior neck dressing in place. Vocalizing well. No increased work of breathing. No stridor.     Data  CBC with Differential:    Lab Results   Component Value Date    WBC 6.7 04/18/2022    RBC 3.18 04/18/2022    HGB 9.6 04/18/2022    HCT 30.7 04/18/2022     04/18/2022    MCV 96.5 04/18/2022    MCH 30.2 04/18/2022    MCHC 31.3 04/18/2022    NRBC 0 04/18/2022    SEGSPCT 77.4 04/18/2022    MONOPCT 7.8 04/18/2022    MONOSABS 0.5 04/18/2022    LYMPHSABS 0.9 04/18/2022    EOSABS 0.1 04/18/2022    BASOSABS 0.0 04/18/2022     BMP:    Lab Results   Component Value Date     04/18/2022    K 3.8 04/18/2022    K 3.4 04/15/2022    CL 98 04/18/2022    CO2 25 04/18/2022    BUN 21 04/18/2022    LABALBU 4.1 04/06/2021    CREATININE 0.7 04/18/2022    CALCIUM 9.6 04/18/2022    LABGLOM 81 04/18/2022    GLUCOSE 79 04/18/2022       Inpatient Medications  Current Facility-Administered Medications: multivitamin 1 tablet, 1 tablet, Oral, Daily  meloxicam (MOBIC) tablet 15 mg, 15 mg, Oral, Daily  cycloSPORINE (RESTASIS) 0.05 % ophthalmic emulsion 2 drop, 2 drop, Both Eyes, Daily  magnesium oxide (MAG-OX) tablet 400 mg, 400 mg, Oral, Daily  ipratropium-albuterol (DUONEB) nebulizer solution 3 mL, 3 mL, Inhalation, Q4H PRN  melatonin tablet 3 mg, 3 mg, Oral, Nightly PRN  aluminum & magnesium hydroxide-simethicone (MAALOX) 200-200-20 MG/5ML suspension 30 mL, 30 mL, Oral, Q6H PRN  sertraline (ZOLOFT) tablet 100 mg, 100 mg, Oral, Daily  nystatin (MYCOSTATIN) cream, , Topical, BID  sodium chloride flush 0.9 % injection 5-40 mL, 5-40 mL, IntraVENous, 2 times per day  sodium chloride flush 0.9 % injection 5-40 mL, 5-40 mL, IntraVENous, PRN  0.9 % sodium chloride infusion, , IntraVENous, PRN  ondansetron (ZOFRAN-ODT) disintegrating tablet 4 mg, 4 mg, Oral, Q8H PRN **OR** ondansetron (ZOFRAN) injection 4 mg, 4 mg, IntraVENous, Q6H PRN  ceFAZolin (ANCEF) 2000 mg in dextrose 5 % 50 mL IVPB, 2,000 mg, IntraVENous, Q8H  oxyCODONE (ROXICODONE) immediate release tablet 5 mg, 5 mg, Oral, Q4H PRN **OR** oxyCODONE (ROXICODONE) immediate release tablet 10 mg, 10 mg, Oral, Q4H PRN  HYDROmorphone (DILAUDID) injection 0.25 mg, 0.25 mg, IntraVENous, Q3H PRN **OR** HYDROmorphone (DILAUDID) injection 0.5 mg, 0.5 mg, IntraVENous, Q3H PRN  polyethylene glycol (GLYCOLAX) packet 17 g, 17 g, Oral, Daily  pantoprazole (PROTONIX) tablet 40 mg, 40 mg, Oral, QAM AC  acetaminophen (TYLENOL) tablet 650 mg, 650 mg, Oral, Q6H PRN    ASSESSMENT AND PLAN    Patient is doing very well with her tracheostomy decannulation thus far. Plan to observe her overnight and re-evaluate readiness for discharge.       Electronically signed by IMELDA Hilario CNP on 5/27/2022 at 2:53 PM

## 2022-05-27 NOTE — RT PROTOCOL NOTE
RT Inhaler-Nebulizer Bronchodilator Protocol Note    There is a bronchodilator order in the chart from a provider indicating to follow the RT Bronchodilator Protocol and there is an Initiate RT Inhaler-Nebulizer Bronchodilator Protocol order as well (see protocol at bottom of note). CXR Findings:  No results found. The findings from the last RT Protocol Assessment were as follows:   History Pulmonary Disease: None or smoker <15 pack years  Respiratory Pattern: Regular pattern and RR 12-20 bpm  Breath Sounds: Slightly diminished and/or crackles  Cough: Strong, productive  Indication for Bronchodilator Therapy: Decreased or absent breath sounds,On home bronchodilators  Bronchodilator Assessment Score: 3    Aerosolized bronchodilator medication orders have been revised according to the RT Inhaler-Nebulizer Bronchodilator Protocol below. Respiratory Therapist to perform RT Therapy Protocol Assessment initially then follow the protocol. Repeat RT Therapy Protocol Assessment PRN for score 0-3 or on second treatment, BID, and PRN for scores above 3. No Indications - adjust the frequency to every 6 hours PRN wheezing or bronchospasm, if no treatments needed after 48 hours then discontinue using Per Protocol order mode. If indication present, adjust the RT bronchodilator orders based on the Bronchodilator Assessment Score as indicated below. Use Inhaler orders unless patient has one or more of the following: on home nebulizer, not able to hold breath for 10 seconds, is not alert and oriented, cannot activate and use MDI correctly, or respiratory rate 25 breaths per minute or more, then use the equivalent nebulizer order(s) with same Frequency and PRN reasons based on the score. If a patient is on this medication at home then do not decrease Frequency below that used at home.     0-3 - enter or revise RT bronchodilator order(s) to equivalent RT Bronchodilator order with Frequency of every 4 hours PRN for wheezing or increased work of breathing using Per Protocol order mode. 4-6 - enter or revise RT Bronchodilator order(s) to two equivalent RT bronchodilator orders with one order with BID Frequency and one order with Frequency of every 4 hours PRN wheezing or increased work of breathing using Per Protocol order mode. 7-10 - enter or revise RT Bronchodilator order(s) to two equivalent RT bronchodilator orders with one order with TID Frequency and one order with Frequency of every 4 hours PRN wheezing or increased work of breathing using Per Protocol order mode. 11-13 - enter or revise RT Bronchodilator order(s) to one equivalent RT bronchodilator order with QID Frequency and an Albuterol order with Frequency of every 4 hours PRN wheezing or increased work of breathing using Per Protocol order mode. Greater than 13 - enter or revise RT Bronchodilator order(s) to one equivalent RT bronchodilator order with every 4 hours Frequency and an Albuterol order with Frequency of every 2 hours PRN wheezing or increased work of breathing using Per Protocol order mode. RT to enter RT Home Evaluation for COPD & MDI Assessment order using Per Protocol order mode.     Electronically signed by Santa Weems RCP on 5/27/2022 at 9:17 AM

## 2022-05-27 NOTE — PLAN OF CARE
Problem: Discharge Planning  Goal: Discharge to home or other facility with appropriate resources  Outcome: Progressing  Flowsheets  Taken 5/26/2022 2000 by Rod Sevilla RN  Discharge to home or other facility with appropriate resources:   Identify barriers to discharge with patient and caregiver   Arrange for needed discharge resources and transportation as appropriate  Taken 5/26/2022 1807 by Bhavana Erickson RN  Discharge to home or other facility with appropriate resources:   Identify barriers to discharge with patient and caregiver   Identify discharge learning needs (meds, wound care, etc)  Taken 5/26/2022 1620 by Bhavana Erickson RN  Discharge to home or other facility with appropriate resources:   Identify discharge learning needs (meds, wound care, etc)   Identify barriers to discharge with patient and caregiver     Problem: Pain  Goal: Verbalizes/displays adequate comfort level or baseline comfort level  Outcome: Progressing     Problem: ABCDS Injury Assessment  Goal: Absence of physical injury  Outcome: Progressing     Problem: Safety - Adult  Goal: Free from fall injury  Outcome: Progressing

## 2022-05-27 NOTE — PROGRESS NOTES
Comprehensive Nutrition Assessment    Type and Reason for Visit:  Initial,Positive Nutrition Screen (unplanned wt loss, home TF)    Nutrition Recommendations/Plan:   Bolus Two Dominic  ml 4 times daily ( Home TF)  Flush with 30ml water before & after each TF bolus ( home water flush regimen)   TF regimen yields ~78% estimated kcal & 81% minimum protein needs  Continue current diet per MD. Consider SLP swallow eval as appropriate. Send ensure clear once daily ( pt does not want apple flavor one)  Send hot tea with packet of sugar daily at breakfast per pt request.  Send chocolate ensure enlive once daily . Malnutrition Assessment:  Malnutrition Status: At risk for malnutrition (Comment) (05/27/22 1157)    Context:  Acute Illness     Findings of the 6 clinical characteristics of malnutrition:  Energy Intake:   (TF + po meets aleast 75% needs per diet & TF hx)  Weight Loss:  Unable to assess (no actual wt this admit)     Body Fat Loss:  No significant body fat loss     Muscle Mass Loss:  No significant muscle mass loss    Fluid Accumulation:  Unable to assess     Strength:  Not Performed    Nutrition Assessment:      Pt. nutritionally compromised AEB inadequate oral intake pt appears to consume  Less than ~25% estimated nutritional needs PTA  )    At risk for further nutrition compromise r/t increased needs for healing s/p (5/26) suspension microlaryngoscopy, laryngoplasty with debridement and underlying medical condition (hx vocal cord paralysis s/p thyroidectomy, PEG, GERD, right side diaphragm paralysis). Nutrition Related Findings:    Pt. Report/Treatments/Miscellaneous: Pt seen ~0930 with , tends to consume less than 25% po needs daily. May have 1/2 avacado mashed, 1/4 cup mandarin oranges cut in 1/2, may have 1/4 cup creamy yogurt, small amount of mashed banana relies on Two Dominic HN bolus feeds to meet majority of nutritional needs.  Pt recently seen by Jose Mattson  [facility] RD) Pt had full breakfast tray in front of her & not finished)  GI Status: Pt reports she had a BM (5/26), none charted  Pertinent Labs: (4/18) Hemoglobin 9.6  Pertinent Meds: MVI, Glycolax, Zoloft    Wound Type: Surgical Incision ((5/26/22) suspension microlaryngoscopy/laryngoplasty with debridement)       Current Nutrition Intake & Therapies:    Average Meal Intake: 1-25%,26-50% (per diet hx)  Average Supplements Intake:  (new)  ADULT DIET; Dysphagia - Minced and Moist  ADULT TUBE FEEDING; PEG; 2.0 Calorie; Bolus; 4 Times Daily; 150; Gravity; 30; Before and after each bolus  ADULT ORAL NUTRITION SUPPLEMENT; Breakfast; Clear Liquid Oral Supplement  ADULT ORAL NUTRITION SUPPLEMENT; Breakfast; Other Oral Supplement; send hot tea & packet of sugar at breakfast daily  ADULT ORAL NUTRITION SUPPLEMENT; Dinner; Standard High Calorie/High Protein Oral Supplement  Current Tube Feeding (TF) Orders:  · Feeding Route: PEG  · Formula: 2.0 Calorie  · Schedule: Bolus  · Feeding Regimen: Bolus 150ml Two Dominic HN 4 times daily ( pt's preferred times are 0600, 1400, 1800, 2100)  · Additives/Modulars: None  · Water Flushes: flush with 30ml water before & after each tube feeding bolus (240ml/24hrs)  · Current TF & Flush Orders Provides: Home TF regimen: Bolus 150ml Two Dominic HN 4 times daily (pt's preferred times are 0600, 1400, 1800, 2100) 1200kcals,50.1 grams protein, 131 grams CHO,  660ml water (420ml water TF, 240 flushes), 840 total volume ( 600 TF, 240 flushes)  · Goal TF & Flush Orders Provides: Bolus Two Dominic  ml 4 times daily yields ~1200kcals, 50.1 grams protein, 131 grams CHO, 660ml water (420 TF,240 flushes) in total volume 840ml (600 TF, 240 flushes)      Anthropometric Measures:  Height: 5' (152.4 cm)  Ideal Body Weight (IBW): 100 lbs (45 kg)    Admission Body Weight: 116 lb (52.6 kg) (stated wt, no actual wt)  Current Body Weight: 116 lb (52.6 kg) (stated wt),   IBW.     Current BMI (kg/m2): 22.7  Usual Body Weight:  (per EMR: (5/9) 113#9. 6oz, (4/26) 114#, (4/18) 124# actual, (4/14) 112# 6.4oz, (3/8) 116# 14.4oz)                       BMI Categories: Normal Weight (BMI 22.0 to 24.9) age over 72    Estimated Daily Nutrient Needs:  Energy Requirements Based On: Kcal/kg     Energy (kcal/day): ~1545kcals (~30kcals/kgm)     Protein (g/day):  grams (1.2-2 grams protein/kgm)     Fluid (ml/day): 1288-1545ml (25-30ml/kgm) wt of 51.5kgm (5/9)    Nutrition Diagnosis:   · Inadequate oral intake related to inadequate protein-energy intake as evidenced by intake 0-25%,intake 26-50%      Nutrition Interventions:   Food and/or Nutrient Delivery: Continue Current Diet,Continue Current Tube Feeding  Nutrition Education/Counseling: Education initiated (Encouraged good oral intake & ONS intake best effort)  Coordination of Nutrition Care: Continue to monitor while inpatient       Goals:     Goals:  (po intake + tube feeding to meet atleast 75% estimated needs)       Nutrition Monitoring and Evaluation:   Behavioral-Environmental Outcomes: None Identified  Food/Nutrient Intake Outcomes: Diet Advancement/Tolerance,Food and Nutrient Intake,Supplement Intake,Enteral Nutrition Intake/Tolerance  Physical Signs/Symptoms Outcomes: Biochemical Data,Chewing or Swallowing,GI Status,Fluid Status or Edema,Hemodynamic Status,Nutrition Focused Physical Findings,Skin,Weight    Discharge Planning:     Too soon to determine     Matt Nunez RD, LD  Contact: [phone]

## 2022-05-27 NOTE — RT PROTOCOL NOTE
RT Inhaler-Nebulizer Bronchodilator Protocol Note    There is a bronchodilator order in the chart from a provider indicating to follow the RT Bronchodilator Protocol and there is an Initiate RT Inhaler-Nebulizer Bronchodilator Protocol order as well (see protocol at bottom of note). CXR Findings:  No results found. The findings from the last RT Protocol Assessment were as follows:   History Pulmonary Disease: None or smoker <15 pack years  Respiratory Pattern: Regular pattern and RR 12-20 bpm  Breath Sounds: Inspiratory and expiratory or bilateral wheezing and/or rhonchi  Cough: Strong, productive  Indication for Bronchodilator Therapy: On home bronchodilators  Bronchodilator Assessment Score: 7    Aerosolized bronchodilator medication orders have been revised according to the RT Inhaler-Nebulizer Bronchodilator Protocol below. Respiratory Therapist to perform RT Therapy Protocol Assessment initially then follow the protocol. Repeat RT Therapy Protocol Assessment PRN for score 0-3 or on second treatment, BID, and PRN for scores above 3. No Indications - adjust the frequency to every 6 hours PRN wheezing or bronchospasm, if no treatments needed after 48 hours then discontinue using Per Protocol order mode. If indication present, adjust the RT bronchodilator orders based on the Bronchodilator Assessment Score as indicated below. Use Inhaler orders unless patient has one or more of the following: on home nebulizer, not able to hold breath for 10 seconds, is not alert and oriented, cannot activate and use MDI correctly, or respiratory rate 25 breaths per minute or more, then use the equivalent nebulizer order(s) with same Frequency and PRN reasons based on the score. If a patient is on this medication at home then do not decrease Frequency below that used at home.     0-3 - enter or revise RT bronchodilator order(s) to equivalent RT Bronchodilator order with Frequency of every 4 hours PRN for wheezing or increased work of breathing using Per Protocol order mode. 4-6 - enter or revise RT Bronchodilator order(s) to two equivalent RT bronchodilator orders with one order with BID Frequency and one order with Frequency of every 4 hours PRN wheezing or increased work of breathing using Per Protocol order mode. 7-10 - enter or revise RT Bronchodilator order(s) to two equivalent RT bronchodilator orders with one order with TID Frequency and one order with Frequency of every 4 hours PRN wheezing or increased work of breathing using Per Protocol order mode. 11-13 - enter or revise RT Bronchodilator order(s) to one equivalent RT bronchodilator order with QID Frequency and an Albuterol order with Frequency of every 4 hours PRN wheezing or increased work of breathing using Per Protocol order mode. Greater than 13 - enter or revise RT Bronchodilator order(s) to one equivalent RT bronchodilator order with every 4 hours Frequency and an Albuterol order with Frequency of every 2 hours PRN wheezing or increased work of breathing using Per Protocol order mode. RT to enter RT Home Evaluation for COPD & MDI Assessment order using Per Protocol order mode.     Electronically signed by Federica Liriano RCP on 5/27/2022 at 5:38 AM

## 2022-05-27 NOTE — CARE COORDINATION
DISCHARGE/PLANNING EVALUATION  5/27/22, 11:07 AM EDT    Reason for Referral: Discharge planning  Mental Status: Alert and Oriented  Decision Making: Self  Family/Social/Home Environment: Patient lives with her spouse. She reported that she has supportive family and friends in the area that are supportive. Current Services including food security, transportation and housekeeping: Patient reported that she is current with [facility]. She reported that she was independent prior to admission. Current Equipment: None  Payment Source: [facility]  Concerns or Barriers to Discharge: None  Post acute provider list with quality measures, geographic area and applicable managed care information provided. Questions regarding selection process answered: Offered    Teach Back Method used with patient and spouse regarding care plan and needs. Patient and spouse verbalize understanding of the plan of care and contribute to goal setting. Patient goals, treatment preferences and discharge plan: Patient is current with [facility] but reported that speech just discharged her and she feels like she needs to continue to see speech since she is decannulated now. SW called [facility] and notified them of decanulation and new RN and speech therapy orders.      Electronically signed by NORMA Mark on 5/27/2022 at 11:07 AM

## 2022-05-28 LAB
ANION GAP SERPL CALCULATED.3IONS-SCNC: 12 MEQ/L (ref 8–16)
BASOPHILS # BLD: 0.4 %
BASOPHILS ABSOLUTE: 0 THOU/MM3 (ref 0–0.1)
BUN BLDV-MCNC: 18 MG/DL (ref 7–22)
CALCIUM SERPL-MCNC: 8.9 MG/DL (ref 8.5–10.5)
CHLORIDE BLD-SCNC: 104 MEQ/L (ref 98–111)
CO2: 26 MEQ/L (ref 23–33)
CREAT SERPL-MCNC: 0.6 MG/DL (ref 0.4–1.2)
EOSINOPHIL # BLD: 1.9 %
EOSINOPHILS ABSOLUTE: 0.2 THOU/MM3 (ref 0–0.4)
ERYTHROCYTE [DISTWIDTH] IN BLOOD BY AUTOMATED COUNT: 15 % (ref 11.5–14.5)
ERYTHROCYTE [DISTWIDTH] IN BLOOD BY AUTOMATED COUNT: 56 FL (ref 35–45)
GFR SERPL CREATININE-BSD FRML MDRD: > 90 ML/MIN/1.73M2
GLUCOSE BLD-MCNC: 90 MG/DL (ref 70–108)
HCT VFR BLD CALC: 31.7 % (ref 37–47)
HEMOGLOBIN: 9.6 GM/DL (ref 12–16)
IMMATURE GRANS (ABS): 0.04 THOU/MM3 (ref 0–0.07)
IMMATURE GRANULOCYTES: 0.5 %
LYMPHOCYTES # BLD: 19.4 %
LYMPHOCYTES ABSOLUTE: 1.5 THOU/MM3 (ref 1–4.8)
MCH RBC QN AUTO: 30.5 PG (ref 26–33)
MCHC RBC AUTO-ENTMCNC: 30.3 GM/DL (ref 32.2–35.5)
MCV RBC AUTO: 100.6 FL (ref 81–99)
MONOCYTES # BLD: 8.4 %
MONOCYTES ABSOLUTE: 0.7 THOU/MM3 (ref 0.4–1.3)
NUCLEATED RED BLOOD CELLS: 0 /100 WBC
PLATELET # BLD: 177 THOU/MM3 (ref 130–400)
PMV BLD AUTO: 11.1 FL (ref 9.4–12.4)
POTASSIUM SERPL-SCNC: 3.8 MEQ/L (ref 3.5–5.2)
RBC # BLD: 3.15 MILL/MM3 (ref 4.2–5.4)
SEG NEUTROPHILS: 69.4 %
SEGMENTED NEUTROPHILS ABSOLUTE COUNT: 5.5 THOU/MM3 (ref 1.8–7.7)
SODIUM BLD-SCNC: 142 MEQ/L (ref 135–145)
WBC # BLD: 7.9 THOU/MM3 (ref 4.8–10.8)

## 2022-05-28 PROCEDURE — 85025 COMPLETE CBC W/AUTO DIFF WBC: CPT

## 2022-05-28 PROCEDURE — 6370000000 HC RX 637 (ALT 250 FOR IP): Performed by: OTOLARYNGOLOGY

## 2022-05-28 PROCEDURE — 6360000002 HC RX W HCPCS: Performed by: OTOLARYNGOLOGY

## 2022-05-28 PROCEDURE — 80048 BASIC METABOLIC PNL TOTAL CA: CPT

## 2022-05-28 PROCEDURE — 36415 COLL VENOUS BLD VENIPUNCTURE: CPT

## 2022-05-28 PROCEDURE — 2060000000 HC ICU INTERMEDIATE R&B

## 2022-05-28 PROCEDURE — 6370000000 HC RX 637 (ALT 250 FOR IP): Performed by: STUDENT IN AN ORGANIZED HEALTH CARE EDUCATION/TRAINING PROGRAM

## 2022-05-28 PROCEDURE — 2580000003 HC RX 258: Performed by: OTOLARYNGOLOGY

## 2022-05-28 RX ORDER — LOPERAMIDE HCL 1 MG/7.5ML
4 SUSPENSION ORAL 4 TIMES DAILY PRN
Status: DISCONTINUED | OUTPATIENT
Start: 2022-05-28 | End: 2022-05-29 | Stop reason: HOSPADM

## 2022-05-28 RX ADMIN — SODIUM CHLORIDE, PRESERVATIVE FREE 10 ML: 5 INJECTION INTRAVENOUS at 10:55

## 2022-05-28 RX ADMIN — NYSTATIN: 100000 CREAM TOPICAL at 21:25

## 2022-05-28 RX ADMIN — CEFAZOLIN 2000 MG: 10 INJECTION, POWDER, FOR SOLUTION INTRAVENOUS at 16:16

## 2022-05-28 RX ADMIN — Medication 400 MG: at 10:54

## 2022-05-28 RX ADMIN — CEFAZOLIN 2000 MG: 10 INJECTION, POWDER, FOR SOLUTION INTRAVENOUS at 21:43

## 2022-05-28 RX ADMIN — CYCLOSPORINE 2 DROP: 0.5 EMULSION OPHTHALMIC at 10:46

## 2022-05-28 RX ADMIN — SERTRALINE HYDROCHLORIDE 100 MG: 100 TABLET ORAL at 10:55

## 2022-05-28 RX ADMIN — MELOXICAM 15 MG: 7.5 TABLET ORAL at 10:54

## 2022-05-28 RX ADMIN — Medication 1 TABLET: at 10:55

## 2022-05-28 RX ADMIN — CEFAZOLIN 2000 MG: 10 INJECTION, POWDER, FOR SOLUTION INTRAVENOUS at 06:44

## 2022-05-28 RX ADMIN — Medication 3 MG: at 21:30

## 2022-05-28 RX ADMIN — PANTOPRAZOLE SODIUM 40 MG: 40 TABLET, DELAYED RELEASE ORAL at 06:46

## 2022-05-28 RX ADMIN — NYSTATIN: 100000 CREAM TOPICAL at 10:55

## 2022-05-28 RX ADMIN — Medication 4 MG: at 21:25

## 2022-05-28 RX ADMIN — SODIUM CHLORIDE, PRESERVATIVE FREE 10 ML: 5 INJECTION INTRAVENOUS at 21:26

## 2022-05-28 ASSESSMENT — PAIN SCALES - GENERAL
PAINLEVEL_OUTOF10: 0

## 2022-05-28 NOTE — PROGRESS NOTES
Patient is happy with her improved laryngeal airway. Little pain, controlled. Wants to know if she needs night-time O2 as before surgery. 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      Will try an overnight pulse ox and shoot for discharge tomorrow AM.

## 2022-05-29 VITALS
DIASTOLIC BLOOD PRESSURE: 71 MMHG | BODY MASS INDEX: 22.87 KG/M2 | SYSTOLIC BLOOD PRESSURE: 129 MMHG | RESPIRATION RATE: 22 BRPM | HEART RATE: 72 BPM | WEIGHT: 116.5 LBS | TEMPERATURE: 98.2 F | OXYGEN SATURATION: 94 % | HEIGHT: 60 IN

## 2022-05-29 PROCEDURE — 6370000000 HC RX 637 (ALT 250 FOR IP): Performed by: OTOLARYNGOLOGY

## 2022-05-29 PROCEDURE — 2580000003 HC RX 258: Performed by: OTOLARYNGOLOGY

## 2022-05-29 PROCEDURE — 6360000002 HC RX W HCPCS: Performed by: OTOLARYNGOLOGY

## 2022-05-29 PROCEDURE — 99232 SBSQ HOSP IP/OBS MODERATE 35: CPT | Performed by: INTERNAL MEDICINE

## 2022-05-29 RX ADMIN — SODIUM CHLORIDE, PRESERVATIVE FREE 10 ML: 5 INJECTION INTRAVENOUS at 09:06

## 2022-05-29 RX ADMIN — MELOXICAM 15 MG: 7.5 TABLET ORAL at 09:04

## 2022-05-29 RX ADMIN — SERTRALINE HYDROCHLORIDE 100 MG: 100 TABLET ORAL at 09:04

## 2022-05-29 RX ADMIN — NYSTATIN: 100000 CREAM TOPICAL at 09:04

## 2022-05-29 RX ADMIN — Medication 400 MG: at 09:04

## 2022-05-29 RX ADMIN — CEFAZOLIN 2000 MG: 10 INJECTION, POWDER, FOR SOLUTION INTRAVENOUS at 06:30

## 2022-05-29 RX ADMIN — PANTOPRAZOLE SODIUM 40 MG: 40 TABLET, DELAYED RELEASE ORAL at 06:21

## 2022-05-29 RX ADMIN — Medication 1 TABLET: at 09:04

## 2022-05-29 RX ADMIN — CYCLOSPORINE 2 DROP: 0.5 EMULSION OPHTHALMIC at 09:05

## 2022-05-29 ASSESSMENT — PAIN SCALES - GENERAL: PAINLEVEL_OUTOF10: 0

## 2022-05-29 NOTE — PROGRESS NOTES
Hospitalist Consult/progress Note        Patient:  Safia Woodard  YOB: 1945  Date of Service: 5/29/2022  MRN: [id number]   Acct:  [de-identified]   Primary Care Physician: Luis Liang    Chief Complaint:  Hilaria Motto decannulation   Reason for consult  Medical mgmt     Date of Service: Pt seen/examined in consultation on 5/29/2022     History Of Present Illness:      Zula Spray y.o. female who we are asked to see/evaluate by Sharan Cruz MD for medical management of chronic conditions. 78-year-old female with a history of post polio syndrome and upper airway obstruction. She is status post thyroidectomy in the past with likely sustained left true vocal fold paralysis. She underwent trach decannulation this admission. Reports she feels as if her feeds are refluxing into esophagus. Denies fever, chills, n/v, SOB, chest pain, diarrhea, constipation, dysuria, or urinary retention. Assessment and Plan:-        Transient hypotension - post op- resolved   -No hx of HTN, bp has been running low, no episodes of hypotension.   -Continue to monitor - pcp f/u in one week    Chronic Macrocytic Anemia   -Likely due to insufficient vitamin intake, b12 and/or folate.  Hb at baseline.   -Continue to monitor and transfuse for hb<7  -Can order b12 and folate with routine labs     post polio syndrome and upper airway obstructionS/p thyroidectomy,  Airway obstruction Paralysis of right vocal cord S/P Microlayrngoscopy w/ Debridement Therapeutic Bronchoscopy w/ Debridement  -Patient doing well, mgmt per primary team    Dysphagia chronic issue - Tube Feeds and also on minced and moist diet  - hx of gerd      Medically stable    Discharge per primary    We will sign off  Call us PRN    Thank you for the consult      Subjective:- (Last 24 hours)  Feeling better  No nausea or vomiting  Eating and also has TF  bp better      Past medical history, family history, social history and allergies reviewed again and is unchanged since admission. ROS (All review of systems completed. Pertinent positives noted. Otherwise All other systems reviewed and negative.)     Scheduled Meds:   multivitamin  1 tablet Oral Daily    meloxicam  15 mg Oral Daily    cycloSPORINE  2 drop Both Eyes Daily    magnesium oxide  400 mg Oral Daily    sertraline  100 mg Oral Daily    nystatin   Topical BID    sodium chloride flush  5-40 mL IntraVENous 2 times per day    polyethylene glycol  17 g Oral Daily    pantoprazole  40 mg Oral QAM AC     Continuous Infusions:   sodium chloride       PRN Meds:. loperamide, ipratropium-albuterol, melatonin, aluminum & magnesium hydroxide-simethicone, sodium chloride flush, sodium chloride, ondansetron **OR** ondansetron, oxyCODONE **OR** oxyCODONE, HYDROmorphone **OR** HYDROmorphone, acetaminophen     Allergies:    Latex, Doxycycline, and Tizanidine hcl    Social History:    reports that she has never smoked. She has never used smokeless tobacco. She reports previous alcohol use. She reports that she does not use drugs. Family History:       Problem Relation Age of Onset    Arthritis Mother     Heart Disease Mother     Cancer Father     No Known Problems Sister     Heart Disease Brother        Diet:  ADULT DIET; Dysphagia - Minced and Moist  ADULT TUBE FEEDING; PEG; 2.0 Calorie; Bolus; 4 Times Daily; 150; Gravity; 30; Before and after each bolus  ADULT ORAL NUTRITION SUPPLEMENT; Breakfast; Clear Liquid Oral Supplement  ADULT ORAL NUTRITION SUPPLEMENT; Breakfast; Other Oral Supplement; send hot tea & packet of sugar at breakfast daily  ADULT ORAL NUTRITION SUPPLEMENT; Dinner; Standard High Calorie/High Protein Oral Supplement    Review of systems:   Pertinent positives as noted in the HPI. All other systems reviewed and negative.     PHYSICAL EXAM:  /71   Pulse 72   Temp 98.2 °F (36.8 °C) (Oral)   Resp 22   Ht 5' (1.524 m)   Wt 116 lb 8 oz (52.8 kg)   SpO2 94%   BMI 22.73 kg/m²   General appearance: No apparent distress, appears stated age and cooperative. HEENT: Normal cephalic, atraumatic without obvious deformity. Pupils equal, round, and reactive to light. Extra ocular muscles intact. Conjunctivae/corneas clear. Neck: Gauze in place with no bleeding noted. Respiratory:  Normal respiratory effort. Clear to auscultation, bilaterally without Rales/Wheezes/Rhonchi. Cardiovascular: Regular rate and rhythm with normal S1/S2 without murmurs, rubs or gallops. Abdomen: Soft, non-tender, non-distended with normal bowel sounds; G tube in place with no evidence of infection. Musculoskeletal:  No clubbing, cyanosis or edema bilaterally. Skin: Skin color, texture, turgor normal.  No rashes or lesions. Neurologic:  Neurovascularly intact without any focal sensory/motor deficits. Cranial nerves: II-XII intact, grossly non-focal.  Psychiatric: Alert and oriented, thought content appropriate, normal insight  Capillary Refill: Brisk,< 3 seconds   Peripheral Pulses: +2 palpable, equal bilaterally     Labs:   Recent Labs     05/28/22  0404   WBC 7.9   HGB 9.6*   HCT 31.7*        Recent Labs     05/28/22  0404      K 3.8      CO2 26   BUN 18   CREATININE 0.6   CALCIUM 8.9     No results for input(s): AST, ALT, BILIDIR, BILITOT, ALKPHOS in the last 72 hours. No results for input(s): INR in the last 72 hours. No results for input(s): [place]te Dec in the last 72 hours. Urinalysis:    Lab Results   Component Value Date    NITRU NEGATIVE 04/15/2022    WBCUA 10-15 04/15/2022    BACTERIA NONE SEEN 04/15/2022    RBCUA 5-10 04/15/2022    BLOODU LARGE 04/15/2022    GLUCOSEU NEGATIVE 04/15/2022       Radiology:   XR ABDOMEN (KUB) (SINGLE AP VIEW)   Final Result   There is perhaps mild constipation. There is no acute abdominal abnormality. **This report has been created using voice recognition software.   It may contain minor errors which are inherent in voice recognition technology. **      Final report electronically signed by Dr. Oswaldo Monroy on 5/27/2022 4:00 PM        XR ABDOMEN (KUB) (SINGLE AP VIEW)    Result Date: 5/27/2022  PROCEDURE: XR ABDOMEN (KUB) (SINGLE AP VIEW) CLINICAL INFORMATION: pt c/o of acid reflux and tube feeding coming up, currently has PEG tube . TECHNIQUE: Portable supine images of the abdomen COMPARISON: 2/9/2021 FINDINGS: PEG tube is noted over the left lower abdomen. Bowel gas pattern is unremarkable. Elevation of the right hemidiaphragm. Surgical clips right upper quadrant. Numerous EKG leads overlie the chest and abdomen. Moderate amount of colonic retention left colon    There is perhaps mild constipation. There is no acute abdominal abnormality. **This report has been created using voice recognition software. It may contain minor errors which are inherent in voice recognition technology. ** Final report electronically signed by Dr. Oswaldo Monroy on 5/27/2022 4:00 PM      [place] 54    Electronically signed by Georgia Chamberlain MD on 5/29/2022 at 1:38 PM

## 2022-05-29 NOTE — PROGRESS NOTES
AVS reviewed with patient and her . They state that they do not have any questions or concerns at this time.

## 2022-05-29 NOTE — DISCHARGE SUMMARY
DISCHARGE SUMMARY    Pt Name: Ham Valiente  MRN: [id number]  YOB: 1945  Primary Care Physician: Nona Hernandez  Admit date:  5/26/2022 10:32 AM  Discharge date:  No discharge date for patient encounter. Disposition: home  Admitting Diagnosis: Airway obstruction and paralyzed right true vocal cord. Discharge Diagnosis:   Patient Active Problem List   Diagnosis Code     Specialty Problems        ENT Problems    Paralysis of right vocal cord        Supraglottic airway obstruction     Hospital Course: Kyung Doty originally presented to the hospital on 5/26/2022 10:32 AM   She underwent  Microlayrngoscopy w/ Debridement Therapeutic Bronchoscopy w/ Debridement. A day later she was decannulated. At time of discharge, Kyung Doty was Still requiring Tube feedings. At discharge she was requiring no pain medication. Patient had no signs or symptoms of complications. Overnight pulse Ox stayed above 90% and usually better. PHYSICAL EXAMINATION   Discharge Vitals:  height is 5' (1.524 m) and weight is 116 lb 8 oz (52.8 kg). Her oral temperature is 98.2 °F (36.8 °C). Her blood pressure is 129/71 and her pulse is 72. Her respiration is 22 and oxygen saturation is 94%. General appearance - alert, well appearing, and in no distress  Stoma - Almost completely closed  Slightly hoarse, no stridor.   Happy    LABS     Recent Labs     05/28/22  0404   WBC 7.9   HGB 9.6*   HCT 31.7*         K 3.8      CO2 26   BUN 18   CREATININE 0.6       DISCHARGE INSTRUCTIONS   Discharge Medications:      Medication List      CONTINUE taking these medications    aluminum & magnesium hydroxide-simethicone 200-200-20 MG/5ML Susp suspension  Commonly known as: MAALOX  Take 30 mLs by mouth every 6 hours as needed for Indigestion     cycloSPORINE 0.05 % ophthalmic emulsion  Commonly known as: RESTASIS  Place 2 drops into both eyes daily     esomeprazole Magnesium 40 MG Pack  Commonly known as: NEXIUM     Feeding Tubes Misc     ipratropium-albuterol 0.5-2.5 (3) MG/3ML Soln nebulizer solution  Commonly known as: DUONEB  Inhale 3 mLs into the lungs every 4 hours as needed for Shortness of Breath     magnesium oxide 400 (241.3 Mg) MG Tabs tablet  Commonly known as: MAG-OX  Take 1 tablet by mouth daily     melatonin 3 mg Tabs tablet     nystatin 350870 UNIT/GM cream  Commonly known as: MYCOSTATIN     PreserVision AREDS 2 Caps     sertraline 100 MG tablet  Commonly known as: ZOLOFT  Take 1 tablet by mouth daily        You may continue taking these medications:    meloxicam 15 MG tablet  Commonly known as: MOBIC     multivitamin per tablet          Diet: TF's  Activity: activity as tolerated  Wound Care: see discharge instructions  Follow-up:  See discharge instructions      Electronically signed by Jeanette Nagel MD on 5/29/2022 at 4:02 PM

## 2022-05-29 NOTE — PLAN OF CARE
Problem: Discharge Planning  Goal: Discharge to home or other facility with appropriate resources  Outcome: Completed     Problem: Pain  Goal: Verbalizes/displays adequate comfort level or baseline comfort level  Outcome: Completed     Problem: ABCDS Injury Assessment  Goal: Absence of physical injury  Outcome: Completed     Problem: Safety - Adult  Goal: Free from fall injury  Outcome: Completed     Problem: Respiratory - Adult  Goal: Achieves optimal ventilation and oxygenation  Outcome: Completed     Problem: Nutrition Deficit:  Goal: Optimize nutritional status  Outcome: Completed     Problem: Cardiovascular - Adult  Goal: Maintains optimal cardiac output and hemodynamic stability  Outcome: Completed  Goal: Absence of cardiac dysrhythmias or at baseline  Outcome: Completed     Problem: Metabolic/Fluid and Electrolytes - Adult  Goal: Electrolytes maintained within normal limits  Outcome: Completed  Goal: Hemodynamic stability and optimal renal function maintained  Outcome: Completed  Goal: Glucose maintained within prescribed range  Outcome: Completed   Care plan reviewed with patient. Patient verbalizes understanding of the plan of care and contributes to goal setting.

## 2022-06-13 ENCOUNTER — OFFICE VISIT (OUTPATIENT)
Dept: ENT CLINIC | Age: 77
End: 2022-06-13
Payer: MEDICARE

## 2022-06-13 VITALS
WEIGHT: 111.7 LBS | TEMPERATURE: 97.4 F | HEART RATE: 83 BPM | RESPIRATION RATE: 12 BRPM | OXYGEN SATURATION: 98 % | SYSTOLIC BLOOD PRESSURE: 122 MMHG | DIASTOLIC BLOOD PRESSURE: 68 MMHG | BODY MASS INDEX: 21.93 KG/M2 | HEIGHT: 60 IN

## 2022-06-13 DIAGNOSIS — R13.14 PHARYNGOESOPHAGEAL DYSPHAGIA: ICD-10-CM

## 2022-06-13 DIAGNOSIS — R49.0 HOARSENESS: ICD-10-CM

## 2022-06-13 DIAGNOSIS — J38.01 PARALYSIS OF RIGHT VOCAL CORD: Primary | ICD-10-CM

## 2022-06-13 DIAGNOSIS — J98.8 AIRWAY OBSTRUCTION: ICD-10-CM

## 2022-06-13 PROCEDURE — 31575 DIAGNOSTIC LARYNGOSCOPY: CPT | Performed by: OTOLARYNGOLOGY

## 2022-06-13 PROCEDURE — 99024 POSTOP FOLLOW-UP VISIT: CPT | Performed by: OTOLARYNGOLOGY

## 2022-06-13 NOTE — PROGRESS NOTES
[address] [facility] TH[facility]  [address] [place] [address] [place] [address]  Dept: [phone]  Dept Fax: [phone]  Loc: [phone]    Ancelmo Mims is a 64 y.o. female who was referred by No ref. provider found for:  Chief Complaint   Patient presents with    Post-Op Check     Patient here for post op exam.        HPI:     Ancelmo Mims is a 64 y.o. female who returns to clinic for her first post decannulation evaluation after undergoing a successful transoral laryngoplasty with augmentation lateralization. The patient subsequently had a minor debridement and was admitted for decannulation on May 26, 2022. The patient is here today with her  reporting having done very well following decannulation with no dyspnea at rest or on exertion. In fact she is fully stopped using her supplemental oxygen. She is also eating well and is cut down her dependence on gastrostomy tube feeds to 2 episodes per day compared to 4 in the past.  She has been sustaining her weight. She is eating wide variety of foods and only occasionally has to drink extra amounts of liquid to \"wash something down\". She reports being very pleased with her recovery thus far. The hole in her neck has completely closed and has a very small amount of fibrinous debris at its base. She reports her voice having been about as its been today with significant breathiness but relative ease in communicating even in the context of a moderate amount of background noise. History: Allergies   Allergen Reactions    Latex Itching    Doxycycline      Tongue, throat and mouth soreness    Tizanidine Hcl Nausea And Vomiting     Current Outpatient Medications   Medication Sig Dispense Refill    nystatin (MYCOSTATIN) 835230 UNIT/GM cream Apply topically 2 times daily Apply topically 2 times daily.   around peg tube site      esomeprazole Magnesium (NEXIUM) 40 MG PACK Take 40 mg by mouth daily      Feeding Tubes MISC by Does not apply route 2 shreya hn 5 times a day. Takes 150 ml of 2 shreya and 60 ml of water (30 ml before and 30 ml after meal.)      aluminum & magnesium hydroxide-simethicone (MAALOX) 200-200-20 MG/5ML SUSP suspension Take 30 mLs by mouth every 6 hours as needed for Indigestion 1 each 3    sertraline (ZOLOFT) 100 MG tablet Take 1 tablet by mouth daily (Patient taking differently: Take 50 mg by mouth daily ) 30 tablet 3    melatonin 3 MG TABS tablet Take 3 mg by mouth nightly as needed      cycloSPORINE (RESTASIS) 0.05 % ophthalmic emulsion Place 2 drops into both eyes daily 1 each 0    Multiple Vitamins-Minerals (PRESERVISION AREDS 2) CAPS Take 1 capsule by mouth 2 times daily       ipratropium-albuterol (DUONEB) 0.5-2.5 (3) MG/3ML SOLN nebulizer solution Inhale 3 mLs into the lungs every 4 hours as needed for Shortness of Breath 360 mL 2    magnesium oxide (MAG-OX) 400 (241.3 Mg) MG TABS tablet Take 1 tablet by mouth daily (Patient not taking: Reported on 6/13/2022) 30 tablet 3     No current facility-administered medications for this visit.      Past Medical History:   Diagnosis Date    GERD (gastroesophageal reflux disease)     Osteoarthritis     Paralysis of vocal cords     Polio     Right side diaphragm paralysis     states trach oxygen at night      Past Surgical History:   Procedure Laterality Date    CHOLECYSTECTOMY      GASTROSTOMY TUBE PLACEMENT      HYSTERECTOMY (CERVIX STATUS UNKNOWN)  01/2022    KNEE SURGERY      Right    LARYNGOSCOPY N/A 2/24/2022    DIAGNOSTIC SUSPENSION MICROLARYNGOSCOPY BRONCHOSCOPY WITH JET VENTILATION performed by Bear Valle MD at [address] [place] [address] 5/26/2022    Suspension Microlayrngoscopy w/ Debridement Therapeutic Bronchoscopy w/ Debridement performed by Bear Valle MD at [address] N/A 4/14/2022    TRANSORAL LARYNGOPLASTY WITH AUGMENTATION AND LATERALIZATION OF RIGHT TRUE VOCAL CORD, laryngoscopy images:  Respiration    Phonation    Phonation          Vitals reviewed. XR ABDOMEN (KUB) (SINGLE AP VIEW)    Result Date: 5/27/2022  There is perhaps mild constipation. There is no acute abdominal abnormality. **This report has been created using voice recognition software. It may contain minor errors which are inherent in voice recognition technology. ** Final report electronically signed by Dr. Charisse Smith on 5/27/2022 4:00 PM     Lab Results   Component Value Date     05/28/2022     04/18/2022     04/15/2022    K 3.8 05/28/2022    K 3.8 04/18/2022    K 3.4 04/15/2022    K 3.9 03/05/2022    K 4.3 03/04/2022     05/28/2022    CL 98 04/18/2022     04/15/2022    CO2 26 05/28/2022    CO2 25 04/18/2022    CO2 19 04/15/2022    BUN 18 05/28/2022    BUN 21 04/18/2022    BUN 20 04/15/2022    CREATININE 0.6 05/28/2022    CREATININE 0.7 04/18/2022    CREATININE 0.5 04/15/2022    CALCIUM 8.9 05/28/2022    CALCIUM 9.6 04/18/2022    CALCIUM 8.4 04/15/2022    PROT 6.4 04/06/2021    PROT 6.6 02/03/2021    LABALBU 4.1 04/06/2021    LABALBU 4.0 02/03/2021    BILITOT 0.2 04/06/2021    BILITOT 0.5 02/03/2021    ALKPHOS 62 04/06/2021    ALKPHOS 63 02/03/2021    AST 25 04/06/2021    AST 29 02/03/2021    ALT 18 04/06/2021    ALT 15 02/03/2021       All of the past medical history, past surgical history, family history,social history, allergies and current medications were reviewed with the patient. Assessment & Plan   Diagnoses and all orders for this visit:     Diagnosis Orders   1. Paralysis of right vocal cord     2. Airway obstruction     3. Hoarseness     4. Pharyngoesophageal dysphagia         Based on the patient's history and these physical findings, I am delighted as it is she and her  for her early post decannulation results. She is highly likely to continue to improve over time as her voice strengthens with use.   If on the other hand her voice remains breathy, I would consider soft tissue augmentation possibly with calcium hydroxyapatite or some other method of increasing the bulk of record if necessary. She is also to continue to try to wean herself from the tube feeding to as regular diet as she can manage. I recommended she give herself a target for doing so of approximately 1 month starting on a single tube feeding meal per day in the next week or so. I will see her back in approximately 2 months for an interval exam.  If she looks is good at that visit as she does today, we will be able to go significantly longer on the order of 6 months for the next interval evaluation. If on the other hand her voice weakens or she desires a stronger voice and has not achieved that, we will discuss soft tissue augmentation at that time. The patient and her  reported understanding the basis of my recommendation being willing to proceed as such. Return in about 2 months (around 8/13/2022) for Flexible laryngoscopy Follow-up and possible flexible laryngoscopy. **This report has been created using voice recognition software. It may contain minor errors which are inherent in voice recognition technology. **

## 2022-06-21 ENCOUNTER — OFFICE VISIT (OUTPATIENT)
Dept: INTERNAL MEDICINE CLINIC | Age: 77
End: 2022-06-21
Payer: MEDICARE

## 2022-06-21 VITALS — TEMPERATURE: 98.6 F | BODY MASS INDEX: 22.07 KG/M2 | HEIGHT: 60 IN | WEIGHT: 112.4 LBS

## 2022-06-21 DIAGNOSIS — R13.10 DYSPHAGIA, UNSPECIFIED TYPE: ICD-10-CM

## 2022-06-21 PROCEDURE — 97803 MED NUTRITION INDIV SUBSEQ: CPT | Performed by: DIETITIAN, REGISTERED

## 2022-06-21 NOTE — PATIENT INSTRUCTIONS
Soft fresh fruit would be ok - soft peach,berries. - Also trial - tomato and peeled cucumber and bell pepper strips. - shredded lettuce, baby spinach - trial.  Avoid raw vegetables othewise. Your calorie goal/day  1300 - 1500 calories/day    Include a protein source at every meal or have Ensure enlive with a meal or have as a snack.

## 2022-06-21 NOTE — PROGRESS NOTES
[facility]  [address] [place] [address]., [facility], [facility]  [phone] (phone)  [phone] (fax)    Patient Name: Fabio Garcia. Date of Birth: [id number]. MRN: [id number]      Assessment: Patient is a 64 y.o. female seen for follow-up MNT visit for Dysphagia. -Nutritionally relevant labs:   Lab Results   Component Value Date/Time    GLUCOSE 90 05/28/2022 04:04 AM    GLUCOSE 79 04/18/2022 02:30 PM     Pt arrived alone today and states this was the 1st day to drive on her own. Haleigh Escalera is out and No need to have oxygen at night  Peg feedings - original order to bolus 4 cartons Two Dominic HN formula/day. Pt told her  only wanting 1/2 carton 2x/day and wanting to eat more. Aiming to eat 3 meals/day and not using feeding tube as much. Has found she likes to drink the Ensure enlive oral supplement drink. Speech therapy (ST) done. End of month will have an outpatient ST evaluation. Practicing her swallowing exercises. Cannot cough very well. Pt with no c/o coughing or choking with food or drinks. Basically eating a regular diet. She states she can handle mixed textures, such as, cereal and milk and chicken noodle soup. Pt not eating raw veggies but craving a salad.    -Food journal:   Breakfast: 1/2 serving of cornflakes with  Milk, 1/2 banana, 1 sl toast and 1/2 TB butter OR Ensure enlive supplement drink with 1 sl toast with 1/2 TB butter or with 2 dony crackers and 1/2 cup 2% milk. Lunch: 1 sl toast with 1/2 TB butter, 1 TB pbutter, 1 T strawberry jam OR deviled egg, 2 oz rotis. Chicken, 1/4 large baked potato, 1 TB BBQ sauce and 1 tsp butter. Dinner:  1 Vicky fish fillet and 1/2 bun, 6 french fries, ketchup, 1/2 apple Clear liquid Ensure drink OR 2 oz rotis chicken, 1/2 avocado, 1/4 c mac and cheese,  1 piece of Resses. She has been counting her calories.   Calorie intake with 260 ml Two Dominic HN/day, Calorie Range:  1621 - 1945 shreya/day. -Impression of Dietary Intake: on average, 3 meals per day, soft to regular textured foods and thin liquids and drinking oral supplement drink each day. Current Outpatient Medications on File Prior to Visit   Medication Sig Dispense Refill    nystatin (MYCOSTATIN) 005266 UNIT/GM cream Apply topically 2 times daily Apply topically 2 times daily. around peg tube site      esomeprazole Magnesium (NEXIUM) 40 MG PACK Take 40 mg by mouth daily      Feeding Tubes MISC by Does not apply route 2 shreya hn 5 times a day. Takes 150 ml of 2 shreya and 60 ml of water (30 ml before and 30 ml after meal.)      aluminum & magnesium hydroxide-simethicone (MAALOX) 200-200-20 MG/5ML SUSP suspension Take 30 mLs by mouth every 6 hours as needed for Indigestion 1 each 3    sertraline (ZOLOFT) 100 MG tablet Take 1 tablet by mouth daily (Patient taking differently: Take 50 mg by mouth daily ) 30 tablet 3    melatonin 3 MG TABS tablet Take 3 mg by mouth nightly as needed      ipratropium-albuterol (DUONEB) 0.5-2.5 (3) MG/3ML SOLN nebulizer solution Inhale 3 mLs into the lungs every 4 hours as needed for Shortness of Breath 360 mL 2    cycloSPORINE (RESTASIS) 0.05 % ophthalmic emulsion Place 2 drops into both eyes daily 1 each 0    magnesium oxide (MAG-OX) 400 (241.3 Mg) MG TABS tablet Take 1 tablet by mouth daily (Patient not taking: Reported on 6/13/2022) 30 tablet 3    Multiple Vitamins-Minerals (PRESERVISION AREDS 2) CAPS Take 1 capsule by mouth 2 times daily        No current facility-administered medications on file prior to visit. Vitals from current and previous visits:  Temp 98.6 °F (37 °C)   Ht 5' (1.524 m)   Wt 112 lb 6.4 oz (51 kg)   BMI 21.95 kg/m²     -Body mass index is 21.95 kg/m². 18.5-24.9 - Normal.   - Patient Lost 4# in 4 weeks.  -Weight goal: maintain weight.      Nutrition Diagnosis:   Food and nutrition-related knowledge deficit related to currently undergoing MNT as evidenced by Conditions associated with a diagnosis or treatment: Dysphagia. Intervention:  -Impression: Pt attributes her slight weight loss to being more active and working in the yard. She states she is ready to be away from the PEG feedings as she is down to only 260 ml/day = 520 calories. She is drinking an Ensure Enlive supplement drink = 350 calories/day which will make up for some of the loss of her tube Feedings.    -Instructed the patient on: allowable fresh fruits and vegetables to trial, calorie counting so intake is 1300 - 1500 shreya/day,  Monitoring her weight at home so not to lose weight but to maintain weight.    -Handouts given for: Ensure coupons. Patient Instructions   Soft fresh fruit would be ok - soft peach,berries. - Also trial - tomato and peeled cucumber and bell pepper strips. - shredded lettuce, baby spinach - trial.  Avoid raw vegetables othewise. Your calorie goal/day  1300 - 1500 calories/day    Include a protein source at every meal or have Ensure enlive with a meal or have as a snack.      -Nutrition prescription: 1300 - 1500 calories/day, 52 - 78 gms protein/day. Comprehension verified using teachback method. Monitoring/Evaluation:   -Followup visit: end of treatment with dietitian per patient choice. Feels she is doing great and feels great. She has excellent monitoring of her calorie intake so this will keep her where she needs to be. -Receptiveness to education/goals: Agreeable.  -Evaluation of education: Indicates understanding.  -Readiness to change: maintenance - has made change and is trying and/or practicing different alternative behaviors - calorie counting and eating appropriate textured foods and drinks and drinking oral supplement drinks with trials of new foods.  -Expected compliance: good. Thank you for your referral of this patient. Total time involved in direct patient education: 30 minutes for follow-up MNT visit.

## 2022-06-23 RX ORDER — TRAZODONE HYDROCHLORIDE 50 MG/1
50 TABLET ORAL NIGHTLY
Qty: 90 TABLET | Refills: 1 | OUTPATIENT
Start: 2022-06-23

## 2022-06-30 ENCOUNTER — HOSPITAL ENCOUNTER (OUTPATIENT)
Dept: SPEECH THERAPY | Age: 77
Setting detail: THERAPIES SERIES
Discharge: HOME | End: 2022-06-30
Payer: MEDICARE

## 2022-06-30 PROCEDURE — 92610 EVALUATE SWALLOWING FUNCTION: CPT

## 2022-06-30 RX ORDER — LANOLIN ALCOHOL/MO/W.PET/CERES
CREAM (GRAM) TOPICAL
Qty: 30 TABLET | Refills: 3 | OUTPATIENT
Start: 2022-06-30

## 2022-06-30 NOTE — PROGRESS NOTES
** PLEASE SIGN, DATE AND TIME CERTIFICATION BELOW AND RETURN TO [facility] OUTPATIENT REHABILITATION (FAX #: [phone]). ATTEST/CO-SIGN IF ACCESSING VIA [employer]. THANK YOU.**    I certify that I have examined the patient below and determined that Physical Medicine and Rehabilitation service is necessary and that I approve the established plan of care for up to 90 days or as specifically noted. Attestation, signature or co-signature of physician indicates approval of certification requirements.    ________________________ ____________ __________  Physician Signature   Date   Time   [facility] THERAPY  [x] CLINICAL SWALLOW EVALUATION  [] DAILY NOTE   [] PROGRESS NOTE [] DISCHARGE NOTE    [x] OUTPATIENT REHABILITATION [facility]   [] [place] [address]    [] [facility]   [] [facility]    Date: 2022  Patient Name:  Talia Willams  : 1945  MRN: [id number]  CSN: [id number]    Referring Practitioner Vinny Christina MD   Diagnosis DYSPHAGIA, R13.10   Treatment Diagnosis Dysphagia    Date of Evaluation 22      Functional Outcome Measure Used [facility] Swallowing   Functional Outcome Score 5 (22)       Insurance: Primary: Payor: Jose A Villanueva /  /  / ,   Secondary:    Authorization Information: No precert required   Visit # 1, 1/10 for progress note   Visits Allowed: No visit limit   Recertification Date:    Physician Follow-Up: Unknown   Physician Orders: Eval and treat   Pertinent History: Patient had a total hysterectomy at [facility] in 2022 and was re-intubated shortly after her surgery due to being hypercapnic and eventually had to have a tracheostomy tube placed after multiple intubation/extubations. Patient had to have a PEG tube placed in the beginning of 2022. Patient reports she was transferred to inpatient rehab at [facility] for several weeks for therapy.  On 22, patient had the following procedures completed by Dr. Jessica Denton: TRANSORAL LARYNGOPLASTY WITH AUGMENTATION AND LATERALIZATION OF RIGHT TRUE VOCAL CORD, and  PARTIAL ARYTENOIDECTOMY. She completed an OP MBS on 4/26/22 which found no laryngeal penetration/aspiration but recommended puree diet with thin liquids in conjunction to PEG tube as primary means to nutrition/hydration. A \"pocket\" concerning for zenker's diverticulum was noticed; however, patient states Dr. Jessica Denton reviewed this imaging and felt no concern was present, per patient. She was discharged from [place] [address] on 5/18. Paitent was decannulated on 5/26. Patient presents today and does still currently have the gastronomy tube but is not using it at all. She is planning to call Dr. Jessica Denton on 7/11 to have the feeding tube removed as long as she feels she is doing well with her swallowing. Patient currently reports she is consuming a regular diet with thin liquids. She reports she is taking her medicines with food. Occasionally reports difficulty with some solids, \"crispy things\", but is able to clear this with implementation of a liquid wash. 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 States she would like to [facility] to cough\" despite physicians in the past stating she may never be able to produce a strong cough. SUBJECTIVE: Patient very pleasant throughout and evaluation. No observation. Social/Functional History:  Medications and Allergies have been reviewed and are listed on the Medical History Questionnaire    Pain:  No pain reported.     Current Diet: Regular diet with thin liquids     Respiratory Status:  Room Air    Behavioral Observation:  Alert and Oriented    CRANIAL NERVE ASSESSMENT   CN V (Trigeminal) Closes and Opens Mandible WFL    Rotary Jaw Movement WFL      CN VII (Facial) Cheeks Hold Food out of Sulci WFL    Opens, Closes/Seals, Protrudes, Retracts Lips WFL    General Appearance WFL    Sensation WFL      CN X (Vagus - Pharyngeal) Raises Back of Tongue Not Tested      CN XI (Accessory) Lifts Soft Palate Not Tested      CN XII (Hypoglossal) Elevates Tongue Up and Back Not Tested    Protrusion   WFL    Lateralizes Tongue WFL    Sensation Not Tested      Other Observations Dentition WFL    Vocal Quality Hoarse/breathy quality    Cough Not tested     Patient Evaluated Using: Thin liquids via cup and straw, puree, minced and moist, soft and bite size, regular solid    Oral Phase:  WFL, slightly prolonged mastication    Pharyngeal Phase: Impaired:  Delayed Swallow, Decreased Hyolaryngeal Elevation, Audible Swallow and Suspected Pharyngeal Residue    Signs and Symptoms of Laryngeal Penetration/Aspiration: Throat Clear    IMPRESSIONS: Patient presents with an oral phase of swallowing that is essentially [facility] with slightly prolonged mastication observed. Suspect at least a mild pharyngeal dysphagia as decreased hyolaryngeal elevation was identified via tactile palpation. Audible swallow was also present which rises concerns for potential presence of a delayed swallow. Patient independently implemented multiple swallows which indicates possible pharyngeal residue. Certainly, these pharyngeal phase dysfunctions cannot be rendered this date without formal imaging. See MBS report for further details. Patient would benefit from skilled dysphagia interventions to target tongue based retraction, pharyngeal strength and airway protection. In regards to patient's hoarse vocal quality, the patient is currently followed by Dr. Guille Porter in regards to the paralysis of the right vocal fold, airway obstruction, hoarseness and pharyngoesophageal dysphagia. This ST has sent a message to collaborate with Dr. Guille Porter to determine if patient is a candidate for voice therapy given history of paralysis within the diaphragm as well. Will send for a new order to PCP to assess voice if MD clearance is provided.      RECOMMENDATIONS/ASSESSMENT:  Instrumental Evaluation: Instrumental evaluation not indicated at this time. Diet Recommendations:  Regular diet with thin liquids  Strategies:  Full Upright Position, Multiple Swallow and Alternate Solids and Liquids   Rehabilitation Potential/Prognosis: good  Areas for Improvement: Impaired voice and Impaired swallow function  Specific Interventions Next Treatment: Discuss goals/POC, provide swallowing HEP exercises    Activity/Treatment Tolerance:  [x]  Patient tolerated treatment well  []  Patient limited by fatigue  []  Patient limited by pain   []  Patient limited by other medical complications  []  Other:     GOALS:  Patient Goal: Return to PLOF    Short-term Goals  Timeframe for Short-term Goals: 4 weeks  Goal 1: Patient will tolerate a regular diet with thin liquids without overt s/s of aspiration to assist in nutrition/hydration measures. Goal 2: Patient will complete pharyngeal strengthening exercises x25 each with good success to reduce presence of pharyngeal residue. Goal 3: Patient will complete tongue based retraction exercsies x25 each with good success to improve bolus control. Goal 4: Patient will complete airway protection exercises x25 each with good success to improve epiglottic inversion. Goal 5: ST will consider clinical voice evaluation if rendered appropriate via ENT. Long-term Goals  Timeframe for Long-term Goals: 9 weeks  Goal 1: Patient will report tolerance of regular diet with thin liquids without overt s/s of aspiration to promote safe consumption of least restrictive diet. Patient Education:   [x]  HEP/Education Completed: Plan of Care, Goals, Diet level recommendations, rationale for therapy  []  No new Education completed  []  Reviewed Prior HEP      [x]  Patient verbalized and/or demonstrated understanding of education provided. []  Patient unable to verbalize and/or demonstrate understanding of education provided. Will continue education.   []  Barriers to learning: PLAN:  Treatment Recommendations:     [x]  Plan of care initiated. Plan to see patient 1 times per week for 8-9 weeks to address the treatment planned outlined above.   []  Continue with current plan of care  []  Modify plan of care as follows:    []  Hold pending physician visit  []  Discharge    Time In 1115   Time Out 1155   Timed Code Minutes: 0 min   Total Treatment Time: 40 min     [facility],[address] Floor, .[address]

## 2022-07-06 ENCOUNTER — OFFICE VISIT (OUTPATIENT)
Dept: ENT CLINIC | Age: 77
End: 2022-07-06

## 2022-07-06 VITALS
WEIGHT: 111.4 LBS | BODY MASS INDEX: 21.87 KG/M2 | RESPIRATION RATE: 14 BRPM | SYSTOLIC BLOOD PRESSURE: 122 MMHG | HEIGHT: 60 IN | TEMPERATURE: 97.9 F | OXYGEN SATURATION: 93 % | HEART RATE: 77 BPM | DIASTOLIC BLOOD PRESSURE: 70 MMHG

## 2022-07-06 DIAGNOSIS — R13.14 PHARYNGOESOPHAGEAL DYSPHAGIA: ICD-10-CM

## 2022-07-06 DIAGNOSIS — J38.01 PARALYSIS OF RIGHT VOCAL CORD: Primary | ICD-10-CM

## 2022-07-06 DIAGNOSIS — R49.0 HOARSENESS: ICD-10-CM

## 2022-07-06 PROCEDURE — 99024 POSTOP FOLLOW-UP VISIT: CPT | Performed by: OTOLARYNGOLOGY

## 2022-07-06 PROCEDURE — 1124F ACP DISCUSS-NO DSCNMKR DOCD: CPT | Performed by: OTOLARYNGOLOGY

## 2022-07-07 NOTE — PROGRESS NOTES
[address] EAR, NOSE AND THROAT  [address] [place] [address]  Dept: [phone]  Dept Fax: [phone]  Loc: [phone]    Frederic Diggs is a 64 y.o. female who was referred by No ref. provider found for:  Chief Complaint   Patient presents with    Follow-up     Patient is here post laryngoscopy 05/26 to remove her tube. HPI:     Frederic Diggs is a 64 y.o. 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 In addition the patient has history of dysphagia with aspiration. She is status post conversion from gastrostomy tube feeds to oral nutrition that she is now able to sustain her weight with. She comes in for further evaluation and possible gastrostomy tube removal.     History: Allergies   Allergen Reactions    Latex Itching    Doxycycline      Tongue, throat and mouth soreness    Tizanidine Hcl Nausea And Vomiting     Current Outpatient Medications   Medication Sig Dispense Refill    nystatin (MYCOSTATIN) 684295 UNIT/GM cream Apply topically 2 times daily Apply topically 2 times daily. around peg tube site      esomeprazole Magnesium (NEXIUM) 40 MG PACK Take 40 mg by mouth daily      Feeding Tubes MISC by Does not apply route 2 shreya hn 5 times a day.   Takes 150 ml of 2 shreya and 60 ml of water (30 ml before and 30 ml after meal.)      aluminum & magnesium hydroxide-simethicone (MAALOX) 200-200-20 MG/5ML SUSP suspension Take 30 mLs by mouth every 6 hours as needed for Indigestion 1 each 3    sertraline (ZOLOFT) 100 MG tablet Take 1 tablet by mouth daily (Patient taking differently: Take 50 mg by mouth daily ) 30 tablet 3    melatonin 3 MG TABS tablet Take 3 mg by mouth nightly as needed      cycloSPORINE (RESTASIS) 0.05 % ophthalmic emulsion Place 2 drops into both eyes daily 1 each 0    magnesium oxide (MAG-OX) 400 (241.3 Mg) MG TABS tablet Take 1 tablet by mouth daily 30 tablet 3    Multiple Vitamins-Minerals (PRESERVISION AREDS 2) CAPS Take 1 capsule by mouth 2 times daily       ipratropium-albuterol (DUONEB) 0.5-2.5 (3) MG/3ML SOLN nebulizer solution Inhale 3 mLs into the lungs every 4 hours as needed for Shortness of Breath 360 mL 2     No current facility-administered medications for this visit. Past Medical History:   Diagnosis Date    GERD (gastroesophageal reflux disease)     Osteoarthritis     Paralysis of vocal cords     Polio     Right side diaphragm paralysis     states trach oxygen at night      Past Surgical History:   Procedure Laterality Date    CHOLECYSTECTOMY      GASTROSTOMY TUBE PLACEMENT      HYSTERECTOMY (CERVIX STATUS UNKNOWN)  01/2022    KNEE SURGERY      Right    LARYNGOSCOPY N/A 2/24/2022    DIAGNOSTIC SUSPENSION MICROLARYNGOSCOPY BRONCHOSCOPY WITH JET VENTILATION performed by Demi Mcnally MD at [facility] 5/26/2022    Suspension Microlayrngoscopy w/ Debridement Therapeutic Bronchoscopy w/ Debridement performed by Demi Mcnally MD at [address] [place] [address] N/A 4/14/2022    TRANSORAL LARYNGOPLASTY WITH AUGMENTATION AND LATERALIZATION OF RIGHT TRUE VOCAL CORD, and  PARTIAL ARYTENOIDECTOMY performed by Demi Mcnally MD at [facility] History   Problem Relation Age of Onset    Arthritis Mother     Heart Disease Mother     Cancer Father     No Known Problems Sister     Heart Disease Brother      Social History     Tobacco Use    Smoking status: Never Smoker    Smokeless tobacco: Never Used   Substance Use Topics    Alcohol use: Not Currently        Subjective:      Review of Systems  Rest of review of systems are negative, except as noted in HPI.      Objective:     /70 (Site: Right Upper Arm, Position: Sitting)   Pulse 77   Temp 97.9 °F (36.6 °C) (Infrared)   Resp 14   Ht 5' (1.524 m)   Wt 111 lb 6.4 oz (50.5 kg)   SpO2 93% BMI 21.76 kg/m²     Physical Exam       Procedure: Gastrostomy tube removal  Findings: Gastrostomy tube removed in the usual fashion without any untoward manifestations. A dressing was applied at the end of the procedure. The patient tolerated the procedure well. Instructions for post removal care were given to the patient and her . Vitals reviewed. No results found. Lab Results   Component Value Date/Time     05/28/2022 04:04 AM     04/18/2022 02:30 PM     04/15/2022 04:12 AM    K 3.8 05/28/2022 04:04 AM    K 3.8 04/18/2022 02:30 PM    K 3.4 04/15/2022 04:12 AM    K 3.9 03/05/2022 04:44 AM    K 4.3 03/04/2022 04:14 AM     05/28/2022 04:04 AM    CL 98 04/18/2022 02:30 PM     04/15/2022 04:12 AM    CO2 26 05/28/2022 04:04 AM    CO2 25 04/18/2022 02:30 PM    CO2 19 04/15/2022 04:12 AM    BUN 18 05/28/2022 04:04 AM    BUN 21 04/18/2022 02:30 PM    BUN 20 04/15/2022 04:12 AM    CREATININE 0.6 05/28/2022 04:04 AM    CREATININE 0.7 04/18/2022 02:30 PM    CREATININE 0.5 04/15/2022 04:12 AM    CALCIUM 8.9 05/28/2022 04:04 AM    CALCIUM 9.6 04/18/2022 02:30 PM    CALCIUM 8.4 04/15/2022 04:12 AM    PROT 6.4 04/06/2021 02:40 PM    PROT 6.6 02/03/2021 09:00 AM    LABALBU 4.1 04/06/2021 02:40 PM    LABALBU 4.0 02/03/2021 09:00 AM    BILITOT 0.2 04/06/2021 02:40 PM    BILITOT 0.5 02/03/2021 09:00 AM    ALKPHOS 62 04/06/2021 02:40 PM    ALKPHOS 63 02/03/2021 09:00 AM    AST 25 04/06/2021 02:40 PM    AST 29 02/03/2021 09:00 AM    ALT 18 04/06/2021 02:40 PM    ALT 15 02/03/2021 09:00 AM       All of the past medical history, past surgical history, family history,social history, allergies and current medications were reviewed with the patient. Assessment & Plan   Diagnoses and all orders for this visit:     Diagnosis Orders   1. Paralysis of right vocal cord     2. Hoarseness     3.  Pharyngoesophageal dysphagia             The patient will return in approximately 2 months for an interval evaluation and possible endoscopy. We will consider additional true vocal fold bulking procedures for her right side if her voice remains thready and if it affects her quality of life. Right now she considers to be relatively minor issue and if she remained chest that she is she believes she would be fine with that rather than have more surgery at this time. I made sure she understood that that is an acceptable option. She appreciated that. I spent over 20 minutes of face-to-face time with the patient and her  more than half of which was dedicated to reviewing her complex history and planning this program.      Return in about 2 months (around 9/6/2022) for Follow-up evaluation. **This report has been created using voice recognition software. It may contain minor errors which are inherent in voice recognition technology. **

## 2022-07-14 ENCOUNTER — HOSPITAL ENCOUNTER (OUTPATIENT)
Dept: SPEECH THERAPY | Age: 77
Setting detail: THERAPIES SERIES
Discharge: HOME | End: 2022-07-14
Payer: MEDICARE

## 2022-07-14 PROCEDURE — 92526 ORAL FUNCTION THERAPY: CPT

## 2022-07-14 NOTE — PROGRESS NOTES
[address] [facility]  [] CLINICAL SWALLOW EVALUATION  [x] DAILY NOTE   [] PROGRESS NOTE [] DISCHARGE NOTE    [x] [facility] [place]   [] [facility]    [] [facility]   [] Dami Vick    Date: 2022  Patient Name:  Reese Cordoba  : 1945  MRN: [id number]  CSN: [id number]    Referring Practitioner Babs Councilman, MD   Diagnosis DYSPHAGIA, R13.10   Treatment Diagnosis Dysphagia    Date of Evaluation 22      Functional Outcome Measure Used [facility] Swallowing   Functional Outcome Score 5 (22)       Insurance: Primary: Payor: Shedrick Runner /  /  / ,   Secondary:    Authorization Information: No precert required   Visit # 2, 2/10 for progress note   Visits Allowed: No visit limit   Recertification Date:    Physician Follow-Up: Unknown   Physician Orders: Eval and treat   Pertinent History: Patient had a total hysterectomy at [facility] in 2022 and was re-intubated shortly after her surgery due to being hypercapnic and eventually had to have a tracheostomy tube placed after multiple intubation/extubations. Patient had to have a PEG tube placed in the beginning of 2022. Patient reports she was transferred to inpatient rehab at [facility] for several weeks for therapy. On 22, patient had the following procedures completed by Dr. Mona Alexis: TRANSORAL LARYNGOPLASTY WITH AUGMENTATION AND LATERALIZATION OF RIGHT TRUE VOCAL CORD, and  PARTIAL ARYTENOIDECTOMY. She completed an OP MBS on 22 which found no laryngeal penetration/aspiration but recommended puree diet with thin liquids in conjunction to PEG tube as primary means to nutrition/hydration. A \"pocket\" concerning for zenker's diverticulum was noticed; however, patient states Dr. Mona Alexis reviewed this imaging and felt no concern was present, per patient. She was discharged from [place] [address] on .  Yehuda was decannulated on 5/26. Patient presents today and does still currently have the gastronomy tube but is not using it at all. She is planning to call Dr. Kathy Menendez on 7/11 to have the feeding tube removed as long as she feels she is doing well with her swallowing. Patient currently reports she is consuming a regular diet with thin liquids. She reports she is taking her medicines with food. Occasionally reports difficulty with some solids, \"crispy things\", but is able to clear this with implementation of a liquid wash. 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 States she would like to [facility] to cough\" despite physicians in the past stating she may never be able to produce a strong cough. SUBJECTIVE: Pleasant and motivated throughout session. SHORT TERM GOAL #1:  Goal 1: Patient will tolerate a regular diet with thin liquids without overt s/s of aspiration to assist in nutrition/hydration measures. INTERVENTIONS: Patient reports she is tolerating a regular diet with thin liquids without overt s/s of aspiration most of the time. Does report to have \"coughing spells\" with dry/crumbly bits. States this occurs every couple days but a liquid wash is effective in clearing residue. Patient questioning how much of this is \"functional\" given her limitations. ST discussed with patient techniques to aid in consumption of these textures as well as continued implementation of liquid wash. She verbalized understanding. SHORT TERM GOAL #2:  Goal 2: Patient will complete pharyngeal strengthening exercises x25 each with good success to reduce presence of pharyngeal residue. INTERVENTIONS: ST discussed goal and provided rationale for targeting in therapy.  Patient then completed the following:   - Effortful swallow: x25 with good success, timely swallows    SHORT TERM GOAL #3:  Goal 3: Patient will complete tongue based retraction exercsies x25 each with good success to improve bolus control. INTERVENTIONS: ST discussed goal and provided rationale for targeting in therapy. - Caren: x25 with good success, additional time required to initiate swallows  - \"Kick\": Not addressed specifically this date due to focus on additional goals. SHORT TERM GOAL #4:  Goal 4: Patient will complete airway protection exercises x25 each with good success to improve epiglottic inversion. INTERVENTIONS: ST discussed goal and provided rationale for targeting in therapy. Not addressed specifically this date due to focus on additional goals. - Mendelsohn maneuver:  - VF closure (hold \"ee\" at a high pitch for 3 seconds):   - Supraglottic Swallow:   - Super-supraglottic swallow:   - EMST:     SHORT TERM GOAL #5:  Goal 5: ST will consider clinical voice evaluation if rendered appropriate via ENT. GOAL MET. NEW GOAL Patient will complete lingual ROM exercises x10 each with good success to promote improved lingual sweep of residue. INTERVENTIONS: ST discussed with patient the conversation between this ST and Dr. Cj Aragon (ENT). Voice therapy is not recommended at this time given suspected limited success due to paralyzed diaphragm and paralysis of the right vocal fold. Patient agreed to this POC as she also discussed this with Dr. Cj Aragon in her previous appointment. Patient did express concerns with her lingual ROM as this has worsened due to her laryngeal procedures. Patient reports she will often feel right buccal residue; however, she is unable to coordinate lingual movement to sweep this residue from the buccal cavity. Therefore, an additional goal has been added to target this. LONG-TERM GOALS:  Long-term Goals  Timeframe for Long-term Goals: 9 weeks  Goal 1: Patient will report tolerance of regular diet with thin liquids without overt s/s of aspiration to promote safe consumption of least restrictive diet.     Assessment: Progressing towards goals   Specific Interventions Next Treatment:

## 2022-07-20 ENCOUNTER — HOSPITAL ENCOUNTER (OUTPATIENT)
Dept: SPEECH THERAPY | Age: 77
Setting detail: THERAPIES SERIES
Discharge: HOME | End: 2022-07-20
Payer: MEDICARE

## 2022-07-20 PROCEDURE — 92526 ORAL FUNCTION THERAPY: CPT

## 2022-07-20 NOTE — PROGRESS NOTES
[address]39 [facility]  [] CLINICAL SWALLOW EVALUATION  [x] DAILY NOTE   [] PROGRESS NOTE [] DISCHARGE NOTE    [x] OUTPATIENT REHABILITATION CENTER [facility]   [] [facility]    [] [facility]   [] [facility]    Date: 2022  Patient Name:  Abbe Dipo  : 1945  MRN: [id number]  CSN: [id number]    Referring Practitioner Arturo Boas, MD   Diagnosis DYSPHAGIA, R13.10   Treatment Diagnosis Dysphagia    Date of Evaluation 22      Functional Outcome Measure Used [facility] Swallowing   Functional Outcome Score 5 (22)       Insurance: Primary: Payor: Shruti Harrington /  /  / ,   Secondary:    Authorization Information: No precert required   Visit # 3, 3/10 for progress note   Visits Allowed: No visit limit   Recertification Date: 75   Physician Follow-Up: Unknown   Physician Orders: Eval and treat   Pertinent History: Patient had a total hysterectomy at [facility] in 2022 and was re-intubated shortly after her surgery due to being hypercapnic and eventually had to have a tracheostomy tube placed after multiple intubation/extubations. Patient had to have a PEG tube placed in the beginning of 2022. Patient reports she was transferred to inpatient rehab at [facility] for several weeks for therapy. On 22, patient had the following procedures completed by Dr. Stone Cheung: TRANSORAL LARYNGOPLASTY WITH AUGMENTATION AND LATERALIZATION OF RIGHT TRUE VOCAL CORD, and  PARTIAL ARYTENOIDECTOMY. She completed an OP MBS on 22 which found no laryngeal penetration/aspiration but recommended puree diet with thin liquids in conjunction to PEG tube as primary means to nutrition/hydration. A \"pocket\" concerning for zenker's diverticulum was noticed; however, patient states Dr. Stone Cheung reviewed this imaging and felt no concern was present, per patient. She was discharged from [place] [address] on .  Yehuda was decannulated on 5/26. Patient presents today and does still currently have the gastronomy tube but is not using it at all. She is planning to call Dr. Ely Aguilar on 7/11 to have the feeding tube removed as long as she feels she is doing well with her swallowing. Patient currently reports she is consuming a regular diet with thin liquids. She reports she is taking her medicines with food. Occasionally reports difficulty with some solids, \"crispy things\", but is able to clear this with implementation of a liquid wash. 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 States she would like to [facility] to cough\" despite physicians in the past stating she may never be able to produce a strong cough. SUBJECTIVE: Motivated and pleasant throughout. Demonstrated frequent throat clearing throughout tasks. SHORT TERM GOAL #1:  Goal 1: Patient will tolerate a regular diet with thin liquids without overt s/s of aspiration to assist in nutrition/hydration measures. INTERVENTIONS: Patient reports she did get her PEG tube removed on 7/6. States she has been doing well with the regular diet with thin liquids with only occasionally coughing during meals. Reports she is maintaining her weight well. SHORT TERM GOAL #2:  Goal 2: Patient will complete pharyngeal strengthening exercises x25 each with good success to reduce presence of pharyngeal residue. INTERVENTIONS: Patient completed the following:   - Effortful swallow: x25 with good success, timely swallows    SHORT TERM GOAL #3:  Goal 3: Patient will complete tongue based retraction exercsies x25 each with good success to improve bolus control.   INTERVENTIONS: Patient completed the following:  - Caren: x25 with good success, additional time required to initiate swallows  - \"Kick\": x10 with good effort  - Back of tongue elevation: \"kakakakakakaka\", \"kalakalakalakala\", \"keekeekeekee\", \"kukukuku\", \"cindy cindy\", \"cook cook\", kind cat\", \"candy cane\" repetition with good success    SHORT TERM GOAL #4:  Goal 4: Patient will complete airway protection exercises x25 each with good success to improve epiglottic inversion. INTERVENTIONS: Patient completed the following:  - Mendelsohn maneuver: Not addressed specifically this date due to focus on additional goals. - VF closure (hold \"ee\" at a high pitch for 3 seconds): x5 with good success   - Supraglottic Swallow: Not addressed specifically this date due to focus on additional goals. - Super-supraglottic swallow: Not addressed specifically this date due to focus on additional goals. - EMST: x10 breaths with least resistance with good success  **Patient arrived with the Vpep EMST device to utilize within therapy. Educated patient on the potential benefits to utilizing this device to improve internal intercostal strength during expiration. Discussed how this will in turn, hopefully aid in a more productive cough. Did review this may not be beneficial as patient does have the hx of a paralyzed diaphragm. Patient wishing to give this a try for a few weeks to see if any improvement is observed within coughing strength. Goal 5: Patient will complete lingual ROM exercises x10 each with good success to promote improved lingual sweep of residue.    INTERVENTIONS: Patient completed the following after ST model:  - External lingual lateralization: x10 with good success  - Internal lingual lateralization: x10 with good success  - \"Lick backs\": T65 with good success    - External lingual circles: x10 each direction with good success  - Internal lingual circles: x10 each direction with good success  - Tongue tip elevation: \"lalalala\", \"leeleeleelee\", \"lulululu\", \"lie down\", \"mara-mara\", \"little lady\", \"hello hello hello\", and \"a little later\" repetition with good success        LONG-TERM GOALS:  Long-term Goals  Timeframe for Long-term Goals: 9 weeks  Goal 1: Patient will report tolerance of regular diet with thin liquids without overt s/s of aspiration to promote safe consumption of least restrictive diet. Assessment: Progressing towards goals   Specific Interventions Next Treatment: lingual ROM, tolerance of diet, pharyngeal strengthening exercises, tongue based retraction, airway protection exercises    Activity/Treatment Tolerance:  [x]  Patient tolerated treatment well  []  Patient limited by fatigue  []  Patient limited by pain   []  Patient limited by other medical complications  []  Other:     Patient Education:   [x]  HEP/Education Completed: Plan of Care, Goals, Estimated Length of Stay, Lingual Exercises  []  No new Education completed  [x]  Reviewed Prior HEP      [x]  Patient verbalized and/or demonstrated understanding of education provided. []  Patient unable to verbalize and/or demonstrate understanding of education provided. Will continue education. []  Barriers to learning:     PLAN:  [x]  Plan of care initiated. Plan to see patient 1 times per week for 8-9 weeks to address the treatment planned outlined above.   []  Continue with current plan of care  []  Modify plan of care as follows:    []  Hold pending physician visit  []  Discharge    Time In 0759   Time Out 0829   Timed Code Minutes: 0 min   Total Treatment Time: 30 min     [address] [facility] [facility] [address]

## 2022-07-25 ENCOUNTER — HOSPITAL ENCOUNTER (OUTPATIENT)
Dept: SPEECH THERAPY | Age: 77
Setting detail: THERAPIES SERIES
Discharge: HOME | End: 2022-07-25
Payer: MEDICARE

## 2022-07-25 PROCEDURE — 92526 ORAL FUNCTION THERAPY: CPT

## 2022-07-25 NOTE — PROGRESS NOTES
[address] [facility]  [] CLINICAL SWALLOW EVALUATION  [x] DAILY NOTE   [] PROGRESS NOTE [] DISCHARGE NOTE    [x] OUTPATIENT REHABILITATION CENTER [facility]   [] [place] [address]    [] [facility]   [] Kayden De La Rosa    Date: 2022  Patient Name:  Mayo Siegel  : 1945  MRN: [id number]  CSN: [id number]    Referring Practitioner Ej Pichardo MD   Diagnosis DYSPHAGIA, R13.10   Treatment Diagnosis Dysphagia    Date of Evaluation 22      Functional Outcome Measure Used [facility] Swallowing   Functional Outcome Score 5 (22)       Insurance: Primary: Payor: Katrin November /  /  / ,   Secondary:    Authorization Information: No precert required   Visit # 4, 4/10 for progress note   Visits Allowed: No visit limit   Recertification Date:    Physician Follow-Up: Unknown   Physician Orders: Eval and treat   Pertinent History: Patient had a total hysterectomy at [facility] in 2022 and was re-intubated shortly after her surgery due to being hypercapnic and eventually had to have a tracheostomy tube placed after multiple intubation/extubations. Patient had to have a PEG tube placed in the beginning of 2022. Patient reports she was transferred to inpatient rehab at [facility] for several weeks for therapy. On 22, patient had the following procedures completed by Dr. Renuka Gonzalez: TRANSORAL LARYNGOPLASTY WITH AUGMENTATION AND LATERALIZATION OF RIGHT TRUE VOCAL CORD, and  PARTIAL ARYTENOIDECTOMY. She completed an OP MBS on 22 which found no laryngeal penetration/aspiration but recommended puree diet with thin liquids in conjunction to PEG tube as primary means to nutrition/hydration. A \"pocket\" concerning for zenker's diverticulum was noticed; however, patient states Dr. Renuka Gonzalez reviewed this imaging and felt no concern was present, per patient. She was discharged from [place] [address] on .  Yehuda was decannulated on 5/26. Patient presents today and does still currently have the gastronomy tube but is not using it at all. She is planning to call Dr. Hari Sheets on 7/11 to have the feeding tube removed as long as she feels she is doing well with her swallowing. Patient currently reports she is consuming a regular diet with thin liquids. She reports she is taking her medicines with food. Occasionally reports difficulty with some solids, \"crispy things\", but is able to clear this with implementation of a liquid wash. 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 States she would like to [facility] to cough\" despite physicians in the past stating she may never be able to produce a strong cough. SUBJECTIVE: Pleasant and cooperative throughout session. ST Ally present throughout as well for observation. Patient inquired about her progress and whether NMES is needed within skilled speech therapy. This ST feels patient is doing well with current swallowing program without use of NMES; therefore, this is not needed at this time. She verbalized understanding. SHORT TERM GOAL #1:  Goal 1: Patient will tolerate a regular diet with thin liquids without overt s/s of aspiration to assist in nutrition/hydration measures. INTERVENTIONS: Patient states she did consume pizza from [employer] this weekend. Reports she had meatballs, onions, and mushrooms on her pizza and utilized her fork to pick out only the soft side of the pizza crust. She reports she tolerated this \"surprisingly well\". Also, endorses consuming salads and many fruits. Did report some concerns with mixed consistencies but demonstrated great insight and use of compensatory strategies to aid in consumption of these challenging textures. Consumed x6 trials of thin liquids via straw. Only x1 instance of immediate coughing d/t accidentally completing the Caren with liquid in oral cavity.  Reviewed education with patient on the importance of refraining from having anything in the oral cavity with completion of the Caren. She verbalized understanding. SHORT TERM GOAL #2:  Goal 2: Patient will complete pharyngeal strengthening exercises x25 each with good success to reduce presence of pharyngeal residue. INTERVENTIONS: Patient completed the following:   - Effortful swallow: x25 with good success with and without thin liquids, fatigue noted  **Good success    SHORT TERM GOAL #3:  Goal 3: Patient will complete tongue based retraction exercsies x25 each with good success to improve bolus control. INTERVENTIONS: Patient completed the following:  - Caren: x25 with good success, additional time required to initiate swallows  - \"Kick\": x10 with good effort  - Back of tongue elevation: \"kakakakakakaka\", \"kalakalakalakala\", \"keekeekeekee\", \"kukukuku\", \"cindy cindy\", \"cook cook\", kind cat\", \"candy cane\" repetition with good success in unison with ST    SHORT TERM GOAL #4:  Goal 4: Patient will complete airway protection exercises x25 each with good success to improve epiglottic inversion. INTERVENTIONS: Patient completed the following:  - Mendelsohn maneuver: x6 with good success with 3 second hold  - VF closure (hold \"ee\" at a high pitch for 3 seconds): x5 with fair success    - Supraglottic Swallow: Not addressed specifically this date due to focus on additional goals. - Super-supraglottic swallow: Not addressed specifically this date due to focus on additional goals. - EMST: Not addressed specifically this date due to focus on additional goals. Goal 5: Patient will complete lingual ROM exercises x10 each with good success to promote improved lingual sweep of residue.    INTERVENTIONS: Patient completed the following after ST model:  - External lingual lateralization: x15 with good success  - Internal lingual lateralization: x15 with good success  - \"Lick backs\": G17 with good success    - External lingual circles: Not addressed specifically this date due to focus on additional goals. - Internal lingual circles: Not addressed specifically this date due to focus on additional goals. - Tongue tip elevation: \"lalalala\", \"leeleeleelee\", \"lulululu\", \"lie down\", \"mara-mara\", \"little lady\", \"hello hello hello\", and \"a little later\" repetition: Not addressed specifically this date due to focus on additional goals. LONG-TERM GOALS:  Long-term Goals  Timeframe for Long-term Goals: 9 weeks  Goal 1: Patient will report tolerance of regular diet with thin liquids without overt s/s of aspiration to promote safe consumption of least restrictive diet. Assessment: Progressing towards goals   Specific Interventions Next Treatment: lingual ROM, tolerance of diet, pharyngeal strengthening exercises, tongue based retraction, airway protection exercises    Activity/Treatment Tolerance:  [x]  Patient tolerated treatment well  []  Patient limited by fatigue  []  Patient limited by pain   []  Patient limited by other medical complications  []  Other:     Patient Education:   [x]  HEP/Education Completed: Plan of Care, Goals, Estimated Length of Stay, Lingual Exercises  []  No new Education completed  [x]  Reviewed Prior HEP      [x]  Patient verbalized and/or demonstrated understanding of education provided. []  Patient unable to verbalize and/or demonstrate understanding of education provided. Will continue education. []  Barriers to learning:     PLAN:  []  Plan of care initiated. Plan to see patient 1 times per week for 8-9 weeks to address the treatment planned outlined above.   [x]  Continue with current plan of care  []  Modify plan of care as follows:    []  Hold pending physician visit  []  Discharge    Time In 1000   Time Out 1030   Timed Code Minutes: 0 min   Total Treatment Time: 30 min     [address] [facility] [facility] [place] [address]

## 2022-07-29 ENCOUNTER — TELEPHONE (OUTPATIENT)
Dept: ENT CLINIC | Age: 77
End: 2022-07-29

## 2022-07-29 NOTE — TELEPHONE ENCOUNTER
Patient had her trach taken out May 27 th, wasn't using Oxygen and had a night time test and she tested out of it. She was fine and now she doesn't need oxygen at night. She's trying to get [address] to  the oxygen and equiptment but they will not until they get a certification that patient no longer needs to use oxygen at home before they can pick it up from her house. phone number [phone]  Fax number [phone]  Dr. Adelso Pichardo will need to send this to       Patient would also like this sent to family Dr. Washington López.

## 2022-08-01 ENCOUNTER — HOSPITAL ENCOUNTER (OUTPATIENT)
Dept: SPEECH THERAPY | Age: 77
Setting detail: THERAPIES SERIES
Discharge: HOME | End: 2022-08-01
Payer: MEDICARE

## 2022-08-01 PROCEDURE — 92526 ORAL FUNCTION THERAPY: CPT

## 2022-08-01 NOTE — PROGRESS NOTES
[address] [facility] THERAPY  [] CLINICAL SWALLOW EVALUATION  [x] DAILY NOTE   [] PROGRESS NOTE [] DISCHARGE NOTE    [x] [facility] [place]   [] [place] [address]    [] [facility]   [] Jeb Galaviz    Date: 2022  Patient Name:  Jef Rutledge  : 1945  MRN: [id number]  CSN: [id number]    Referring Practitioner Debbie Lobo MD   Diagnosis DYSPHAGIA, R13.10   Treatment Diagnosis Dysphagia    Date of Evaluation 22      Functional Outcome Measure Used [facility] Swallowing   Functional Outcome Score 5 (22)       Insurance: Primary: Payor: Quentin Forde /  /  / ,   Secondary:    Authorization Information: No precert required   Visit # 5, 5/10 for progress note   Visits Allowed: No visit limit   Recertification Date: 18   Physician Follow-Up: Unknown   Physician Orders: Eval and treat   Pertinent History: Patient had a total hysterectomy at [facility] in 2022 and was re-intubated shortly after her surgery due to being hypercapnic and eventually had to have a tracheostomy tube placed after multiple intubation/extubations. Patient had to have a PEG tube placed in the beginning of 2022. Patient reports she was transferred to inpatient rehab at [facility] for several weeks for therapy. On 22, patient had the following procedures completed by Dr. Tee Diop: TRANSORAL LARYNGOPLASTY WITH AUGMENTATION AND LATERALIZATION OF RIGHT TRUE VOCAL CORD, and  PARTIAL ARYTENOIDECTOMY. She completed an OP MBS on 22 which found no laryngeal penetration/aspiration but recommended puree diet with thin liquids in conjunction to PEG tube as primary means to nutrition/hydration. A \"pocket\" concerning for zenker's diverticulum was noticed; however, patient states Dr. Tee Diop reviewed this imaging and felt no concern was present, per patient. She was discharged from [place] [address] on .  Yehuda was decannulated on 5/26. Patient presents today and does still currently have the gastronomy tube but is not using it at all. She is planning to call Dr. Tee Diop on 7/11 to have the feeding tube removed as long as she feels she is doing well with her swallowing. Patient currently reports she is consuming a regular diet with thin liquids. She reports she is taking her medicines with food. Occasionally reports difficulty with some solids, \"crispy things\", but is able to clear this with implementation of a liquid wash. 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 States she would like to [facility]ian[facility] Height to cough\" despite physicians in the past stating she may never be able to produce a strong cough. SUBJECTIVE: Cooperative and motivated. Reports she did complete her swallowing HEP earlier this morning prior to coming to therapy. Suspect this also contributing to performance/fatigue within these exercises this date. SHORT TERM GOAL #1:  Goal 1: Patient will tolerate a regular diet with thin liquids without overt s/s of aspiration to assist in nutrition/hydration measures. INTERVENTIONS: Reports her family had a nice visit over the weekend. States an assortment of \"taco salads, other salads, corn, pasta, sandwiches\" was brought by family members to eat. \"I didn't even try the bobo tomatoes, salami or salads with crunchy pieces\", patient commented as she felt she would be unable to tolerate these. Denies any difficulty with coarse textures such as crackers, nuts, and potato chips. \"I truthfully have not choked on anything in a long time\". ST encouraged patient to begin to trial foods such as \"cherry tomatoes, salami\" etc., at home to improve her confidence with these types of solids. Also recommended patient bring in any of these items to trial within skilled speech therapy.  Discussed this ST feels patient is able to tolerate these solids without difficulty; however, these may present as a concern to patient given her dysphagia history. She verbalized understanding and reports she will attempt these at home. Consumed x13 thin liquids with straw with occasional throat clear and delayed cough observed. SHORT TERM GOAL #2:  Goal 2: Patient will complete pharyngeal strengthening exercises x25 each with good success to reduce presence of pharyngeal residue. INTERVENTIONS: Patient completed the following:   - Effortful swallow: x25 with good success with and without thin liquids, improved timeliness  **Good success    SHORT TERM GOAL #3:  Goal 3: Patient will complete tongue based retraction exercsies x25 each with good success to improve bolus control. INTERVENTIONS: Patient completed the following:  - Caren: x10 with fair success, additional time required to initiate swallows  - \"Kick\": x10 with good effort  - Back of tongue elevation: \"kakakakakakaka\", \"kalakalakalakala\", \"keekeekeekee\", \"kukukuku\", \"cindy cindy\", \"cook cook\", kind cat\", \"candy cane\" repetition with good success in unison with ST    SHORT TERM GOAL #4:  Goal 4: Patient will complete airway protection exercises x25 each with good success to improve epiglottic inversion. INTERVENTIONS: Patient completed the following:  - Mendelsohn maneuver: x12 with good success with 3 second hold  - VF closure (hold \"ee\" at a high pitch for 3 seconds): x5 with fair success    - Supraglottic Swallow: Not addressed specifically this date due to focus on additional goals. - Super-supraglottic swallow: Not addressed specifically this date due to focus on additional goals. - EMST: Not addressed specifically this date due to focus on additional goals. Goal 5: Patient will complete lingual ROM exercises x10 each with good success to promote improved lingual sweep of residue.    INTERVENTIONS: Patient completed the following after ST model:  - External lingual lateralization: x10 with good success  - Internal lingual lateralization: x15 with good success  - \"Lick backs\": G83 with good success    - External lingual circles: x10 each direction with good success  - Internal lingual circles: Not addressed specifically this date due to focus on additional goals. - Tongue tip elevation: \"lalalala\", \"leeleeleelee\", \"lulululu\", \"lie down\", \"mara-mara\", \"little lady\", \"hello hello hello\", and \"a little later\" repetition: good success in unison with ST model        LONG-TERM GOALS:  Long-term Goals  Timeframe for Long-term Goals: 9 weeks  Goal 1: Patient will report tolerance of regular diet with thin liquids without overt s/s of aspiration to promote safe consumption of least restrictive diet. Assessment: Progressing towards goals   Specific Interventions Next Treatment: lingual ROM, tolerance of diet, pharyngeal strengthening exercises, tongue based retraction, airway protection exercises    Activity/Treatment Tolerance:  [x]  Patient tolerated treatment well  []  Patient limited by fatigue  []  Patient limited by pain   []  Patient limited by other medical complications  []  Other:     Patient Education:   [x]  HEP/Education Completed: Plan of Care, Goals, Estimated Length of Stay, Lingual Exercises  []  No new Education completed  [x]  Reviewed Prior HEP      [x]  Patient verbalized and/or demonstrated understanding of education provided. []  Patient unable to verbalize and/or demonstrate understanding of education provided. Will continue education. []  Barriers to learning:     PLAN:  []  Plan of care initiated. Plan to see patient 1 times per week for 8-9 weeks to address the treatment planned outlined above.   [x]  Continue with current plan of care  []  Modify plan of care as follows:    []  Hold pending physician visit  []  Discharge    Time In 1000   Time Out 1030   Timed Code Minutes: 0 min   Total Treatment Time: 30 min     [address] [facility] [facility] [place] [address]

## 2022-08-08 ENCOUNTER — HOSPITAL ENCOUNTER (OUTPATIENT)
Dept: SPEECH THERAPY | Age: 77
Setting detail: THERAPIES SERIES
Discharge: HOME | End: 2022-08-08
Payer: MEDICARE

## 2022-08-08 PROCEDURE — 92526 ORAL FUNCTION THERAPY: CPT

## 2022-08-08 NOTE — PROGRESS NOTES
[address] [facility]  [] CLINICAL SWALLOW EVALUATION  [x] DAILY NOTE   [] PROGRESS NOTE [] DISCHARGE NOTE    [x] OUTPATIENT REHABILITATION CENTER [facility]   [] [place] [address]    [] [facility]   [] Arch Nearing    Date: 2022  Patient Name:  Brendia Dance  : 1945  MRN: [id number]  CSN: [id number]    Referring Practitioner Brock Sharif MD   Diagnosis DYSPHAGIA, R13.10   Treatment Diagnosis Dysphagia    Date of Evaluation 22      Functional Outcome Measure Used [facility] Swallowing   Functional Outcome Score 5 (22)       Insurance: Primary: Payor: [employer] /  /  / ,   Secondary:    Authorization Information: No precert required   Visit # 6, 6/10 for progress note   Visits Allowed: No visit limit   Recertification Date:    Physician Follow-Up: Unknown   Physician Orders: Eval and treat   Pertinent History: Patient had a total hysterectomy at [facility] in 2022 and was re-intubated shortly after her surgery due to being hypercapnic and eventually had to have a tracheostomy tube placed after multiple intubation/extubations. Patient had to have a PEG tube placed in the beginning of 2022. Patient reports she was transferred to inpatient rehab at [facility] for several weeks for therapy. On 22, patient had the following procedures completed by Dr. Keily Castillo: TRANSORAL LARYNGOPLASTY WITH AUGMENTATION AND LATERALIZATION OF RIGHT TRUE VOCAL CORD, and  PARTIAL ARYTENOIDECTOMY. She completed an OP MBS on 22 which found no laryngeal penetration/aspiration but recommended puree diet with thin liquids in conjunction to PEG tube as primary means to nutrition/hydration. A \"pocket\" concerning for zenker's diverticulum was noticed; however, patient states Dr. Keily Castillo reviewed this imaging and felt no concern was present, per patient. She was discharged from [place] [address] on .  Yehuda was decannulated on 5/26. Patient presents today and does still currently have the gastronomy tube but is not using it at all. She is planning to call Dr. Cj Aragon on 7/11 to have the feeding tube removed as long as she feels she is doing well with her swallowing. Patient currently reports she is consuming a regular diet with thin liquids. She reports she is taking her medicines with food. Occasionally reports difficulty with some solids, \"crispy things\", but is able to clear this with implementation of a liquid wash. 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 States she would like to [facility] to cough\" despite physicians in the past stating she may never be able to produce a strong cough. SUBJECTIVE: Patient motivated and pleasant throughout. No observation. SHORT TERM GOAL #1:  Goal 1: Patient will tolerate a regular diet with thin liquids without overt s/s of aspiration to assist in nutrition/hydration measures. INTERVENTIONS: Patient states she tried peanuts on her ice cream over the weekend and reports she tolerated it well. Denies any overt difficulty with consumption of diet. Does report to have a minor sore throat this date that she contributes to allergies. This ST noted x6 throat clears unrelated to swallowing during session. Provided patient with education regarding the affects of chronic throat clearing on vocal quality. Encouraged patient to try glycerin cough drops and sips of water to enhance the voice and reduce dryness without implementing a throat clear. Warned patient to avoid cough drops containing mint and menthol as these act as a drying agent. She verbalized understanding. Consumed x15 trials of thin liquids with cup with occasional throat clear and delayed cough observed. SHORT TERM GOAL #2:  Goal 2: Patient will complete pharyngeal strengthening exercises x25 each with good success to reduce presence of pharyngeal residue.   INTERVENTIONS: Patient completed the following:   - Effortful swallow: x25 with good success with and without thin liquids, improved timeliness  **Good success    SHORT TERM GOAL #3:  Goal 3: Patient will complete tongue based retraction exercsies x25 each with good success to improve bolus control. INTERVENTIONS: Patient completed the following:  - Caren: x10 with fair success, additional time required to initiate swallows  - \"Kick\": x10 with good effort  - Back of tongue elevation: \"kakakakakakaka\", \"kalakalakalakala\", \"keekeekeekee\", \"kukukuku\", \"cindy cindy\", \"cook cook\", kind cat\", \"candy cane\" repetition with good success in unison with ST  **Patient reports she is completing these exercises 1x per day. Encouraged patient to complete 2x per day if possible. SHORT TERM GOAL #4:  Goal 4: Patient will complete airway protection exercises x25 each with good success to improve epiglottic inversion. INTERVENTIONS: Patient completed the following:  - Mendelsohn maneuver: Not addressed specifically this date due to focus on additional goals. - VF closure (hold \"ee\" at a high pitch for 3 seconds): x5 with fair-good success, improvement since last therapy session.   - Supraglottic Swallow: x10 with good success, min cues   - Super-supraglottic swallow: x5 with fair success, mod cues   - EMST: Patient completed x15 breaths with her EMST device. Initially, patient only able to sustain exhalations for ~3 seconds. Encouraged patient to attempt to slow expirations to ~5 seconds which also amplifies the resistance. She was able to complete x2 exhalations at ~4 seconds. **Patient reports she is completing these exercises 1x per day. Encouraged patient to complete 2x per day if possible. Goal 5: Patient will complete lingual ROM exercises x10 each with good success to promote improved lingual sweep of residue. INTERVENTIONS: Not addressed specifically this date due to focus on additional goals.          LONG-TERM GOALS:  Long-term Goals  Timeframe for Long-term Goals: 9 weeks  Goal 1: Patient will report tolerance of regular diet with thin liquids without overt s/s of aspiration to promote safe consumption of least restrictive diet. Assessment: Progressing towards goals   Specific Interventions Next Treatment: lingual ROM, tolerance of diet, pharyngeal strengthening exercises, tongue based retraction, airway protection exercises    Activity/Treatment Tolerance:  [x]  Patient tolerated treatment well  []  Patient limited by fatigue  []  Patient limited by pain   []  Patient limited by other medical complications  []  Other:     Patient Education:   [x]  HEP/Education Completed: Plan of Care, Goals, Estimated Length of Stay, Lingual Exercises  []  No new Education completed  [x]  Reviewed Prior HEP      [x]  Patient verbalized and/or demonstrated understanding of education provided. []  Patient unable to verbalize and/or demonstrate understanding of education provided. Will continue education. []  Barriers to learning:     PLAN:  []  Plan of care initiated. Plan to see patient 1 times per week for 8-9 weeks to address the treatment planned outlined above.   [x]  Continue with current plan of care  []  Modify plan of care as follows:    []  Hold pending physician visit  []  Discharge    Time In 1000   Time Out 1030   Timed Code Minutes: 0 min   Total Treatment Time: 30 min     [address] [facility],[address], [address] [address] [place] [address]

## 2022-08-08 NOTE — TELEPHONE ENCOUNTER
In looking through chart I found where Hortensia Gordon CNP, from the pulmonary office, placed the order for Home oxygen on 02/28/22. Attempted to call patient. Got voicemail, left detailed message informing the patient she needs to call the pulmonary office for the D/C of oxygen. Advised her to call the office for any additional questions.

## 2022-08-15 ENCOUNTER — HOSPITAL ENCOUNTER (OUTPATIENT)
Dept: SPEECH THERAPY | Age: 77
Setting detail: THERAPIES SERIES
Discharge: HOME | End: 2022-08-15
Payer: MEDICARE

## 2022-08-15 PROCEDURE — 92526 ORAL FUNCTION THERAPY: CPT

## 2022-08-15 NOTE — PROGRESS NOTES
decannulated on 5/26. Patient presents today and does still currently have the gastronomy tube but is not using it at all. She is planning to call Dr. Deanna Reddy on 7/11 to have the feeding tube removed as long as she feels she is doing well with her swallowing. Patient currently reports she is consuming a regular diet with thin liquids. She reports she is taking her medicines with food. Occasionally reports difficulty with some solids, \"crispy things\", but is able to clear this with implementation of a liquid wash. 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 States she would like to [facility] to cough\" despite physicians in the past stating she may never be able to produce a strong cough. SUBJECTIVE: Patient very cooperative and pleasant. SHORT TERM GOAL #1:  Goal 1: Patient will tolerate a regular diet with thin liquids without overt s/s of aspiration to assist in nutrition/hydration measures. INTERVENTIONS: Patient reports she will occasionally choke on her own saliva but does not notice this with swallowing food/drinks. States earlier this date she did have this occur in the shower. She commented that she was pleased because with a cough she was able to clear this sensation and resume normal breathing. \"I  feel like I'm making progress\". This ST also feels patient is making good progress as she is reporting tolerance of diet without s/s of aspiration. Consumed x10 trials of thin liquids with cup with occasional throat clear and cough observed during additional trials. SHORT TERM GOAL #2:  Goal 2: Patient will complete pharyngeal strengthening exercises x25 each with good success to reduce presence of pharyngeal residue.   INTERVENTIONS: Patient completed the following:   - Effortful swallow: x25 with good success with and without thin liquids, improved timeliness  **Good success    SHORT TERM GOAL #3:  Goal 3: Patient will complete tongue based retraction exercsies x25 each with good success to improve bolus control. INTERVENTIONS: Patient completed the following:  - Caren: x15 with good success  - \"Kick\": x10 with good effort  - Back of tongue elevation: \"kakakakakakaka\", \"kalakalakalakala\", \"keekeekeekee\", \"kukukuku\", \"cindy cindy\", \"cook cook\", kind cat\", \"candy cane\" repetition with good success in unison with ST  **Patient reports she is completing these exercises a couple times a day. SHORT TERM GOAL #4:  Goal 4: Patient will complete airway protection exercises x25 each with good success to improve epiglottic inversion. INTERVENTIONS: Patient completed the following:  - Mendelsohn maneuver: x10 with good success at sustaining elevation  - VF closure (hold \"ee\" at a high pitch for 3 seconds): x5 with good success  - Supraglottic Swallow: x10 with good success, min cues   - Super-supraglottic swallow: x5 with fair success, mod cues   - EMST: Patient completed x15 breaths with her EMST device. Encouraged patient to focus on strong exhalations rather than slow/prolonged exhalations. Good success this date with audibly improved strength  **Patient reports she is completing these exercises 2x per day. Goal 5: Patient will complete lingual ROM exercises x10 each with good success to promote improved lingual sweep of residue. INTERVENTIONS: Patient completed the following:   - External lingual lateralization: x10 with good success  - Internal lingual lateralization: x15 with good success  - \"Lick backs\": G47 with good success    - External lingual circles: x10 each direction with good success  - Internal lingual circles: Not addressed specifically this date due to focus on additional goals. - Tongue tip elevation: \"lalalala\", \"leeleeleelee\", \"lulululu\", \"lie down\", \"mara-mara\", \"little lady\", \"hello hello hello\", and \"a little later\" repetition: good success with reading the written provision  **\"I feel like these really help\".      LONG-TERM GOALS:  Long-term Goals  Timeframe for Long-term Goals: 9 weeks  Goal 1: Patient will report tolerance of regular diet with thin liquids without overt s/s of aspiration to promote safe consumption of least restrictive diet. Assessment: Progressing towards goals   Specific Interventions Next Treatment: lingual ROM, tolerance of diet, pharyngeal strengthening exercises, tongue based retraction, airway protection exercises    Activity/Treatment Tolerance:  [x]  Patient tolerated treatment well  []  Patient limited by fatigue  []  Patient limited by pain   []  Patient limited by other medical complications  []  Other:     Patient Education:   [x]  HEP/Education Completed: Plan of Care, Goals, Exercises, EMST exercises, Vocal hygiene   []  No new Education completed  [x]  Reviewed Prior HEP      [x]  Patient verbalized and/or demonstrated understanding of education provided. []  Patient unable to verbalize and/or demonstrate understanding of education provided. Will continue education. []  Barriers to learning:     PLAN:  []  Plan of care initiated. Plan to see patient 1 times per week for 8-9 weeks to address the treatment planned outlined above.   [x]  Continue with current plan of care  []  Modify plan of care as follows:    []  Hold pending physician visit  []  Discharge    Time In 1000   Time Out 1030   Timed Code Minutes: 0 min   Total Treatment Time: 30 min     [address] [facility],[address] Floor, [place] [address]

## 2022-08-22 ENCOUNTER — HOSPITAL ENCOUNTER (OUTPATIENT)
Dept: SPEECH THERAPY | Age: 77
Setting detail: THERAPIES SERIES
Discharge: HOME | End: 2022-08-22
Payer: MEDICARE

## 2022-08-22 PROCEDURE — 92507 TX SP LANG VOICE COMM INDIV: CPT

## 2022-08-22 NOTE — PROGRESS NOTES
[address] [facility]  [] CLINICAL SWALLOW EVALUATION  [x] DAILY NOTE   [] PROGRESS NOTE [] DISCHARGE NOTE    [x] OUTPATIENT REHABILITATION CENTER - [place]   [] [place] [address]    [] [facility]   [] Didier Porter    Date: 2022  Patient Name:  Aram Jones  : 1945  MRN: [id number]  CSN: [id number]    Referring Practitioner Mary Alvarenga MD   Diagnosis DYSPHAGIA, R13.10   Treatment Diagnosis Dysphagia    Date of Evaluation 22      Functional Outcome Measure Used [facility] Swallowing   Functional Outcome Score 5 (22)       Insurance: Primary: Payor: Mat Elliott /  /  / ,   Secondary:    Authorization Information: No precert required   Visit # 8, 8/10 for progress note   Visits Allowed: No visit limit   Recertification Date:    Physician Follow-Up: Unknown   Physician Orders: Eval and treat   Pertinent History: Patient had a total hysterectomy at [facility] in 2022 and was re-intubated shortly after her surgery due to being hypercapnic and eventually had to have a tracheostomy tube placed after multiple intubation/extubations. Patient had to have a PEG tube placed in the beginning of 2022. Patient reports she was transferred to inpatient rehab at [facility] for several weeks for therapy. On 22, patient had the following procedures completed by Dr. Dany Manjarrez: TRANSORAL LARYNGOPLASTY WITH AUGMENTATION AND LATERALIZATION OF RIGHT TRUE VOCAL CORD, and  PARTIAL ARYTENOIDECTOMY. She completed an OP MBS on 22 which found no laryngeal penetration/aspiration but recommended puree diet with thin liquids in conjunction to PEG tube as primary means to nutrition/hydration. A \"pocket\" concerning for zenker's diverticulum was noticed; however, patient states Dr. Dany Manjarrez reviewed this imaging and felt no concern was present, per patient. She was discharged from [place] [address] on .  Yehuda was decannulated on 5/26. Patient presents today and does still currently have the gastronomy tube but is not using it at all. She is planning to call Dr. Mckeon Drivers on 7/11 to have the feeding tube removed as long as she feels she is doing well with her swallowing. Patient currently reports she is consuming a regular diet with thin liquids. She reports she is taking her medicines with food. Occasionally reports difficulty with some solids, \"crispy things\", but is able to clear this with implementation of a liquid wash. 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 States she would like to [facility] to cough\" despite physicians in the past stating she may never be able to produce a strong cough. SUBJECTIVE: States she feels \"stuffed up\" due [place] the [place] with all the allergies this weekend. Very pleasant and cooperative. SHORT TERM GOAL #1:  Goal 1: Patient will tolerate a regular diet with thin liquids without overt s/s of aspiration to assist in nutrition/hydration measures. INTERVENTIONS: Reports tolerance of regular diet with thin liquids without overt s/s of aspiration. States she had a cheeseburger at the fair but \"didn't enjoy it\" since she was around people and it was fairly dense. Reports she only consumed a few bites of it and then threw it away. Great insight towards dysphagia concerns; however, encouraged patient to continue to trial these textures in social settings as she has demonstrated great improvement within managing dysphagia symptoms. This ST is confident patient would have tolerated the cheeseburger without difficulty. Patient did state she felt so too but didn't want to risk it. Consumed x8 trials of thin liquids with cup with occasional throat clear and cough observed during additional trials. SHORT TERM GOAL #2:  Goal 2: Patient will complete pharyngeal strengthening exercises x25 each with good success to reduce presence of pharyngeal residue.   INTERVENTIONS: Patient completed the following:   - Effortful swallow: x25 with good success with and without thin liquids, improved timeliness  **Good success    SHORT TERM GOAL #3:  Goal 3: Patient will complete tongue based retraction exercsies x25 each with good success to improve bolus control. INTERVENTIONS: Patient completed the following:  - Caren: x20 with good success  - \"Kick\": x10 with good effort  - Back of tongue elevation: \"kakakakakakaka\", \"kalakalakalakala\", \"keekeekeekee\", \"kukukuku\", \"cindy cindy\", \"cook cook\", kind cat\", \"candy cane\" repetition with good success in unison with ST  **Patient reports she is completing these exercises a couple times a day. SHORT TERM GOAL #4:  Goal 4: Patient will complete airway protection exercises x25 each with good success to improve epiglottic inversion. INTERVENTIONS: Patient completed the following:  - Mendelsohn maneuver: x15 with good success at sustaining elevation  - VF closure (hold \"ee\" at a high pitch for 3 seconds): 4/5 with good success, 1/5 with fair success as patient only able to sustain for 2 seconds  - Supraglottic Swallow: Not addressed specifically this date due to focus on additional goals. - Super-supraglottic swallow: Not addressed specifically this date due to focus on additional goals. - EMST: Patient completed x20 breaths with her EMST device. Encouraged patient to push the air out as forcefully as possible. **Patient reports she is completing these exercises 2x per day. Goal 5: Patient will complete lingual ROM exercises x10 each with good success to promote improved lingual sweep of residue.    INTERVENTIONS: Patient completed the following:   - External lingual lateralization: x10 with good success  - Internal lingual lateralization: x15 with good success  - \"Lick backs\": P76 with good success    - External lingual circles: x10 each direction with good success  - Internal lingual circles: x10 each direction with good success  - Tongue tip elevation: \"lalalala\", \"leeleeleelee\", \"lulululu\", \"lie down\", \"mara-mara\", \"little lady\", \"hello hello hello\", and \"a little later\" repetition: good success with reading the written provision  **Good success    LONG-TERM GOALS:  Long-term Goals  Timeframe for Long-term Goals: 9 weeks  Goal 1: Patient will report tolerance of regular diet with thin liquids without overt s/s of aspiration to promote safe consumption of least restrictive diet. Assessment: Progressing towards goals   Specific Interventions Next Treatment: lingual ROM, tolerance of diet, pharyngeal strengthening exercises, tongue based retraction, airway protection exercises    Activity/Treatment Tolerance:  [x]  Patient tolerated treatment well  []  Patient limited by fatigue  []  Patient limited by pain   []  Patient limited by other medical complications  []  Other:     Patient Education:   [x]  HEP/Education Completed: Plan of Care, Goals, Exercises, EMST exercises, Vocal hygiene   []  No new Education completed  [x]  Reviewed Prior HEP      [x]  Patient verbalized and/or demonstrated understanding of education provided. []  Patient unable to verbalize and/or demonstrate understanding of education provided. Will continue education. []  Barriers to learning:     PLAN:  []  Plan of care initiated. Plan to see patient 1 times per week for 8-9 weeks to address the treatment planned outlined above.   [x]  Continue with current plan of care  []  Modify plan of care as follows:    []  Hold pending physician visit  []  Discharge    Time In 1005   Time Out 1025   Timed Code Minutes: 0 min   Total Treatment Time: 25 min     [facility] [address]

## 2022-08-29 ENCOUNTER — HOSPITAL ENCOUNTER (OUTPATIENT)
Dept: SPEECH THERAPY | Age: 77
Setting detail: THERAPIES SERIES
Discharge: HOME | End: 2022-08-29
Payer: MEDICARE

## 2022-08-29 PROCEDURE — 92526 ORAL FUNCTION THERAPY: CPT

## 2022-08-29 NOTE — DISCHARGE SUMMARY
** PLEASE SIGN, DATE AND TIME CERTIFICATION BELOW AND RETURN TO [facility] OUTPATIENT REHABILITATION (FAX #: [phone]). ATTEST/CO-SIGN IF ACCESSING VIA [employer]. THANK YOU.**    I certify that I have examined the patient below and determined that Physical Medicine and Rehabilitation service is necessary and that I approve the established plan of care for up to 90 days or as specifically noted. Attestation, signature or co-signature of physician indicates approval of certification requirements.    ________________________ ____________ __________  Physician Signature   Date   Time     [address]  [] CLINICAL SWALLOW EVALUATION  [] DAILY NOTE   [] PROGRESS NOTE [x] DISCHARGE NOTE    [x] OUTPATIENT REHABILITATION CENTER [facility]   [] [address]    [] [address] [facility]   [] Kimberly Blair    Date: 2022  Patient Name:  Chilango Soto  : 1945  MRN: [id number]  CSN: [id number]    Referring Practitioner Candance Lab, MD   Diagnosis DYSPHAGIA, R13.10   Treatment Diagnosis Dysphagia    Date of Evaluation 22      Functional Outcome Measure Used [facility] Swallowing   Functional Outcome Score 5 (22)       Insurance: Primary: Payor: Maria E Carey /  /  / ,   Secondary:    Authorization Information: No precert required   Visit # 9, 9/10 for progress note   Visits Allowed: No visit limit   Recertification Date:    Physician Follow-Up: Unknown   Physician Orders: Eval and treat   Pertinent History: Patient had a total hysterectomy at [facility] in 2022 and was re-intubated shortly after her surgery due to being hypercapnic and eventually had to have a tracheostomy tube placed after multiple intubation/extubations. Patient had to have a PEG tube placed in the beginning of 2022. Patient reports she was transferred to inpatient rehab at [facility] for several weeks for therapy.  On 22, patient had the following procedures completed by Dr. Pedro Foster: TRANSORAL LARYNGOPLASTY WITH AUGMENTATION AND LATERALIZATION OF RIGHT TRUE VOCAL CORD, and  PARTIAL ARYTENOIDECTOMY. She completed an OP MBS on 4/26/22 which found no laryngeal penetration/aspiration but recommended puree diet with thin liquids in conjunction to PEG tube as primary means to nutrition/hydration. A \"pocket\" concerning for zenker's diverticulum was noticed; however, patient states Dr. Pedro Foster reviewed this imaging and felt no concern was present, per patient. She was discharged from [place] [address] on 5/18. Paitent was decannulated on 5/26. Patient presents today and does still currently have the gastronomy tube but is not using it at all. She is planning to call Dr. Pedro Foster on 7/11 to have the feeding tube removed as long as she feels she is doing well with her swallowing. Patient currently reports she is consuming a regular diet with thin liquids. She reports she is taking her medicines with food. Occasionally reports difficulty with some solids, \"crispy things\", but is able to clear this with implementation of a liquid wash. 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 States she would like to [facility] to cough\" despite physicians in the past stating she may never be able to produce a strong cough. SUBJECTIVE: Patient reports \"I feel comfortable\" with discharging from therapy this date. Discussed patient's progress within therapy thus far and rationale for discharging from therapy. SHORT TERM GOAL #1:  Goal 1: Patient will tolerate a regular diet with thin liquids without overt s/s of aspiration to assist in nutrition/hydration measures. GOAL MET. NO NEW GOAL. INTERVENTIONS: Patient reports she had friends over for dinner last night in which she served sandwiches with some soft sides.  States she did eat a decent portion of the soft sides but only had a few bites of the sandwich due to fear that she may have a choking episode success at sustaining elevation  - VF closure (hold \"ee\" at a high pitch for 3 seconds): 5/5 with great success at sustain 4 seconds  - Supraglottic Swallow: x5 with good success  - Super-supraglottic swallow: Not addressed specifically this date due to focus on additional goals. - EMST: Patient completed x25 breaths with her EMST device. Encouraged patient to push the air out as forcefully as possible. **Recommended patient continue to complete these exercises a few times a week upon discharge. Goal 5: Patient will complete lingual ROM exercises x10 each with good success to promote improved lingual sweep of residue. GOAL MET. NO NEW GOAL. INTERVENTIONS: Not addressed specifically this date due to focus on additional goals. PREVIOUS SESSION:   Patient completed the following:   - External lingual lateralization: x10 with good success  - Internal lingual lateralization: x15 with good success  - \"Lick backs\": C09 with good success    - External lingual circles: x10 each direction with good success  - Internal lingual circles: x10 each direction with good success  - Tongue tip elevation: \"lalalala\", \"leeleeleelee\", \"lulululu\", \"lie down\", \"mara-mara\", \"little lady\", \"hello hello hello\", and \"a little later\" repetition: good success with reading the written provision  **Recommended patient continue to complete these exercises a few times a week upon discharge. LONG-TERM GOALS:  Long-term Goals  Timeframe for Long-term Goals: 9 weeks  Goal 1: Patient will report tolerance of regular diet with thin liquids without overt s/s of aspiration to promote safe consumption of least restrictive diet. GOAL MET. NO NEW GOAL. DISCHARGE SUMMARY: Patient has met all of her STGs and LTGs within this therapy period. She has been reporting consistent good tolerance of a regular diet with thin liquids without overt s/s at home. Great carryover and progress has been observed with her dysphagia HEP as well.  Patient's only concerns with swallowing at this time include her ability to participate in conversation with others at dinner due to fears of choking episodes. Discussed with patient that her comfort level may take time to expand. This ST does feel patient is safe to consume a regular diet with thin liquids in these situations. No further speech therapy services recommended at this time. Informed patient how to return to skilled speech therapy in the future if desired. Activity/Treatment Tolerance:  [x]  Patient tolerated treatment well  []  Patient limited by fatigue  []  Patient limited by pain   []  Patient limited by other medical complications  []  Other:     Patient Education:   [x]  HEP/Education Completed: Plan of Care, Goals, Swallowing Exercises, EMST exercises, Vocal hygiene   []  No new Education completed  [x]  Reviewed Prior HEP      [x]  Patient verbalized and/or demonstrated understanding of education provided. []  Patient unable to verbalize and/or demonstrate understanding of education provided. Will continue education. []  Barriers to learning:     PLAN:  []  Plan of care initiated. Plan to see patient 1 times per week for 8-9 weeks to address the treatment planned outlined above.   []  Continue with current plan of care  []  Modify plan of care as follows:    []  Hold pending physician visit  [x]  Discharge    Time In 1003   Time Out 1030   Timed Code Minutes: 0 min   Total Treatment Time: 27 min     [address] [facility] [facility], [address] [facility]

## 2022-09-07 ENCOUNTER — OFFICE VISIT (OUTPATIENT)
Dept: ENT CLINIC | Age: 77
End: 2022-09-07
Payer: MEDICARE

## 2022-09-07 VITALS
OXYGEN SATURATION: 96 % | RESPIRATION RATE: 12 BRPM | SYSTOLIC BLOOD PRESSURE: 112 MMHG | HEIGHT: 60 IN | TEMPERATURE: 98.1 F | WEIGHT: 113.3 LBS | DIASTOLIC BLOOD PRESSURE: 72 MMHG | HEART RATE: 68 BPM | BODY MASS INDEX: 22.24 KG/M2

## 2022-09-07 DIAGNOSIS — R13.14 PHARYNGOESOPHAGEAL DYSPHAGIA: ICD-10-CM

## 2022-09-07 DIAGNOSIS — R49.0 HOARSENESS: ICD-10-CM

## 2022-09-07 DIAGNOSIS — J38.01 PARALYSIS OF RIGHT VOCAL CORD: Primary | ICD-10-CM

## 2022-09-07 PROCEDURE — 1124F ACP DISCUSS-NO DSCNMKR DOCD: CPT | Performed by: OTOLARYNGOLOGY

## 2022-09-07 PROCEDURE — 99213 OFFICE O/P EST LOW 20 MIN: CPT | Performed by: OTOLARYNGOLOGY

## 2022-09-07 RX ORDER — FLUTICASONE PROPIONATE 50 MCG
1 SPRAY, SUSPENSION (ML) NASAL DAILY
COMMUNITY

## 2022-09-07 RX ORDER — ESTRADIOL 0.1 MG/G
2 CREAM VAGINAL DAILY
COMMUNITY

## 2022-09-07 RX ORDER — CETIRIZINE HYDROCHLORIDE 5 MG/1
5 TABLET ORAL DAILY
COMMUNITY

## 2022-09-07 RX ORDER — MELOXICAM 15 MG/1
15 TABLET ORAL DAILY
COMMUNITY

## 2022-09-07 NOTE — PROGRESS NOTES
[address] EAR, NOSE AND THROAT  [address] [place] [address] [place] [address]  Dept: [phone]  Dept Fax: [phone]  Loc: [phone]    Sanford Willard is a 64 y.o. female who was referred by No ref. provider found for:  Chief Complaint   Patient presents with    Follow-up     Patient is here for 2 month f/u laryngoscopy. Patient states that she feels great. She said she breathes better since surgery. HPI:     Sanford Willard is a 64 y.o. 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 Based on the physical findings at the time of her diagnosis of this condition, the patient underwent a tracheostomy and a gastrostomy tube placement. She now returns for follow-up of an \"augmentation lateralization laryngoplasty\" performed transorally where I remove the obstructing soft tissue and use it to augment the paralyzed cord lateralizing the arytenoid and producing a significantly improved airway conduit. Based on this technique, the patient gets her voice back and an airway that is serviceable enough to allow her to have her tracheostomy removed. This was successfully accomplished several months ago. She returns today for an interval examination and to discuss her various levels of function with her . The patient describes having few limitations and none that matter to her. She states that something she needs to eat and drink slowly but she is able to get down virtually any food that she eats these days. She is also able to tolerate exerting herself and walking at a reasonable pace. She is sleeping comfortably and is pleased with her voice quality understanding that it is not normal but it is still much clearer and easier for her to produce than her voice was preoperatively.   She complains that in the morning she feels a tightness in her neck as though her airway is scarred down in the area of the tracheostomy. It is. History: Allergies   Allergen Reactions    Latex Itching    Doxycycline      Tongue, throat and mouth soreness    Tizanidine Hcl Nausea And Vomiting     Current Outpatient Medications   Medication Sig Dispense Refill    estradiol (ESTRACE VAGINAL) 0.1 MG/GM vaginal cream Place 2 g vaginally daily      cetirizine (ZYRTEC) 5 MG tablet Take 5 mg by mouth daily      meloxicam (MOBIC) 15 MG tablet Take 15 mg by mouth daily      fluticasone (FLONASE) 50 MCG/ACT nasal spray 1 spray by Each Nostril route daily      esomeprazole Magnesium (NEXIUM) 40 MG PACK Take 40 mg by mouth daily      aluminum & magnesium hydroxide-simethicone (MAALOX) 200-200-20 MG/5ML SUSP suspension Take 30 mLs by mouth every 6 hours as needed for Indigestion 1 each 3    sertraline (ZOLOFT) 100 MG tablet Take 1 tablet by mouth daily (Patient taking differently: Take 50 mg by mouth daily) 30 tablet 3    melatonin 3 MG TABS tablet Take 3 mg by mouth nightly as needed      cycloSPORINE (RESTASIS) 0.05 % ophthalmic emulsion Place 2 drops into both eyes daily 1 each 0    Multiple Vitamins-Minerals (PRESERVISION AREDS 2) CAPS Take 1 capsule by mouth 2 times daily       magnesium oxide (MAG-OX) 400 (241.3 Mg) MG TABS tablet Take 1 tablet by mouth daily (Patient not taking: Reported on 9/7/2022) 30 tablet 3     No current facility-administered medications for this visit.      Past Medical History:   Diagnosis Date    GERD (gastroesophageal reflux disease)     Osteoarthritis     Paralysis of vocal cords     Polio     Right side diaphragm paralysis     states trach oxygen at night      Past Surgical History:   Procedure Laterality Date    CHOLECYSTECTOMY      GASTROSTOMY TUBE PLACEMENT      HYSTERECTOMY (CERVIX STATUS UNKNOWN)  01/2022    KNEE SURGERY      Right    LARYNGOSCOPY N/A 2/24/2022    DIAGNOSTIC SUSPENSION MICROLARYNGOSCOPY BRONCHOSCOPY WITH JET VENTILATION performed by Falguni Harmon MD at [address] N/A 5/26/2022    Suspension Microlayrngoscopy w/ Debridement Therapeutic Bronchoscopy w/ Debridement performed by Falguni Harmon MD at [address] N/A 4/14/2022    TRANSORAL LARYNGOPLASTY WITH AUGMENTATION AND LATERALIZATION OF RIGHT TRUE VOCAL CORD, and  PARTIAL ARYTENOIDECTOMY performed by Falguni Harmon MD at [facility] History   Problem Relation Age of Onset    Arthritis Mother     Heart Disease Mother     Cancer Father     No Known Problems Sister     Heart Disease Brother      Social History     Tobacco Use    Smoking status: Never    Smokeless tobacco: Never   Substance Use Topics    Alcohol use: Not Currently        Subjective:      Review of Systems  Rest of review of systems are negative, except as noted in HPI. Objective:     /72 (Site: Right Upper Arm, Position: Sitting)   Pulse 68   Temp 98.1 °F (36.7 °C) (Infrared)   Resp 12   Ht 5' (1.524 m)   Wt 113 lb 4.8 oz (51.4 kg)   SpO2 96%   BMI 22.13 kg/m²     Physical Exam       On general physical exam the patient is pleasant alert cooperative remarkably well-appearing younger than stated age appearing older adult female in no acute distress. Her voice is mildly low in pitch and mildly low in volume with occasional tendency to diplophonia but overall clear easy to understand voice quality. Her speech pattern is entirely normal.  I heard no throat clearing coughing or inspiratory stridor. Her neck is abnormal for the presence of peristomal tethering consistent with a longstanding tracheostomy. No signs of a tracheocutaneous fistula are seen. The patient is breathing without labor. Vitals reviewed. No results found.    Lab Results   Component Value Date/Time     05/28/2022 04:04 AM     04/18/2022 02:30 PM     04/15/2022 04:12 AM    K 3.8 05/28/2022 04:04 AM    K 3.8 04/18/2022 02:30 PM    K 3.4 04/15/2022 04:12 AM    K 3.9 03/05/2022 changes to her laryngopharynx and its function. I will see her back on an as-needed basis moving forward happy to see her if she has new problems or a recrudessence of any old ones. She and her  reported being very pleased with the outcome of her care thus far and grateful for all the was done for her. I thanked her for her kind appreciation and am grateful to have been able to care for her as well. I spent over 20 minutes of face-to-face time with the patient and her  the majority of which was dedicated to reviewing her past clinical problems and describing options in the future should anything become significantly worse. Return if symptoms worsen or fail to improve or new ENT problems. **This report has been created using voice recognition software. It may contain minor errors which are inherent in voice recognition technology. **

## 2022-09-26 RX ORDER — SERTRALINE HYDROCHLORIDE 100 MG/1
TABLET, FILM COATED ORAL
Qty: 90 TABLET | Refills: 1 | OUTPATIENT
Start: 2022-09-26

## 2022-10-05 ENCOUNTER — OFFICE VISIT (OUTPATIENT)
Dept: RHEUMATOLOGY | Age: 77
End: 2022-10-05
Payer: MEDICARE

## 2022-10-05 VITALS
SYSTOLIC BLOOD PRESSURE: 132 MMHG | OXYGEN SATURATION: 98 % | HEIGHT: 60 IN | DIASTOLIC BLOOD PRESSURE: 82 MMHG | HEART RATE: 87 BPM | BODY MASS INDEX: 22.19 KG/M2 | WEIGHT: 113 LBS

## 2022-10-05 DIAGNOSIS — Z78.0 POSTMENOPAUSAL: ICD-10-CM

## 2022-10-05 DIAGNOSIS — M81.0 AGE-RELATED OSTEOPOROSIS WITHOUT CURRENT PATHOLOGICAL FRACTURE: Primary | ICD-10-CM

## 2022-10-05 PROCEDURE — 1124F ACP DISCUSS-NO DSCNMKR DOCD: CPT | Performed by: NURSE PRACTITIONER

## 2022-10-05 PROCEDURE — 99213 OFFICE O/P EST LOW 20 MIN: CPT | Performed by: NURSE PRACTITIONER

## 2022-10-05 RX ORDER — ALBUTEROL SULFATE 90 UG/1
4 AEROSOL, METERED RESPIRATORY (INHALATION) PRN
OUTPATIENT
Start: 2022-10-05

## 2022-10-05 RX ORDER — SODIUM CHLORIDE 0.9 % (FLUSH) 0.9 %
5-40 SYRINGE (ML) INJECTION PRN
Status: CANCELLED | OUTPATIENT
Start: 2022-10-05

## 2022-10-05 RX ORDER — ZOLEDRONIC ACID 5 MG/100ML
5 INJECTION, SOLUTION INTRAVENOUS ONCE
Status: CANCELLED | OUTPATIENT
Start: 2022-10-05 | End: 2022-10-05

## 2022-10-05 RX ORDER — 0.9 % SODIUM CHLORIDE 0.9 %
500 INTRAVENOUS SOLUTION INTRAVENOUS ONCE
Status: CANCELLED | OUTPATIENT
Start: 2022-10-05 | End: 2022-10-05

## 2022-10-05 RX ORDER — SODIUM CHLORIDE 9 MG/ML
5-250 INJECTION, SOLUTION INTRAVENOUS PRN
OUTPATIENT
Start: 2022-10-05

## 2022-10-05 RX ORDER — FAMOTIDINE 10 MG/ML
20 INJECTION, SOLUTION INTRAVENOUS
OUTPATIENT
Start: 2022-10-05

## 2022-10-05 RX ORDER — SODIUM CHLORIDE 9 MG/ML
INJECTION, SOLUTION INTRAVENOUS ONCE
Status: CANCELLED | OUTPATIENT
Start: 2022-10-05 | End: 2022-10-05

## 2022-10-05 RX ORDER — DIPHENHYDRAMINE HYDROCHLORIDE 50 MG/ML
50 INJECTION INTRAMUSCULAR; INTRAVENOUS
OUTPATIENT
Start: 2022-10-05

## 2022-10-05 RX ORDER — TRIAMCINOLONE ACETONIDE 0.25 MG/G
CREAM TOPICAL
COMMUNITY
Start: 2022-09-16

## 2022-10-05 RX ORDER — ACETAMINOPHEN 325 MG/1
650 TABLET ORAL
OUTPATIENT
Start: 2022-10-05

## 2022-10-05 RX ORDER — SODIUM CHLORIDE 9 MG/ML
INJECTION, SOLUTION INTRAVENOUS CONTINUOUS
OUTPATIENT
Start: 2022-10-05

## 2022-10-05 RX ORDER — HEPARIN SODIUM (PORCINE) LOCK FLUSH IV SOLN 100 UNIT/ML 100 UNIT/ML
500 SOLUTION INTRAVENOUS PRN
OUTPATIENT
Start: 2022-10-05

## 2022-10-05 RX ORDER — EPINEPHRINE 1 MG/ML
0.3 INJECTION, SOLUTION, CONCENTRATE INTRAVENOUS PRN
OUTPATIENT
Start: 2022-10-05

## 2022-10-05 RX ORDER — ONDANSETRON 2 MG/ML
8 INJECTION INTRAMUSCULAR; INTRAVENOUS
OUTPATIENT
Start: 2022-10-05

## 2022-10-05 ASSESSMENT — ENCOUNTER SYMPTOMS
EYE PAIN: 0
ABDOMINAL PAIN: 0
CONSTIPATION: 0
BACK PAIN: 0
EYE ITCHING: 0
SHORTNESS OF BREATH: 0
TROUBLE SWALLOWING: 1
NAUSEA: 0
DIARRHEA: 0
COUGH: 0

## 2022-10-05 NOTE — PROGRESS NOTES
[facility]  Bone Fragility Follow up     Visit Date: 10/5/2022  MRN: [id number]  Cc:   Chief Complaint   Patient presents with    Follow-up     1 year f/u Age-related osteoporosis without current pathological fracture    Shoulder pains bilateral          HPI:   Daron De La Torre  is a(n)76 y.o. female here today for follow up of Osteoporosis. Had hysterectomy 6/8452 - had some complications, ended up w/ respiratory failure, was on trach and vent for some time. Lona Glee now removed. Now working with ENT on vocal cord paralysis and dysphagia from polio. Osteoporosis- denies any falls. Taking calcium and vitamin D. Due for reclast this past April- was missed due to above complications. ROS:  Review of Systems   Constitutional:  Negative for fatigue, fever and unexpected weight change. HENT:  Positive for trouble swallowing. Negative for congestion. Eyes:  Negative for pain and itching. Respiratory:  Negative for cough and shortness of breath. Cardiovascular:  Negative for chest pain and leg swelling. Gastrointestinal:  Negative for abdominal pain, constipation, diarrhea and nausea. Endocrine: Negative for cold intolerance and heat intolerance. Genitourinary:  Negative for difficulty urinating, frequency and urgency. Musculoskeletal:  Positive for arthralgias. Negative for back pain and joint swelling. Skin:  Negative for rash. Neurological:  Negative for dizziness, weakness, numbness and headaches. Psychiatric/Behavioral:  The patient is not nervous/anxious.         PAST MEDICAL HISTORY  Past Medical History:   Diagnosis Date    GERD (gastroesophageal reflux disease)     H/O respiratory failure     Osteoarthritis     Paralysis of vocal cords     Polio     Right side diaphragm paralysis     states trach oxygen at night       SOCIAL HISTORY  Social History     Socioeconomic History    Marital status:      Spouse name: Erum Quintero    Number of children: 5 Years of education: None    Highest education level: None   Tobacco Use    Smoking status: Never    Smokeless tobacco: Never   Vaping Use    Vaping Use: Never used   Substance and Sexual Activity    Alcohol use: Not Currently    Drug use: No    Sexual activity: Yes     Partners: Male     Social Determinants of Health     Financial Resource Strain: Low Risk     Difficulty of Paying Living Expenses: Not hard at all   Food Insecurity: No Food Insecurity    Worried About [address] in the Last Year: Never true    [address] in the Last Year: Never true   Transportation Needs: No Transportation Needs    Lack of Transportation (Medical): No    Lack of Transportation (Non-Medical):  No   Physical Activity: Insufficiently Active    Days of Exercise per Week: 4 days    Minutes of Exercise per Session: 20 min   Stress: No Stress Concern Present    Feeling of Stress : Not at all   Social Connections: Socially Integrated    Frequency of Communication with Friends and Family: Twice a week    Frequency of Social Gatherings with Friends and Family: Twice a week    Attends Jew Services: More than 4 times per year    Active Member of [employer] or Organizations: Yes    Attends Club or Organization Meetings: More than 4 times per year    Marital Status:    Housing Stability: Unknown    Unable to Pay for Housing in the Last Year: No    Unstable Housing in the Last Year: No       FAMILY HISTORY  Family History   Problem Relation Age of Onset    Arthritis Mother     Heart Disease Mother     Cancer Father     No Known Problems Sister     Heart Disease Brother        SURGICAL HISTORY  Past Surgical History:   Procedure Laterality Date    CHOLECYSTECTOMY      GASTROSTOMY TUBE PLACEMENT      HYSTERECTOMY (CERVIX STATUS UNKNOWN)  01/2022    KNEE SURGERY      Right    LARYNGOSCOPY N/A 2/24/2022    DIAGNOSTIC SUSPENSION MICROLARYNGOSCOPY BRONCHOSCOPY WITH JET VENTILATION performed by Keerthi Alvarado MD at [facility] LARYNGOSCOPY N/A 5/26/2022    Suspension Microlayrngoscopy w/ Debridement Therapeutic Bronchoscopy w/ Debridement performed by Beni Graham MD at [facility] N/A 4/14/2022    TRANSORAL LARYNGOPLASTY WITH AUGMENTATION AND LATERALIZATION OF RIGHT TRUE VOCAL CORD, and  PARTIAL ARYTENOIDECTOMY performed by Beni Graham MD at [address] [place] [address]  Allergies   Allergen Reactions    Latex Itching    Doxycycline      Tongue, throat and mouth soreness    Tizanidine Hcl Nausea And Vomiting       CURRENTMEDICATIONS  Current Outpatient Medications   Medication Sig Dispense Refill    estradiol (ESTRACE) 0.1 MG/GM vaginal cream Place 2 g vaginally daily      cetirizine (ZYRTEC) 5 MG tablet Take 5 mg by mouth daily      meloxicam (MOBIC) 15 MG tablet Take 15 mg by mouth daily      fluticasone (FLONASE) 50 MCG/ACT nasal spray 1 spray by Each Nostril route daily      esomeprazole Magnesium (NEXIUM) 40 MG PACK Take 40 mg by mouth daily      aluminum & magnesium hydroxide-simethicone (MAALOX) 200-200-20 MG/5ML SUSP suspension Take 30 mLs by mouth every 6 hours as needed for Indigestion 1 each 3    sertraline (ZOLOFT) 100 MG tablet Take 1 tablet by mouth daily (Patient taking differently: Take 50 mg by mouth daily) 30 tablet 3    melatonin 3 MG TABS tablet Take 3 mg by mouth nightly as needed      cycloSPORINE (RESTASIS) 0.05 % ophthalmic emulsion Place 2 drops into both eyes daily 1 each 0    Multiple Vitamins-Minerals (PRESERVISION AREDS 2) CAPS Take 1 capsule by mouth 2 times daily       triamcinolone (KENALOG) 0.025 % cream APPLY THIN COAT TO AFFECTED AREA TWICE A DAY      magnesium oxide (MAG-OX) 400 (241.3 Mg) MG TABS tablet Take 1 tablet by mouth daily (Patient not taking: No sig reported) 30 tablet 3     No current facility-administered medications for this visit.          Objective:  /82 (Site: Left Upper Arm, Position: Sitting, Cuff Size: Medium Adult)   Pulse 87   Ht 5' (1.524 m)   Wt 113 lb (51.3 kg)   SpO2 98%   BMI 22.07 kg/m²     General: No distress. Alert. Eyes: No conjunctival injection. Demetri Arndt Resp: No accessory muscle use. Lung sounds clear. CV: Regular rate. Regularrhythm. No mumur or rub. No edema. M/S:   Upper extremities:  Muscle strength 5/5    Lower Extremities:   Muscle strength 5/5    Neuro: Oriented to person, place, time. Psych:  No anxiety or agitation. Skin: Warm and dry. No rash on exposed extremities.        Labs:  CBC  Lab Results   Component Value Date/Time    WBC 7.9 05/28/2022 04:04 AM    RBC 3.15 05/28/2022 04:04 AM    HGB 9.6 05/28/2022 04:04 AM    HCT 31.7 05/28/2022 04:04 AM    .6 05/28/2022 04:04 AM    MCH 30.5 05/28/2022 04:04 AM    MCHC 30.3 05/28/2022 04:04 AM     05/28/2022 04:04 AM       CMP  Lab Results   Component Value Date/Time    CALCIUM 8.9 05/28/2022 04:04 AM    LABALBU 4.1 04/06/2021 02:40 PM    PROT 6.4 04/06/2021 02:40 PM     05/28/2022 04:04 AM    K 3.8 05/28/2022 04:04 AM    K 3.4 04/15/2022 04:12 AM    CO2 26 05/28/2022 04:04 AM     05/28/2022 04:04 AM    BUN 18 05/28/2022 04:04 AM    CREATININE 0.6 05/28/2022 04:04 AM    ALKPHOS 62 04/06/2021 02:40 PM    ALT 18 04/06/2021 02:40 PM    AST 25 04/06/2021 02:40 PM       HgBA1c: No components found for: HGBA1C    Lab Results   Component Value Date    TSH 1.390 04/06/2021     Lab Results   Component Value Date/Time    VITD25 97 04/06/2021 02:40 PM       Lab Results   Component Value Date    SEDRATE 2 04/06/2021     Lab Results   Component Value Date    CRP < 0.30 04/06/2021       Lab Results   Component Value Date    VITD25 97 04/06/2021    CALCIUM 8.9 05/28/2022    MG 1.8 04/15/2022     Lab Results   Component Value Date     05/28/2022    K 3.8 05/28/2022     05/28/2022    CO2 26 05/28/2022    BUN 18 05/28/2022    CREATININE 0.6 05/28/2022    GLUCOSE 90 05/28/2022    CALCIUM 8.9 05/28/2022    PROT 6.4 04/06/2021    LABALBU 4.1 04/06/2021    BILITOT 0.2 (L) 04/06/2021    ALKPHOS 62 04/06/2021    AST 25 04/06/2021    ALT 18 04/06/2021         RADIOLOGY:     DEXA 11/3/2020  Left hip: -3.0, 0.625  Right hip: -2.7, 0.670  Lumbar spine: -2.8, 0.849      Assessment and plan:  Osteoporosis, postmenopausal  - A 66year old  female with osteoporosis diagnosed about 4 years ago, and osteopenia for several years prior to that. Has been treated with evista for about 10 years. Denies any fractures. No known fmhx of OP. Reports DEXA showed decline from previous. 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 - oral bisphosphonate contraindicated due to dysphagia    - reclast 5 mg IV yearly (4/28/2021)- ordered redose to be scheduled   - calcium and vitamin D supplementation   - Repeat DEXA 11/2022- ordered      No follow-ups on file. Electronically signed by IMELDA Loza CNP on 10/5/2022 at 2:04 PM      Thank you for allowing me to participate in the care of this patient. Please call if there are any questions.

## 2022-10-12 ENCOUNTER — TELEPHONE (OUTPATIENT)
Dept: RHEUMATOLOGY | Age: 77
End: 2022-10-12

## 2022-10-12 DIAGNOSIS — M81.0 AGE-RELATED OSTEOPOROSIS WITHOUT CURRENT PATHOLOGICAL FRACTURE: Primary | ICD-10-CM

## 2022-10-12 NOTE — TELEPHONE ENCOUNTER
Calcium and creatine needs order for IV infusion  wouldn't let me pend orders? ? Not sure why please order so I can inform pt

## 2022-10-25 ENCOUNTER — HOSPITAL ENCOUNTER (OUTPATIENT)
Dept: NURSING | Age: 77
Discharge: HOME | End: 2022-10-25
Payer: MEDICARE

## 2022-10-25 VITALS
WEIGHT: 113 LBS | TEMPERATURE: 96.8 F | DIASTOLIC BLOOD PRESSURE: 62 MMHG | RESPIRATION RATE: 18 BRPM | SYSTOLIC BLOOD PRESSURE: 136 MMHG | OXYGEN SATURATION: 99 % | HEART RATE: 74 BPM | BODY MASS INDEX: 22.07 KG/M2

## 2022-10-25 DIAGNOSIS — M81.0 AGE-RELATED OSTEOPOROSIS WITHOUT CURRENT PATHOLOGICAL FRACTURE: Primary | ICD-10-CM

## 2022-10-25 LAB
ANION GAP SERPL CALCULATED.3IONS-SCNC: 11 MEQ/L (ref 8–16)
BUN BLDV-MCNC: 22 MG/DL (ref 7–22)
CALCIUM SERPL-MCNC: 9.6 MG/DL (ref 8.5–10.5)
CHLORIDE BLD-SCNC: 103 MEQ/L (ref 98–111)
CO2: 29 MEQ/L (ref 23–33)
CREAT SERPL-MCNC: 0.7 MG/DL (ref 0.4–1.2)
GFR SERPL CREATININE-BSD FRML MDRD: > 60 ML/MIN/1.73M2
GLUCOSE BLD-MCNC: 94 MG/DL (ref 70–108)
POTASSIUM SERPL-SCNC: 4.3 MEQ/L (ref 3.5–5.2)
SODIUM BLD-SCNC: 143 MEQ/L (ref 135–145)

## 2022-10-25 PROCEDURE — 36415 COLL VENOUS BLD VENIPUNCTURE: CPT

## 2022-10-25 PROCEDURE — 2580000003 HC RX 258: Performed by: NURSE PRACTITIONER

## 2022-10-25 PROCEDURE — 6360000002 HC RX W HCPCS: Performed by: NURSE PRACTITIONER

## 2022-10-25 PROCEDURE — 96365 THER/PROPH/DIAG IV INF INIT: CPT

## 2022-10-25 PROCEDURE — 80048 BASIC METABOLIC PNL TOTAL CA: CPT

## 2022-10-25 RX ORDER — SODIUM CHLORIDE 0.9 % (FLUSH) 0.9 %
5-40 SYRINGE (ML) INJECTION PRN
Status: DISCONTINUED | OUTPATIENT
Start: 2022-10-25 | End: 2022-10-26 | Stop reason: HOSPADM

## 2022-10-25 RX ORDER — 0.9 % SODIUM CHLORIDE 0.9 %
500 INTRAVENOUS SOLUTION INTRAVENOUS ONCE
Status: COMPLETED | OUTPATIENT
Start: 2022-10-25 | End: 2022-10-25

## 2022-10-25 RX ORDER — ONDANSETRON 2 MG/ML
8 INJECTION INTRAMUSCULAR; INTRAVENOUS
OUTPATIENT
Start: 2022-10-25

## 2022-10-25 RX ORDER — SODIUM CHLORIDE 0.9 % (FLUSH) 0.9 %
5-40 SYRINGE (ML) INJECTION PRN
OUTPATIENT
Start: 2022-10-25

## 2022-10-25 RX ORDER — SODIUM CHLORIDE 9 MG/ML
5-250 INJECTION, SOLUTION INTRAVENOUS PRN
OUTPATIENT
Start: 2022-10-25

## 2022-10-25 RX ORDER — SODIUM CHLORIDE 9 MG/ML
INJECTION, SOLUTION INTRAVENOUS ONCE
Status: COMPLETED | OUTPATIENT
Start: 2022-10-25 | End: 2022-10-25

## 2022-10-25 RX ORDER — ALBUTEROL SULFATE 90 UG/1
4 AEROSOL, METERED RESPIRATORY (INHALATION) PRN
OUTPATIENT
Start: 2022-10-25

## 2022-10-25 RX ORDER — SODIUM CHLORIDE 9 MG/ML
INJECTION, SOLUTION INTRAVENOUS ONCE
Status: CANCELLED | OUTPATIENT
Start: 2022-10-25 | End: 2022-10-25

## 2022-10-25 RX ORDER — ACETAMINOPHEN 325 MG/1
650 TABLET ORAL
OUTPATIENT
Start: 2022-10-25

## 2022-10-25 RX ORDER — ZOLEDRONIC ACID 5 MG/100ML
5 INJECTION, SOLUTION INTRAVENOUS ONCE
Status: COMPLETED | OUTPATIENT
Start: 2022-10-25 | End: 2022-10-25

## 2022-10-25 RX ORDER — HEPARIN SODIUM (PORCINE) LOCK FLUSH IV SOLN 100 UNIT/ML 100 UNIT/ML
500 SOLUTION INTRAVENOUS PRN
OUTPATIENT
Start: 2022-10-25

## 2022-10-25 RX ORDER — 0.9 % SODIUM CHLORIDE 0.9 %
500 INTRAVENOUS SOLUTION INTRAVENOUS ONCE
Status: CANCELLED | OUTPATIENT
Start: 2022-10-25 | End: 2022-10-25

## 2022-10-25 RX ORDER — DIPHENHYDRAMINE HYDROCHLORIDE 50 MG/ML
50 INJECTION INTRAMUSCULAR; INTRAVENOUS
OUTPATIENT
Start: 2022-10-25

## 2022-10-25 RX ORDER — SODIUM CHLORIDE 9 MG/ML
INJECTION, SOLUTION INTRAVENOUS CONTINUOUS
OUTPATIENT
Start: 2022-10-25

## 2022-10-25 RX ORDER — ZOLEDRONIC ACID 5 MG/100ML
5 INJECTION, SOLUTION INTRAVENOUS ONCE
Status: CANCELLED | OUTPATIENT
Start: 2022-10-25 | End: 2022-10-25

## 2022-10-25 RX ADMIN — ZOLEDRONIC ACID 5 MG: 0.05 INJECTION, SOLUTION INTRAVENOUS at 12:45

## 2022-10-25 RX ADMIN — SODIUM CHLORIDE: 9 INJECTION, SOLUTION INTRAVENOUS at 12:44

## 2022-10-25 RX ADMIN — SODIUM CHLORIDE 500 ML: 9 INJECTION, SOLUTION INTRAVENOUS at 13:05

## 2022-10-25 NOTE — DISCHARGE INSTRUCTIONS
RECLAST DISCHARGE INSTRUCTIONS    DIET:  Drink extra fluids. ACTIVITY : Usual     1. Continue to take adequate calcium and vitamin D. Recommend calcium 1200 mg per day and vitamin D 400 - 800 units daily. 2.  If flu like symptoms - fever, muscle soreness or headache take Tylenol or Ibuprofen. Symptoms may last up to 3 days, sometimes 7 - 14 days. 3.  Follow up with ordering physician. 4.  Any problems return to emergency room. 5.  Continue usual medication. MEDICATION GIVEN YEARLY. CONSULT YOUR DOCTOR FOR NEXT DOSE.

## 2022-10-25 NOTE — PROGRESS NOTES
1150 pt arrives ambulatory for reclast infusion. Infusion explained and questions answered. PT RIGHTS AND RESPONSIBILITIES OFFERED TO PT.   2583 labs drawn and sent down as ordered. 1230 pt meets criteria for reclast infusion. 1300 pt tolerating infusion well. Spouse at bedside. 1340 infusion complete. Pt tolerated it well with no complaints. Pt discharged ambulatory with instructions with no complaints.                __m__ Safety:       (Environmental)  Lynd to environment  Ensure ID band is correct and in place/ allergy band as needed  Assess for fall risk  Initiate fall precautions as applicable (fall band, side rails, etc.)  Call light within reach  Bed in low position/ wheels locked    _m___ Pain:       Assess pain level and characteristics  Administer analgesics as ordered  Assess effectiveness of pain management and report to MD as needed    _m___ Knowledge Deficit:  Assess baseline knowledge  Provide teaching at level of understanding  Provide teaching via preferred learning method  Evaluate teaching effectiveness    __m__ Hemodynamic/Respiratory Status:       (Pre and Post Procedure Monitoring)  Assess/Monitor vital signs and LOC  Assess Baseline SpO2 prior to any sedation  Obtain weight/height  Assess vital signs/ LOC until patient meets discharge criteria  Monitor procedure site and notify MD of any issues

## 2022-11-17 ENCOUNTER — TELEPHONE (OUTPATIENT)
Dept: RHEUMATOLOGY | Age: 77
End: 2022-11-17

## 2022-11-17 NOTE — TELEPHONE ENCOUNTER
Pt wanted to know what her t score was compared to her last dexa attempted to find previous scores and couldn't, do you see these?  If so please send pt my chart message w/scores per her request.

## 2023-05-23 NOTE — TELEPHONE ENCOUNTER
Patient calling asking if she should stop her Evista now that she has had Reclast. Her infusion was yesterday. Spoke with [place] and advised to stop the Evista.      She voiced understanding
23-May-2023

## 2023-10-05 ENCOUNTER — OFFICE VISIT (OUTPATIENT)
Dept: RHEUMATOLOGY | Age: 78
End: 2023-10-05
Payer: MEDICARE

## 2023-10-05 VITALS
BODY MASS INDEX: 22.78 KG/M2 | WEIGHT: 116 LBS | HEART RATE: 72 BPM | DIASTOLIC BLOOD PRESSURE: 68 MMHG | HEIGHT: 60 IN | SYSTOLIC BLOOD PRESSURE: 130 MMHG | OXYGEN SATURATION: 98 %

## 2023-10-05 DIAGNOSIS — M81.0 AGE-RELATED OSTEOPOROSIS WITHOUT CURRENT PATHOLOGICAL FRACTURE: Primary | ICD-10-CM

## 2023-10-05 PROCEDURE — 99213 OFFICE O/P EST LOW 20 MIN: CPT | Performed by: NURSE PRACTITIONER

## 2023-10-05 PROCEDURE — 1124F ACP DISCUSS-NO DSCNMKR DOCD: CPT | Performed by: NURSE PRACTITIONER

## 2023-10-05 RX ORDER — DIPHENHYDRAMINE HYDROCHLORIDE 50 MG/ML
50 INJECTION INTRAMUSCULAR; INTRAVENOUS
OUTPATIENT
Start: 2023-10-05

## 2023-10-05 RX ORDER — MULTIVIT WITH MINERALS/LUTEIN
1000 TABLET ORAL DAILY
COMMUNITY

## 2023-10-05 RX ORDER — ACETAMINOPHEN 160 MG
TABLET,DISINTEGRATING ORAL
COMMUNITY

## 2023-10-05 RX ORDER — SODIUM CHLORIDE 9 MG/ML
5-250 INJECTION, SOLUTION INTRAVENOUS PRN
OUTPATIENT
Start: 2023-10-05

## 2023-10-05 RX ORDER — LUTEIN 10 MG
TABLET ORAL
COMMUNITY

## 2023-10-05 RX ORDER — ACETAMINOPHEN 325 MG/1
650 TABLET ORAL
OUTPATIENT
Start: 2023-10-05

## 2023-10-05 RX ORDER — SODIUM CHLORIDE 9 MG/ML
INJECTION, SOLUTION INTRAVENOUS CONTINUOUS
OUTPATIENT
Start: 2023-10-05

## 2023-10-05 RX ORDER — ALBUTEROL SULFATE 90 UG/1
4 AEROSOL, METERED RESPIRATORY (INHALATION) PRN
OUTPATIENT
Start: 2023-10-05

## 2023-10-05 RX ORDER — FAMOTIDINE 10 MG/ML
20 INJECTION, SOLUTION INTRAVENOUS
OUTPATIENT
Start: 2023-10-05

## 2023-10-05 RX ORDER — HEPARIN SODIUM (PORCINE) LOCK FLUSH IV SOLN 100 UNIT/ML 100 UNIT/ML
500 SOLUTION INTRAVENOUS PRN
OUTPATIENT
Start: 2023-10-05

## 2023-10-05 RX ORDER — SODIUM CHLORIDE 0.9 % (FLUSH) 0.9 %
5-40 SYRINGE (ML) INJECTION PRN
OUTPATIENT
Start: 2023-10-05

## 2023-10-05 RX ORDER — ZOLEDRONIC ACID 5 MG/100ML
5 INJECTION, SOLUTION INTRAVENOUS ONCE
OUTPATIENT
Start: 2023-10-05 | End: 2023-10-05

## 2023-10-05 RX ORDER — ONDANSETRON 2 MG/ML
8 INJECTION INTRAMUSCULAR; INTRAVENOUS
OUTPATIENT
Start: 2023-10-05

## 2023-10-05 RX ORDER — EPINEPHRINE 1 MG/ML
0.3 INJECTION, SOLUTION, CONCENTRATE INTRAVENOUS PRN
OUTPATIENT
Start: 2023-10-05

## 2023-10-05 ASSESSMENT — ENCOUNTER SYMPTOMS
BACK PAIN: 0
EYE PAIN: 0
TROUBLE SWALLOWING: 1
CONSTIPATION: 0
EYE ITCHING: 0
SHORTNESS OF BREATH: 0
DIARRHEA: 0
ABDOMINAL PAIN: 0
NAUSEA: 0
COUGH: 0

## 2023-10-05 NOTE — PROGRESS NOTES
[address] [facility]  Bone Fragility Follow up     Visit Date: 10/5/2023  MRN: [id number]  Cc:   Chief Complaint   Patient presents with    Follow-up     1 yr f/u, Age-related osteoporosis without current pathological fracture         HPI:   Aliyah West  is a(n)77 y.o. female here today for follow up of Osteoporosis. Denies any falls or fractures. Taking calcium and vit D. Recent vit D level ordered by PCP was WNL. ROS:  Review of Systems   Constitutional:  Negative for fatigue, fever and unexpected weight change. HENT:  Positive for trouble swallowing. Negative for congestion. Eyes:  Negative for pain and itching. Respiratory:  Negative for cough and shortness of breath. Cardiovascular:  Negative for chest pain and leg swelling. Gastrointestinal:  Negative for abdominal pain, constipation, diarrhea and nausea. Endocrine: Negative for cold intolerance and heat intolerance. Genitourinary:  Negative for difficulty urinating, frequency and urgency. Musculoskeletal:  Positive for arthralgias. Negative for back pain and joint swelling. Skin:  Negative for rash. Neurological:  Negative for dizziness, weakness, numbness and headaches. Psychiatric/Behavioral:  The patient is not nervous/anxious.           PAST MEDICAL HISTORY  Past Medical History:   Diagnosis Date    GERD (gastroesophageal reflux disease)     H/O respiratory failure     Osteoarthritis     Paralysis of vocal cords     Polio     Right side diaphragm paralysis     states trach oxygen at night       SOCIAL HISTORY  Social History     Socioeconomic History    Marital status:      Spouse name: Yong Reza    Number of children: 5   Tobacco Use    Smoking status: Never    Smokeless tobacco: Never   Vaping Use    Vaping Use: Never used   Substance and Sexual Activity    Alcohol use: Not Currently    Drug use: No    Sexual activity: Yes     Partners: Male     Social Determinants of Health     Financial

## 2023-10-27 RX ORDER — SODIUM CHLORIDE 9 MG/ML
INJECTION, SOLUTION INTRAVENOUS CONTINUOUS
Status: DISCONTINUED | OUTPATIENT
Start: 2023-10-27 | End: 2023-10-30 | Stop reason: HOSPADM

## 2023-10-30 ENCOUNTER — ANESTHESIA (OUTPATIENT)
Dept: ENDOSCOPY | Age: 78
End: 2023-10-30
Payer: MEDICARE

## 2023-10-30 ENCOUNTER — HOSPITAL ENCOUNTER (OUTPATIENT)
Age: 78
Setting detail: OUTPATIENT SURGERY
Discharge: HOME | End: 2023-10-30
Attending: INTERNAL MEDICINE | Admitting: INTERNAL MEDICINE
Payer: MEDICARE

## 2023-10-30 ENCOUNTER — ANESTHESIA EVENT (OUTPATIENT)
Dept: ENDOSCOPY | Age: 78
End: 2023-10-30
Payer: MEDICARE

## 2023-10-30 VITALS
TEMPERATURE: 97.7 F | RESPIRATION RATE: 16 BRPM | DIASTOLIC BLOOD PRESSURE: 68 MMHG | BODY MASS INDEX: 23.01 KG/M2 | WEIGHT: 117.2 LBS | HEART RATE: 74 BPM | SYSTOLIC BLOOD PRESSURE: 148 MMHG | OXYGEN SATURATION: 95 % | HEIGHT: 60 IN

## 2023-10-30 PROCEDURE — 88305 TISSUE EXAM BY PATHOLOGIST: CPT

## 2023-10-30 PROCEDURE — 3609012400 HC EGD TRANSORAL BIOPSY SINGLE/MULTIPLE: Performed by: INTERNAL MEDICINE

## 2023-10-30 PROCEDURE — 3700000000 HC ANESTHESIA ATTENDED CARE: Performed by: INTERNAL MEDICINE

## 2023-10-30 PROCEDURE — 3700000001 HC ADD 15 MINUTES (ANESTHESIA): Performed by: INTERNAL MEDICINE

## 2023-10-30 PROCEDURE — 7100000011 HC PHASE II RECOVERY - ADDTL 15 MIN: Performed by: INTERNAL MEDICINE

## 2023-10-30 PROCEDURE — 6360000002 HC RX W HCPCS: Performed by: NURSE ANESTHETIST, CERTIFIED REGISTERED

## 2023-10-30 PROCEDURE — 2580000003 HC RX 258: Performed by: NURSE ANESTHETIST, CERTIFIED REGISTERED

## 2023-10-30 PROCEDURE — 2580000003 HC RX 258: Performed by: INTERNAL MEDICINE

## 2023-10-30 PROCEDURE — 88342 IMHCHEM/IMCYTCHM 1ST ANTB: CPT

## 2023-10-30 PROCEDURE — 7100000010 HC PHASE II RECOVERY - FIRST 15 MIN: Performed by: INTERNAL MEDICINE

## 2023-10-30 RX ORDER — SODIUM CHLORIDE 9 MG/ML
INJECTION, SOLUTION INTRAVENOUS CONTINUOUS PRN
Status: DISCONTINUED | OUTPATIENT
Start: 2023-10-30 | End: 2023-10-30 | Stop reason: SDUPTHER

## 2023-10-30 RX ORDER — FAMOTIDINE 40 MG/1
40 TABLET, FILM COATED ORAL DAILY
COMMUNITY

## 2023-10-30 RX ADMIN — PROPOFOL 20 MG: 10 INJECTION, EMULSION INTRAVENOUS at 14:15

## 2023-10-30 RX ADMIN — PROPOFOL 30 MG: 10 INJECTION, EMULSION INTRAVENOUS at 14:13

## 2023-10-30 RX ADMIN — PROPOFOL 20 MG: 10 INJECTION, EMULSION INTRAVENOUS at 14:17

## 2023-10-30 RX ADMIN — SODIUM CHLORIDE: 9 INJECTION, SOLUTION INTRAVENOUS at 14:11

## 2023-10-30 RX ADMIN — SODIUM CHLORIDE: 9 INJECTION, SOLUTION INTRAVENOUS at 14:03

## 2023-10-30 RX ADMIN — PROPOFOL 20 MG: 10 INJECTION, EMULSION INTRAVENOUS at 14:16

## 2023-10-30 ASSESSMENT — PAIN - FUNCTIONAL ASSESSMENT: PAIN_FUNCTIONAL_ASSESSMENT: 0-10

## 2023-10-30 NOTE — ANESTHESIA PRE PROCEDURE
controlled,      (-) liver disease and no renal disease       Endo/Other:    (+) : arthritis: OA., .    (-) diabetes mellitus               Abdominal:             Vascular:     - DVT. Other Findings:             Anesthesia Plan      MAC     ASA 3       Induction: intravenous. Anesthetic plan and risks discussed with patient. Plan discussed with attending.                     IMELDA Villegas - KRISTEN   10/30/2023

## 2023-10-30 NOTE — PROGRESS NOTES
EGD complete, biopsies taken and 1 jar sent to lab. photos taken, pt tolerated procedure well. Scope Number  used. Judy King

## 2023-10-30 NOTE — PROGRESS NOTES
Ayana Jacobo admitted to [facility] for egd with Dr. Amy Dietrich. Consent form signed and verified with pt.

## 2023-10-30 NOTE — OP NOTE
[facility]  EGD Procedure Note    Patient: Jeanmarie Okeefe  : 1945      Procedure: Esophagogastroduodenoscopy with biopsy          Date:  10/30/2023     Endoscopist:   Cydney Locke MD    Referring Physician: Cydney Locke MD  Primary Care Physician: Pam Perkins    Indications: This is a 78y.o. year old female who presents with dysphagia and GERD. She is very hesitant to have esophageal dilation completed so therefore it was deferred this visit. Anesthesia: MAC per Anesthesia. Please see anesthesia report. Consent:  The patient or their legal guardian has signed a consent and is aware of the potential risks, benefits, alternatives, and potential complications of this procedure. These include, but are not limited to hemorrhage, bleeding, post procedural pain, perforation, phlebitis, aspiration, hypotension, hypoxia, cardiovascular events such as arrhythmia, and possibly death. Description of Procedure: The patient was then taken to the endoscopy suite and placed in the left lateral decubitus position and the above IV sedation was administered. A forward-viewing Olympus video endoscope was lubricated and inserted through the patient's mouth into the oropharynx. Under direct visualization, the upper esophagus was intubated. The scope was advanced through the esophagus to the extent of the second portion of duodenum. The scope was then withdrawn with good views of the mucosal surfaces. Both forward and retroflexed views of the stomach were obtained. The stomach was decompressed and the scope was completely withdrawn. The patient tolerated the procedure well and was taken to the recovery area in good condition. There were no immediate complications. Estimated Blood Loss: minimal    Findings:    Esophagus: The GE junction was noted to be at 40 cm. The esophagus appeared normal without evidence of Lipscomb's esophagus or reflux esophagitis. Stomach:  There was mild antral bile

## 2023-10-30 NOTE — DISCHARGE INSTRUCTIONS
- Await pathology. - Continue current medications. - Follow up with primary care physician as scheduled. - Follow up with myself in 6-8 weeks unless previously scheduled.

## 2023-10-30 NOTE — H&P
[facility]   Pre-Operative History and Physical: EGD    Patient: Gust Merlin  : 1945     History Obtained From:  patient, electronic medical record    HISTORY OF PRESENT ILLNESS:    The patient is a 78 y.o. female who presents for an EGD for GERD, dysphagia. Past Medical History:        Diagnosis Date    GERD (gastroesophageal reflux disease)     H/O respiratory failure     Osteoarthritis     Paralysis of vocal cords     Polio     Right side diaphragm paralysis     states trach oxygen at night     Past Surgical History:        Procedure Laterality Date    CHOLECYSTECTOMY      GASTROSTOMY TUBE PLACEMENT      HYSTERECTOMY (CERVIX STATUS UNKNOWN)  2022    KNEE SURGERY      Right    LARYNGOSCOPY N/A 2022    DIAGNOSTIC SUSPENSION MICROLARYNGOSCOPY BRONCHOSCOPY WITH JET VENTILATION performed by Bard Leonel MD at [facility] N/A 2022    Suspension Microlayrngoscopy w/ Debridement Therapeutic Bronchoscopy w/ Debridement performed by Bard Leonel MD at [facility] N/A 2022    TRANSORAL LARYNGOPLASTY WITH AUGMENTATION AND LATERALIZATION OF RIGHT TRUE VOCAL CORD, and  PARTIAL ARYTENOIDECTOMY performed by Bard Leonel MD at [address] [place] [address]     Medications Prior to Admission:   No current facility-administered medications on file prior to encounter.      Current Outpatient Medications on File Prior to Encounter   Medication Sig Dispense Refill    triamcinolone (KENALOG) 0.025 % cream APPLY THIN COAT TO AFFECTED AREA TWICE A DAY      estradiol (ESTRACE) 0.1 MG/GM vaginal cream Place 2 g vaginally daily      cetirizine (ZYRTEC) 5 MG tablet Take 1 tablet by mouth daily      meloxicam (MOBIC) 15 MG tablet Take 15 mg by mouth daily (Patient not taking: Reported on 10/5/2023)      fluticasone (FLONASE) 50 MCG/ACT nasal spray 1 spray by Each Nostril route daily      esomeprazole Magnesium (NEXIUM) 40 MG PACK Take 1 packet by mouth daily      aluminum & magnesium

## 2023-10-30 NOTE — ANESTHESIA POSTPROCEDURE EVALUATION
Department of Anesthesiology  Postprocedure Note    Patient: Shruti Crisostomo  MRN: [id number]  YOB: 1945  Date of evaluation: 10/30/2023      Procedure Summary     Date: 10/30/23 Room / Location: [address] [place], [place] [address] / [address] [place] [address]    Anesthesia Start: 7341 Anesthesia Stop: 2777    Procedure: EGD BIOPSY Diagnosis:       Dysphagia, unspecified type      Gastroesophageal reflux disease, unspecified whether esophagitis present      (Dysphagia, unspecified type [R13.10])      (Gastroesophageal reflux disease, unspecified whether esophagitis present [K21.9])    Surgeons: Jeannette Dancer, MD Responsible Provider: Joe Reagan DO    Anesthesia Type: MAC ASA Status: 3          Anesthesia Type: No value filed.     Carol Ann Phase I: Carol Ann Score: 10    Carol Ann Phase II:        Anesthesia Post Evaluation    Patient location during evaluation: bedside  Patient participation: complete - patient cannot participate  Level of consciousness: awake and alert  Pain score: 0  Airway patency: patent  Nausea & Vomiting: no vomiting and no nausea  Complications: no  Cardiovascular status: blood pressure returned to baseline and hemodynamically stable  Respiratory status: acceptable, nasal cannula, nonlabored ventilation and spontaneous ventilation  Hydration status: stable

## 2023-10-30 NOTE — PROGRESS NOTES
Recovery mode, arouses easily, denies discomfort. Taking fluids, Dr. Amador Cai discussed findings and plan of care with pt and , understandings verbalized.
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
## 2023-11-13 ENCOUNTER — TELEPHONE (OUTPATIENT)
Dept: RHEUMATOLOGY | Age: 78
End: 2023-11-13

## 2023-11-13 NOTE — TELEPHONE ENCOUNTER
Called pt back and she has had the reclast infusion before last seen [place] on Oct. 5th and they discussed getting another reclast infusion and she has not heard anything about it and she would like to get this done again. Please advise.

## 2023-11-21 ENCOUNTER — HOSPITAL ENCOUNTER (OUTPATIENT)
Dept: NURSING | Age: 78
Discharge: HOME | End: 2023-11-21
Payer: MEDICARE

## 2023-11-21 VITALS
HEART RATE: 79 BPM | RESPIRATION RATE: 18 BRPM | DIASTOLIC BLOOD PRESSURE: 60 MMHG | OXYGEN SATURATION: 97 % | TEMPERATURE: 97 F | BODY MASS INDEX: 23.05 KG/M2 | WEIGHT: 118 LBS | SYSTOLIC BLOOD PRESSURE: 126 MMHG

## 2023-11-21 DIAGNOSIS — M81.0 AGE-RELATED OSTEOPOROSIS WITHOUT CURRENT PATHOLOGICAL FRACTURE: Primary | ICD-10-CM

## 2023-11-21 PROCEDURE — 96365 THER/PROPH/DIAG IV INF INIT: CPT

## 2023-11-21 PROCEDURE — 6360000002 HC RX W HCPCS: Performed by: NURSE PRACTITIONER

## 2023-11-21 RX ORDER — DIPHENHYDRAMINE HYDROCHLORIDE 50 MG/ML
50 INJECTION INTRAMUSCULAR; INTRAVENOUS
OUTPATIENT
Start: 2024-11-17

## 2023-11-21 RX ORDER — ZOLEDRONIC ACID 5 MG/100ML
5 INJECTION, SOLUTION INTRAVENOUS ONCE
Status: COMPLETED | OUTPATIENT
Start: 2023-11-21 | End: 2023-11-21

## 2023-11-21 RX ORDER — SODIUM CHLORIDE 0.9 % (FLUSH) 0.9 %
5-40 SYRINGE (ML) INJECTION PRN
OUTPATIENT
Start: 2024-11-17

## 2023-11-21 RX ORDER — HEPARIN 100 UNIT/ML
500 SYRINGE INTRAVENOUS PRN
OUTPATIENT
Start: 2024-11-17

## 2023-11-21 RX ORDER — SODIUM CHLORIDE 9 MG/ML
5-250 INJECTION, SOLUTION INTRAVENOUS PRN
OUTPATIENT
Start: 2024-11-17

## 2023-11-21 RX ORDER — SODIUM CHLORIDE 0.9 % (FLUSH) 0.9 %
5-40 SYRINGE (ML) INJECTION PRN
Status: DISCONTINUED | OUTPATIENT
Start: 2023-11-21 | End: 2023-11-22 | Stop reason: HOSPADM

## 2023-11-21 RX ORDER — EPINEPHRINE 1 MG/ML
0.3 INJECTION, SOLUTION INTRAMUSCULAR; SUBCUTANEOUS PRN
OUTPATIENT
Start: 2024-11-17

## 2023-11-21 RX ORDER — ACETAMINOPHEN 325 MG/1
650 TABLET ORAL
OUTPATIENT
Start: 2024-11-17

## 2023-11-21 RX ORDER — ALBUTEROL SULFATE 90 UG/1
4 AEROSOL, METERED RESPIRATORY (INHALATION) PRN
OUTPATIENT
Start: 2024-11-17

## 2023-11-21 RX ORDER — SODIUM CHLORIDE 9 MG/ML
INJECTION, SOLUTION INTRAVENOUS CONTINUOUS
OUTPATIENT
Start: 2024-11-17

## 2023-11-21 RX ORDER — SODIUM CHLORIDE 9 MG/ML
5-250 INJECTION, SOLUTION INTRAVENOUS PRN
Status: DISCONTINUED | OUTPATIENT
Start: 2023-11-21 | End: 2023-11-22 | Stop reason: HOSPADM

## 2023-11-21 RX ORDER — ONDANSETRON 2 MG/ML
8 INJECTION INTRAMUSCULAR; INTRAVENOUS
OUTPATIENT
Start: 2024-11-17

## 2023-11-21 RX ORDER — ZOLEDRONIC ACID 5 MG/100ML
5 INJECTION, SOLUTION INTRAVENOUS ONCE
OUTPATIENT
Start: 2024-11-17 | End: 2024-11-17

## 2023-11-21 RX ADMIN — ZOLEDRONIC ACID 5 MG: 5 INJECTION, SOLUTION INTRAVENOUS at 09:15

## 2023-11-21 NOTE — PROGRESS NOTES
0900- Patient arrived to [facility] ambulatory with  for Reclast infusion. Patient familiar with medication and has tolerated well in the past. Creatinine clearance calculator verified and patient is okay to get infusion. Vitals stable. IV inserted. Call light placed within reach. PT RIGHTS AND RESPONSIBILITIES OFFERED TO PT.     9320- Infusion started. Patient offered drink and snack. Denies further needs. 0930- Infusion complete. Patient tolerated well with no issues. IV removed. Vitals stable. 5790- Discharge instructions reviewed with patient and . Verbalized understanding with no further questions. AVS provided. Discharged ambulatory to [facility] in stable condition.            __M__ Safety:       (Environmental)  [place] to environment  Ensure ID band is correct and in place/ allergy band as needed  Assess for fall risk  Initiate fall precautions as applicable (fall band, side rails, etc.)  Call light within reach  Bed in low position/ wheels locked    __M__ Pain:       Assess pain level and characteristics  Administer analgesics as ordered  Assess effectiveness of pain management and report to MD as needed    __M__ Knowledge Deficit:  Assess baseline knowledge  Provide teaching at level of understanding  Provide teaching via preferred learning method  Evaluate teaching effectiveness    __M__ Hemodynamic/Respiratory Status:       (Pre and Post Procedure Monitoring)  Assess/Monitor vital signs and LOC  Assess Baseline SpO2 prior to any sedation  Obtain weight/height  Assess vital signs/ LOC until patient meets discharge criteria  Monitor procedure site and notify MD of any issues

## 2024-10-07 ENCOUNTER — OFFICE VISIT (OUTPATIENT)
Dept: RHEUMATOLOGY | Age: 79
End: 2024-10-07
Payer: MEDICARE

## 2024-10-07 VITALS
BODY MASS INDEX: 22.93 KG/M2 | SYSTOLIC BLOOD PRESSURE: 134 MMHG | HEIGHT: 60 IN | HEART RATE: 68 BPM | DIASTOLIC BLOOD PRESSURE: 76 MMHG | WEIGHT: 116.8 LBS | OXYGEN SATURATION: 96 %

## 2024-10-07 DIAGNOSIS — Z78.0 POSTMENOPAUSAL: ICD-10-CM

## 2024-10-07 DIAGNOSIS — M81.0 AGE-RELATED OSTEOPOROSIS WITHOUT CURRENT PATHOLOGICAL FRACTURE: Primary | ICD-10-CM

## 2024-10-07 PROCEDURE — 99213 OFFICE O/P EST LOW 20 MIN: CPT | Performed by: NURSE PRACTITIONER

## 2024-10-07 PROCEDURE — 1124F ACP DISCUSS-NO DSCNMKR DOCD: CPT | Performed by: NURSE PRACTITIONER

## 2024-10-07 RX ORDER — ACETAMINOPHEN 325 MG/1
650 TABLET ORAL
OUTPATIENT
Start: 2024-10-07

## 2024-10-07 RX ORDER — ONDANSETRON 2 MG/ML
8 INJECTION INTRAMUSCULAR; INTRAVENOUS
OUTPATIENT
Start: 2024-10-07

## 2024-10-07 RX ORDER — DIPHENHYDRAMINE HYDROCHLORIDE 50 MG/ML
50 INJECTION INTRAMUSCULAR; INTRAVENOUS
OUTPATIENT
Start: 2024-10-07

## 2024-10-07 RX ORDER — SODIUM CHLORIDE 9 MG/ML
5-250 INJECTION, SOLUTION INTRAVENOUS PRN
OUTPATIENT
Start: 2024-10-07

## 2024-10-07 RX ORDER — ESOMEPRAZOLE MAGNESIUM 40 MG/1
CAPSULE, DELAYED RELEASE ORAL DAILY
COMMUNITY
Start: 2024-08-23

## 2024-10-07 RX ORDER — ALBUTEROL SULFATE 90 UG/1
4 INHALANT RESPIRATORY (INHALATION) PRN
OUTPATIENT
Start: 2024-10-07

## 2024-10-07 RX ORDER — EPINEPHRINE 1 MG/ML
0.3 INJECTION, SOLUTION, CONCENTRATE INTRAVENOUS PRN
OUTPATIENT
Start: 2024-10-07

## 2024-10-07 RX ORDER — FAMOTIDINE 10 MG/ML
20 INJECTION, SOLUTION INTRAVENOUS
OUTPATIENT
Start: 2024-10-07

## 2024-10-07 RX ORDER — ZOLEDRONIC ACID 0.05 MG/ML
5 INJECTION, SOLUTION INTRAVENOUS ONCE
OUTPATIENT
Start: 2024-10-07 | End: 2024-10-07

## 2024-10-07 RX ORDER — SODIUM CHLORIDE 0.9 % (FLUSH) 0.9 %
5-40 SYRINGE (ML) INJECTION PRN
OUTPATIENT
Start: 2024-10-07

## 2024-10-07 RX ORDER — SODIUM CHLORIDE 9 MG/ML
INJECTION, SOLUTION INTRAVENOUS CONTINUOUS
OUTPATIENT
Start: 2024-10-07

## 2024-10-07 RX ORDER — HEPARIN SODIUM (PORCINE) LOCK FLUSH IV SOLN 100 UNIT/ML 100 UNIT/ML
500 SOLUTION INTRAVENOUS PRN
OUTPATIENT
Start: 2024-10-07

## 2024-10-07 ASSESSMENT — ENCOUNTER SYMPTOMS
COUGH: 0
SHORTNESS OF BREATH: 0
ABDOMINAL PAIN: 0
NAUSEA: 0
TROUBLE SWALLOWING: 0
EYE ITCHING: 0
EYE PAIN: 0
CONSTIPATION: 0
DIARRHEA: 0
BACK PAIN: 0

## 2024-10-07 NOTE — PROGRESS NOTES
[facility]  Bone Fragility Follow up     Visit Date: 10/7/2024  MRN: [id number]  Cc:   Chief Complaint   Patient presents with    Follow-up     1-year follow-up for osteoporosis. Patient states she does get a injection every 3 months for her shoulders by Dr. Ordoñez.          HPI:   Lenka Rodgers  is a(n)78 y.o. female here today for follow up of Osteoporosis. Had had 1 fall down a couple steps since last visit, but no injuries. Denies any other falls. Taking calcium and vitamin D. Due for DEXA and reclast next month.       ROS:  Review of Systems   Constitutional:  Negative for fatigue, fever and unexpected weight change.   HENT:  Negative for congestion and trouble swallowing.    Eyes:  Negative for pain and itching.   Respiratory:  Negative for cough and shortness of breath.    Cardiovascular:  Negative for chest pain and leg swelling.   Gastrointestinal:  Negative for abdominal pain, constipation, diarrhea and nausea.   Endocrine: Negative for cold intolerance and heat intolerance.   Genitourinary:  Negative for difficulty urinating, frequency and urgency.   Musculoskeletal:  Negative for back pain.   Skin:  Negative for rash.   Neurological:  Negative for dizziness, weakness, numbness and headaches.   Psychiatric/Behavioral:  The patient is not nervous/anxious.          PAST MEDICAL HISTORY  Past Medical History:   Diagnosis Date    GERD (gastroesophageal reflux disease)     H/O respiratory failure     Osteoarthritis     Paralysis of vocal cords     Polio     Right side diaphragm paralysis     states trach oxygen at night       SOCIAL HISTORY  Social History     Socioeconomic History    Marital status:      Spouse name: Stan Rodgers    Number of children: 5    Years of education: None    Highest education level: None   Tobacco Use    Smoking status: Never    Smokeless tobacco: Never   Vaping Use    Vaping status: Never Used   Substance and Sexual Activity    Alcohol use: Not

## 2024-11-26 ENCOUNTER — HOSPITAL ENCOUNTER (OUTPATIENT)
Dept: NURSING | Age: 79
Discharge: HOME | End: 2024-11-26
Payer: MEDICARE

## 2024-11-26 VITALS
OXYGEN SATURATION: 98 % | DIASTOLIC BLOOD PRESSURE: 50 MMHG | RESPIRATION RATE: 20 BRPM | TEMPERATURE: 98.5 F | SYSTOLIC BLOOD PRESSURE: 142 MMHG | HEART RATE: 77 BPM

## 2024-11-26 DIAGNOSIS — M81.0 AGE-RELATED OSTEOPOROSIS WITHOUT CURRENT PATHOLOGICAL FRACTURE: Primary | ICD-10-CM

## 2024-11-26 PROCEDURE — 6360000002 HC RX W HCPCS: Performed by: NURSE PRACTITIONER

## 2024-11-26 PROCEDURE — 96365 THER/PROPH/DIAG IV INF INIT: CPT

## 2024-11-26 RX ORDER — ACETAMINOPHEN 325 MG/1
650 TABLET ORAL
Status: CANCELLED | OUTPATIENT
Start: 2025-11-23

## 2024-11-26 RX ORDER — EPINEPHRINE 1 MG/ML
0.3 INJECTION, SOLUTION INTRAMUSCULAR; SUBCUTANEOUS PRN
Status: CANCELLED | OUTPATIENT
Start: 2025-11-23

## 2024-11-26 RX ORDER — ALBUTEROL SULFATE 90 UG/1
4 INHALANT RESPIRATORY (INHALATION) PRN
Status: CANCELLED | OUTPATIENT
Start: 2025-11-23

## 2024-11-26 RX ORDER — ZOLEDRONIC ACID 0.05 MG/ML
5 INJECTION, SOLUTION INTRAVENOUS ONCE
Status: CANCELLED | OUTPATIENT
Start: 2025-11-23 | End: 2025-11-23

## 2024-11-26 RX ORDER — SODIUM CHLORIDE 9 MG/ML
5-250 INJECTION, SOLUTION INTRAVENOUS PRN
Status: CANCELLED | OUTPATIENT
Start: 2025-11-23

## 2024-11-26 RX ORDER — SODIUM CHLORIDE 9 MG/ML
INJECTION, SOLUTION INTRAVENOUS CONTINUOUS
Status: CANCELLED | OUTPATIENT
Start: 2025-11-23

## 2024-11-26 RX ORDER — ONDANSETRON 2 MG/ML
8 INJECTION INTRAMUSCULAR; INTRAVENOUS
Status: CANCELLED | OUTPATIENT
Start: 2025-11-23

## 2024-11-26 RX ORDER — HYDROCORTISONE SODIUM SUCCINATE 100 MG/2ML
100 INJECTION INTRAMUSCULAR; INTRAVENOUS
Status: CANCELLED | OUTPATIENT
Start: 2025-11-23

## 2024-11-26 RX ORDER — DIPHENHYDRAMINE HYDROCHLORIDE 50 MG/ML
50 INJECTION INTRAMUSCULAR; INTRAVENOUS
Status: CANCELLED | OUTPATIENT
Start: 2025-11-23

## 2024-11-26 RX ORDER — SODIUM CHLORIDE 0.9 % (FLUSH) 0.9 %
5-40 SYRINGE (ML) INJECTION PRN
Status: CANCELLED | OUTPATIENT
Start: 2025-11-23

## 2024-11-26 RX ORDER — HEPARIN 100 UNIT/ML
500 SYRINGE INTRAVENOUS PRN
Status: CANCELLED | OUTPATIENT
Start: 2025-11-23

## 2024-11-26 RX ORDER — ZOLEDRONIC ACID 0.05 MG/ML
5 INJECTION, SOLUTION INTRAVENOUS ONCE
Status: COMPLETED | OUTPATIENT
Start: 2024-11-26 | End: 2024-11-26

## 2024-11-26 RX ADMIN — ZOLEDRONIC ACID 5 MG: 5 INJECTION, SOLUTION INTRAVENOUS at 09:43

## 2024-11-26 NOTE — PROGRESS NOTES
0930:  ARRIVES AMBULATORY FOR IV RECLAST  PROCESS REVIEWED.  PT DENIES ISSUES WITH PREVIOUS INFUSIONS.   ACCOMPANIES.  0950:  INFUSION CONTINUES  1007:  TOLERATED INFUSION WELL AND PT DISCHARGED AMBULATORY WITH INSTRUCTIONS.    _M___ Safety:       (Environmental)  [place] to environment  Ensure ID band is correct and in place/ allergy band as needed  Assess for fall risk  Initiate fall precautions as applicable (fall band, side rails, etc.)  Call light within reach  Bed in low position/ wheels locked    ____ Pain:       Assess pain level and characteristics  Administer analgesics as ordered  Assess effectiveness of pain management and report to MD as needed    _M___ Knowledge Deficit:  Assess baseline knowledge  Provide teaching at level of understanding  Provide teaching via preferred learning method  Evaluate teaching effectiveness  M___ Hemodynamic/Respiratory Status:       (Pre and Post Procedure Monitoring)  Assess/Monitor vital signs and LOC  Assess Baseline SpO2 prior to any sedation  Obtain weight/height  Assess vital signs/ LOC until patient meets discharge criteria  Monitor procedure site and notify MD of any issues    _

## (undated) DEVICE — SYRINGE MED 10ML LUERLOCK TIP W/O SFTY DISP

## (undated) DEVICE — NEEDLE SPNL 22GA L7IN SPINOCAN

## (undated) DEVICE — SUTURE MCRYL + SZ 3-0 L27IN ABSRB UD L26MM SH 1/2 CIR MCP416H

## (undated) DEVICE — TUBING, SUCTION, 1/4" X 20', STRAIGHT: Brand: MEDLINE INDUSTRIES, INC.

## (undated) DEVICE — CLIP LIG M TI 6 SIL HNDL FOR OPN AND ENDOSCP SGL APPL

## (undated) DEVICE — CODMAN® SURGICAL PATTIES 1/2" X 3" (1.27CM X 7.62CM): Brand: CODMAN®

## (undated) DEVICE — SUTURE MCRYL SZ 4 0 L18IN ABSRB UD P 3 3 8 CIR REV CUT NDL MCP494G

## (undated) DEVICE — SUTURE V-LOC 180 SZ 0 L12IN ABSRB GRN L37MM GS-21 1/2 CIR VLOCL0316

## (undated) DEVICE — CODMAN® SURGICAL PATTIES 1/2" X 1/2" (1.27CM X 1.27CM): Brand: CODMAN®

## (undated) DEVICE — Device

## (undated) DEVICE — GLOVE SURG SZ 75 L12IN FNGR THK94MIL TRNSLUC YEL LTX

## (undated) DEVICE — FIBER LASER 295 MH 40 W LNG 0.5X1.04 MMX2 MR FIBERLASE

## (undated) DEVICE — APPLICATOR MEDICATED 26 CC SOLUTION CLR STRL CHLORAPREP

## (undated) DEVICE — PACK-MAJOR

## (undated) DEVICE — DRAPE,INSTRUMENT,MAGNETIC,10X16: Brand: MEDLINE

## (undated) DEVICE — RESUS HYPERINF SYSTEM 1/2L-LF: Brand: MEDLINE INDUSTRIES, INC.

## (undated) DEVICE — SUTURE PROL SZ 2-0 L36IN NONABSORBABLE BLU SH L26MM 1/2 CIR 8523H

## (undated) DEVICE — PACK PROCEDURE SURG SET UP SRMC

## (undated) DEVICE — KIT,ANTI FOG,W/SPONGE & FLUID,SOFT PACK: Brand: MEDLINE

## (undated) DEVICE — JELLY,LUBE,STERILE,FLIP TOP,TUBE,2-OZ: Brand: MEDLINE

## (undated) DEVICE — GOWN,SIRUS,NON REINFRCD,LARGE,SET IN SL: Brand: MEDLINE

## (undated) DEVICE — SYRINGE MED 10ML TRNSLUC BRL PLUNG BLK MRK POLYPR CTRL

## (undated) DEVICE — SUTURE PROL SZ 2-0 L18IN NONABSORBABLE BLU FS L26MM 3/8 CIR 8685H

## (undated) DEVICE — GAUZE,SPONGE,8"X4",12PLY,XRAY,STRL,LF: Brand: MEDLINE

## (undated) DEVICE — SUTURE PDS + SZ 3 0 L27IN ABSRB VLT L17MM RB 1 1 2 CIR PDP305H

## (undated) DEVICE — DRAPE,EENT,SPLIT,STERILE: Brand: MEDLINE

## (undated) DEVICE — UNIVERSAL MONOPOLAR LAPAROSCOPIC CABLE 10FT, 4MM PIN CONNECTOR: Brand: CONMED

## (undated) DEVICE — PENCIL SMK EVAC ALL IN 1 DSGN ENH VISIBILITY IMPROVED AIR

## (undated) DEVICE — INTENDED FOR TISSUE SEPARATION, AND OTHER PROCEDURES THAT REQUIRE A SHARP SURGICAL BLADE TO PUNCTURE OR CUT.: Brand: BARD-PARKER ® CARBON RIB-BACK BLADES

## (undated) DEVICE — MARKER,SKIN,WI/RULER AND LABELS: Brand: MEDLINE

## (undated) DEVICE — DRAPE,TOP,102X53,STERILE: Brand: MEDLINE

## (undated) DEVICE — SYRINGE IRRIG 60ML SFT PLIABLE BLB EZ TO GRP 1 HND USE W/

## (undated) DEVICE — BLADE 1884031HRE TRICUT 4MM 27CM M4 IRR: Brand: TRICUT®

## (undated) DEVICE — 3M™ STERI-DRAPE™ INSTRUMENT POUCH 1018: Brand: STERI-DRAPE™

## (undated) DEVICE — PAD,NON-ADHERENT,3X8,STERILE,LF,1/PK: Brand: MEDLINE

## (undated) DEVICE — DRAPE,SPLIT ,77X120: Brand: MEDLINE

## (undated) DEVICE — SPONGE,NEURO,1"X3",XR,STRL,LF,10/PK: Brand: MEDLINE

## (undated) DEVICE — HYPODERMIC SAFETY NEEDLE: Brand: MAGELLAN

## (undated) DEVICE — SPONGE,PEANUT,XRAY,ST,SM,3/8",5/CARD: Brand: MEDLINE INDUSTRIES, INC.